# Patient Record
Sex: MALE | Race: WHITE | Employment: OTHER | URBAN - METROPOLITAN AREA
[De-identification: names, ages, dates, MRNs, and addresses within clinical notes are randomized per-mention and may not be internally consistent; named-entity substitution may affect disease eponyms.]

---

## 2017-09-28 ENCOUNTER — CASE MANAGEMENT (OUTPATIENT)
Dept: EMERGENCY DEPT | Age: 72
End: 2017-09-28

## 2017-09-28 PROBLEM — R07.81 RIB PAIN: Status: ACTIVE | Noted: 2017-09-28

## 2017-09-28 PROBLEM — M25.562 BILATERAL KNEE PAIN: Status: ACTIVE | Noted: 2017-09-28

## 2017-09-28 PROBLEM — M25.561 BILATERAL KNEE PAIN: Status: ACTIVE | Noted: 2017-09-28

## 2017-09-28 PROBLEM — F10.10 ALCOHOL ABUSE: Status: ACTIVE | Noted: 2017-09-28

## 2017-09-28 PROBLEM — J96.01 ACUTE RESPIRATORY FAILURE WITH HYPOXIA (HCC): Status: ACTIVE | Noted: 2017-09-28

## 2017-09-28 PROBLEM — M54.9 BACK PAIN: Status: ACTIVE | Noted: 2017-09-28

## 2017-11-07 ENCOUNTER — OFFICE VISIT (OUTPATIENT)
Dept: INTERNAL MEDICINE CLINIC | Age: 72
End: 2017-11-07

## 2017-11-07 VITALS — HEIGHT: 73 IN | WEIGHT: 298 LBS | BODY MASS INDEX: 39.49 KG/M2

## 2017-11-07 DIAGNOSIS — E78.2 MIXED HYPERLIPIDEMIA: ICD-10-CM

## 2017-11-07 DIAGNOSIS — Z11.59 ENCOUNTER FOR HEPATITIS C SCREENING TEST FOR LOW RISK PATIENT: ICD-10-CM

## 2017-11-07 DIAGNOSIS — E03.9 ACQUIRED HYPOTHYROIDISM: ICD-10-CM

## 2017-11-07 DIAGNOSIS — R60.0 PEDAL EDEMA: ICD-10-CM

## 2017-11-07 DIAGNOSIS — I10 HYPERTENSION, ESSENTIAL: Primary | ICD-10-CM

## 2017-11-07 DIAGNOSIS — K70.31 ASCITES DUE TO ALCOHOLIC CIRRHOSIS (HCC): ICD-10-CM

## 2017-11-07 DIAGNOSIS — F10.10 ALCOHOL ABUSE: ICD-10-CM

## 2017-11-07 PROBLEM — R07.81 RIB PAIN: Status: RESOLVED | Noted: 2017-09-28 | Resolved: 2017-11-07

## 2017-11-07 PROBLEM — M54.9 BACK PAIN: Status: RESOLVED | Noted: 2017-09-28 | Resolved: 2017-11-07

## 2017-11-07 LAB
A/G RATIO: 1.2 (ref 1.1–2.2)
ALBUMIN SERPL-MCNC: 3.9 G/DL (ref 3.4–5)
ALP BLD-CCNC: 117 U/L (ref 40–129)
ALT SERPL-CCNC: 21 U/L (ref 10–40)
ANION GAP SERPL CALCULATED.3IONS-SCNC: 17 MMOL/L (ref 3–16)
AST SERPL-CCNC: 30 U/L (ref 15–37)
BILIRUB SERPL-MCNC: 0.7 MG/DL (ref 0–1)
BUN BLDV-MCNC: 11 MG/DL (ref 7–20)
CALCIUM SERPL-MCNC: 9.9 MG/DL (ref 8.3–10.6)
CHLORIDE BLD-SCNC: 95 MMOL/L (ref 99–110)
CO2: 31 MMOL/L (ref 21–32)
CREAT SERPL-MCNC: 0.6 MG/DL (ref 0.8–1.3)
GFR AFRICAN AMERICAN: >60
GFR NON-AFRICAN AMERICAN: >60
GLOBULIN: 3.2 G/DL
GLUCOSE BLD-MCNC: 98 MG/DL (ref 70–99)
POTASSIUM SERPL-SCNC: 4 MMOL/L (ref 3.5–5.1)
SODIUM BLD-SCNC: 143 MMOL/L (ref 136–145)
TOTAL PROTEIN: 7.1 G/DL (ref 6.4–8.2)
TSH REFLEX: 6.76 UIU/ML (ref 0.27–4.2)

## 2017-11-07 PROCEDURE — 99214 OFFICE O/P EST MOD 30 MIN: CPT | Performed by: INTERNAL MEDICINE

## 2017-11-07 RX ORDER — FUROSEMIDE 40 MG/1
40 TABLET ORAL 2 TIMES DAILY
Qty: 60 TABLET | Refills: 3 | Status: SHIPPED | OUTPATIENT
Start: 2017-11-07 | End: 2017-11-27 | Stop reason: SDUPTHER

## 2017-11-07 ASSESSMENT — ENCOUNTER SYMPTOMS
COLOR CHANGE: 0
RHINORRHEA: 0
CHEST TIGHTNESS: 0
SHORTNESS OF BREATH: 1

## 2017-11-07 NOTE — PROGRESS NOTES
Negative for ear discharge, hearing loss, postnasal drip and rhinorrhea. Respiratory: Positive for shortness of breath. Negative for chest tightness. Cardiovascular: Positive for leg swelling. Negative for chest pain and palpitations. Genitourinary: Negative for frequency and hematuria. Musculoskeletal: Negative for neck stiffness. Skin: Positive for rash and wound. Negative for color change. Neurological: Negative for weakness. Hematological: Negative for adenopathy. Objective:   Physical Exam      There were no vitals filed for this visit. General:  Awake, alert and oriented. Appears to be not in any distress  Mucous Membranes:  Pink , anicteric  Neck: No JVD, no carotid bruit, no thyromegaly  Chest:  Clear to auscultation bilaterally, no added sounds  Cardiovascular:  RRR S1S2 heard, no murmurs or gallops  Abdomen:  Soft, undistended, non tender, no organomegaly, BS present  Extremities: 2 + pedal edema jono with chronic skin changes and erythematous skin   With excessive dry skin  Distal pulses well felt  Neurological : grossly normal      Assessment:      1. Hypertension, essential     2. Ascites due to alcoholic cirrhosis (HCC)  US Abdomen Complete    COMPREHENSIVE METABOLIC PANEL    HEPATITIS C ANTIBODY   3. Mixed hyperlipidemia     4. Alcohol abuse     5. Pedal edema     6. Encounter for hepatitis C screening test for low risk patient     7. Acquired hypothyroidism  TSH with Reflex           Plan:              HTN  - moderately controlled. On multiple meds. On norvasc, losartan, metoprolol   See below for changes        Alochol abuse - discussed cessation- pt not interested    Suspect pedal edema and ascites with Alcoholic cirrhosis - dc norvasc. Increase lasix to 40 mg bid , obtain liver us and add aldactone if needed    Chronic back pain - off methadone , now on cymbalta and neurontin    Chronic anxiety - on xanax bid, plan to wean off slowly .  D.w pt     Hyperlipidemia - on statins. Need lipids    Copd with chronic resp failure- pt not using oxygen.  Continue proventil , add symbicort  Diurese    Vit d def - on supplements    Hypothyroid on synthroid, check TSH    F/w 2 months

## 2017-11-08 LAB
HEPATITIS C ANTIBODY INTERPRETATION: NORMAL
T4 FREE: 1.3 NG/DL (ref 0.9–1.8)

## 2017-11-09 ENCOUNTER — HOSPITAL ENCOUNTER (OUTPATIENT)
Dept: ULTRASOUND IMAGING | Age: 72
Discharge: HOME | End: 2017-11-09
Attending: INTERNAL MEDICINE | Admitting: INTERNAL MEDICINE

## 2017-11-09 DIAGNOSIS — K70.31 ASCITES DUE TO ALCOHOLIC CIRRHOSIS (HCC): ICD-10-CM

## 2017-11-09 DIAGNOSIS — K70.31 ALCOHOLIC CIRRHOSIS OF LIVER WITH ASCITES (HCC): ICD-10-CM

## 2017-11-13 RX ORDER — LEVOTHYROXINE SODIUM 0.07 MG/1
TABLET ORAL
Qty: 30 TABLET | Refills: 2 | Status: SHIPPED | OUTPATIENT
Start: 2017-11-13 | End: 2018-01-11 | Stop reason: SDUPTHER

## 2017-11-13 RX ORDER — DULOXETIN HYDROCHLORIDE 60 MG/1
CAPSULE, DELAYED RELEASE ORAL
Qty: 30 CAPSULE | Refills: 2 | Status: SHIPPED | OUTPATIENT
Start: 2017-11-13 | End: 2018-01-11 | Stop reason: SDUPTHER

## 2017-11-14 ENCOUNTER — TELEPHONE (OUTPATIENT)
Dept: INTERNAL MEDICINE CLINIC | Age: 72
End: 2017-11-14

## 2017-11-14 NOTE — TELEPHONE ENCOUNTER
----- Message from Florence Adams MD sent at 11/14/2017  3:03 PM EST -----  Contact: [phone]  Call him    ----- Message -----  From: Nomi Milena  Sent: 11/13/2017   4:32 PM  To: Florence Adams MD    Patient wants you to speak with him personally about his US results. He was unaware that he had cirrhosis of the liver.

## 2017-11-20 RX ORDER — METOPROLOL SUCCINATE 25 MG/1
TABLET, EXTENDED RELEASE ORAL
Qty: 30 TABLET | Refills: 1 | Status: SHIPPED | OUTPATIENT
Start: 2017-11-20 | End: 2018-01-11 | Stop reason: SDUPTHER

## 2017-11-27 RX ORDER — FUROSEMIDE 40 MG/1
TABLET ORAL
Qty: 30 TABLET | Refills: 3 | Status: SHIPPED | OUTPATIENT
Start: 2017-11-27 | End: 2018-01-11 | Stop reason: SDUPTHER

## 2017-12-05 RX ORDER — LOSARTAN POTASSIUM 100 MG/1
TABLET ORAL
Qty: 30 TABLET | Refills: 2 | Status: SHIPPED | OUTPATIENT
Start: 2017-12-05 | End: 2018-01-11 | Stop reason: SDUPTHER

## 2018-01-11 ENCOUNTER — OFFICE VISIT (OUTPATIENT)
Dept: INTERNAL MEDICINE CLINIC | Age: 73
End: 2018-01-11

## 2018-01-11 VITALS
WEIGHT: 288 LBS | HEIGHT: 73 IN | HEART RATE: 70 BPM | BODY MASS INDEX: 38.17 KG/M2 | RESPIRATION RATE: 18 BRPM | DIASTOLIC BLOOD PRESSURE: 78 MMHG | SYSTOLIC BLOOD PRESSURE: 110 MMHG

## 2018-01-11 DIAGNOSIS — E78.2 MIXED HYPERLIPIDEMIA: ICD-10-CM

## 2018-01-11 DIAGNOSIS — R79.89 ABNORMAL TSH: ICD-10-CM

## 2018-01-11 DIAGNOSIS — I10 HYPERTENSION, ESSENTIAL: Primary | ICD-10-CM

## 2018-01-11 DIAGNOSIS — F10.10 ALCOHOL ABUSE: ICD-10-CM

## 2018-01-11 DIAGNOSIS — E55.9 VITAMIN D DEFICIENCY: ICD-10-CM

## 2018-01-11 LAB
A/G RATIO: 1.4 (ref 1.1–2.2)
ALBUMIN SERPL-MCNC: 3.8 G/DL (ref 3.4–5)
ALP BLD-CCNC: 98 U/L (ref 40–129)
ALT SERPL-CCNC: 21 U/L (ref 10–40)
ANION GAP SERPL CALCULATED.3IONS-SCNC: 12 MMOL/L (ref 3–16)
AST SERPL-CCNC: 26 U/L (ref 15–37)
BILIRUB SERPL-MCNC: 0.6 MG/DL (ref 0–1)
BUN BLDV-MCNC: 11 MG/DL (ref 7–20)
CALCIUM SERPL-MCNC: 10.3 MG/DL (ref 8.3–10.6)
CHLORIDE BLD-SCNC: 99 MMOL/L (ref 99–110)
CHOLESTEROL, TOTAL: 176 MG/DL (ref 0–199)
CO2: 32 MMOL/L (ref 21–32)
CREAT SERPL-MCNC: 0.7 MG/DL (ref 0.8–1.3)
GFR AFRICAN AMERICAN: >60
GFR NON-AFRICAN AMERICAN: >60
GLOBULIN: 2.7 G/DL
GLUCOSE BLD-MCNC: 102 MG/DL (ref 70–99)
HDLC SERPL-MCNC: 105 MG/DL (ref 40–60)
LDL CHOLESTEROL CALCULATED: 59 MG/DL
POTASSIUM SERPL-SCNC: 4.6 MMOL/L (ref 3.5–5.1)
SODIUM BLD-SCNC: 143 MMOL/L (ref 136–145)
TOTAL PROTEIN: 6.5 G/DL (ref 6.4–8.2)
TRIGL SERPL-MCNC: 62 MG/DL (ref 0–150)
TSH REFLEX: 5.41 UIU/ML (ref 0.27–4.2)
VLDLC SERPL CALC-MCNC: 12 MG/DL

## 2018-01-11 PROCEDURE — 99213 OFFICE O/P EST LOW 20 MIN: CPT | Performed by: INTERNAL MEDICINE

## 2018-01-11 RX ORDER — METOPROLOL SUCCINATE 25 MG/1
25 TABLET, EXTENDED RELEASE ORAL DAILY
Qty: 30 TABLET | Refills: 1 | Status: SHIPPED | OUTPATIENT
Start: 2018-01-11 | End: 2018-03-11 | Stop reason: SDUPTHER

## 2018-01-11 RX ORDER — DULOXETIN HYDROCHLORIDE 60 MG/1
60 CAPSULE, DELAYED RELEASE ORAL DAILY
Qty: 30 CAPSULE | Refills: 2 | Status: SHIPPED | OUTPATIENT
Start: 2018-01-11 | End: 2018-03-05 | Stop reason: SDUPTHER

## 2018-01-11 RX ORDER — LOSARTAN POTASSIUM 100 MG/1
100 TABLET ORAL DAILY
Qty: 30 TABLET | Refills: 2 | Status: SHIPPED | OUTPATIENT
Start: 2018-01-11 | End: 2018-04-13 | Stop reason: SDUPTHER

## 2018-01-11 RX ORDER — ATORVASTATIN CALCIUM 20 MG/1
20 TABLET, FILM COATED ORAL DAILY
Qty: 90 TABLET | Refills: 0 | Status: SHIPPED | OUTPATIENT
Start: 2018-01-11 | End: 2018-04-11 | Stop reason: SDUPTHER

## 2018-01-11 RX ORDER — OMEPRAZOLE 20 MG/1
20 CAPSULE, DELAYED RELEASE ORAL DAILY
Qty: 30 CAPSULE | Refills: 0 | Status: SHIPPED | OUTPATIENT
Start: 2018-01-11 | End: 2018-04-13 | Stop reason: SDUPTHER

## 2018-01-11 RX ORDER — FUROSEMIDE 40 MG/1
40 TABLET ORAL DAILY
Qty: 30 TABLET | Refills: 3 | Status: SHIPPED | OUTPATIENT
Start: 2018-01-11 | End: 2018-04-13 | Stop reason: SDUPTHER

## 2018-01-11 RX ORDER — LEVOTHYROXINE SODIUM 0.07 MG/1
75 TABLET ORAL DAILY
Qty: 30 TABLET | Refills: 2 | Status: SHIPPED | OUTPATIENT
Start: 2018-01-11 | End: 2018-01-15 | Stop reason: ALTCHOICE

## 2018-01-11 ASSESSMENT — ENCOUNTER SYMPTOMS
COLOR CHANGE: 0
RHINORRHEA: 0
CHEST TIGHTNESS: 0
SHORTNESS OF BREATH: 1

## 2018-01-11 ASSESSMENT — PATIENT HEALTH QUESTIONNAIRE - PHQ9
2. FEELING DOWN, DEPRESSED OR HOPELESS: 1
SUM OF ALL RESPONSES TO PHQ9 QUESTIONS 1 & 2: 1
SUM OF ALL RESPONSES TO PHQ QUESTIONS 1-9: 1
1. LITTLE INTEREST OR PLEASURE IN DOING THINGS: 0

## 2018-01-11 NOTE — PROGRESS NOTES
Visit Information    Have you changed or started any medications since your last visit including any over-the-counter medicines, vitamins, or herbal medicines? no   Are you having any side effects from any of your medications? -  no  Have you stopped taking any of your medications? Is so, why? -  no    Have you seen any other physician or provider since your last visit? No  Have you had any other diagnostic tests since your last visit? No  Have you been seen in the emergency room and/or had an admission to a hospital since we last saw you? No  Have you had your routine dental cleaning in the past 6 months? no    Have you activated your [employer] account? If not, what are your barriers?  Yes     Patient Care Team:  Jame Torrez MD as PCP - General (Internal Medicine)    Medical History Review  Past Medical, Family, and Social History reviewed and does not contribute to the patient presenting condition    Health Maintenance   Topic Date Due    AAA screen  1945    Lipid screen  06/28/1986    Colon cancer screen colonoscopy  06/28/1996    Zostavax vaccine  06/28/2006    Pneumococcal low/med risk (1 of 2 - PCV13) 06/28/2011    Flu vaccine (1) 09/01/2017    Potassium monitoring  11/07/2018    Creatinine monitoring  11/07/2018    DTaP/Tdap/Td vaccine (2 - Td) 03/06/2023    Hepatitis C screen  Completed

## 2018-01-11 NOTE — PROGRESS NOTES
Subjective:      Patient ID: Ean Rubio is a 71 y.o. male. HPI     71 y.o. male  with known h.o alcohol abuse, copd , HTN, hypothyroid,  anxiety here for regular f.w    Since last visit, pt has been taking lasix and has lost considerable weight but sob with exertion did not improve much     Reports he has been still drinking heavily, 8-10 beers per day and whiskey at night       Chronic pain syndrome- previously on methadone and has been off for few years    Copd with chronic resp failure- reports he is not using oxygen and he sent away oxygen recently. Not a current smoker     HTN -  on metoprolol. norvasc and losartan , lasix, no dizziness, edema resolved        Current Outpatient Prescriptions   Medication Sig Dispense Refill    losartan (COZAAR) 100 MG tablet TAKE 1 TABLET BY MOUTH EVERY DAY 30 tablet 2    CVS VITAMIN D3 1000 units CAPS TAKE ONE CAPSULE BY MOUTH DAILY 30 capsule 3    furosemide (LASIX) 40 MG tablet TAKE 1 TABLET BY MOUTH EVERY DAY 30 tablet 3    metoprolol succinate (TOPROL XL) 25 MG extended release tablet TAKE 1 TABLET BY MOUTH EVERY DAY 30 tablet 1    DULoxetine (CYMBALTA) 60 MG extended release capsule TAKE ONE CAPSULE BY MOUTH EVERY DAY 30 capsule 2    levothyroxine (SYNTHROID) 75 MCG tablet TAKE 1 TABLET BY MOUTH EVERY DAY 30 tablet 2    albuterol sulfate HFA (PROVENTIL HFA) 108 (90 BASE) MCG/ACT inhaler Inhale 2 puffs into the lungs every 6 hours as needed for Wheezing 1 Inhaler 3    potassium chloride SA (K-DUR;KLOR-CON M) 20 MEQ tablet Take 1 tablet by mouth 2 times daily 60 tablet 3    atorvastatin (LIPITOR) 20 MG tablet Take 1 tablet by mouth daily 90 tablet 0     No current facility-administered medications for this visit. Review of Systems   Constitutional: Negative for activity change, fatigue and unexpected weight change. HENT: Negative for ear discharge, hearing loss, postnasal drip and rhinorrhea. Respiratory: Positive for shortness of breath.  Negative

## 2018-01-12 LAB — T4 FREE: 1.1 NG/DL (ref 0.9–1.8)

## 2018-01-15 ENCOUNTER — TELEPHONE (OUTPATIENT)
Dept: INTERNAL MEDICINE CLINIC | Age: 73
End: 2018-01-15

## 2018-01-15 RX ORDER — LEVOTHYROXINE SODIUM 0.1 MG/1
100 TABLET ORAL DAILY
Qty: 30 TABLET | Refills: 0 | Status: SHIPPED | OUTPATIENT
Start: 2018-01-15 | End: 2018-02-10 | Stop reason: SDUPTHER

## 2018-01-16 RX ORDER — GABAPENTIN 300 MG/1
CAPSULE ORAL
Qty: 90 CAPSULE | Refills: 1 | Status: SHIPPED | OUTPATIENT
Start: 2018-01-16 | End: 2018-04-13 | Stop reason: SDUPTHER

## 2018-01-16 RX ORDER — POTASSIUM CHLORIDE 1500 MG/1
TABLET, EXTENDED RELEASE ORAL
Qty: 60 TABLET | Refills: 3 | Status: SHIPPED | OUTPATIENT
Start: 2018-01-16 | End: 2018-04-13 | Stop reason: SDUPTHER

## 2018-01-22 ENCOUNTER — TELEPHONE (OUTPATIENT)
Dept: INTERNAL MEDICINE CLINIC | Age: 73
End: 2018-01-22

## 2018-02-12 RX ORDER — LEVOTHYROXINE SODIUM 0.1 MG/1
100 TABLET ORAL DAILY
Qty: 30 TABLET | Refills: 2 | Status: SHIPPED | OUTPATIENT
Start: 2018-02-12 | End: 2018-04-13 | Stop reason: SDUPTHER

## 2018-03-05 RX ORDER — DULOXETIN HYDROCHLORIDE 60 MG/1
CAPSULE, DELAYED RELEASE ORAL
Qty: 30 CAPSULE | Refills: 1 | Status: SHIPPED | OUTPATIENT
Start: 2018-03-05 | End: 2018-04-13 | Stop reason: SDUPTHER

## 2018-03-12 RX ORDER — METOPROLOL SUCCINATE 25 MG/1
25 TABLET, EXTENDED RELEASE ORAL DAILY
Qty: 30 TABLET | Refills: 1 | Status: SHIPPED | OUTPATIENT
Start: 2018-03-12 | End: 2018-04-13 | Stop reason: SDUPTHER

## 2018-04-12 RX ORDER — ATORVASTATIN CALCIUM 20 MG/1
20 TABLET, FILM COATED ORAL DAILY
Qty: 30 TABLET | Refills: 0 | Status: SHIPPED | OUTPATIENT
Start: 2018-04-12 | End: 2018-04-13 | Stop reason: SDUPTHER

## 2018-04-13 ENCOUNTER — OFFICE VISIT (OUTPATIENT)
Dept: INTERNAL MEDICINE CLINIC | Age: 73
End: 2018-04-13

## 2018-04-13 VITALS
DIASTOLIC BLOOD PRESSURE: 78 MMHG | HEART RATE: 70 BPM | BODY MASS INDEX: 36.58 KG/M2 | SYSTOLIC BLOOD PRESSURE: 135 MMHG | RESPIRATION RATE: 18 BRPM | OXYGEN SATURATION: 93 % | WEIGHT: 276 LBS | HEIGHT: 73 IN

## 2018-04-13 DIAGNOSIS — E03.9 ACQUIRED HYPOTHYROIDISM: ICD-10-CM

## 2018-04-13 DIAGNOSIS — E78.2 MIXED HYPERLIPIDEMIA: Primary | ICD-10-CM

## 2018-04-13 DIAGNOSIS — I10 HYPERTENSION, ESSENTIAL: ICD-10-CM

## 2018-04-13 DIAGNOSIS — R06.09 DYSPNEA ON EXERTION: ICD-10-CM

## 2018-04-13 DIAGNOSIS — F41.9 ANXIETY: ICD-10-CM

## 2018-04-13 DIAGNOSIS — F10.10 ALCOHOL ABUSE: ICD-10-CM

## 2018-04-13 PROBLEM — J96.01 ACUTE RESPIRATORY FAILURE WITH HYPOXIA (HCC): Status: RESOLVED | Noted: 2017-09-28 | Resolved: 2018-04-13

## 2018-04-13 LAB
A/G RATIO: 1.6 (ref 1.1–2.2)
ALBUMIN SERPL-MCNC: 4.2 G/DL (ref 3.4–5)
ALP BLD-CCNC: 92 U/L (ref 40–129)
ALT SERPL-CCNC: 15 U/L (ref 10–40)
ANION GAP SERPL CALCULATED.3IONS-SCNC: 15 MMOL/L (ref 3–16)
AST SERPL-CCNC: 20 U/L (ref 15–37)
BASOPHILS ABSOLUTE: 0.1 K/UL (ref 0–0.2)
BASOPHILS RELATIVE PERCENT: 0.6 %
BILIRUB SERPL-MCNC: 0.8 MG/DL (ref 0–1)
BUN BLDV-MCNC: 12 MG/DL (ref 7–20)
CALCIUM SERPL-MCNC: 9.4 MG/DL (ref 8.3–10.6)
CHLORIDE BLD-SCNC: 96 MMOL/L (ref 99–110)
CO2: 31 MMOL/L (ref 21–32)
CREAT SERPL-MCNC: 0.7 MG/DL (ref 0.8–1.3)
EOSINOPHILS ABSOLUTE: 0.3 K/UL (ref 0–0.6)
EOSINOPHILS RELATIVE PERCENT: 3.6 %
GFR AFRICAN AMERICAN: >60
GFR NON-AFRICAN AMERICAN: >60
GLOBULIN: 2.7 G/DL
GLUCOSE BLD-MCNC: 111 MG/DL (ref 70–99)
HCT VFR BLD CALC: 44.4 % (ref 40.5–52.5)
HEMOGLOBIN: 15 G/DL (ref 13.5–17.5)
LYMPHOCYTES ABSOLUTE: 1.2 K/UL (ref 1–5.1)
LYMPHOCYTES RELATIVE PERCENT: 12.4 %
MCH RBC QN AUTO: 35.3 PG (ref 26–34)
MCHC RBC AUTO-ENTMCNC: 33.8 G/DL (ref 31–36)
MCV RBC AUTO: 104.2 FL (ref 80–100)
MONOCYTES ABSOLUTE: 0.8 K/UL (ref 0–1.3)
MONOCYTES RELATIVE PERCENT: 8.3 %
NEUTROPHILS ABSOLUTE: 7 K/UL (ref 1.7–7.7)
NEUTROPHILS RELATIVE PERCENT: 75.1 %
PDW BLD-RTO: 15.8 % (ref 12.4–15.4)
PLATELET # BLD: 455 K/UL (ref 135–450)
PMV BLD AUTO: 8.2 FL (ref 5–10.5)
POTASSIUM SERPL-SCNC: 4.6 MMOL/L (ref 3.5–5.1)
RBC # BLD: 4.26 M/UL (ref 4.2–5.9)
SODIUM BLD-SCNC: 142 MMOL/L (ref 136–145)
TOTAL PROTEIN: 6.9 G/DL (ref 6.4–8.2)
TSH REFLEX: 4.17 UIU/ML (ref 0.27–4.2)
WBC # BLD: 9.4 K/UL (ref 4–11)

## 2018-04-13 PROCEDURE — 99213 OFFICE O/P EST LOW 20 MIN: CPT | Performed by: INTERNAL MEDICINE

## 2018-04-13 RX ORDER — OMEPRAZOLE 20 MG/1
20 CAPSULE, DELAYED RELEASE ORAL DAILY
Qty: 30 CAPSULE | Refills: 0 | Status: SHIPPED | OUTPATIENT
Start: 2018-04-13 | End: 2018-05-13 | Stop reason: SDUPTHER

## 2018-04-13 RX ORDER — LEVOTHYROXINE SODIUM 0.1 MG/1
100 TABLET ORAL DAILY
Qty: 30 TABLET | Refills: 2 | Status: ON HOLD | OUTPATIENT
Start: 2018-04-13 | End: 2019-02-19 | Stop reason: HOSPADM

## 2018-04-13 RX ORDER — ATORVASTATIN CALCIUM 20 MG/1
20 TABLET, FILM COATED ORAL DAILY
Qty: 30 TABLET | Refills: 5 | Status: SHIPPED | OUTPATIENT
Start: 2018-04-13 | End: 2019-04-08 | Stop reason: ALTCHOICE

## 2018-04-13 RX ORDER — METOPROLOL SUCCINATE 25 MG/1
25 TABLET, EXTENDED RELEASE ORAL DAILY
Qty: 30 TABLET | Refills: 5 | Status: SHIPPED | OUTPATIENT
Start: 2018-04-13 | End: 2018-05-08 | Stop reason: SDUPTHER

## 2018-04-13 RX ORDER — POTASSIUM CHLORIDE 20 MEQ/1
20 TABLET, EXTENDED RELEASE ORAL DAILY
Qty: 30 TABLET | Refills: 3 | Status: SHIPPED | OUTPATIENT
Start: 2018-04-13 | End: 2018-05-08 | Stop reason: SDUPTHER

## 2018-04-13 RX ORDER — LOSARTAN POTASSIUM 100 MG/1
100 TABLET ORAL DAILY
Qty: 30 TABLET | Refills: 3 | Status: SHIPPED | OUTPATIENT
Start: 2018-04-13 | End: 2018-05-08 | Stop reason: SDUPTHER

## 2018-04-13 RX ORDER — GABAPENTIN 300 MG/1
300 CAPSULE ORAL 3 TIMES DAILY
Qty: 90 CAPSULE | Refills: 1 | Status: SHIPPED | OUTPATIENT
Start: 2018-04-13 | End: 2018-09-17 | Stop reason: SDUPTHER

## 2018-04-13 RX ORDER — DULOXETIN HYDROCHLORIDE 60 MG/1
60 CAPSULE, DELAYED RELEASE ORAL DAILY
Qty: 30 CAPSULE | Refills: 3 | Status: SHIPPED | OUTPATIENT
Start: 2018-04-13 | End: 2018-05-08 | Stop reason: SDUPTHER

## 2018-04-13 RX ORDER — FUROSEMIDE 40 MG/1
40 TABLET ORAL DAILY
Qty: 30 TABLET | Refills: 3 | Status: SHIPPED | OUTPATIENT
Start: 2018-04-13 | End: 2018-04-30 | Stop reason: SDUPTHER

## 2018-04-13 RX ORDER — ALBUTEROL SULFATE 90 UG/1
2 AEROSOL, METERED RESPIRATORY (INHALATION) EVERY 6 HOURS PRN
Qty: 1 INHALER | Refills: 3 | Status: SHIPPED | OUTPATIENT
Start: 2018-04-13

## 2018-04-13 ASSESSMENT — ENCOUNTER SYMPTOMS
SHORTNESS OF BREATH: 1
CHEST TIGHTNESS: 0
RHINORRHEA: 0
COLOR CHANGE: 0

## 2018-04-23 RX ORDER — LOSARTAN POTASSIUM 100 MG/1
100 TABLET ORAL DAILY
Qty: 30 TABLET | Refills: 2 | Status: SHIPPED | OUTPATIENT
Start: 2018-04-23 | End: 2018-05-08 | Stop reason: SDUPTHER

## 2018-04-30 RX ORDER — FUROSEMIDE 40 MG/1
TABLET ORAL
Qty: 30 TABLET | Refills: 2 | Status: SHIPPED | OUTPATIENT
Start: 2018-04-30 | End: 2018-05-08 | Stop reason: SDUPTHER

## 2018-05-08 RX ORDER — POTASSIUM CHLORIDE 20 MEQ/1
20 TABLET, EXTENDED RELEASE ORAL DAILY
Qty: 90 TABLET | Refills: 0 | Status: SHIPPED | OUTPATIENT
Start: 2018-05-08 | End: 2018-05-10 | Stop reason: SDUPTHER

## 2018-05-08 RX ORDER — FUROSEMIDE 40 MG/1
TABLET ORAL
Qty: 90 TABLET | Refills: 0 | Status: SHIPPED | OUTPATIENT
Start: 2018-05-08 | End: 2018-10-25

## 2018-05-08 RX ORDER — METOPROLOL SUCCINATE 25 MG/1
25 TABLET, EXTENDED RELEASE ORAL DAILY
Qty: 30 TABLET | Refills: 1 | Status: SHIPPED | OUTPATIENT
Start: 2018-05-08 | End: 2018-05-08 | Stop reason: SDUPTHER

## 2018-05-08 RX ORDER — DULOXETIN HYDROCHLORIDE 60 MG/1
60 CAPSULE, DELAYED RELEASE ORAL DAILY
Qty: 90 CAPSULE | Refills: 0 | Status: SHIPPED | OUTPATIENT
Start: 2018-05-08 | End: 2019-04-15 | Stop reason: ALTCHOICE

## 2018-05-08 RX ORDER — LOSARTAN POTASSIUM 100 MG/1
100 TABLET ORAL DAILY
Qty: 90 TABLET | Refills: 0 | Status: SHIPPED | OUTPATIENT
Start: 2018-05-08 | End: 2019-03-09 | Stop reason: SDUPTHER

## 2018-05-08 RX ORDER — METOPROLOL SUCCINATE 25 MG/1
25 TABLET, EXTENDED RELEASE ORAL DAILY
Qty: 90 TABLET | Refills: 0 | Status: ON HOLD | OUTPATIENT
Start: 2018-05-08 | End: 2019-03-23 | Stop reason: SDUPTHER

## 2018-05-10 RX ORDER — POTASSIUM CHLORIDE 20 MEQ/1
20 TABLET, EXTENDED RELEASE ORAL DAILY
Qty: 90 TABLET | Refills: 0 | Status: SHIPPED | OUTPATIENT
Start: 2018-05-10 | End: 2018-09-07 | Stop reason: SDUPTHER

## 2018-05-14 RX ORDER — OMEPRAZOLE 20 MG/1
20 CAPSULE, DELAYED RELEASE ORAL DAILY
Qty: 30 CAPSULE | Refills: 1 | Status: SHIPPED | OUTPATIENT
Start: 2018-05-14 | End: 2018-08-12 | Stop reason: SDUPTHER

## 2018-06-14 ENCOUNTER — OFFICE VISIT (OUTPATIENT)
Dept: INTERNAL MEDICINE CLINIC | Age: 73
End: 2018-06-14

## 2018-06-14 VITALS — HEART RATE: 70 BPM | DIASTOLIC BLOOD PRESSURE: 70 MMHG | RESPIRATION RATE: 18 BRPM | SYSTOLIC BLOOD PRESSURE: 135 MMHG
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
## 2018-06-14 DIAGNOSIS — J94.2 HEMOTHORAX, RIGHT: ICD-10-CM

## 2018-06-14 DIAGNOSIS — M43.24 FUSION OF SPINE OF THORACIC REGION: ICD-10-CM

## 2018-06-14 DIAGNOSIS — R78.81 STAPHYLOCOCCUS AUREUS BACTEREMIA: ICD-10-CM

## 2018-06-14 DIAGNOSIS — Z09 HOSPITAL DISCHARGE FOLLOW-UP: ICD-10-CM

## 2018-06-14 DIAGNOSIS — B95.61 STAPHYLOCOCCUS AUREUS BACTEREMIA: ICD-10-CM

## 2018-06-14 DIAGNOSIS — S22.43XS MULTIPLE FRACTURES OF RIBS, BILATERAL, SEQUELA: ICD-10-CM

## 2018-06-14 PROCEDURE — 99214 OFFICE O/P EST MOD 30 MIN: CPT | Performed by: INTERNAL MEDICINE

## 2018-06-14 ASSESSMENT — ENCOUNTER SYMPTOMS
SHORTNESS OF BREATH: 1
CHEST TIGHTNESS: 0
RHINORRHEA: 0
COLOR CHANGE: 0

## 2018-06-14 NOTE — PROGRESS NOTES
Subjective:      Patient ID: Deja Maki is a 71 y.o. male. HPI       71 y.o. male  with known h.o alcohol abuse, copd , HTN, hypothyroid,  anxiety here for regular f.w    Since last visit, pt fell at home on steps 5/18- with injury to back bone   Sustained multiple jono rib fractures, right hemothorax , MSSA bacteremia    Required T6- T11 fusion  at . Now completed rehab and in back brace  Now has picc line for IV abx , f/w  Infectious diseases     Reports he quit alcohol since discharge and now slowly recovering . Able to ambulate. Pain well controlled. No urinary or fecal incontinence or lower ext weakness         Chronic pain syndrome- previously on methadone and has been off for few years  Now on cymbalta         HTN -  on metoprolol.  norvasc and losartan , lasix, no dizziness, edema resolved    GERD on PPI    Copd , stable now on home oxygen post surgery      Current Outpatient Prescriptions   Medication Sig Dispense Refill    omeprazole (PRILOSEC) 20 MG delayed release capsule TAKE 1 CAPSULE BY MOUTH DAILY 30 capsule 1    aspirin 81 MG tablet Take 81 mg by mouth daily      potassium chloride (KLOR-CON M20) 20 MEQ extended release tablet Take 1 tablet by mouth daily 90 tablet 0    DULoxetine (CYMBALTA) 60 MG extended release capsule Take 1 capsule by mouth daily 90 capsule 0    metoprolol succinate (TOPROL XL) 25 MG extended release tablet Take 1 tablet by mouth daily 90 tablet 0    furosemide (LASIX) 40 MG tablet TAKE 1 TABLET BY MOUTH EVERY DAY 90 tablet 0    losartan (COZAAR) 100 MG tablet Take 1 tablet by mouth daily 90 tablet 0    vitamin D (CVS VITAMIN D3) 1000 units CAPS Take 1 capsule by mouth daily 90 capsule 0    albuterol sulfate HFA (PROVENTIL HFA) 108 (90 Base) MCG/ACT inhaler Inhale 2 puffs into the lungs every 6 hours as needed for Wheezing 1 Inhaler 3    atorvastatin (LIPITOR) 20 MG tablet Take 1 tablet by mouth daily 30 tablet 5    levothyroxine (SYNTHROID) 100 MCG tablet vertebral fracture  - from fall, sp T6- 11 fusion   - pain controlled  - in back brace, fw  trauma clinic      HTN  - controlled. On multiple meds. Off norvasc,  Continue lasix, losartan, metoprolol   Improved pedal edema, has lost 25 lbs since last year       Alochol abuse - remains sober since fall 5/18    Pedal edema - on lasix 40 mg daily    Chronic back pain - off methadone , now on cymbalta and neurontin     Chronic anxiety- was on xanax bid, weaned off last year 2017    Hyperlipidemia - on statins.  Well controlled       Vit d def - on supplements    Hypothyroid on synthroid, check TSH    F/w 3 months

## 2018-06-21 RX ORDER — BUDESONIDE AND FORMOTEROL FUMARATE DIHYDRATE 80; 4.5 UG/1; UG/1
1 AEROSOL RESPIRATORY (INHALATION) 2 TIMES DAILY
Qty: 3 INHALER | Refills: 0 | Status: SHIPPED | OUTPATIENT
Start: 2018-06-21 | End: 2018-10-01 | Stop reason: SDUPTHER

## 2018-07-23 ASSESSMENT — ENCOUNTER SYMPTOMS: BACK PAIN: 1

## 2018-08-09 ENCOUNTER — TELEPHONE (OUTPATIENT)
Dept: INTERNAL MEDICINE CLINIC | Age: 73
End: 2018-08-09

## 2018-08-13 RX ORDER — OMEPRAZOLE 20 MG/1
CAPSULE, DELAYED RELEASE ORAL
Qty: 30 CAPSULE | Refills: 1 | Status: SHIPPED | OUTPATIENT
Start: 2018-08-13 | End: 2018-10-06 | Stop reason: SDUPTHER

## 2018-08-29 ENCOUNTER — OFFICE VISIT (OUTPATIENT)
Dept: INTERNAL MEDICINE CLINIC | Age: 73
End: 2018-08-29

## 2018-08-29 VITALS
SYSTOLIC BLOOD PRESSURE: 125 MMHG | RESPIRATION RATE: 18 BRPM | WEIGHT: 266 LBS | BODY MASS INDEX: 35.25 KG/M2 | HEART RATE: 85 BPM | DIASTOLIC BLOOD PRESSURE: 70 MMHG | HEIGHT: 73 IN

## 2018-08-29 DIAGNOSIS — I10 HYPERTENSION, ESSENTIAL: Primary | ICD-10-CM

## 2018-08-29 DIAGNOSIS — F10.10 ALCOHOL ABUSE: ICD-10-CM

## 2018-08-29 DIAGNOSIS — J43.9 PULMONARY EMPHYSEMA, UNSPECIFIED EMPHYSEMA TYPE (HCC): ICD-10-CM

## 2018-08-29 DIAGNOSIS — I10 HYPERTENSION, ESSENTIAL: ICD-10-CM

## 2018-08-29 DIAGNOSIS — E78.2 MIXED HYPERLIPIDEMIA: ICD-10-CM

## 2018-08-29 DIAGNOSIS — F41.9 ANXIETY: ICD-10-CM

## 2018-08-29 LAB
A/G RATIO: 1.4 (ref 1.1–2.2)
ALBUMIN SERPL-MCNC: 4.1 G/DL (ref 3.4–5)
ALP BLD-CCNC: 123 U/L (ref 40–129)
ALT SERPL-CCNC: 19 U/L (ref 10–40)
ANION GAP SERPL CALCULATED.3IONS-SCNC: 16 MMOL/L (ref 3–16)
AST SERPL-CCNC: 30 U/L (ref 15–37)
BASOPHILS ABSOLUTE: 0.1 K/UL (ref 0–0.2)
BASOPHILS RELATIVE PERCENT: 0.5 %
BILIRUB SERPL-MCNC: 0.3 MG/DL (ref 0–1)
BUN BLDV-MCNC: 8 MG/DL (ref 7–20)
CALCIUM SERPL-MCNC: 9.6 MG/DL (ref 8.3–10.6)
CHLORIDE BLD-SCNC: 94 MMOL/L (ref 99–110)
CO2: 30 MMOL/L (ref 21–32)
CREAT SERPL-MCNC: 0.8 MG/DL (ref 0.8–1.3)
EOSINOPHILS ABSOLUTE: 0.2 K/UL (ref 0–0.6)
EOSINOPHILS RELATIVE PERCENT: 2.5 %
GFR AFRICAN AMERICAN: >60
GFR NON-AFRICAN AMERICAN: >60
GLOBULIN: 2.9 G/DL
GLUCOSE BLD-MCNC: 92 MG/DL (ref 70–99)
HCT VFR BLD CALC: 37.5 % (ref 40.5–52.5)
HEMOGLOBIN: 12.6 G/DL (ref 13.5–17.5)
LYMPHOCYTES ABSOLUTE: 1.2 K/UL (ref 1–5.1)
LYMPHOCYTES RELATIVE PERCENT: 13 %
MCH RBC QN AUTO: 31.6 PG (ref 26–34)
MCHC RBC AUTO-ENTMCNC: 33.5 G/DL (ref 31–36)
MCV RBC AUTO: 94.2 FL (ref 80–100)
MONOCYTES ABSOLUTE: 0.7 K/UL (ref 0–1.3)
MONOCYTES RELATIVE PERCENT: 7.5 %
NEUTROPHILS ABSOLUTE: 7.3 K/UL (ref 1.7–7.7)
NEUTROPHILS RELATIVE PERCENT: 76.5 %
PDW BLD-RTO: 16.1 % (ref 12.4–15.4)
PLATELET # BLD: 470 K/UL (ref 135–450)
PMV BLD AUTO: 8.3 FL (ref 5–10.5)
POTASSIUM SERPL-SCNC: 4.2 MMOL/L (ref 3.5–5.1)
RBC # BLD: 3.98 M/UL (ref 4.2–5.9)
SODIUM BLD-SCNC: 140 MMOL/L (ref 136–145)
TOTAL PROTEIN: 7 G/DL (ref 6.4–8.2)
TSH REFLEX: 2.93 UIU/ML (ref 0.27–4.2)
WBC # BLD: 9.6 K/UL (ref 4–11)

## 2018-08-29 PROCEDURE — 99214 OFFICE O/P EST MOD 30 MIN: CPT | Performed by: INTERNAL MEDICINE

## 2018-08-29 ASSESSMENT — ENCOUNTER SYMPTOMS
BACK PAIN: 1
RHINORRHEA: 0
CHEST TIGHTNESS: 0
SHORTNESS OF BREATH: 1
COLOR CHANGE: 0

## 2018-08-29 NOTE — PROGRESS NOTES
Inhaler 3    atorvastatin (LIPITOR) 20 MG tablet Take 1 tablet by mouth daily 30 tablet 5    levothyroxine (SYNTHROID) 100 MCG tablet Take 1 tablet by mouth Daily 30 tablet 2    gabapentin (NEURONTIN) 300 MG capsule Take 1 capsule by mouth 3 times daily for 90 days. . 90 capsule 1     No current facility-administered medications for this visit. Review of Systems   Constitutional: Negative for activity change, fatigue and unexpected weight change. HENT: Negative for ear discharge, hearing loss, postnasal drip and rhinorrhea. Respiratory: Positive for shortness of breath. Negative for chest tightness. Cardiovascular: Negative for chest pain, palpitations and leg swelling. Genitourinary: Negative for frequency and hematuria. Musculoskeletal: Positive for back pain. Negative for neck stiffness. Skin: Negative for color change and wound. Skin tag on left shoulder   Neurological: Negative for dizziness, weakness and numbness. Hematological: Negative for adenopathy. Psychiatric/Behavioral: Negative for dysphoric mood and sleep disturbance. The patient is not nervous/anxious. Objective:   Physical Exam    Vitals:    08/29/18 1057   BP: 125/70   Pulse: 85   Resp: 18         General: elderly male,  Awake, alert and oriented. Appears to be not in any distress  Mucous Membranes:  Pink , anicteric  Neck: No JVD, no carotid bruit, no thyromegaly  Chest:  Bilateral air entry , clear with no wheeze  Cardiovascular:  RRR S1S2 heard, no murmurs   Abdomen:  Soft, undistended, non tender, no organomegaly, BS present  Extremities: resolved pedal edema jono with chronic skin changes and erythematous skin    Distal pulses well felt    Wt Readings from Last 3 Encounters:   08/29/18 266 lb (120.7 kg)   05/13/18 276 lb (125.2 kg)   04/13/18 276 lb (125.2 kg)         Assessment:       Diagnosis Orders   1. Hypertension, essential     2. Anxiety     3. Mixed hyperlipidemia     4.  Pulmonary emphysema, unspecified emphysema type ([facility] [address]             Plan:        Copd  - weaned off oxygen  - remains symptomatic with exertional dyspnea. Does not want oxygen  - quit smoking >10 yrs  - stop symbicort and trial of breo today    Fall with mulitiple rib fractures and T8 vertebral fracture  - from fall, sp T6- 11 fusion 5/18 at [facility]  - no issues  - pt continues to drink       HTN  - controlled. On multiple meds. Off norvasc,  Continue lasix, losartan, metoprolol   Improved pedal edema, has lost 30 lbs since last year       Alochol abuse - no interest in quittting. Continues to drink heavily    Pedal edema - on lasix 40 mg daily- resolved    Chronic back pain - off methadone , now on cymbalta and neurontin     Chronic anxiety- was on xanax bid, weaned off last year 2017    Hyperlipidemia - on statins.  Well controlled       Vit d def - on supplements    Hypothyroid on synthroid, check TSH    F/w 3-4months

## 2018-09-10 RX ORDER — POTASSIUM CHLORIDE 1500 MG/1
TABLET, EXTENDED RELEASE ORAL
Qty: 90 TABLET | Refills: 0 | Status: ON HOLD | OUTPATIENT
Start: 2018-09-10 | End: 2019-08-25 | Stop reason: HOSPADM

## 2018-09-17 RX ORDER — GABAPENTIN 300 MG/1
300 CAPSULE ORAL 3 TIMES DAILY
Qty: 90 CAPSULE | Refills: 1 | Status: ON HOLD | OUTPATIENT
Start: 2018-09-17 | End: 2019-02-19 | Stop reason: HOSPADM

## 2018-10-01 RX ORDER — DILTIAZEM HYDROCHLORIDE 60 MG/1
TABLET, FILM COATED ORAL
Qty: 30.6 INHALER | Refills: 2 | Status: SHIPPED | OUTPATIENT
Start: 2018-10-01

## 2018-10-08 RX ORDER — OMEPRAZOLE 20 MG/1
CAPSULE, DELAYED RELEASE ORAL
Qty: 30 CAPSULE | Refills: 2 | Status: SHIPPED | OUTPATIENT
Start: 2018-10-08 | End: 2018-12-10 | Stop reason: SDUPTHER

## 2018-10-25 RX ORDER — FUROSEMIDE 40 MG/1
TABLET ORAL
Qty: 30 TABLET | Refills: 2 | Status: ON HOLD | OUTPATIENT
Start: 2018-10-25 | End: 2019-03-29 | Stop reason: SDUPTHER

## 2018-12-03 ENCOUNTER — OFFICE VISIT (OUTPATIENT)
Dept: INTERNAL MEDICINE CLINIC | Age: 73
End: 2018-12-03

## 2018-12-03 ENCOUNTER — TELEPHONE (OUTPATIENT)
Dept: INTERNAL MEDICINE CLINIC | Age: 73
End: 2018-12-03

## 2018-12-03 VITALS
HEIGHT: 73 IN | WEIGHT: 272 LBS | DIASTOLIC BLOOD PRESSURE: 75 MMHG | RESPIRATION RATE: 18 BRPM | BODY MASS INDEX: 36.05 KG/M2 | SYSTOLIC BLOOD PRESSURE: 125 MMHG | HEART RATE: 70 BPM

## 2018-12-03 DIAGNOSIS — F41.9 ANXIETY: ICD-10-CM

## 2018-12-03 DIAGNOSIS — F10.10 ALCOHOL ABUSE: ICD-10-CM

## 2018-12-03 DIAGNOSIS — E03.9 ACQUIRED HYPOTHYROIDISM: ICD-10-CM

## 2018-12-03 DIAGNOSIS — I10 HYPERTENSION, ESSENTIAL: Primary | ICD-10-CM

## 2018-12-03 DIAGNOSIS — R06.09 DYSPNEA ON EXERTION: ICD-10-CM

## 2018-12-03 DIAGNOSIS — E78.2 MIXED HYPERLIPIDEMIA: ICD-10-CM

## 2018-12-03 DIAGNOSIS — G62.1 ALCOHOLIC PERIPHERAL NEUROPATHY (HCC): ICD-10-CM

## 2018-12-03 DIAGNOSIS — J43.2 CENTRILOBULAR EMPHYSEMA (HCC): ICD-10-CM

## 2018-12-03 DIAGNOSIS — I10 HYPERTENSION, ESSENTIAL: ICD-10-CM

## 2018-12-03 PROCEDURE — G0008 ADMIN INFLUENZA VIRUS VAC: HCPCS | Performed by: INTERNAL MEDICINE

## 2018-12-03 PROCEDURE — G0009 ADMIN PNEUMOCOCCAL VACCINE: HCPCS | Performed by: INTERNAL MEDICINE

## 2018-12-03 PROCEDURE — 90732 PPSV23 VACC 2 YRS+ SUBQ/IM: CPT | Performed by: INTERNAL MEDICINE

## 2018-12-03 PROCEDURE — 90662 IIV NO PRSV INCREASED AG IM: CPT | Performed by: INTERNAL MEDICINE

## 2018-12-03 PROCEDURE — 99214 OFFICE O/P EST MOD 30 MIN: CPT | Performed by: INTERNAL MEDICINE

## 2018-12-03 RX ORDER — ALBUTEROL SULFATE 0.63 MG/3ML
1 SOLUTION RESPIRATORY (INHALATION) EVERY 6 HOURS PRN
Qty: 270 ML | Refills: 3 | Status: ON HOLD | OUTPATIENT
Start: 2018-12-03 | End: 2019-02-19 | Stop reason: HOSPADM

## 2018-12-03 RX ORDER — MULTIVIT-MIN/IRON FUM/FOLIC AC 7.5 MG-4
1 TABLET ORAL DAILY
Qty: 30 TABLET | Refills: 3 | Status: SHIPPED | OUTPATIENT
Start: 2018-12-03 | End: 2019-03-19 | Stop reason: SDUPTHER

## 2018-12-03 ASSESSMENT — ENCOUNTER SYMPTOMS
BACK PAIN: 1
RHINORRHEA: 0
SHORTNESS OF BREATH: 1
CHEST TIGHTNESS: 0
COLOR CHANGE: 0

## 2018-12-03 NOTE — PROGRESS NOTES
skin changes and erythematous skin   Diabetic foot exam normal. Monofilament testing and vibration testing  demonstrates decreased sensation to front half of foot   Distal pulses well felt    Wt Readings from Last 3 Encounters:   12/03/18 272 lb (123.4 kg)   08/29/18 266 lb (120.7 kg)   05/13/18 276 lb (125.2 kg)         Assessment:       Diagnosis Orders   1. Hypertension, essential  COMPREHENSIVE METABOLIC PANEL    CBC WITH AUTO DIFFERENTIAL   2. Mixed hyperlipidemia     3. Anxiety     4. Alcohol abuse     5. Centrilobular emphysema ([address]  Full PFT Study With Bronchodilator   6. Dyspnea on exertion  Full PFT Study With Bronchodilator   7. Alcoholic peripheral neuropathy (HCC)  HEMOGLOBIN A1C    VITAMIN B12 & FOLATE   8. Acquired hypothyroidism  TSH with Reflex           Plan:        Copd  - off oxygen since 5./18- received during hospital   - remains symptomatic with exertional dyspnea. Does not want oxygen  - quit smoking >10 yrs  - add nebulizer with accunebs  - PFT  - continue breo       Fall with mulitiple rib fractures and T8 vertebral fracture  - from fall, sp T6- 11 fusion 5/18 at [facility]  - no issues  - pt continues to drink heavily    Peripheral neuropathy - likely alcohol induced  - add B12 supplements       HTN  - controlled. On multiple meds. Off norvasc,  Continue lasix, losartan, metoprolol   Improved pedal edema,      Alochol abuse - no interest in quittting. Continues to drink heavily    Pedal edema - on lasix 40 mg daily- resolved    Chronic back pain - off methadone , now on cymbalta and neurontin     Chronic anxiety- was on xanax bid, weaned off last year 2017    Hyperlipidemia - on statins.  Well controlled       Vit d def - on supplements    Hypothyroid on synthroid, check TSH    F/w 3-4months

## 2018-12-04 ENCOUNTER — TELEPHONE (OUTPATIENT)
Dept: INTERNAL MEDICINE CLINIC | Age: 73
End: 2018-12-04

## 2018-12-04 DIAGNOSIS — D64.9 ANEMIA, UNSPECIFIED TYPE: Primary | ICD-10-CM

## 2018-12-04 DIAGNOSIS — D64.9 ANEMIA, UNSPECIFIED TYPE: ICD-10-CM

## 2018-12-04 LAB
A/G RATIO: 1.3 (ref 1.1–2.2)
ALBUMIN SERPL-MCNC: 3.8 G/DL (ref 3.4–5)
ALP BLD-CCNC: 131 U/L (ref 40–129)
ALT SERPL-CCNC: 12 U/L (ref 10–40)
ANION GAP SERPL CALCULATED.3IONS-SCNC: 17 MMOL/L (ref 3–16)
AST SERPL-CCNC: 15 U/L (ref 15–37)
BASOPHILS ABSOLUTE: 0.1 K/UL (ref 0–0.2)
BASOPHILS RELATIVE PERCENT: 0.7 %
BILIRUB SERPL-MCNC: 0.6 MG/DL (ref 0–1)
BUN BLDV-MCNC: 17 MG/DL (ref 7–20)
CALCIUM SERPL-MCNC: 9.7 MG/DL (ref 8.3–10.6)
CHLORIDE BLD-SCNC: 93 MMOL/L (ref 99–110)
CO2: 31 MMOL/L (ref 21–32)
CREAT SERPL-MCNC: 1.1 MG/DL (ref 0.8–1.3)
EOSINOPHILS ABSOLUTE: 0.5 K/UL (ref 0–0.6)
EOSINOPHILS RELATIVE PERCENT: 3.9 %
ESTIMATED AVERAGE GLUCOSE: 125.5 MG/DL
FERRITIN: 65.1 NG/ML (ref 30–400)
FOLATE: 3.27 NG/ML (ref 4.78–24.2)
GFR AFRICAN AMERICAN: >60
GFR NON-AFRICAN AMERICAN: >60
GLOBULIN: 3 G/DL
GLUCOSE BLD-MCNC: 101 MG/DL (ref 70–99)
HBA1C MFR BLD: 6 %
HCT VFR BLD CALC: 32.6 % (ref 40.5–52.5)
HEMOGLOBIN: 9.9 G/DL (ref 13.5–17.5)
IRON SATURATION: 9 % (ref 20–50)
IRON: 29 UG/DL (ref 59–158)
LYMPHOCYTES ABSOLUTE: 1.1 K/UL (ref 1–5.1)
LYMPHOCYTES RELATIVE PERCENT: 8.7 %
MCH RBC QN AUTO: 25.4 PG (ref 26–34)
MCHC RBC AUTO-ENTMCNC: 30.5 G/DL (ref 31–36)
MCV RBC AUTO: 83 FL (ref 80–100)
MONOCYTES ABSOLUTE: 0.8 K/UL (ref 0–1.3)
MONOCYTES RELATIVE PERCENT: 6 %
NEUTROPHILS ABSOLUTE: 10.1 K/UL (ref 1.7–7.7)
NEUTROPHILS RELATIVE PERCENT: 80.7 %
PDW BLD-RTO: 18.6 % (ref 12.4–15.4)
PLATELET # BLD: 666 K/UL (ref 135–450)
PMV BLD AUTO: 7.9 FL (ref 5–10.5)
POTASSIUM SERPL-SCNC: 4 MMOL/L (ref 3.5–5.1)
RBC # BLD: 3.93 M/UL (ref 4.2–5.9)
SODIUM BLD-SCNC: 141 MMOL/L (ref 136–145)
TOTAL IRON BINDING CAPACITY: 308 UG/DL (ref 260–445)
TOTAL PROTEIN: 6.8 G/DL (ref 6.4–8.2)
TSH REFLEX: 2.69 UIU/ML (ref 0.27–4.2)
VITAMIN B-12: 367 PG/ML (ref 211–911)
WBC # BLD: 12.6 K/UL (ref 4–11)

## 2018-12-04 RX ORDER — FERROUS SULFATE 325(65) MG
325 TABLET ORAL
Qty: 90 TABLET | Refills: 1 | Status: SHIPPED | OUTPATIENT
Start: 2018-12-04 | End: 2019-06-06 | Stop reason: SDUPTHER

## 2018-12-04 RX ORDER — FOLIC ACID 1 MG/1
1 TABLET ORAL DAILY
Qty: 90 TABLET | Refills: 1 | Status: SHIPPED | OUTPATIENT
Start: 2018-12-04 | End: 2019-06-06 | Stop reason: SDUPTHER

## 2018-12-06 ENCOUNTER — TELEPHONE (OUTPATIENT)
Dept: INTERNAL MEDICINE CLINIC | Age: 73
End: 2018-12-06

## 2018-12-06 NOTE — TELEPHONE ENCOUNTER
Pt informed script for nebulizer will be sent to [facility] in [place]. Pt given their number. Script faxed.

## 2018-12-10 RX ORDER — OMEPRAZOLE 20 MG/1
CAPSULE, DELAYED RELEASE ORAL
Qty: 90 CAPSULE | Refills: 0 | Status: ON HOLD | OUTPATIENT
Start: 2018-12-10 | End: 2019-03-27 | Stop reason: SDUPTHER

## 2019-01-08 RX ORDER — LEVOTHYROXINE SODIUM 0.1 MG/1
TABLET ORAL
Qty: 90 TABLET | Refills: 0 | Status: SHIPPED | OUTPATIENT
Start: 2019-01-08 | End: 2019-04-04 | Stop reason: SDUPTHER

## 2019-02-14 ENCOUNTER — APPOINTMENT (OUTPATIENT)
Dept: GENERAL RADIOLOGY | Age: 74
DRG: 871 | End: 2019-02-14
Payer: MEDICARE

## 2019-02-14 ENCOUNTER — APPOINTMENT (OUTPATIENT)
Dept: CT IMAGING | Age: 74
DRG: 871 | End: 2019-02-14
Payer: MEDICARE

## 2019-02-14 ENCOUNTER — HOSPITAL ENCOUNTER (INPATIENT)
Age: 74
LOS: 5 days | Discharge: HOME | DRG: 871 | End: 2019-02-19
Attending: EMERGENCY MEDICINE | Admitting: INTERNAL MEDICINE
Payer: MEDICARE

## 2019-02-14 DIAGNOSIS — J44.1 COPD EXACERBATION (HCC): ICD-10-CM

## 2019-02-14 DIAGNOSIS — R00.0 TACHYCARDIA: ICD-10-CM

## 2019-02-14 DIAGNOSIS — R09.02 HYPOXIA: Primary | ICD-10-CM

## 2019-02-14 DIAGNOSIS — I50.9 ACUTE ON CHRONIC CONGESTIVE HEART FAILURE, UNSPECIFIED HEART FAILURE TYPE (HCC): ICD-10-CM

## 2019-02-14 DIAGNOSIS — I26.99 OTHER ACUTE PULMONARY EMBOLISM WITHOUT ACUTE COR PULMONALE (HCC): ICD-10-CM

## 2019-02-14 DIAGNOSIS — D72.829 LEUKOCYTOSIS, UNSPECIFIED TYPE: ICD-10-CM

## 2019-02-14 PROBLEM — R59.0 MEDIASTINAL ADENOPATHY: Status: ACTIVE | Noted: 2019-02-14

## 2019-02-14 PROBLEM — I27.20 PULMONARY HTN (HCC): Status: ACTIVE | Noted: 2019-02-14

## 2019-02-14 PROBLEM — Z87.891 FORMER SMOKER: Status: ACTIVE | Noted: 2019-02-14

## 2019-02-14 PROBLEM — Z78.9 ALCOHOL USE: Status: ACTIVE | Noted: 2019-02-14

## 2019-02-14 PROBLEM — J90 PLEURAL EFFUSION: Status: ACTIVE | Noted: 2019-02-14

## 2019-02-14 PROBLEM — R91.8 PULMONARY INFILTRATES: Status: ACTIVE | Noted: 2019-02-14

## 2019-02-14 PROBLEM — E87.20 LACTIC ACID ACIDOSIS: Status: ACTIVE | Noted: 2019-02-14

## 2019-02-14 PROBLEM — J98.11 ATELECTASIS: Status: ACTIVE | Noted: 2019-02-14

## 2019-02-14 PROBLEM — I50.31 ACUTE DIASTOLIC HEART FAILURE (HCC): Status: ACTIVE | Noted: 2019-02-14

## 2019-02-14 PROBLEM — J96.21 ACUTE ON CHRONIC RESPIRATORY FAILURE WITH HYPOXIA (HCC): Status: ACTIVE | Noted: 2019-02-14

## 2019-02-14 LAB
A/G RATIO: 0.8 (ref 1.1–2.2)
ALBUMIN SERPL-MCNC: 2.7 G/DL (ref 3.4–5)
ALP BLD-CCNC: 196 U/L (ref 40–129)
ALT SERPL-CCNC: 40 U/L (ref 10–40)
ANION GAP SERPL CALCULATED.3IONS-SCNC: 14 MMOL/L (ref 3–16)
ANISOCYTOSIS: ABNORMAL
AST SERPL-CCNC: 75 U/L (ref 15–37)
BANDED NEUTROPHILS RELATIVE PERCENT: 2 % (ref 0–7)
BASE EXCESS VENOUS: 4.1 MMOL/L (ref -3–3)
BASOPHILS ABSOLUTE: 0 K/UL (ref 0–0.2)
BASOPHILS RELATIVE PERCENT: 0 %
BILIRUB SERPL-MCNC: 1.1 MG/DL (ref 0–1)
BUN BLDV-MCNC: 13 MG/DL (ref 7–20)
C DIFFICILE TOXIN, EIA: NORMAL
CALCIUM SERPL-MCNC: 8.1 MG/DL (ref 8.3–10.6)
CARBOXYHEMOGLOBIN: 1.6 % (ref 0–1.5)
CHLORIDE BLD-SCNC: 99 MMOL/L (ref 99–110)
CO2: 27 MMOL/L (ref 21–32)
CREAT SERPL-MCNC: 0.9 MG/DL (ref 0.8–1.3)
EKG ATRIAL RATE: 123 BPM
EKG DIAGNOSIS: NORMAL
EKG P-R INTERVAL: 176 MS
EKG Q-T INTERVAL: 322 MS
EKG QRS DURATION: 84 MS
EKG QTC CALCULATION (BAZETT): 460 MS
EKG R AXIS: 47 DEGREES
EKG T AXIS: 47 DEGREES
EKG VENTRICULAR RATE: 123 BPM
EOSINOPHILS ABSOLUTE: 0 K/UL (ref 0–0.6)
EOSINOPHILS RELATIVE PERCENT: 0 %
GFR AFRICAN AMERICAN: >60
GFR NON-AFRICAN AMERICAN: >60
GLOBULIN: 3.4 G/DL
GLUCOSE BLD-MCNC: 159 MG/DL (ref 70–99)
HCO3 VENOUS: 28.5 MMOL/L (ref 23–29)
HCT VFR BLD CALC: 36.5 % (ref 40.5–52.5)
HEMOGLOBIN: 11.9 G/DL (ref 13.5–17.5)
LACTIC ACID, SEPSIS: 2.7 MMOL/L (ref 0.4–1.9)
LACTIC ACID: 2.4 MMOL/L (ref 0.4–2)
LACTIC ACID: 3.7 MMOL/L (ref 0.4–2)
LACTIC ACID: 3.8 MMOL/L (ref 0.4–2)
LYMPHOCYTES ABSOLUTE: 0.2 K/UL (ref 1–5.1)
LYMPHOCYTES RELATIVE PERCENT: 1 %
MCH RBC QN AUTO: 31.1 PG (ref 26–34)
MCHC RBC AUTO-ENTMCNC: 32.7 G/DL (ref 31–36)
MCV RBC AUTO: 94.9 FL (ref 80–100)
METHEMOGLOBIN VENOUS: 0.6 %
MONOCYTES ABSOLUTE: 2 K/UL (ref 0–1.3)
MONOCYTES RELATIVE PERCENT: 11 %
NEUTROPHILS ABSOLUTE: 16 K/UL (ref 1.7–7.7)
NEUTROPHILS RELATIVE PERCENT: 86 %
O2 CONTENT, VEN: 13 VOL %
O2 SAT, VEN: 71 %
O2 THERAPY: ABNORMAL
PCO2, VEN: 41.8 MMHG (ref 40–50)
PDW BLD-RTO: 24.6 % (ref 12.4–15.4)
PH VENOUS: 7.45 (ref 7.35–7.45)
PLATELET # BLD: 410 K/UL (ref 135–450)
PLATELET SLIDE REVIEW: ADEQUATE
PMV BLD AUTO: 8.7 FL (ref 5–10.5)
PO2, VEN: 40 MMHG (ref 25–40)
POIKILOCYTES: ABNORMAL
POTASSIUM SERPL-SCNC: 3.9 MMOL/L (ref 3.5–5.1)
PRO-BNP: 1639 PG/ML (ref 0–124)
RAPID INFLUENZA  B AGN: NEGATIVE
RAPID INFLUENZA A AGN: NEGATIVE
RBC # BLD: 3.85 M/UL (ref 4.2–5.9)
RSV RAPID ANTIGEN: NEGATIVE
SLIDE REVIEW: ABNORMAL
SODIUM BLD-SCNC: 140 MMOL/L (ref 136–145)
STOMATOCYTES: ABNORMAL
TARGET CELLS: ABNORMAL
TCO2 CALC VENOUS: 30 MMOL/L
TOTAL PROTEIN: 6.1 G/DL (ref 6.4–8.2)
TROPONIN: <0.01 NG/ML
WBC # BLD: 18.2 K/UL (ref 4–11)

## 2019-02-14 PROCEDURE — 6360000002 HC RX W HCPCS: Performed by: INTERNAL MEDICINE

## 2019-02-14 PROCEDURE — 93005 ELECTROCARDIOGRAM TRACING: CPT | Performed by: EMERGENCY MEDICINE

## 2019-02-14 PROCEDURE — 82803 BLOOD GASES ANY COMBINATION: CPT

## 2019-02-14 PROCEDURE — 99291 CRITICAL CARE FIRST HOUR: CPT | Performed by: INTERNAL MEDICINE

## 2019-02-14 PROCEDURE — 94644 CONT INHLJ TX 1ST HOUR: CPT

## 2019-02-14 PROCEDURE — 93010 ELECTROCARDIOGRAM REPORT: CPT | Performed by: INTERNAL MEDICINE

## 2019-02-14 PROCEDURE — 2060000000 HC ICU INTERMEDIATE R&B

## 2019-02-14 PROCEDURE — 96365 THER/PROPH/DIAG IV INF INIT: CPT

## 2019-02-14 PROCEDURE — 2580000003 HC RX 258: Performed by: NURSE PRACTITIONER

## 2019-02-14 PROCEDURE — 87804 INFLUENZA ASSAY W/OPTIC: CPT

## 2019-02-14 PROCEDURE — 6370000000 HC RX 637 (ALT 250 FOR IP): Performed by: NURSE PRACTITIONER

## 2019-02-14 PROCEDURE — 2700000000 HC OXYGEN THERAPY PER DAY

## 2019-02-14 PROCEDURE — 2580000003 HC RX 258: Performed by: INTERNAL MEDICINE

## 2019-02-14 PROCEDURE — 99285 EMERGENCY DEPT VISIT HI MDM: CPT

## 2019-02-14 PROCEDURE — 6370000000 HC RX 637 (ALT 250 FOR IP): Performed by: INTERNAL MEDICINE

## 2019-02-14 PROCEDURE — 6360000002 HC RX W HCPCS: Performed by: NURSE PRACTITIONER

## 2019-02-14 PROCEDURE — 94664 DEMO&/EVAL PT USE INHALER: CPT

## 2019-02-14 PROCEDURE — 83880 ASSAY OF NATRIURETIC PEPTIDE: CPT

## 2019-02-14 PROCEDURE — 84484 ASSAY OF TROPONIN QUANT: CPT

## 2019-02-14 PROCEDURE — 71260 CT THORAX DX C+: CPT

## 2019-02-14 PROCEDURE — 85025 COMPLETE CBC W/AUTO DIFF WBC: CPT

## 2019-02-14 PROCEDURE — 71046 X-RAY EXAM CHEST 2 VIEWS: CPT

## 2019-02-14 PROCEDURE — 83605 ASSAY OF LACTIC ACID: CPT

## 2019-02-14 PROCEDURE — 87807 RSV ASSAY W/OPTIC: CPT

## 2019-02-14 PROCEDURE — 96375 TX/PRO/DX INJ NEW DRUG ADDON: CPT

## 2019-02-14 PROCEDURE — 87040 BLOOD CULTURE FOR BACTERIA: CPT

## 2019-02-14 PROCEDURE — 36415 COLL VENOUS BLD VENIPUNCTURE: CPT

## 2019-02-14 PROCEDURE — 87324 CLOSTRIDIUM AG IA: CPT

## 2019-02-14 PROCEDURE — 87076 CULTURE ANAEROBE IDENT EACH: CPT

## 2019-02-14 PROCEDURE — 94761 N-INVAS EAR/PLS OXIMETRY MLT: CPT

## 2019-02-14 PROCEDURE — 6360000004 HC RX CONTRAST MEDICATION: Performed by: EMERGENCY MEDICINE

## 2019-02-14 PROCEDURE — 87449 NOS EACH ORGANISM AG IA: CPT

## 2019-02-14 PROCEDURE — 94640 AIRWAY INHALATION TREATMENT: CPT

## 2019-02-14 PROCEDURE — 80053 COMPREHEN METABOLIC PANEL: CPT

## 2019-02-14 RX ORDER — IPRATROPIUM BROMIDE AND ALBUTEROL SULFATE 2.5; .5 MG/3ML; MG/3ML
1 SOLUTION RESPIRATORY (INHALATION) ONCE
Status: COMPLETED | OUTPATIENT
Start: 2019-02-14 | End: 2019-02-14

## 2019-02-14 RX ORDER — IPRATROPIUM BROMIDE AND ALBUTEROL SULFATE 2.5; .5 MG/3ML; MG/3ML
1 SOLUTION RESPIRATORY (INHALATION)
Status: DISCONTINUED | OUTPATIENT
Start: 2019-02-14 | End: 2019-02-19 | Stop reason: HOSPADM

## 2019-02-14 RX ORDER — POTASSIUM CHLORIDE 20 MEQ/1
20 TABLET, EXTENDED RELEASE ORAL
Status: DISCONTINUED | OUTPATIENT
Start: 2019-02-15 | End: 2019-02-19 | Stop reason: HOSPADM

## 2019-02-14 RX ORDER — LOSARTAN POTASSIUM 100 MG/1
100 TABLET ORAL DAILY
Status: DISCONTINUED | OUTPATIENT
Start: 2019-02-14 | End: 2019-02-19 | Stop reason: HOSPADM

## 2019-02-14 RX ORDER — LEVOTHYROXINE SODIUM 0.1 MG/1
100 TABLET ORAL DAILY
Status: DISCONTINUED | OUTPATIENT
Start: 2019-02-15 | End: 2019-02-19 | Stop reason: HOSPADM

## 2019-02-14 RX ORDER — ACETAMINOPHEN 325 MG/1
650 TABLET ORAL ONCE
Status: DISCONTINUED | OUTPATIENT
Start: 2019-02-14 | End: 2019-02-19 | Stop reason: HOSPADM

## 2019-02-14 RX ORDER — SODIUM CHLORIDE 0.9 % (FLUSH) 0.9 %
10 SYRINGE (ML) INJECTION PRN
Status: DISCONTINUED | OUTPATIENT
Start: 2019-02-14 | End: 2019-02-19 | Stop reason: HOSPADM

## 2019-02-14 RX ORDER — FUROSEMIDE 10 MG/ML
40 INJECTION INTRAMUSCULAR; INTRAVENOUS ONCE
Status: COMPLETED | OUTPATIENT
Start: 2019-02-14 | End: 2019-02-14

## 2019-02-14 RX ORDER — FERROUS SULFATE 325(65) MG
325 TABLET ORAL
Status: DISCONTINUED | OUTPATIENT
Start: 2019-02-15 | End: 2019-02-19 | Stop reason: HOSPADM

## 2019-02-14 RX ORDER — ATORVASTATIN CALCIUM 10 MG/1
20 TABLET, FILM COATED ORAL DAILY
Status: DISCONTINUED | OUTPATIENT
Start: 2019-02-14 | End: 2019-02-19 | Stop reason: HOSPADM

## 2019-02-14 RX ORDER — METOPROLOL SUCCINATE 25 MG/1
25 TABLET, EXTENDED RELEASE ORAL DAILY
Status: DISCONTINUED | OUTPATIENT
Start: 2019-02-14 | End: 2019-02-19 | Stop reason: HOSPADM

## 2019-02-14 RX ORDER — 0.9 % SODIUM CHLORIDE 0.9 %
500 INTRAVENOUS SOLUTION INTRAVENOUS ONCE
Status: COMPLETED | OUTPATIENT
Start: 2019-02-14 | End: 2019-02-14

## 2019-02-14 RX ORDER — METHYLPREDNISOLONE SODIUM SUCCINATE 40 MG/ML
40 INJECTION, POWDER, LYOPHILIZED, FOR SOLUTION INTRAMUSCULAR; INTRAVENOUS EVERY 12 HOURS
Status: DISCONTINUED | OUTPATIENT
Start: 2019-02-14 | End: 2019-02-17

## 2019-02-14 RX ORDER — ASPIRIN 81 MG/1
81 TABLET, CHEWABLE ORAL DAILY
Status: DISCONTINUED | OUTPATIENT
Start: 2019-02-14 | End: 2019-02-19 | Stop reason: HOSPADM

## 2019-02-14 RX ORDER — METHYLPREDNISOLONE SODIUM SUCCINATE 125 MG/2ML
125 INJECTION, POWDER, LYOPHILIZED, FOR SOLUTION INTRAMUSCULAR; INTRAVENOUS ONCE
Status: COMPLETED | OUTPATIENT
Start: 2019-02-14 | End: 2019-02-14

## 2019-02-14 RX ORDER — DULOXETIN HYDROCHLORIDE 60 MG/1
60 CAPSULE, DELAYED RELEASE ORAL DAILY
Status: DISCONTINUED | OUTPATIENT
Start: 2019-02-14 | End: 2019-02-19 | Stop reason: HOSPADM

## 2019-02-14 RX ORDER — FOLIC ACID 1 MG/1
1 TABLET ORAL DAILY
Status: DISCONTINUED | OUTPATIENT
Start: 2019-02-14 | End: 2019-02-19 | Stop reason: HOSPADM

## 2019-02-14 RX ORDER — SODIUM CHLORIDE 9 MG/ML
INJECTION, SOLUTION INTRAVENOUS CONTINUOUS
Status: DISCONTINUED | OUTPATIENT
Start: 2019-02-14 | End: 2019-02-15

## 2019-02-14 RX ORDER — PANTOPRAZOLE SODIUM 40 MG/1
40 TABLET, DELAYED RELEASE ORAL
Status: DISCONTINUED | OUTPATIENT
Start: 2019-02-15 | End: 2019-02-19 | Stop reason: HOSPADM

## 2019-02-14 RX ORDER — SODIUM CHLORIDE 0.9 % (FLUSH) 0.9 %
10 SYRINGE (ML) INJECTION EVERY 12 HOURS SCHEDULED
Status: DISCONTINUED | OUTPATIENT
Start: 2019-02-14 | End: 2019-02-19 | Stop reason: HOSPADM

## 2019-02-14 RX ADMIN — ATORVASTATIN CALCIUM 20 MG: 10 TABLET, FILM COATED ORAL at 17:17

## 2019-02-14 RX ADMIN — IPRATROPIUM BROMIDE AND ALBUTEROL SULFATE 1 AMPULE: .5; 3 SOLUTION RESPIRATORY (INHALATION) at 20:48

## 2019-02-14 RX ADMIN — SODIUM CHLORIDE 500 ML: 9 INJECTION, SOLUTION INTRAVENOUS at 17:20

## 2019-02-14 RX ADMIN — METHYLPREDNISOLONE SODIUM SUCCINATE 40 MG: 40 INJECTION, POWDER, FOR SOLUTION INTRAMUSCULAR; INTRAVENOUS at 21:29

## 2019-02-14 RX ADMIN — LOSARTAN POTASSIUM 100 MG: 100 TABLET, FILM COATED ORAL at 17:18

## 2019-02-14 RX ADMIN — ASPIRIN 81 MG 81 MG: 81 TABLET ORAL at 17:18

## 2019-02-14 RX ADMIN — CEFTRIAXONE SODIUM 1 G: 1 INJECTION, POWDER, FOR SOLUTION INTRAMUSCULAR; INTRAVENOUS at 16:06

## 2019-02-14 RX ADMIN — ENOXAPARIN SODIUM 120 MG: 150 INJECTION SUBCUTANEOUS at 17:17

## 2019-02-14 RX ADMIN — FUROSEMIDE 40 MG: 10 INJECTION, SOLUTION INTRAMUSCULAR; INTRAVENOUS at 14:47

## 2019-02-14 RX ADMIN — DULOXETINE HYDROCHLORIDE 60 MG: 60 CAPSULE, DELAYED RELEASE ORAL at 17:18

## 2019-02-14 RX ADMIN — METHYLPREDNISOLONE SODIUM SUCCINATE 125 MG: 125 INJECTION, POWDER, FOR SOLUTION INTRAMUSCULAR; INTRAVENOUS at 13:09

## 2019-02-14 RX ADMIN — SODIUM CHLORIDE, PRESERVATIVE FREE 10 ML: 5 INJECTION INTRAVENOUS at 21:29

## 2019-02-14 RX ADMIN — METOPROLOL SUCCINATE 25 MG: 25 TABLET, EXTENDED RELEASE ORAL at 17:18

## 2019-02-14 RX ADMIN — IPRATROPIUM BROMIDE AND ALBUTEROL SULFATE 1 AMPULE: .5; 3 SOLUTION RESPIRATORY (INHALATION) at 12:18

## 2019-02-14 RX ADMIN — SODIUM CHLORIDE: 9 INJECTION, SOLUTION INTRAVENOUS at 17:21

## 2019-02-14 RX ADMIN — IOPAMIDOL 75 ML: 755 INJECTION, SOLUTION INTRAVENOUS at 15:42

## 2019-02-14 RX ADMIN — FOLIC ACID 1 MG: 1 TABLET ORAL at 17:18

## 2019-02-14 RX ADMIN — AZITHROMYCIN MONOHYDRATE 500 MG: 500 INJECTION, POWDER, LYOPHILIZED, FOR SOLUTION INTRAVENOUS at 14:38

## 2019-02-14 RX ADMIN — VITAMIN D, TAB 1000IU (100/BT) 1000 UNITS: 25 TAB at 17:18

## 2019-02-14 ASSESSMENT — PAIN SCALES - GENERAL: PAINLEVEL_OUTOF10: 0

## 2019-02-15 LAB
ANION GAP SERPL CALCULATED.3IONS-SCNC: 11 MMOL/L (ref 3–16)
BASOPHILS ABSOLUTE: 0.1 K/UL (ref 0–0.2)
BASOPHILS RELATIVE PERCENT: 0.6 %
BUN BLDV-MCNC: 16 MG/DL (ref 7–20)
CALCIUM SERPL-MCNC: 8 MG/DL (ref 8.3–10.6)
CHLORIDE BLD-SCNC: 101 MMOL/L (ref 99–110)
CHOLESTEROL, TOTAL: 121 MG/DL (ref 0–199)
CO2: 27 MMOL/L (ref 21–32)
CREAT SERPL-MCNC: 0.9 MG/DL (ref 0.8–1.3)
EOSINOPHILS ABSOLUTE: 0 K/UL (ref 0–0.6)
EOSINOPHILS RELATIVE PERCENT: 0 %
GFR AFRICAN AMERICAN: >60
GFR NON-AFRICAN AMERICAN: >60
GLUCOSE BLD-MCNC: 158 MG/DL (ref 70–99)
HCT VFR BLD CALC: 32.6 % (ref 40.5–52.5)
HDLC SERPL-MCNC: 57 MG/DL (ref 40–60)
HEMATOLOGY PATH CONSULT: NORMAL
HEMOGLOBIN: 10.8 G/DL (ref 13.5–17.5)
LACTIC ACID: 2 MMOL/L (ref 0.4–2)
LDL CHOLESTEROL CALCULATED: 51 MG/DL
LV EF: 58 %
LVEF MODALITY: NORMAL
LYMPHOCYTES ABSOLUTE: 0.8 K/UL (ref 1–5.1)
LYMPHOCYTES RELATIVE PERCENT: 5.1 %
MAGNESIUM: 1.9 MG/DL (ref 1.8–2.4)
MCH RBC QN AUTO: 31.5 PG (ref 26–34)
MCHC RBC AUTO-ENTMCNC: 33 G/DL (ref 31–36)
MCV RBC AUTO: 95.4 FL (ref 80–100)
MONOCYTES ABSOLUTE: 0.7 K/UL (ref 0–1.3)
MONOCYTES RELATIVE PERCENT: 4.8 %
NEUTROPHILS ABSOLUTE: 13.6 K/UL (ref 1.7–7.7)
NEUTROPHILS RELATIVE PERCENT: 89.5 %
PDW BLD-RTO: 24.6 % (ref 12.4–15.4)
PLATELET # BLD: 362 K/UL (ref 135–450)
PMV BLD AUTO: 8.3 FL (ref 5–10.5)
POTASSIUM REFLEX MAGNESIUM: 3.8 MMOL/L (ref 3.5–5.1)
POTASSIUM SERPL-SCNC: 3.8 MMOL/L (ref 3.5–5.1)
RBC # BLD: 3.42 M/UL (ref 4.2–5.9)
SODIUM BLD-SCNC: 139 MMOL/L (ref 136–145)
TRIGL SERPL-MCNC: 66 MG/DL (ref 0–150)
VLDLC SERPL CALC-MCNC: 13 MG/DL
WBC # BLD: 15.2 K/UL (ref 4–11)

## 2019-02-15 PROCEDURE — 85025 COMPLETE CBC W/AUTO DIFF WBC: CPT

## 2019-02-15 PROCEDURE — 6360000004 HC RX CONTRAST MEDICATION: Performed by: INTERNAL MEDICINE

## 2019-02-15 PROCEDURE — 2060000000 HC ICU INTERMEDIATE R&B

## 2019-02-15 PROCEDURE — 83605 ASSAY OF LACTIC ACID: CPT

## 2019-02-15 PROCEDURE — 36415 COLL VENOUS BLD VENIPUNCTURE: CPT

## 2019-02-15 PROCEDURE — 80061 LIPID PANEL: CPT

## 2019-02-15 PROCEDURE — 94640 AIRWAY INHALATION TREATMENT: CPT

## 2019-02-15 PROCEDURE — 6360000002 HC RX W HCPCS: Performed by: INTERNAL MEDICINE

## 2019-02-15 PROCEDURE — 2700000000 HC OXYGEN THERAPY PER DAY

## 2019-02-15 PROCEDURE — 94668 MNPJ CHEST WALL SBSQ: CPT

## 2019-02-15 PROCEDURE — 94761 N-INVAS EAR/PLS OXIMETRY MLT: CPT

## 2019-02-15 PROCEDURE — 2580000003 HC RX 258: Performed by: INTERNAL MEDICINE

## 2019-02-15 PROCEDURE — 99233 SBSQ HOSP IP/OBS HIGH 50: CPT | Performed by: INTERNAL MEDICINE

## 2019-02-15 PROCEDURE — 80048 BASIC METABOLIC PNL TOTAL CA: CPT

## 2019-02-15 PROCEDURE — 94660 CPAP INITIATION&MGMT: CPT

## 2019-02-15 PROCEDURE — 93306 TTE W/DOPPLER COMPLETE: CPT

## 2019-02-15 PROCEDURE — 6370000000 HC RX 637 (ALT 250 FOR IP): Performed by: INTERNAL MEDICINE

## 2019-02-15 PROCEDURE — C8929 TTE W OR WO FOL WCON,DOPPLER: HCPCS

## 2019-02-15 PROCEDURE — 94667 MNPJ CHEST WALL 1ST: CPT

## 2019-02-15 PROCEDURE — 83735 ASSAY OF MAGNESIUM: CPT

## 2019-02-15 RX ADMIN — PANTOPRAZOLE SODIUM 40 MG: 40 TABLET, DELAYED RELEASE ORAL at 08:56

## 2019-02-15 RX ADMIN — CEFTRIAXONE SODIUM 1 G: 1 INJECTION, POWDER, FOR SOLUTION INTRAMUSCULAR; INTRAVENOUS at 08:57

## 2019-02-15 RX ADMIN — IPRATROPIUM BROMIDE AND ALBUTEROL SULFATE 1 AMPULE: .5; 3 SOLUTION RESPIRATORY (INHALATION) at 15:56

## 2019-02-15 RX ADMIN — FOLIC ACID 1 MG: 1 TABLET ORAL at 08:56

## 2019-02-15 RX ADMIN — PERFLUTREN 2.42 MG: 6.52 INJECTION, SUSPENSION INTRAVENOUS at 13:36

## 2019-02-15 RX ADMIN — IPRATROPIUM BROMIDE AND ALBUTEROL SULFATE 1 AMPULE: .5; 3 SOLUTION RESPIRATORY (INHALATION) at 08:40

## 2019-02-15 RX ADMIN — LOSARTAN POTASSIUM 100 MG: 100 TABLET, FILM COATED ORAL at 08:56

## 2019-02-15 RX ADMIN — VITAMIN D, TAB 1000IU (100/BT) 1000 UNITS: 25 TAB at 08:56

## 2019-02-15 RX ADMIN — ENOXAPARIN SODIUM 120 MG: 150 INJECTION SUBCUTANEOUS at 05:21

## 2019-02-15 RX ADMIN — IPRATROPIUM BROMIDE AND ALBUTEROL SULFATE 1 AMPULE: .5; 3 SOLUTION RESPIRATORY (INHALATION) at 20:41

## 2019-02-15 RX ADMIN — SODIUM CHLORIDE, PRESERVATIVE FREE 10 ML: 5 INJECTION INTRAVENOUS at 09:02

## 2019-02-15 RX ADMIN — DULOXETINE HYDROCHLORIDE 60 MG: 60 CAPSULE, DELAYED RELEASE ORAL at 08:56

## 2019-02-15 RX ADMIN — METHYLPREDNISOLONE SODIUM SUCCINATE 40 MG: 40 INJECTION, POWDER, FOR SOLUTION INTRAMUSCULAR; INTRAVENOUS at 08:56

## 2019-02-15 RX ADMIN — SODIUM CHLORIDE, PRESERVATIVE FREE 10 ML: 5 INJECTION INTRAVENOUS at 20:09

## 2019-02-15 RX ADMIN — SODIUM CHLORIDE: 9 INJECTION, SOLUTION INTRAVENOUS at 05:25

## 2019-02-15 RX ADMIN — ENOXAPARIN SODIUM 120 MG: 150 INJECTION SUBCUTANEOUS at 16:22

## 2019-02-15 RX ADMIN — METOPROLOL SUCCINATE 25 MG: 25 TABLET, EXTENDED RELEASE ORAL at 08:56

## 2019-02-15 RX ADMIN — AZITHROMYCIN MONOHYDRATE 250 MG: 500 INJECTION, POWDER, LYOPHILIZED, FOR SOLUTION INTRAVENOUS at 09:36

## 2019-02-15 RX ADMIN — METHYLPREDNISOLONE SODIUM SUCCINATE 40 MG: 40 INJECTION, POWDER, FOR SOLUTION INTRAMUSCULAR; INTRAVENOUS at 20:09

## 2019-02-15 RX ADMIN — POTASSIUM CHLORIDE 20 MEQ: 20 TABLET, EXTENDED RELEASE ORAL at 08:56

## 2019-02-15 RX ADMIN — ASPIRIN 81 MG 81 MG: 81 TABLET ORAL at 08:56

## 2019-02-15 RX ADMIN — FERROUS SULFATE TAB 325 MG (65 MG ELEMENTAL FE) 325 MG: 325 (65 FE) TAB at 08:56

## 2019-02-15 RX ADMIN — ATORVASTATIN CALCIUM 20 MG: 10 TABLET, FILM COATED ORAL at 08:56

## 2019-02-15 RX ADMIN — LEVOTHYROXINE SODIUM 100 MCG: 0.1 TABLET ORAL at 08:56

## 2019-02-15 RX ADMIN — IPRATROPIUM BROMIDE AND ALBUTEROL SULFATE 1 AMPULE: .5; 3 SOLUTION RESPIRATORY (INHALATION) at 12:11

## 2019-02-15 ASSESSMENT — PAIN SCALES - GENERAL: PAINLEVEL_OUTOF10: 0

## 2019-02-16 ENCOUNTER — APPOINTMENT (OUTPATIENT)
Dept: GENERAL RADIOLOGY | Age: 74
DRG: 871 | End: 2019-02-16
Payer: MEDICARE

## 2019-02-16 LAB
ANION GAP SERPL CALCULATED.3IONS-SCNC: 10 MMOL/L (ref 3–16)
BASOPHILS ABSOLUTE: 0.1 K/UL (ref 0–0.2)
BASOPHILS RELATIVE PERCENT: 0.7 %
BUN BLDV-MCNC: 19 MG/DL (ref 7–20)
CALCIUM SERPL-MCNC: 7.9 MG/DL (ref 8.3–10.6)
CHLORIDE BLD-SCNC: 102 MMOL/L (ref 99–110)
CO2: 29 MMOL/L (ref 21–32)
CREAT SERPL-MCNC: 0.9 MG/DL (ref 0.8–1.3)
EOSINOPHILS ABSOLUTE: 0 K/UL (ref 0–0.6)
EOSINOPHILS RELATIVE PERCENT: 0 %
GFR AFRICAN AMERICAN: >60
GFR NON-AFRICAN AMERICAN: >60
GLUCOSE BLD-MCNC: 125 MG/DL (ref 70–99)
HCT VFR BLD CALC: 33.5 % (ref 40.5–52.5)
HEMOGLOBIN: 11.1 G/DL (ref 13.5–17.5)
LYMPHOCYTES ABSOLUTE: 0.7 K/UL (ref 1–5.1)
LYMPHOCYTES RELATIVE PERCENT: 4.3 %
MAGNESIUM: 2 MG/DL (ref 1.8–2.4)
MCH RBC QN AUTO: 31.7 PG (ref 26–34)
MCHC RBC AUTO-ENTMCNC: 33.1 G/DL (ref 31–36)
MCV RBC AUTO: 95.9 FL (ref 80–100)
MONOCYTES ABSOLUTE: 0.7 K/UL (ref 0–1.3)
MONOCYTES RELATIVE PERCENT: 4.6 %
NEUTROPHILS ABSOLUTE: 13.6 K/UL (ref 1.7–7.7)
NEUTROPHILS RELATIVE PERCENT: 90.4 %
PDW BLD-RTO: 24.8 % (ref 12.4–15.4)
PLATELET # BLD: 424 K/UL (ref 135–450)
PMV BLD AUTO: 8.4 FL (ref 5–10.5)
POTASSIUM REFLEX MAGNESIUM: 4.1 MMOL/L (ref 3.5–5.1)
POTASSIUM SERPL-SCNC: 4.1 MMOL/L (ref 3.5–5.1)
PRO-BNP: 2086 PG/ML (ref 0–124)
RBC # BLD: 3.49 M/UL (ref 4.2–5.9)
SODIUM BLD-SCNC: 141 MMOL/L (ref 136–145)
WBC # BLD: 15.1 K/UL (ref 4–11)

## 2019-02-16 PROCEDURE — 83880 ASSAY OF NATRIURETIC PEPTIDE: CPT

## 2019-02-16 PROCEDURE — 94668 MNPJ CHEST WALL SBSQ: CPT

## 2019-02-16 PROCEDURE — 80048 BASIC METABOLIC PNL TOTAL CA: CPT

## 2019-02-16 PROCEDURE — 2580000003 HC RX 258: Performed by: INTERNAL MEDICINE

## 2019-02-16 PROCEDURE — 83735 ASSAY OF MAGNESIUM: CPT

## 2019-02-16 PROCEDURE — 99233 SBSQ HOSP IP/OBS HIGH 50: CPT | Performed by: INTERNAL MEDICINE

## 2019-02-16 PROCEDURE — 94761 N-INVAS EAR/PLS OXIMETRY MLT: CPT

## 2019-02-16 PROCEDURE — 2060000000 HC ICU INTERMEDIATE R&B

## 2019-02-16 PROCEDURE — 85025 COMPLETE CBC W/AUTO DIFF WBC: CPT

## 2019-02-16 PROCEDURE — 6360000002 HC RX W HCPCS: Performed by: INTERNAL MEDICINE

## 2019-02-16 PROCEDURE — 71046 X-RAY EXAM CHEST 2 VIEWS: CPT

## 2019-02-16 PROCEDURE — 94664 DEMO&/EVAL PT USE INHALER: CPT

## 2019-02-16 PROCEDURE — 94660 CPAP INITIATION&MGMT: CPT

## 2019-02-16 PROCEDURE — 2700000000 HC OXYGEN THERAPY PER DAY

## 2019-02-16 PROCEDURE — 6370000000 HC RX 637 (ALT 250 FOR IP): Performed by: INTERNAL MEDICINE

## 2019-02-16 PROCEDURE — 36415 COLL VENOUS BLD VENIPUNCTURE: CPT

## 2019-02-16 PROCEDURE — 94640 AIRWAY INHALATION TREATMENT: CPT

## 2019-02-16 RX ORDER — SODIUM CHLORIDE 9 MG/ML
INJECTION, SOLUTION INTRAVENOUS
Status: DISPENSED
Start: 2019-02-16 | End: 2019-02-16

## 2019-02-16 RX ADMIN — METHYLPREDNISOLONE SODIUM SUCCINATE 40 MG: 40 INJECTION, POWDER, FOR SOLUTION INTRAMUSCULAR; INTRAVENOUS at 09:26

## 2019-02-16 RX ADMIN — ATORVASTATIN CALCIUM 20 MG: 10 TABLET, FILM COATED ORAL at 09:26

## 2019-02-16 RX ADMIN — METHYLPREDNISOLONE SODIUM SUCCINATE 40 MG: 40 INJECTION, POWDER, FOR SOLUTION INTRAMUSCULAR; INTRAVENOUS at 20:14

## 2019-02-16 RX ADMIN — FOLIC ACID 1 MG: 1 TABLET ORAL at 09:27

## 2019-02-16 RX ADMIN — VITAMIN D, TAB 1000IU (100/BT) 1000 UNITS: 25 TAB at 09:27

## 2019-02-16 RX ADMIN — DULOXETINE HYDROCHLORIDE 60 MG: 60 CAPSULE, DELAYED RELEASE ORAL at 09:27

## 2019-02-16 RX ADMIN — METOPROLOL SUCCINATE 25 MG: 25 TABLET, EXTENDED RELEASE ORAL at 09:27

## 2019-02-16 RX ADMIN — IPRATROPIUM BROMIDE AND ALBUTEROL SULFATE 1 AMPULE: .5; 3 SOLUTION RESPIRATORY (INHALATION) at 12:35

## 2019-02-16 RX ADMIN — ASPIRIN 81 MG 81 MG: 81 TABLET ORAL at 09:27

## 2019-02-16 RX ADMIN — SODIUM CHLORIDE, PRESERVATIVE FREE 10 ML: 5 INJECTION INTRAVENOUS at 20:16

## 2019-02-16 RX ADMIN — ENOXAPARIN SODIUM 120 MG: 150 INJECTION SUBCUTANEOUS at 05:36

## 2019-02-16 RX ADMIN — FERROUS SULFATE TAB 325 MG (65 MG ELEMENTAL FE) 325 MG: 325 (65 FE) TAB at 09:27

## 2019-02-16 RX ADMIN — POTASSIUM CHLORIDE 20 MEQ: 20 TABLET, EXTENDED RELEASE ORAL at 09:27

## 2019-02-16 RX ADMIN — IPRATROPIUM BROMIDE AND ALBUTEROL SULFATE 1 AMPULE: .5; 3 SOLUTION RESPIRATORY (INHALATION) at 08:33

## 2019-02-16 RX ADMIN — LOSARTAN POTASSIUM 100 MG: 100 TABLET, FILM COATED ORAL at 09:27

## 2019-02-16 RX ADMIN — CEFTRIAXONE SODIUM 1 G: 1 INJECTION, POWDER, FOR SOLUTION INTRAMUSCULAR; INTRAVENOUS at 09:28

## 2019-02-16 RX ADMIN — LEVOTHYROXINE SODIUM 100 MCG: 0.1 TABLET ORAL at 09:27

## 2019-02-16 RX ADMIN — AZITHROMYCIN MONOHYDRATE 250 MG: 500 INJECTION, POWDER, LYOPHILIZED, FOR SOLUTION INTRAVENOUS at 10:56

## 2019-02-16 RX ADMIN — SODIUM CHLORIDE, PRESERVATIVE FREE 10 ML: 5 INJECTION INTRAVENOUS at 09:38

## 2019-02-16 RX ADMIN — ENOXAPARIN SODIUM 120 MG: 150 INJECTION SUBCUTANEOUS at 17:50

## 2019-02-16 RX ADMIN — IPRATROPIUM BROMIDE AND ALBUTEROL SULFATE 1 AMPULE: .5; 3 SOLUTION RESPIRATORY (INHALATION) at 20:44

## 2019-02-16 RX ADMIN — IPRATROPIUM BROMIDE AND ALBUTEROL SULFATE 1 AMPULE: .5; 3 SOLUTION RESPIRATORY (INHALATION) at 15:52

## 2019-02-16 RX ADMIN — PANTOPRAZOLE SODIUM 40 MG: 40 TABLET, DELAYED RELEASE ORAL at 09:27

## 2019-02-17 LAB
ANION GAP SERPL CALCULATED.3IONS-SCNC: 11 MMOL/L (ref 3–16)
BASOPHILS ABSOLUTE: 0.1 K/UL (ref 0–0.2)
BASOPHILS RELATIVE PERCENT: 0.6 %
BUN BLDV-MCNC: 17 MG/DL (ref 7–20)
CALCIUM SERPL-MCNC: 8 MG/DL (ref 8.3–10.6)
CHLORIDE BLD-SCNC: 104 MMOL/L (ref 99–110)
CO2: 28 MMOL/L (ref 21–32)
CREAT SERPL-MCNC: 0.9 MG/DL (ref 0.8–1.3)
EOSINOPHILS ABSOLUTE: 0 K/UL (ref 0–0.6)
EOSINOPHILS RELATIVE PERCENT: 0 %
GFR AFRICAN AMERICAN: >60
GFR NON-AFRICAN AMERICAN: >60
GLUCOSE BLD-MCNC: 109 MG/DL (ref 70–99)
HCT VFR BLD CALC: 33.9 % (ref 40.5–52.5)
HEMOGLOBIN: 11.3 G/DL (ref 13.5–17.5)
LYMPHOCYTES ABSOLUTE: 0.5 K/UL (ref 1–5.1)
LYMPHOCYTES RELATIVE PERCENT: 4.5 %
MAGNESIUM: 2.3 MG/DL (ref 1.8–2.4)
MCH RBC QN AUTO: 32.2 PG (ref 26–34)
MCHC RBC AUTO-ENTMCNC: 33.4 G/DL (ref 31–36)
MCV RBC AUTO: 96.6 FL (ref 80–100)
MONOCYTES ABSOLUTE: 0.7 K/UL (ref 0–1.3)
MONOCYTES RELATIVE PERCENT: 6.6 %
NEUTROPHILS ABSOLUTE: 9.2 K/UL (ref 1.7–7.7)
NEUTROPHILS RELATIVE PERCENT: 88.3 %
PDW BLD-RTO: 24.5 % (ref 12.4–15.4)
PLATELET # BLD: 438 K/UL (ref 135–450)
PMV BLD AUTO: 8.5 FL (ref 5–10.5)
POTASSIUM REFLEX MAGNESIUM: 4.4 MMOL/L (ref 3.5–5.1)
POTASSIUM SERPL-SCNC: 4.4 MMOL/L (ref 3.5–5.1)
RBC # BLD: 3.51 M/UL (ref 4.2–5.9)
SODIUM BLD-SCNC: 143 MMOL/L (ref 136–145)
WBC # BLD: 10.4 K/UL (ref 4–11)

## 2019-02-17 PROCEDURE — 2580000003 HC RX 258: Performed by: INTERNAL MEDICINE

## 2019-02-17 PROCEDURE — 94640 AIRWAY INHALATION TREATMENT: CPT

## 2019-02-17 PROCEDURE — 80048 BASIC METABOLIC PNL TOTAL CA: CPT

## 2019-02-17 PROCEDURE — 2700000000 HC OXYGEN THERAPY PER DAY

## 2019-02-17 PROCEDURE — 6370000000 HC RX 637 (ALT 250 FOR IP): Performed by: INTERNAL MEDICINE

## 2019-02-17 PROCEDURE — 94761 N-INVAS EAR/PLS OXIMETRY MLT: CPT

## 2019-02-17 PROCEDURE — 2060000000 HC ICU INTERMEDIATE R&B

## 2019-02-17 PROCEDURE — 94664 DEMO&/EVAL PT USE INHALER: CPT

## 2019-02-17 PROCEDURE — 99232 SBSQ HOSP IP/OBS MODERATE 35: CPT | Performed by: INTERNAL MEDICINE

## 2019-02-17 PROCEDURE — 83735 ASSAY OF MAGNESIUM: CPT

## 2019-02-17 PROCEDURE — 6370000000 HC RX 637 (ALT 250 FOR IP): Performed by: FAMILY MEDICINE

## 2019-02-17 PROCEDURE — 36415 COLL VENOUS BLD VENIPUNCTURE: CPT

## 2019-02-17 PROCEDURE — 94668 MNPJ CHEST WALL SBSQ: CPT

## 2019-02-17 PROCEDURE — 85025 COMPLETE CBC W/AUTO DIFF WBC: CPT

## 2019-02-17 PROCEDURE — 6360000002 HC RX W HCPCS: Performed by: INTERNAL MEDICINE

## 2019-02-17 RX ORDER — FUROSEMIDE 40 MG/1
40 TABLET ORAL DAILY
Status: DISCONTINUED | OUTPATIENT
Start: 2019-02-17 | End: 2019-02-19 | Stop reason: HOSPADM

## 2019-02-17 RX ORDER — PREDNISONE 20 MG/1
40 TABLET ORAL DAILY
Status: DISCONTINUED | OUTPATIENT
Start: 2019-02-17 | End: 2019-02-19 | Stop reason: HOSPADM

## 2019-02-17 RX ADMIN — LEVOTHYROXINE SODIUM 100 MCG: 0.1 TABLET ORAL at 08:42

## 2019-02-17 RX ADMIN — METOPROLOL SUCCINATE 25 MG: 25 TABLET, EXTENDED RELEASE ORAL at 08:42

## 2019-02-17 RX ADMIN — SODIUM CHLORIDE, PRESERVATIVE FREE 10 ML: 5 INJECTION INTRAVENOUS at 08:42

## 2019-02-17 RX ADMIN — IPRATROPIUM BROMIDE AND ALBUTEROL SULFATE 1 AMPULE: .5; 3 SOLUTION RESPIRATORY (INHALATION) at 16:14

## 2019-02-17 RX ADMIN — FOLIC ACID 1 MG: 1 TABLET ORAL at 08:42

## 2019-02-17 RX ADMIN — PANTOPRAZOLE SODIUM 40 MG: 40 TABLET, DELAYED RELEASE ORAL at 08:42

## 2019-02-17 RX ADMIN — ATORVASTATIN CALCIUM 20 MG: 10 TABLET, FILM COATED ORAL at 08:42

## 2019-02-17 RX ADMIN — VITAMIN D, TAB 1000IU (100/BT) 1000 UNITS: 25 TAB at 08:41

## 2019-02-17 RX ADMIN — PREDNISONE 40 MG: 20 TABLET ORAL at 08:42

## 2019-02-17 RX ADMIN — IPRATROPIUM BROMIDE AND ALBUTEROL SULFATE 1 AMPULE: .5; 3 SOLUTION RESPIRATORY (INHALATION) at 19:59

## 2019-02-17 RX ADMIN — IPRATROPIUM BROMIDE AND ALBUTEROL SULFATE 1 AMPULE: .5; 3 SOLUTION RESPIRATORY (INHALATION) at 11:50

## 2019-02-17 RX ADMIN — POTASSIUM CHLORIDE 20 MEQ: 20 TABLET, EXTENDED RELEASE ORAL at 08:41

## 2019-02-17 RX ADMIN — ENOXAPARIN SODIUM 120 MG: 150 INJECTION SUBCUTANEOUS at 16:36

## 2019-02-17 RX ADMIN — CEFTRIAXONE SODIUM 1 G: 1 INJECTION, POWDER, FOR SOLUTION INTRAMUSCULAR; INTRAVENOUS at 08:42

## 2019-02-17 RX ADMIN — ENOXAPARIN SODIUM 120 MG: 150 INJECTION SUBCUTANEOUS at 04:24

## 2019-02-17 RX ADMIN — LOSARTAN POTASSIUM 100 MG: 100 TABLET, FILM COATED ORAL at 08:41

## 2019-02-17 RX ADMIN — DULOXETINE HYDROCHLORIDE 60 MG: 60 CAPSULE, DELAYED RELEASE ORAL at 08:42

## 2019-02-17 RX ADMIN — IPRATROPIUM BROMIDE AND ALBUTEROL SULFATE 1 AMPULE: .5; 3 SOLUTION RESPIRATORY (INHALATION) at 08:20

## 2019-02-17 RX ADMIN — FUROSEMIDE 40 MG: 40 TABLET ORAL at 16:36

## 2019-02-17 RX ADMIN — ASPIRIN 81 MG 81 MG: 81 TABLET ORAL at 08:42

## 2019-02-17 RX ADMIN — FERROUS SULFATE TAB 325 MG (65 MG ELEMENTAL FE) 325 MG: 325 (65 FE) TAB at 08:41

## 2019-02-18 LAB
ANION GAP SERPL CALCULATED.3IONS-SCNC: 13 MMOL/L (ref 3–16)
BASOPHILS ABSOLUTE: 0 K/UL (ref 0–0.2)
BASOPHILS RELATIVE PERCENT: 0.4 %
BLOOD CULTURE, ROUTINE: ABNORMAL
BUN BLDV-MCNC: 17 MG/DL (ref 7–20)
CALCIUM SERPL-MCNC: 8.5 MG/DL (ref 8.3–10.6)
CHLORIDE BLD-SCNC: 103 MMOL/L (ref 99–110)
CO2: 27 MMOL/L (ref 21–32)
CREAT SERPL-MCNC: 1 MG/DL (ref 0.8–1.3)
EOSINOPHILS ABSOLUTE: 0 K/UL (ref 0–0.6)
EOSINOPHILS RELATIVE PERCENT: 0.1 %
GFR AFRICAN AMERICAN: >60
GFR NON-AFRICAN AMERICAN: >60
GLUCOSE BLD-MCNC: 78 MG/DL (ref 70–99)
HCT VFR BLD CALC: 34.9 % (ref 40.5–52.5)
HEMOGLOBIN: 11.7 G/DL (ref 13.5–17.5)
LYMPHOCYTES ABSOLUTE: 1.2 K/UL (ref 1–5.1)
LYMPHOCYTES RELATIVE PERCENT: 11.3 %
MAGNESIUM: 2.1 MG/DL (ref 1.8–2.4)
MCH RBC QN AUTO: 32.2 PG (ref 26–34)
MCHC RBC AUTO-ENTMCNC: 33.5 G/DL (ref 31–36)
MCV RBC AUTO: 96.2 FL (ref 80–100)
MONOCYTES ABSOLUTE: 1 K/UL (ref 0–1.3)
MONOCYTES RELATIVE PERCENT: 9.4 %
NEUTROPHILS ABSOLUTE: 8.2 K/UL (ref 1.7–7.7)
NEUTROPHILS RELATIVE PERCENT: 78.8 %
ORGANISM: ABNORMAL
ORGANISM: ABNORMAL
PDW BLD-RTO: 24.3 % (ref 12.4–15.4)
PLATELET # BLD: 450 K/UL (ref 135–450)
PMV BLD AUTO: 8.4 FL (ref 5–10.5)
POTASSIUM REFLEX MAGNESIUM: 4 MMOL/L (ref 3.5–5.1)
POTASSIUM SERPL-SCNC: 4 MMOL/L (ref 3.5–5.1)
PRO-BNP: 6711 PG/ML (ref 0–124)
RBC # BLD: 3.63 M/UL (ref 4.2–5.9)
SODIUM BLD-SCNC: 143 MMOL/L (ref 136–145)
WBC # BLD: 10.4 K/UL (ref 4–11)

## 2019-02-18 PROCEDURE — 97162 PT EVAL MOD COMPLEX 30 MIN: CPT

## 2019-02-18 PROCEDURE — 94668 MNPJ CHEST WALL SBSQ: CPT

## 2019-02-18 PROCEDURE — 83735 ASSAY OF MAGNESIUM: CPT

## 2019-02-18 PROCEDURE — 6370000000 HC RX 637 (ALT 250 FOR IP): Performed by: INTERNAL MEDICINE

## 2019-02-18 PROCEDURE — 80048 BASIC METABOLIC PNL TOTAL CA: CPT

## 2019-02-18 PROCEDURE — 2700000000 HC OXYGEN THERAPY PER DAY

## 2019-02-18 PROCEDURE — 85025 COMPLETE CBC W/AUTO DIFF WBC: CPT

## 2019-02-18 PROCEDURE — 99232 SBSQ HOSP IP/OBS MODERATE 35: CPT | Performed by: INTERNAL MEDICINE

## 2019-02-18 PROCEDURE — 83880 ASSAY OF NATRIURETIC PEPTIDE: CPT

## 2019-02-18 PROCEDURE — 97530 THERAPEUTIC ACTIVITIES: CPT

## 2019-02-18 PROCEDURE — 6370000000 HC RX 637 (ALT 250 FOR IP): Performed by: FAMILY MEDICINE

## 2019-02-18 PROCEDURE — 94761 N-INVAS EAR/PLS OXIMETRY MLT: CPT

## 2019-02-18 PROCEDURE — 2580000003 HC RX 258: Performed by: INTERNAL MEDICINE

## 2019-02-18 PROCEDURE — 6360000002 HC RX W HCPCS: Performed by: INTERNAL MEDICINE

## 2019-02-18 PROCEDURE — 2580000003 HC RX 258

## 2019-02-18 PROCEDURE — 2060000000 HC ICU INTERMEDIATE R&B

## 2019-02-18 PROCEDURE — 97116 GAIT TRAINING THERAPY: CPT

## 2019-02-18 PROCEDURE — 97165 OT EVAL LOW COMPLEX 30 MIN: CPT

## 2019-02-18 PROCEDURE — 36415 COLL VENOUS BLD VENIPUNCTURE: CPT

## 2019-02-18 PROCEDURE — 94640 AIRWAY INHALATION TREATMENT: CPT

## 2019-02-18 RX ORDER — SODIUM CHLORIDE 9 MG/ML
INJECTION, SOLUTION INTRAVENOUS
Status: COMPLETED
Start: 2019-02-18 | End: 2019-02-18

## 2019-02-18 RX ADMIN — LEVOTHYROXINE SODIUM 100 MCG: 0.1 TABLET ORAL at 09:51

## 2019-02-18 RX ADMIN — ENOXAPARIN SODIUM 120 MG: 150 INJECTION SUBCUTANEOUS at 16:38

## 2019-02-18 RX ADMIN — ENOXAPARIN SODIUM 120 MG: 150 INJECTION SUBCUTANEOUS at 04:35

## 2019-02-18 RX ADMIN — ASPIRIN 81 MG 81 MG: 81 TABLET ORAL at 09:51

## 2019-02-18 RX ADMIN — ATORVASTATIN CALCIUM 20 MG: 10 TABLET, FILM COATED ORAL at 12:09

## 2019-02-18 RX ADMIN — FUROSEMIDE 40 MG: 40 TABLET ORAL at 09:50

## 2019-02-18 RX ADMIN — VITAMIN D, TAB 1000IU (100/BT) 1000 UNITS: 25 TAB at 09:50

## 2019-02-18 RX ADMIN — SODIUM CHLORIDE 250 ML: 9 INJECTION, SOLUTION INTRAVENOUS at 09:54

## 2019-02-18 RX ADMIN — APIXABAN 10 MG: 5 TABLET, FILM COATED ORAL at 20:52

## 2019-02-18 RX ADMIN — CEFTRIAXONE SODIUM 1 G: 1 INJECTION, POWDER, FOR SOLUTION INTRAMUSCULAR; INTRAVENOUS at 09:53

## 2019-02-18 RX ADMIN — POTASSIUM CHLORIDE 20 MEQ: 20 TABLET, EXTENDED RELEASE ORAL at 09:50

## 2019-02-18 RX ADMIN — IPRATROPIUM BROMIDE AND ALBUTEROL SULFATE 1 AMPULE: .5; 3 SOLUTION RESPIRATORY (INHALATION) at 20:24

## 2019-02-18 RX ADMIN — PANTOPRAZOLE SODIUM 40 MG: 40 TABLET, DELAYED RELEASE ORAL at 09:50

## 2019-02-18 RX ADMIN — DULOXETINE HYDROCHLORIDE 60 MG: 60 CAPSULE, DELAYED RELEASE ORAL at 09:51

## 2019-02-18 RX ADMIN — PREDNISONE 40 MG: 20 TABLET ORAL at 09:51

## 2019-02-18 RX ADMIN — IPRATROPIUM BROMIDE AND ALBUTEROL SULFATE 1 AMPULE: .5; 3 SOLUTION RESPIRATORY (INHALATION) at 04:41

## 2019-02-18 RX ADMIN — IPRATROPIUM BROMIDE AND ALBUTEROL SULFATE 1 AMPULE: .5; 3 SOLUTION RESPIRATORY (INHALATION) at 12:53

## 2019-02-18 RX ADMIN — SODIUM CHLORIDE, PRESERVATIVE FREE 10 ML: 5 INJECTION INTRAVENOUS at 09:53

## 2019-02-18 RX ADMIN — FERROUS SULFATE TAB 325 MG (65 MG ELEMENTAL FE) 325 MG: 325 (65 FE) TAB at 09:50

## 2019-02-18 RX ADMIN — IPRATROPIUM BROMIDE AND ALBUTEROL SULFATE 1 AMPULE: .5; 3 SOLUTION RESPIRATORY (INHALATION) at 16:42

## 2019-02-18 RX ADMIN — LOSARTAN POTASSIUM 100 MG: 100 TABLET, FILM COATED ORAL at 09:51

## 2019-02-18 RX ADMIN — IPRATROPIUM BROMIDE AND ALBUTEROL SULFATE 1 AMPULE: .5; 3 SOLUTION RESPIRATORY (INHALATION) at 09:04

## 2019-02-18 RX ADMIN — METOPROLOL SUCCINATE 25 MG: 25 TABLET, EXTENDED RELEASE ORAL at 09:50

## 2019-02-18 RX ADMIN — SODIUM CHLORIDE, PRESERVATIVE FREE 10 ML: 5 INJECTION INTRAVENOUS at 00:35

## 2019-02-18 RX ADMIN — FOLIC ACID 1 MG: 1 TABLET ORAL at 09:51

## 2019-02-18 RX ADMIN — SODIUM CHLORIDE, PRESERVATIVE FREE 10 ML: 5 INJECTION INTRAVENOUS at 20:52

## 2019-02-19 ENCOUNTER — TELEPHONE (OUTPATIENT)
Dept: PULMONOLOGY | Age: 74
End: 2019-02-19

## 2019-02-19 VITALS
HEIGHT: 74 IN | TEMPERATURE: 98.2 F | WEIGHT: 260.7 LBS | RESPIRATION RATE: 20 BRPM | OXYGEN SATURATION: 94 % | DIASTOLIC BLOOD PRESSURE: 85 MMHG | BODY MASS INDEX: 33.46 KG/M2 | SYSTOLIC BLOOD PRESSURE: 151 MMHG | HEART RATE: 84 BPM

## 2019-02-19 LAB
ANION GAP SERPL CALCULATED.3IONS-SCNC: 10 MMOL/L (ref 3–16)
BASOPHILS ABSOLUTE: 0 K/UL (ref 0–0.2)
BASOPHILS RELATIVE PERCENT: 0.4 %
BUN BLDV-MCNC: 16 MG/DL (ref 7–20)
CALCIUM SERPL-MCNC: 8.4 MG/DL (ref 8.3–10.6)
CHLORIDE BLD-SCNC: 101 MMOL/L (ref 99–110)
CO2: 29 MMOL/L (ref 21–32)
CREAT SERPL-MCNC: 0.8 MG/DL (ref 0.8–1.3)
CULTURE, BLOOD 2: NORMAL
EOSINOPHILS ABSOLUTE: 0 K/UL (ref 0–0.6)
EOSINOPHILS RELATIVE PERCENT: 0.2 %
GFR AFRICAN AMERICAN: >60
GFR NON-AFRICAN AMERICAN: >60
GLUCOSE BLD-MCNC: 96 MG/DL (ref 70–99)
HCT VFR BLD CALC: 34.9 % (ref 40.5–52.5)
HEMOGLOBIN: 11.3 G/DL (ref 13.5–17.5)
LYMPHOCYTES ABSOLUTE: 1.1 K/UL (ref 1–5.1)
LYMPHOCYTES RELATIVE PERCENT: 9.6 %
MAGNESIUM: 2 MG/DL (ref 1.8–2.4)
MCH RBC QN AUTO: 31 PG (ref 26–34)
MCHC RBC AUTO-ENTMCNC: 32.5 G/DL (ref 31–36)
MCV RBC AUTO: 95.2 FL (ref 80–100)
MONOCYTES ABSOLUTE: 0.9 K/UL (ref 0–1.3)
MONOCYTES RELATIVE PERCENT: 7.7 %
NEUTROPHILS ABSOLUTE: 9.2 K/UL (ref 1.7–7.7)
NEUTROPHILS RELATIVE PERCENT: 82.1 %
PDW BLD-RTO: 23.7 % (ref 12.4–15.4)
PLATELET # BLD: 459 K/UL (ref 135–450)
PMV BLD AUTO: 8.4 FL (ref 5–10.5)
POTASSIUM REFLEX MAGNESIUM: 5 MMOL/L (ref 3.5–5.1)
RBC # BLD: 3.66 M/UL (ref 4.2–5.9)
SODIUM BLD-SCNC: 140 MMOL/L (ref 136–145)
WBC # BLD: 11.2 K/UL (ref 4–11)

## 2019-02-19 PROCEDURE — 99232 SBSQ HOSP IP/OBS MODERATE 35: CPT | Performed by: INTERNAL MEDICINE

## 2019-02-19 PROCEDURE — 85025 COMPLETE CBC W/AUTO DIFF WBC: CPT

## 2019-02-19 PROCEDURE — 6360000002 HC RX W HCPCS: Performed by: INTERNAL MEDICINE

## 2019-02-19 PROCEDURE — 80048 BASIC METABOLIC PNL TOTAL CA: CPT

## 2019-02-19 PROCEDURE — 6370000000 HC RX 637 (ALT 250 FOR IP): Performed by: INTERNAL MEDICINE

## 2019-02-19 PROCEDURE — 83735 ASSAY OF MAGNESIUM: CPT

## 2019-02-19 PROCEDURE — 36415 COLL VENOUS BLD VENIPUNCTURE: CPT

## 2019-02-19 PROCEDURE — 2700000000 HC OXYGEN THERAPY PER DAY

## 2019-02-19 PROCEDURE — 2580000003 HC RX 258: Performed by: INTERNAL MEDICINE

## 2019-02-19 PROCEDURE — 94761 N-INVAS EAR/PLS OXIMETRY MLT: CPT

## 2019-02-19 PROCEDURE — 6370000000 HC RX 637 (ALT 250 FOR IP): Performed by: FAMILY MEDICINE

## 2019-02-19 PROCEDURE — 94640 AIRWAY INHALATION TREATMENT: CPT

## 2019-02-19 PROCEDURE — 97116 GAIT TRAINING THERAPY: CPT

## 2019-02-19 PROCEDURE — 94668 MNPJ CHEST WALL SBSQ: CPT

## 2019-02-19 PROCEDURE — 97110 THERAPEUTIC EXERCISES: CPT

## 2019-02-19 RX ORDER — IPRATROPIUM BROMIDE AND ALBUTEROL SULFATE 2.5; .5 MG/3ML; MG/3ML
3 SOLUTION RESPIRATORY (INHALATION) EVERY 4 HOURS PRN
Qty: 360 ML | Refills: 1 | Status: SHIPPED | OUTPATIENT
Start: 2019-02-19 | End: 2019-04-04 | Stop reason: SDUPTHER

## 2019-02-19 RX ADMIN — APIXABAN 10 MG: 5 TABLET, FILM COATED ORAL at 09:26

## 2019-02-19 RX ADMIN — DULOXETINE HYDROCHLORIDE 60 MG: 60 CAPSULE, DELAYED RELEASE ORAL at 09:26

## 2019-02-19 RX ADMIN — IPRATROPIUM BROMIDE AND ALBUTEROL SULFATE 1 AMPULE: .5; 3 SOLUTION RESPIRATORY (INHALATION) at 08:04

## 2019-02-19 RX ADMIN — ATORVASTATIN CALCIUM 20 MG: 10 TABLET, FILM COATED ORAL at 09:26

## 2019-02-19 RX ADMIN — ASPIRIN 81 MG 81 MG: 81 TABLET ORAL at 09:26

## 2019-02-19 RX ADMIN — IPRATROPIUM BROMIDE AND ALBUTEROL SULFATE 1 AMPULE: .5; 3 SOLUTION RESPIRATORY (INHALATION) at 16:32

## 2019-02-19 RX ADMIN — PREDNISONE 40 MG: 20 TABLET ORAL at 09:26

## 2019-02-19 RX ADMIN — FERROUS SULFATE TAB 325 MG (65 MG ELEMENTAL FE) 325 MG: 325 (65 FE) TAB at 09:26

## 2019-02-19 RX ADMIN — POTASSIUM CHLORIDE 20 MEQ: 20 TABLET, EXTENDED RELEASE ORAL at 09:26

## 2019-02-19 RX ADMIN — CEFTRIAXONE SODIUM 1 G: 1 INJECTION, POWDER, FOR SOLUTION INTRAMUSCULAR; INTRAVENOUS at 09:24

## 2019-02-19 RX ADMIN — LEVOTHYROXINE SODIUM 100 MCG: 0.1 TABLET ORAL at 09:26

## 2019-02-19 RX ADMIN — VITAMIN D, TAB 1000IU (100/BT) 1000 UNITS: 25 TAB at 09:25

## 2019-02-19 RX ADMIN — FOLIC ACID 1 MG: 1 TABLET ORAL at 09:26

## 2019-02-19 RX ADMIN — METOPROLOL SUCCINATE 25 MG: 25 TABLET, EXTENDED RELEASE ORAL at 09:25

## 2019-02-19 RX ADMIN — FUROSEMIDE 40 MG: 40 TABLET ORAL at 09:26

## 2019-02-19 RX ADMIN — LOSARTAN POTASSIUM 100 MG: 100 TABLET, FILM COATED ORAL at 09:26

## 2019-02-19 RX ADMIN — PANTOPRAZOLE SODIUM 40 MG: 40 TABLET, DELAYED RELEASE ORAL at 09:25

## 2019-02-19 RX ADMIN — IPRATROPIUM BROMIDE AND ALBUTEROL SULFATE 1 AMPULE: .5; 3 SOLUTION RESPIRATORY (INHALATION) at 12:19

## 2019-02-19 RX ADMIN — SODIUM CHLORIDE, PRESERVATIVE FREE 10 ML: 5 INJECTION INTRAVENOUS at 09:29

## 2019-02-19 ASSESSMENT — PAIN SCALES - GENERAL
PAINLEVEL_OUTOF10: 0
PAINLEVEL_OUTOF10: 0

## 2019-02-20 ENCOUNTER — TELEPHONE (OUTPATIENT)
Dept: PHARMACY | Facility: CLINIC | Age: 74
End: 2019-02-20

## 2019-02-20 ENCOUNTER — CARE COORDINATION (OUTPATIENT)
Dept: CASE MANAGEMENT | Age: 74
End: 2019-02-20

## 2019-02-20 DIAGNOSIS — I26.99 OTHER ACUTE PULMONARY EMBOLISM WITHOUT ACUTE COR PULMONALE (HCC): Primary | ICD-10-CM

## 2019-02-20 PROCEDURE — 1111F DSCHRG MED/CURRENT MED MERGE: CPT

## 2019-02-25 ENCOUNTER — CARE COORDINATION (OUTPATIENT)
Dept: CASE MANAGEMENT | Age: 74
End: 2019-02-25

## 2019-02-28 ENCOUNTER — CARE COORDINATION (OUTPATIENT)
Dept: CASE MANAGEMENT | Age: 74
End: 2019-02-28

## 2019-03-04 ENCOUNTER — CARE COORDINATION (OUTPATIENT)
Dept: CASE MANAGEMENT | Age: 74
End: 2019-03-04

## 2019-03-11 RX ORDER — LOSARTAN POTASSIUM 100 MG/1
TABLET ORAL
Qty: 90 TABLET | Refills: 0 | Status: ON HOLD | OUTPATIENT
Start: 2019-03-11 | End: 2019-04-11 | Stop reason: HOSPADM

## 2019-03-20 RX ORDER — GLUCOSAMINE SULFATE DIPOT CHLR 500 MG
CAPSULE ORAL
Qty: 30 TABLET | Refills: 3 | Status: ON HOLD | OUTPATIENT
Start: 2019-03-20 | End: 2019-04-11 | Stop reason: HOSPADM

## 2019-03-23 ENCOUNTER — HOSPITAL ENCOUNTER (INPATIENT)
Age: 74
LOS: 5 days | Discharge: HOME | DRG: 291 | End: 2019-03-29
Attending: EMERGENCY MEDICINE | Admitting: INTERNAL MEDICINE
Payer: MEDICARE

## 2019-03-23 DIAGNOSIS — R77.8 ELEVATED TROPONIN: ICD-10-CM

## 2019-03-23 DIAGNOSIS — R60.9 PERIPHERAL EDEMA: ICD-10-CM

## 2019-03-23 DIAGNOSIS — R06.00 DYSPNEA, UNSPECIFIED TYPE: Primary | ICD-10-CM

## 2019-03-23 PROCEDURE — 99285 EMERGENCY DEPT VISIT HI MDM: CPT

## 2019-03-24 ENCOUNTER — APPOINTMENT (OUTPATIENT)
Dept: GENERAL RADIOLOGY | Age: 74
DRG: 291 | End: 2019-03-24
Payer: MEDICARE

## 2019-03-24 ENCOUNTER — APPOINTMENT (OUTPATIENT)
Dept: ULTRASOUND IMAGING | Age: 74
DRG: 291 | End: 2019-03-24
Payer: MEDICARE

## 2019-03-24 PROBLEM — E66.9 OBESITY: Status: ACTIVE | Noted: 2019-03-24

## 2019-03-24 PROBLEM — N17.9 ARF (ACUTE RENAL FAILURE) (HCC): Status: ACTIVE | Noted: 2019-03-24

## 2019-03-24 PROBLEM — D64.9 ANEMIA: Status: ACTIVE | Noted: 2019-03-24

## 2019-03-24 PROBLEM — I50.9 CHF (CONGESTIVE HEART FAILURE) (HCC): Status: ACTIVE | Noted: 2019-03-24

## 2019-03-24 PROBLEM — I50.43 CHF (CONGESTIVE HEART FAILURE), NYHA CLASS I, ACUTE ON CHRONIC, COMBINED (HCC): Status: ACTIVE | Noted: 2019-03-24

## 2019-03-24 LAB
A/G RATIO: 0.9 (ref 1.1–2.2)
ALBUMIN SERPL-MCNC: 2.7 G/DL (ref 3.4–5)
ALP BLD-CCNC: 178 U/L (ref 40–129)
ALT SERPL-CCNC: 46 U/L (ref 10–40)
ANION GAP SERPL CALCULATED.3IONS-SCNC: 13 MMOL/L (ref 3–16)
ANION GAP SERPL CALCULATED.3IONS-SCNC: 20 MMOL/L (ref 3–16)
ANISOCYTOSIS: ABNORMAL
AST SERPL-CCNC: 68 U/L (ref 15–37)
BANDED NEUTROPHILS RELATIVE PERCENT: 7 % (ref 0–7)
BASOPHILS ABSOLUTE: 0 K/UL (ref 0–0.2)
BASOPHILS RELATIVE PERCENT: 0 %
BILIRUB SERPL-MCNC: 0.4 MG/DL (ref 0–1)
BUN BLDV-MCNC: 16 MG/DL (ref 7–20)
BUN BLDV-MCNC: 18 MG/DL (ref 7–20)
CALCIUM SERPL-MCNC: 8.2 MG/DL (ref 8.3–10.6)
CALCIUM SERPL-MCNC: 8.4 MG/DL (ref 8.3–10.6)
CHLORIDE BLD-SCNC: 96 MMOL/L (ref 99–110)
CHLORIDE BLD-SCNC: 96 MMOL/L (ref 99–110)
CO2: 21 MMOL/L (ref 21–32)
CO2: 29 MMOL/L (ref 21–32)
CREAT SERPL-MCNC: 1.4 MG/DL (ref 0.8–1.3)
CREAT SERPL-MCNC: 1.4 MG/DL (ref 0.8–1.3)
EKG ATRIAL RATE: 110 BPM
EKG DIAGNOSIS: NORMAL
EKG P AXIS: 65 DEGREES
EKG P-R INTERVAL: 166 MS
EKG Q-T INTERVAL: 350 MS
EKG QRS DURATION: 100 MS
EKG QTC CALCULATION (BAZETT): 473 MS
EKG R AXIS: 23 DEGREES
EKG T AXIS: 34 DEGREES
EKG VENTRICULAR RATE: 110 BPM
EOSINOPHILS ABSOLUTE: 0.1 K/UL (ref 0–0.6)
EOSINOPHILS RELATIVE PERCENT: 1 %
GFR AFRICAN AMERICAN: >60
GFR AFRICAN AMERICAN: >60
GFR NON-AFRICAN AMERICAN: 50
GFR NON-AFRICAN AMERICAN: 50
GLOBULIN: 3 G/DL
GLUCOSE BLD-MCNC: 107 MG/DL (ref 70–99)
GLUCOSE BLD-MCNC: 163 MG/DL (ref 70–99)
HCT VFR BLD CALC: 29.4 % (ref 40.5–52.5)
HEMOGLOBIN: 9.9 G/DL (ref 13.5–17.5)
LACTIC ACID, SEPSIS: 4.8 MMOL/L (ref 0.4–1.9)
LACTIC ACID, SEPSIS: 6.9 MMOL/L (ref 0.4–1.9)
LACTIC ACID: 3.2 MMOL/L (ref 0.4–2)
LYMPHOCYTES ABSOLUTE: 1.7 K/UL (ref 1–5.1)
LYMPHOCYTES RELATIVE PERCENT: 19 %
MACROCYTES: ABNORMAL
MCH RBC QN AUTO: 34.3 PG (ref 26–34)
MCHC RBC AUTO-ENTMCNC: 33.6 G/DL (ref 31–36)
MCV RBC AUTO: 102.3 FL (ref 80–100)
MONOCYTES ABSOLUTE: 1.1 K/UL (ref 0–1.3)
MONOCYTES RELATIVE PERCENT: 12 %
NEUTROPHILS ABSOLUTE: 6.1 K/UL (ref 1.7–7.7)
NEUTROPHILS RELATIVE PERCENT: 61 %
PDW BLD-RTO: 18.2 % (ref 12.4–15.4)
PLATELET # BLD: 346 K/UL (ref 135–450)
PLATELET SLIDE REVIEW: ADEQUATE
PMV BLD AUTO: 7.3 FL (ref 5–10.5)
POTASSIUM SERPL-SCNC: 4.2 MMOL/L (ref 3.5–5.1)
POTASSIUM SERPL-SCNC: 4.9 MMOL/L (ref 3.5–5.1)
PRO-BNP: 365 PG/ML (ref 0–124)
RBC # BLD: 2.87 M/UL (ref 4.2–5.9)
SODIUM BLD-SCNC: 137 MMOL/L (ref 136–145)
SODIUM BLD-SCNC: 138 MMOL/L (ref 136–145)
TOTAL PROTEIN: 5.7 G/DL (ref 6.4–8.2)
TROPONIN: 0.02 NG/ML
TROPONIN: 0.03 NG/ML
TROPONIN: 0.03 NG/ML
TROPONIN: 0.04 NG/ML
WBC # BLD: 8.9 K/UL (ref 4–11)

## 2019-03-24 PROCEDURE — 83880 ASSAY OF NATRIURETIC PEPTIDE: CPT

## 2019-03-24 PROCEDURE — 76770 US EXAM ABDO BACK WALL COMP: CPT

## 2019-03-24 PROCEDURE — 93010 ELECTROCARDIOGRAM REPORT: CPT | Performed by: INTERNAL MEDICINE

## 2019-03-24 PROCEDURE — 2580000003 HC RX 258: Performed by: INTERNAL MEDICINE

## 2019-03-24 PROCEDURE — 80053 COMPREHEN METABOLIC PANEL: CPT

## 2019-03-24 PROCEDURE — 94640 AIRWAY INHALATION TREATMENT: CPT

## 2019-03-24 PROCEDURE — 99223 1ST HOSP IP/OBS HIGH 75: CPT | Performed by: INTERNAL MEDICINE

## 2019-03-24 PROCEDURE — 87801 DETECT AGNT MULT DNA AMPLI: CPT

## 2019-03-24 PROCEDURE — 36415 COLL VENOUS BLD VENIPUNCTURE: CPT

## 2019-03-24 PROCEDURE — 6360000002 HC RX W HCPCS: Performed by: INTERNAL MEDICINE

## 2019-03-24 PROCEDURE — 85025 COMPLETE CBC W/AUTO DIFF WBC: CPT

## 2019-03-24 PROCEDURE — 6370000000 HC RX 637 (ALT 250 FOR IP): Performed by: EMERGENCY MEDICINE

## 2019-03-24 PROCEDURE — 84484 ASSAY OF TROPONIN QUANT: CPT

## 2019-03-24 PROCEDURE — 93005 ELECTROCARDIOGRAM TRACING: CPT | Performed by: EMERGENCY MEDICINE

## 2019-03-24 PROCEDURE — 94150 VITAL CAPACITY TEST: CPT

## 2019-03-24 PROCEDURE — 87040 BLOOD CULTURE FOR BACTERIA: CPT

## 2019-03-24 PROCEDURE — 2700000000 HC OXYGEN THERAPY PER DAY

## 2019-03-24 PROCEDURE — 94761 N-INVAS EAR/PLS OXIMETRY MLT: CPT

## 2019-03-24 PROCEDURE — 6370000000 HC RX 637 (ALT 250 FOR IP): Performed by: INTERNAL MEDICINE

## 2019-03-24 PROCEDURE — 71046 X-RAY EXAM CHEST 2 VIEWS: CPT

## 2019-03-24 PROCEDURE — 83605 ASSAY OF LACTIC ACID: CPT

## 2019-03-24 PROCEDURE — 1200000000 HC SEMI PRIVATE

## 2019-03-24 RX ORDER — M-VIT,TX,IRON,MINS/CALC/FOLIC 27MG-0.4MG
1 TABLET ORAL DAILY
Status: DISCONTINUED | OUTPATIENT
Start: 2019-03-24 | End: 2019-03-29 | Stop reason: HOSPADM

## 2019-03-24 RX ORDER — SODIUM CHLORIDE 0.9 % (FLUSH) 0.9 %
10 SYRINGE (ML) INJECTION PRN
Status: DISCONTINUED | OUTPATIENT
Start: 2019-03-24 | End: 2019-03-29 | Stop reason: HOSPADM

## 2019-03-24 RX ORDER — DULOXETIN HYDROCHLORIDE 60 MG/1
60 CAPSULE, DELAYED RELEASE ORAL DAILY
Status: DISCONTINUED | OUTPATIENT
Start: 2019-03-24 | End: 2019-03-29 | Stop reason: HOSPADM

## 2019-03-24 RX ORDER — LORAZEPAM 2 MG/ML
4 INJECTION INTRAMUSCULAR
Status: DISCONTINUED | OUTPATIENT
Start: 2019-03-24 | End: 2019-03-29 | Stop reason: HOSPADM

## 2019-03-24 RX ORDER — LORAZEPAM 1 MG/1
1 TABLET ORAL
Status: DISCONTINUED | OUTPATIENT
Start: 2019-03-24 | End: 2019-03-29 | Stop reason: HOSPADM

## 2019-03-24 RX ORDER — METOPROLOL SUCCINATE 50 MG/1
50 TABLET, EXTENDED RELEASE ORAL DAILY
Status: DISCONTINUED | OUTPATIENT
Start: 2019-03-25 | End: 2019-03-29 | Stop reason: HOSPADM

## 2019-03-24 RX ORDER — SODIUM CHLORIDE 0.9 % (FLUSH) 0.9 %
10 SYRINGE (ML) INJECTION EVERY 12 HOURS SCHEDULED
Status: DISCONTINUED | OUTPATIENT
Start: 2019-03-24 | End: 2019-03-29 | Stop reason: HOSPADM

## 2019-03-24 RX ORDER — LORAZEPAM 1 MG/1
3 TABLET ORAL
Status: DISCONTINUED | OUTPATIENT
Start: 2019-03-24 | End: 2019-03-29 | Stop reason: HOSPADM

## 2019-03-24 RX ORDER — HYDRALAZINE HYDROCHLORIDE 20 MG/ML
10 INJECTION INTRAMUSCULAR; INTRAVENOUS EVERY 4 HOURS PRN
Status: DISCONTINUED | OUTPATIENT
Start: 2019-03-24 | End: 2019-03-29 | Stop reason: HOSPADM

## 2019-03-24 RX ORDER — IPRATROPIUM BROMIDE AND ALBUTEROL SULFATE 2.5; .5 MG/3ML; MG/3ML
1 SOLUTION RESPIRATORY (INHALATION) ONCE
Status: COMPLETED | OUTPATIENT
Start: 2019-03-24 | End: 2019-03-24

## 2019-03-24 RX ORDER — FUROSEMIDE 10 MG/ML
40 INJECTION INTRAMUSCULAR; INTRAVENOUS 3 TIMES DAILY
Status: DISCONTINUED | OUTPATIENT
Start: 2019-03-24 | End: 2019-03-24

## 2019-03-24 RX ORDER — SODIUM CHLORIDE 9 MG/ML
INJECTION, SOLUTION INTRAVENOUS CONTINUOUS
Status: DISPENSED | OUTPATIENT
Start: 2019-03-24 | End: 2019-03-25

## 2019-03-24 RX ORDER — LORAZEPAM 2 MG/ML
1 INJECTION INTRAMUSCULAR
Status: DISCONTINUED | OUTPATIENT
Start: 2019-03-24 | End: 2019-03-29 | Stop reason: HOSPADM

## 2019-03-24 RX ORDER — ONDANSETRON 2 MG/ML
4 INJECTION INTRAMUSCULAR; INTRAVENOUS EVERY 6 HOURS PRN
Status: DISCONTINUED | OUTPATIENT
Start: 2019-03-24 | End: 2019-03-29 | Stop reason: HOSPADM

## 2019-03-24 RX ORDER — LORAZEPAM 1 MG/1
4 TABLET ORAL
Status: DISCONTINUED | OUTPATIENT
Start: 2019-03-24 | End: 2019-03-29 | Stop reason: HOSPADM

## 2019-03-24 RX ORDER — ACETAMINOPHEN 325 MG/1
650 TABLET ORAL EVERY 4 HOURS PRN
Status: DISCONTINUED | OUTPATIENT
Start: 2019-03-24 | End: 2019-03-29 | Stop reason: HOSPADM

## 2019-03-24 RX ORDER — LORAZEPAM 1 MG/1
2 TABLET ORAL
Status: DISCONTINUED | OUTPATIENT
Start: 2019-03-24 | End: 2019-03-29 | Stop reason: HOSPADM

## 2019-03-24 RX ORDER — THIAMINE MONONITRATE (VIT B1) 100 MG
100 TABLET ORAL DAILY
Status: DISCONTINUED | OUTPATIENT
Start: 2019-03-24 | End: 2019-03-29 | Stop reason: HOSPADM

## 2019-03-24 RX ORDER — FOLIC ACID 1 MG/1
1 TABLET ORAL DAILY
Status: DISCONTINUED | OUTPATIENT
Start: 2019-03-24 | End: 2019-03-29 | Stop reason: HOSPADM

## 2019-03-24 RX ORDER — LOSARTAN POTASSIUM 100 MG/1
100 TABLET ORAL DAILY
Status: DISCONTINUED | OUTPATIENT
Start: 2019-03-24 | End: 2019-03-24

## 2019-03-24 RX ORDER — METOPROLOL SUCCINATE 25 MG/1
25 TABLET, EXTENDED RELEASE ORAL DAILY
Status: DISCONTINUED | OUTPATIENT
Start: 2019-03-24 | End: 2019-03-24

## 2019-03-24 RX ORDER — FUROSEMIDE 10 MG/ML
40 INJECTION INTRAMUSCULAR; INTRAVENOUS 2 TIMES DAILY
Status: DISCONTINUED | OUTPATIENT
Start: 2019-03-24 | End: 2019-03-24

## 2019-03-24 RX ORDER — POTASSIUM CHLORIDE 20 MEQ/1
20 TABLET, EXTENDED RELEASE ORAL
Status: DISCONTINUED | OUTPATIENT
Start: 2019-03-24 | End: 2019-03-24

## 2019-03-24 RX ORDER — PANTOPRAZOLE SODIUM 20 MG/1
40 TABLET, DELAYED RELEASE ORAL
Status: DISCONTINUED | OUTPATIENT
Start: 2019-03-24 | End: 2019-03-29 | Stop reason: HOSPADM

## 2019-03-24 RX ORDER — ASPIRIN 81 MG/1
81 TABLET ORAL DAILY
Status: DISCONTINUED | OUTPATIENT
Start: 2019-03-24 | End: 2019-03-29

## 2019-03-24 RX ORDER — IPRATROPIUM BROMIDE AND ALBUTEROL SULFATE 2.5; .5 MG/3ML; MG/3ML
3 SOLUTION RESPIRATORY (INHALATION) EVERY 4 HOURS
Status: DISCONTINUED | OUTPATIENT
Start: 2019-03-24 | End: 2019-03-29 | Stop reason: HOSPADM

## 2019-03-24 RX ORDER — LORAZEPAM 2 MG/ML
3 INJECTION INTRAMUSCULAR
Status: DISCONTINUED | OUTPATIENT
Start: 2019-03-24 | End: 2019-03-29 | Stop reason: HOSPADM

## 2019-03-24 RX ORDER — ATORVASTATIN CALCIUM 40 MG/1
20 TABLET, FILM COATED ORAL DAILY
Status: DISCONTINUED | OUTPATIENT
Start: 2019-03-24 | End: 2019-03-29 | Stop reason: HOSPADM

## 2019-03-24 RX ORDER — LEVOTHYROXINE SODIUM 0.1 MG/1
100 TABLET ORAL DAILY
Status: DISCONTINUED | OUTPATIENT
Start: 2019-03-24 | End: 2019-03-29 | Stop reason: HOSPADM

## 2019-03-24 RX ORDER — FERROUS SULFATE 325(65) MG
325 TABLET ORAL
Status: DISCONTINUED | OUTPATIENT
Start: 2019-03-24 | End: 2019-03-29 | Stop reason: HOSPADM

## 2019-03-24 RX ORDER — LORAZEPAM 2 MG/ML
2 INJECTION INTRAMUSCULAR
Status: DISCONTINUED | OUTPATIENT
Start: 2019-03-24 | End: 2019-03-29 | Stop reason: HOSPADM

## 2019-03-24 RX ORDER — FUROSEMIDE 10 MG/ML
40 INJECTION INTRAMUSCULAR; INTRAVENOUS ONCE
Status: COMPLETED | OUTPATIENT
Start: 2019-03-24 | End: 2019-03-24

## 2019-03-24 RX ORDER — IPRATROPIUM BROMIDE AND ALBUTEROL SULFATE 2.5; .5 MG/3ML; MG/3ML
3 SOLUTION RESPIRATORY (INHALATION) EVERY 4 HOURS PRN
Status: DISCONTINUED | OUTPATIENT
Start: 2019-03-24 | End: 2019-03-24

## 2019-03-24 RX ADMIN — POTASSIUM CHLORIDE 20 MEQ: 20 TABLET, EXTENDED RELEASE ORAL at 08:13

## 2019-03-24 RX ADMIN — LORAZEPAM 3 MG: 1 TABLET ORAL at 12:18

## 2019-03-24 RX ADMIN — Medication 10 ML: at 08:13

## 2019-03-24 RX ADMIN — Medication 100 MG: at 21:44

## 2019-03-24 RX ADMIN — FOLIC ACID 1 MG: 1 TABLET ORAL at 08:13

## 2019-03-24 RX ADMIN — SODIUM CHLORIDE: 9 INJECTION, SOLUTION INTRAVENOUS at 14:35

## 2019-03-24 RX ADMIN — IPRATROPIUM BROMIDE AND ALBUTEROL SULFATE 3 ML: .5; 3 SOLUTION RESPIRATORY (INHALATION) at 11:55

## 2019-03-24 RX ADMIN — FUROSEMIDE 40 MG: 10 INJECTION, SOLUTION INTRAMUSCULAR; INTRAVENOUS at 08:13

## 2019-03-24 RX ADMIN — METOPROLOL SUCCINATE 25 MG: 25 TABLET, EXTENDED RELEASE ORAL at 08:13

## 2019-03-24 RX ADMIN — LOSARTAN POTASSIUM 100 MG: 100 TABLET, FILM COATED ORAL at 08:13

## 2019-03-24 RX ADMIN — ACETAMINOPHEN 650 MG: 325 TABLET ORAL at 03:55

## 2019-03-24 RX ADMIN — ATORVASTATIN CALCIUM 20 MG: 40 TABLET, FILM COATED ORAL at 08:13

## 2019-03-24 RX ADMIN — DULOXETINE HYDROCHLORIDE 60 MG: 60 CAPSULE, DELAYED RELEASE ORAL at 08:14

## 2019-03-24 RX ADMIN — IPRATROPIUM BROMIDE AND ALBUTEROL SULFATE 3 ML: .5; 3 SOLUTION RESPIRATORY (INHALATION) at 08:00

## 2019-03-24 RX ADMIN — FERROUS SULFATE TAB 325 MG (65 MG ELEMENTAL FE) 325 MG: 325 (65 FE) TAB at 08:13

## 2019-03-24 RX ADMIN — ONDANSETRON 4 MG: 2 INJECTION INTRAMUSCULAR; INTRAVENOUS at 12:18

## 2019-03-24 RX ADMIN — Medication 2 PUFF: at 20:31

## 2019-03-24 RX ADMIN — PANTOPRAZOLE SODIUM 40 MG: 20 TABLET, DELAYED RELEASE ORAL at 06:48

## 2019-03-24 RX ADMIN — APIXABAN 5 MG: 5 TABLET, FILM COATED ORAL at 08:13

## 2019-03-24 RX ADMIN — IPRATROPIUM BROMIDE AND ALBUTEROL SULFATE 3 ML: .5; 3 SOLUTION RESPIRATORY (INHALATION) at 04:12

## 2019-03-24 RX ADMIN — FUROSEMIDE 40 MG: 10 INJECTION, SOLUTION INTRAMUSCULAR; INTRAVENOUS at 03:12

## 2019-03-24 RX ADMIN — Medication 2 PUFF: at 08:00

## 2019-03-24 RX ADMIN — Medication 1 TABLET: at 21:45

## 2019-03-24 RX ADMIN — ASPIRIN 81 MG: 81 TABLET, COATED ORAL at 08:13

## 2019-03-24 RX ADMIN — IPRATROPIUM BROMIDE AND ALBUTEROL SULFATE 1 AMPULE: .5; 3 SOLUTION RESPIRATORY (INHALATION) at 01:35

## 2019-03-24 RX ADMIN — LEVOTHYROXINE SODIUM 100 MCG: 100 TABLET ORAL at 06:48

## 2019-03-24 RX ADMIN — IPRATROPIUM BROMIDE AND ALBUTEROL SULFATE 3 ML: .5; 3 SOLUTION RESPIRATORY (INHALATION) at 23:44

## 2019-03-24 RX ADMIN — APIXABAN 5 MG: 5 TABLET, FILM COATED ORAL at 21:44

## 2019-03-24 RX ADMIN — IPRATROPIUM BROMIDE AND ALBUTEROL SULFATE 3 ML: .5; 3 SOLUTION RESPIRATORY (INHALATION) at 20:31

## 2019-03-24 ASSESSMENT — PAIN DESCRIPTION - PAIN TYPE
TYPE: CHRONIC PAIN

## 2019-03-24 ASSESSMENT — PAIN SCALES - GENERAL
PAINLEVEL_OUTOF10: 9
PAINLEVEL_OUTOF10: 4
PAINLEVEL_OUTOF10: 5
PAINLEVEL_OUTOF10: 0
PAINLEVEL_OUTOF10: 4
PAINLEVEL_OUTOF10: 9
PAINLEVEL_OUTOF10: 9
PAINLEVEL_OUTOF10: 0
PAINLEVEL_OUTOF10: 9
PAINLEVEL_OUTOF10: 9

## 2019-03-24 ASSESSMENT — PAIN DESCRIPTION - LOCATION
LOCATION: BACK

## 2019-03-24 ASSESSMENT — PAIN DESCRIPTION - PROGRESSION
CLINICAL_PROGRESSION: NOT CHANGED

## 2019-03-24 NOTE — PROGRESS NOTES
Patient admitted to room 211 from ED. Patient oriented to room, call light, bed rails, phone, lights and bathroom. Patient instructed about the schedule of the day including: vital sign frequency, lab draws, possible tests, frequency of MD and staff rounds, including RN/MD rounding together at bedside, daily weights, and I &O's. Patient instructed about prescribed diet, how to use 8MENU, and television. Bed alarm in place, patient aware of placement and reason. Telemetry box  in place, patient aware of placement and reason. Bed locked, in lowest position, side rails up 2/4, call light within reach. Will continue to monitor.

## 2019-03-24 NOTE — PLAN OF CARE
Diabetes education provided today:    Different diabetic medications. Managing high and low sugar readings.

## 2019-03-24 NOTE — CONSULTS
Cardiovascular Consultation     Attending Physician: Esmer Mckeon MD    PATIENT: Elvis Tomlinson  : 1945  MRN: [id number]    Reason for Consultation:   Chief Complaint   Patient presents with    Shortness of Breath     states has been swelling since he was released from the hospital last month, states started SOB 2 days ago and now is harder to walk, patient with wet nonproductive cough       History of present illness:   Mr. Elvis Tomlinson is a 68 y.o. male patient who was discharged from here 19 following admission for acute on chronic hypoxic respiratory failure secondary to multiple bilateral PE as well as bilateral pneumonia and acute on chronic diastolic HF. He admits to not leaving his home since that day and his daughter managing all errands to include groceries and medication pickup. Tamiko So is a very poor historian and has little insight into his conditions. He says that he eats one small meal a day and drinks liquor daily (gave up beer and does not smoke). He claims he does not miss any medications but \"just started noticing leg swelling and more short of breath\". Medical History:      Diagnosis Date    Alcohol abuse     Alcohol abuse     ARF (acute renal failure) ([facility] [address] 3/24/2019    CHF (congestive heart failure) ([facility] [address] 3/24/2019    Hypercholesteremia     Hypertension     Mediastinal adenopathy 2019    Pulmonary emphysema ([facility] [address] 2018       Surgical History:      Procedure Laterality Date    SKIN BIOPSY      UPPER GASTROINTESTINAL ENDOSCOPY  1/10/2011       Social History:  Social History     Socioeconomic History    Marital status:       Spouse name: Not on file    Number of children: Not on file    Years of education: Not on file    Highest education level: Not on file   Occupational History    Not on file   Social Needs    Financial resource strain: Not on file    Food insecurity:     Worry: Not on file     Inability: Not on file    Transportation needs:     Medical: Not on file     Non-medical: Not on file   Tobacco Use    Smoking status: Former Smoker     Packs/day: 3.00     Years: 50.00     Pack years: 150.00     Last attempt to quit: 3/10/2002     Years since quittin.0    Smokeless tobacco: Never Used   Substance and Sexual Activity    Alcohol use: Yes     Alcohol/week: 50.4 oz     Types: 84 Cans of beer per week     Comment: 12 beers a day and 1/2 liter of liquor    Drug use: No    Sexual activity: Not Currently   Lifestyle    Physical activity:     Days per week: Not on file     Minutes per session: Not on file    Stress: Not on file   Relationships    Social connections:     Talks on phone: Not on file     Gets together: Not on file     Attends Anabaptism service: Not on file     Active member of club or organization: Not on file     Attends meetings of clubs or organizations: Not on file     Relationship status: Not on file    Intimate partner violence:     Fear of current or ex partner: Not on file     Emotionally abused: Not on file     Physically abused: Not on file     Forced sexual activity: Not on file   Other Topics Concern    Not on file   Social History Narrative    Not on file        Family History:  No evidence for sudden cardiac death or premature CAD.       Problem Relation Age of Onset    Other Other         no early lung disease       Medications:    apixaban (ELIQUIS) tablet 5 mg BID   aspirin EC tablet 81 mg Daily   atorvastatin (LIPITOR) tablet 20 mg Daily   DULoxetine (CYMBALTA) extended release capsule 60 mg Daily   ferrous sulfate tablet 325 mg Daily with breakfast   folic acid (FOLVITE) tablet 1 mg Daily   levothyroxine (SYNTHROID) tablet 100 mcg Daily   metoprolol succinate (TOPROL XL) extended release tablet 25 mg Daily   pantoprazole (PROTONIX) tablet 40 mg QAM AC   mometasone-formoterol (DULERA) 200-5 MCG/ACT inhaler 2 puff BID   hydrALAZINE (APRESOLINE) injection 10 mg Q4H PRN sodium chloride flush 0.9 % injection 10 mL 2 times per day   sodium chloride flush 0.9 % injection 10 mL PRN   magnesium hydroxide (MILK OF MAGNESIA) 400 MG/5ML suspension 30 mL Daily PRN   ondansetron (ZOFRAN) injection 4 mg Q6H PRN   acetaminophen (TYLENOL) tablet 650 mg Q4H PRN   ipratropium-albuterol (DUONEB) nebulizer solution 3 mL Q4H   sodium chloride flush 0.9 % injection 10 mL 2 times per day   sodium chloride flush 0.9 % injection 10 mL PRN   LORazepam (ATIVAN) tablet 1 mg Q1H PRN   Or    LORazepam (ATIVAN) injection 1 mg Q1H PRN   Or    LORazepam (ATIVAN) tablet 2 mg Q1H PRN   Or    LORazepam (ATIVAN) injection 2 mg Q1H PRN   Or    LORazepam (ATIVAN) tablet 3 mg Q1H PRN   Or    LORazepam (ATIVAN) injection 3 mg Q1H PRN   Or    LORazepam (ATIVAN) tablet 4 mg Q1H PRN   Or    LORazepam (ATIVAN) injection 4 mg Q1H PRN   vitamin B-1 (THIAMINE) tablet 100 mg Daily   therapeutic multivitamin-minerals 1 tablet Daily   furosemide (LASIX) injection 40 mg BID       Allergies:  Patient has no known allergies.      Review of Systems:   [x]Full ROS obtained and negative except as mentioned in HPI    Physical Examination:    /61   Pulse 118   Temp 98.1 °F (36.7 °C) (Oral)   Resp 18   Ht 6' 2\" (1.88 m)   Wt 292 lb 8.8 oz (132.7 kg)   SpO2 92%   BMI 37.56 kg/m²   Wt Readings from Last 3 Encounters:   03/24/19 292 lb 8.8 oz (132.7 kg)   02/19/19 260 lb 11.2 oz (118.3 kg)   12/03/18 272 lb (123.4 kg)       GENERAL: Well developed, well nourished, no acute distress  NEUROLOGICAL: Alert and oriented x3  PSYCH: Normal mood and affect   SKIN: Warm and dry, without lesions  HEENT: Normocephalic, atraumatic, Sclera non-icteric, mucous membranes moist  NECK: supple, JVP normal, thyroid not enlarged   CAROTID: Normal upstroke, no bruits  CARDIAC: Normal PMI, regular rate and rhythm, normal S1S2, no murmur, rub  RESPIRATORY: Normal respiratory effort, clear to auscultation bilaterally  EXTREMITIES: four extremity erythema with 1+ pitting edema to mid calf bilaterally   MUSCULOSKELETAL: No joint swelling or tenderness, no chest wall tenderness  GASTROINTESTINAL:  soft, non-tender, no bruit    Labs:  Lab Review   Lab Results   Component Value Date     03/24/2019    K 4.2 03/24/2019    K 5.0 02/19/2019    CL 96 03/24/2019    CO2 21 03/24/2019    BUN 16 03/24/2019    CREATININE 1.4 03/24/2019    GLUCOSE 107 03/24/2019    CALCIUM 8.4 03/24/2019     Lab Results   Component Value Date    TROPONINI 0.03 03/24/2019     Lab Results   Component Value Date    WBC 8.9 03/24/2019    HGB 9.9 03/24/2019    HCT 29.4 03/24/2019    .3 03/24/2019     03/24/2019     Lab Results   Component Value Date    CHOL 121 02/15/2019    TRIG 66 02/15/2019    HDL 57 02/15/2019         Imaging:  I have reviewed the below testing personally:    EKG:    3/24/19  Sinus tachycardia   Incomplete right bundle branch block  Otherwise normal ECG  When compared with ECG of 14-FEB-2019 11:54,  Fusion complexes are no longer Present  ST no longer depressed in Anterior leads  T wave inversion no longer evident in Anterior leads     ECHO:   2/15/19  Summary   Technically difficult study due to body surface area and poor acoustic   window.   Normal left ventricular systolic function with ejection fraction of 55-60%.   No regional wall motion abnormalites are seen.   Mild concentric left ventricular hypertrophy.   Grade I diastolic dysfunction with normal filling pressure.   Compared to previous study from 9- no changes noted. 3/24/19 CXR  Pleural effusions with bibasilar atelectasis or pneumonia. Troponins 0.04, 0.03    Impression/Recommendations    Mr. Brooke Locke is a 68 y.o. male patient    Acute hypoxic respiratory insufficiency   Acute on Chronic Diastolic (LVEF 48-74% 8/0762) CHF  Elevated troponins, flat, inconsistent with ACS   MCKENNA  Recent hx.  PEs, on DOAC  Accelerated Hypertension  Hyperlipidemia   Obesity  ETOH  Macrocytic anemia  Medical noncompliance         Without clinical concern for coronary ischemia at this time. Asymptomatic this morning and ECG has actually improved. Tele unremarkable. He is a poor historian with poor insight into above conditions. Continue cardioprotective regimen as well as antihypertensives and IV diuresis. HR and BP control improving. Repeat limited TTE to evaluate RV function. Thank you for allowing me to participate in the care of your patient. Please do not hesitate to call. Eri Mccarty D.O., [facility] [place]  Interventional Cardiology     o: [phone]  [address] [place] [address]., [address], [address] [facility], [place]        NOTE:  This report was transcribed using voice recognition software. Every effort was made to ensure accuracy; however, inadvertent computerized transcription errors may be present.

## 2019-03-24 NOTE — PROGRESS NOTES
Hospitalist Progress Note      PCP: Elisabeth Rodgers MD    Date of Admission: 3/23/2019    Chief Complaint: [facility] Course:      Subjective: notes ongoing sob, LE swelling       Medications:  Reviewed    Infusion Medications   Scheduled Medications    apixaban  5 mg Oral BID    aspirin  81 mg Oral Daily    atorvastatin  20 mg Oral Daily    DULoxetine  60 mg Oral Daily    ferrous sulfate  325 mg Oral Daily with breakfast    folic acid  1 mg Oral Daily    furosemide  40 mg Intravenous TID    potassium chloride  20 mEq Oral TID WC    levothyroxine  100 mcg Oral Daily    losartan  100 mg Oral Daily    metoprolol succinate  25 mg Oral Daily    pantoprazole  40 mg Oral QAM AC    mometasone-formoterol  2 puff Inhalation BID    sodium chloride flush  10 mL Intravenous 2 times per day    ipratropium-albuterol  3 mL Inhalation Q4H     PRN Meds: hydrALAZINE, sodium chloride flush, magnesium hydroxide, ondansetron, acetaminophen      Intake/Output Summary (Last 24 hours) at 3/24/2019 0846  Last data filed at 3/24/2019 0842  Gross per 24 hour   Intake 490 ml   Output 0 ml   Net 490 ml       Physical Exam Performed:    BP (!) 125/55   Pulse 116   Temp 98.8 °F (37.1 °C)   Resp 18   Ht 6' 2\" (1.88 m)   Wt 292 lb 8.8 oz (132.7 kg)   SpO2 94%   BMI 37.56 kg/m²       General appearance:  No apparent distress, appears stated age and cooperative. HEENT:  Normal cephalic, atraumatic without obvious deformity. Pupils equal, round, and reactive to light. Extra ocular muscles intact. Conjunctivae/corneas clear. Neck: Supple, with full range of motion. No jugular venous distention. Trachea midline. Respiratory:  Normal respiratory effort. Clear to auscultation, bilaterally without Rales/Wheezes/Rhonchi. Cardiovascular:  Regular rate and rhythm with normal S1/S2 without murmurs, rubs or gallops. Abdomen: Soft, non-tender, non-distended with normal bowel sounds.   Musculoskeletal:  No clubbing, and documented as above. Hydralazine PRN ordered.      HyperLipidemia - controlled on home Statin. Continue, w/ f/u and med adjustment w/ PCP     Lactic acidosis- unclear, no infection, ?hypoperfusion from heart failure  trialed ivfs    Anemia - macrocytic, w/out evidence of active bleeding/hemolysis. Asymptomatic w/out indication for transfusion. Will continue to follow serial labs. Reviewed and documented as above.     ARF - w/out elevated BUN/Cr ratio. Will diurese and follow serial labs. Reviewed and documented as above.    -nephro consulted, suspect element of cardiorenal syndrome    Alcohol Abuse - active and ongoing. Follow clinically for signs/sxs of active w/drawal   -ordered CIWA      Obesity -  With Body mass index is 34.67 kg/m². Complicating assessment and treatment. Placing patient at risk for multiple co-morbidities as well as early death and contributing to the patient's presentation.  Counseled on weight loss.        DVT Prophylaxis: lovenox  Diet: DIET GENERAL;  Code Status: Full Code    PT/OT Eval Status: not ordered    Dispo - cards/nephro consulted, stop kcl/arb, stop lasix, trial ivfs, check serial lactates    Karla Lopez MD

## 2019-03-24 NOTE — H&P
Hospital Medicine History & Physical      PCP: Armen Mann MD    Date of Admission: 3/23/2019    Date of Service: Pt seen/examined on 24 March and Admitted to Inpatient with expected LOS greater than two midnights due to medical therapy. Chief Complaint:  SOB      History Of Present Illness:      68 y.o. male w/ active Etoh Hx as well as a hx of CHF who presented to [facility] with a 2 day hx of worsening SOB/TOBIAS w/ associated whole body edema, worse to the lower extremities. He denies any hx suggestive of infection. The patient denies any fever/chills or other signs/sxs of systemic illness or identifiable aggravating/alleviating factors. Past Medical History:          Diagnosis Date    Alcohol abuse     Alcohol abuse     [facility]F [facility]acute renal failure) ([facility] 3/24/2019    CHF (congestive heart failure) ([facility] 3/24/2019    Hypercholesteremia     Hypertension     Mediastinal adenopathy 2/14/2019    Pulmonary emphysema ([facility] [address] 8/29/2018       Past Surgical History:          Procedure Laterality Date    SKIN BIOPSY      UPPER GASTROINTESTINAL ENDOSCOPY  1/10/2011       Medications Prior to Admission:      Prior to Admission medications    Medication Sig Start Date End Date Taking?  Authorizing Provider   Multiple Vitamins-Minerals ([employer] SPECTRAVITE SENIOR) TABS TAKE 1 TABLET BY MOUTH EVERY DAY 3/20/19  Yes Armen Mann MD   losartan (COZAAR) 100 MG tablet TAKE 1 TABLET BY MOUTH EVERY DAY 3/11/19  Yes Armen Mann MD   ipratropium-albuterol (DUONEB) 0.5-2.5 (3) MG/3ML SOLN nebulizer solution Inhale 3 mLs into the lungs every 4 hours as needed for Shortness of Breath 2/19/19  Yes Abilio Irby MD   apixaban (ELIQUIS) 5 MG TABS tablet Take 1 tablet by mouth 2 times daily 2/25/19  Yes Abilio Irby MD   levothyroxine (SYNTHROID) 100 MCG tablet TAKE 1 TABLET BY MOUTH EVERY DAY 1/8/19  Yes Armen Mann MD   omeprazole (PRILOSEC) 20 MG delayed release capsule TAKE 1 CAPSULE BY MOUTH EVERY DAY 12/10/18  Yes Denice Urban MD   folic acid (FOLVITE) 1 MG tablet Take 1 tablet by mouth daily 12/4/18  Yes Denice Urban MD   ferrous sulfate 325 (65 Fe) MG tablet Take 1 tablet by mouth daily (with breakfast) 12/4/18  Yes Denice Urban MD   Respiratory Therapy Supplies (NEBULIZER/ADULT MASK) KIT Tid daily 12/3/18  Yes Denice Urban MD   furosemide (LASIX) 40 MG tablet TAKE 1 TABLET BY MOUTH EVERY DAY 10/25/18  Yes Denice Urban MD   SYMBICORT 80-4.5 MCG/ACT AERO INHALE 1 PUFF INTO LUNGS BY MOUTH TWICE A DAY 10/1/18  Yes Denice Urban MD   KLOR-CON M20 20 MEQ extended release tablet TAKE 1 TABLET BY MOUTH EVERY DAY 9/10/18  Yes Denice Urban MD   aspirin 81 MG tablet Take 81 mg by mouth daily   Yes Kamilla Sharif MD   DULoxetine (CYMBALTA) 60 MG extended release capsule Take 1 capsule by mouth daily 5/8/18  Yes Denice Urban MD   metoprolol succinate (TOPROL XL) 25 MG extended release tablet Take 1 tablet by mouth daily 5/8/18  Yes Denice Urban MD   vitamin D ([employer]S VITAMIN D3) 1000 units CAPS Take 1 capsule by mouth daily 5/8/18  Yes Denice Urban MD   albuterol sulfate HFA (PROVENTIL HFA) 108 (90 Base) MCG/ACT inhaler Inhale 2 puffs into the lungs every 6 hours as needed for Wheezing 4/13/18  Yes Denice Urban MD   atorvastatin (LIPITOR) 20 MG tablet Take 1 tablet by mouth daily 4/13/18  Yes Denice Urban MD       Allergies:  Patient has no known allergies. Social History:      The patient currently lives independently    TOBACCO:   reports that he quit smoking about 17 years ago. He has a 150.00 pack-year smoking history. He has never used smokeless tobacco.  ETOH:   reports that he drinks about 50.4 oz of alcohol per week. Family History:      Reviewed in detail and negative for DM, CAD, Cancer, CVA.  Positive as follows:        Problem Relation Age of Onset    Other Other no early lung disease       REVIEW OF SYSTEMS:   Pertinent positives as noted in the HPI. All other systems reviewed and negative. PHYSICAL EXAM:    BP (!) 172/98   Pulse 75   Temp 97.9 °F (36.6 °C) (Oral)   Resp 12   Ht 6' 2\" (1.88 m)   Wt 270 lb (122.5 kg)   SpO2 97%   BMI 34.67 kg/m²     General appearance:  No apparent distress, appears stated age and cooperative. HEENT:  Normal cephalic, atraumatic without obvious deformity. Pupils equal, round, and reactive to light. Extra ocular muscles intact. Conjunctivae/corneas clear. Neck: Supple, with full range of motion. No jugular venous distention. Trachea midline. Respiratory:  Normal respiratory effort. Clear to auscultation, bilaterally without Rales/Wheezes/Rhonchi. Cardiovascular:  Regular rate and rhythm with normal S1/S2 without murmurs, rubs or gallops. Abdomen: Soft, non-tender, non-distended with normal bowel sounds. Musculoskeletal:  No clubbing, cyanosis w/ 2+ LE edema bilaterally. Full range of motion without deformity. Skin: Skin color, texture, turgor normal.  No rashes or lesions. Neurologic:  Neurovascularly intact without any focal sensory/motor deficits. Cranial nerves: II-XII intact, grossly non-focal.  Psychiatric:  Alert and oriented, thought content appropriate, normal insight  Capillary Refill: Brisk,< 3 seconds   Peripheral Pulses: +2 palpable, equal bilaterally       CXR:  I have reviewed the CXR with the following interpretation: Vascular congestion by my read  EKG:  I have reviewed the EKG with the following interpretation: no acute ischemic changes. Labs:     Recent Labs     03/24/19 0016   WBC 8.9   HGB 9.9*   HCT 29.4*        Recent Labs     03/24/19 0016      K 4.2   CL 96*   CO2 21   BUN 16   CREATININE 1.4*   CALCIUM 8.4     Recent Labs     03/24/19 0016   AST 68*   ALT 46*   BILITOT 0.4   ALKPHOS 178*     No results for input(s): INR in the last 72 hours.   Recent Labs     03/24/19 0016 involved in this patient's care. If you have any questions or concerns please feel free to contact me at [phone].

## 2019-03-24 NOTE — ED PROVIDER NOTES
Triage Chief Complaint:   Shortness of Breath (states has been swelling since he was released from the hospital last month, states started SOB 2 days ago and now is harder to walk, patient with wet nonproductive cough)      EDDIE:  Stanley Haji is a 68 y.o. male that presents with shortness of breath. The patient is an extremely poor historian. He does not know his ongoing medical problems or medications. He states that he was placed on a new medication which she feels caused him to develop peripheral edema and he stopped taking it. He does not note what medication that was. The patient has anasarca and may have stopped taking his Lasix. He denies chest pain or fever and has not had a cough. Is not vomiting and did not have abdominal pain. ROS:  Unable to obtain an accurate review of system because of the patient's mental status    Past Medical History:   Diagnosis Date    Alcohol abuse     Alcohol abuse     Hypercholesteremia     Hypertension     Mediastinal adenopathy 2019    Pulmonary emphysema ([address] 2018     Past Surgical History:   Procedure Laterality Date    SKIN BIOPSY      UPPER GASTROINTESTINAL ENDOSCOPY  1/10/2011     Family History   Problem Relation Age of Onset    Other Other         no early lung disease     Social History     Socioeconomic History    Marital status:       Spouse name: Not on file    Number of children: Not on file    Years of education: Not on file    Highest education level: Not on file   Occupational History    Not on file   Social Needs    Financial resource strain: Not on file    Food insecurity:     Worry: Not on file     Inability: Not on file    Transportation needs:     Medical: Not on file     Non-medical: Not on file   Tobacco Use    Smoking status: Former Smoker     Packs/day: 3.00     Years: 50.00     Pack years: 150.00     Last attempt to quit: 3/10/2002     Years since quittin.0    Smokeless tobacco: Never Used Substance and Sexual Activity    Alcohol use: Yes     Alcohol/week: 50.4 oz     Types: 84 Cans of beer per week     Comment: 12 beers a day and 1/2 liter of liquor    Drug use: No    Sexual activity: Not Currently   Lifestyle    Physical activity:     Days per week: Not on file     Minutes per session: Not on file    Stress: Not on file   Relationships    Social connections:     Talks on phone: Not on file     Gets together: Not on file     Attends Jew service: Not on file     Active member of club or organization: Not on file     Attends meetings of clubs or organizations: Not on file     Relationship status: Not on file    Intimate partner violence:     Fear of current or ex partner: Not on file     Emotionally abused: Not on file     Physically abused: Not on file     Forced sexual activity: Not on file   Other Topics Concern    Not on file   Social History Narrative    Not on file     No current facility-administered medications for this encounter.       Current Outpatient Medications   Medication Sig Dispense Refill    Multiple Vitamins-Minerals ([employer]S SPECTRAVITE SENIOR) TABS TAKE 1 TABLET BY MOUTH EVERY DAY 30 tablet 3    losartan (COZAAR) 100 MG tablet TAKE 1 TABLET BY MOUTH EVERY DAY 90 tablet 0    ipratropium-albuterol (DUONEB) 0.5-2.5 (3) MG/3ML SOLN nebulizer solution Inhale 3 mLs into the lungs every 4 hours as needed for Shortness of Breath 360 mL 1    apixaban (ELIQUIS) 5 MG TABS tablet Take 1 tablet by mouth 2 times daily 60 tablet 1    levothyroxine (SYNTHROID) 100 MCG tablet TAKE 1 TABLET BY MOUTH EVERY DAY 90 tablet 0    omeprazole (PRILOSEC) 20 MG delayed release capsule TAKE 1 CAPSULE BY MOUTH EVERY DAY 90 capsule 0    folic acid (FOLVITE) 1 MG tablet Take 1 tablet by mouth daily 90 tablet 1    ferrous sulfate 325 (65 Fe) MG tablet Take 1 tablet by mouth daily (with breakfast) 90 tablet 1    Respiratory Therapy Supplies (NEBULIZER/ADULT MASK) KIT Tid daily 1 kit 0    furosemide (LASIX) 40 MG tablet TAKE 1 TABLET BY MOUTH EVERY DAY 30 tablet 2    SYMBICORT 80-4.5 MCG/ACT AERO INHALE 1 PUFF INTO LUNGS BY MOUTH TWICE A DAY 30.6 Inhaler 2    KLOR-CON M20 20 MEQ extended release tablet TAKE 1 TABLET BY MOUTH EVERY DAY 90 tablet 0    aspirin 81 MG tablet Take 81 mg by mouth daily      DULoxetine (CYMBALTA) 60 MG extended release capsule Take 1 capsule by mouth daily 90 capsule 0    metoprolol succinate (TOPROL XL) 25 MG extended release tablet Take 1 tablet by mouth daily 90 tablet 0    vitamin D ([employer]S VITAMIN D3) 1000 units CAPS Take 1 capsule by mouth daily 90 capsule 0    albuterol sulfate HFA (PROVENTIL HFA) 108 (90 Base) MCG/ACT inhaler Inhale 2 puffs into the lungs every 6 hours as needed for Wheezing 1 Inhaler 3    atorvastatin (LIPITOR) 20 MG tablet Take 1 tablet by mouth daily 30 tablet 5     No Known Allergies  Nursing Notes Reviewed    Physical Exam:  ED Triage Vitals [03/23/19 3866]   Enc Vitals Group      BP (!) 191/117      Pulse 110      Resp 18      Temp 97.9 °F (36.6 °C)      Temp Source Oral      SpO2       Weight 270 lb (122.5 kg)      Height 6' 2\" (1.88 m)      Head Circumference       Peak Flow       Pain Score       Pain Loc       Pain Edu? Excl. in [address]? GENERAL APPEARANCE: An on healthy-appearing overweight elderly male  In moderate distress  HEAD: Normocephalic, atraumatic  EYES: Sclera anicteric.no conjunctival injection, PERRL EOM's  Intact  ENT: Mucous membranes moist, no nasal discharge, pharynx clear,   NECK: Supple, no meningismus, no adenopathy, no JVD  HEART: RRR without rubs murmurs or gallops  LUNGS:  And expiratory wheezing with fair overall air movement no retraction or accessory muscle use  ABDOMEN: Soft, non-tender to palpation, no guarding or rebound. , no mass or distention and no hepatosplenomegaly.   No peritoneal signs, focal findings, or evidence of an acute abdomen at time of exam   : Bladder not distended,  no CVA Alkaline Phosphatase 178 (H) 40 - 129 U/L    ALT 46 (H) 10 - 40 U/L    AST 68 (H) 15 - 37 U/L    Globulin 3.0 g/dL   Troponin   Result Value Ref Range    Troponin 0.04 (H) <0.01 ng/mL   Brain Natriuretic Peptide   Result Value Ref Range    Pro- (H) 0 - 124 pg/mL        Radiographs (if obtained):  ? Radiologist's Report Reviewed:  XR CHEST STANDARD (2 VW)   Final Result   Pleural effusions with bibasilar atelectasis or pneumonia. ? Discussed with Radiologist:     ? The following radiograph was interpreted by myself in the absence of a radiologist:     EKG (if obtained): (All EKG's are interpreted by myself in the absence of a cardiologist)  EKG demonstrates a sinus tachycardia at 110 but was otherwise normal with normal axis intervals and ST-T wave segments and no evidence of ischemia infarction or other arrhythmia and this is much like his previous EKGs    MDM:   Patient with respiratory distress presents to the ER for evaluation. He does appear to have anasarca but his BNP is not significantly elevated at 365. He did have significant desaturations with any exertion even urinating at the bedside. His troponin was elevated at 0.04 and it is not normally elevated. Radiology reads his chest x-ray as pleural effusion with basilar atelectasis versus pneumonia. Patient is not febrile. I'll arrange to get him admitted for further evaluation. Final Impression:  1. Dyspnea, unspecified type    2. Peripheral edema    3. Elevated troponin        Critical Care:       Disposition referral (if applicable):  No follow-up provider specified. Disposition medications (if applicable):  New Prescriptions    No medications on file       Comment: Please note this report has been produced using speech recognition software and may contain errors related to that system including errors in grammar, punctuation, and spelling, as well as words and phrases that may be inappropriate.  If there are any questions or concerns please feel free to contact the dictating provider for clarification.       (Please note that portions of this note may have been completed with a voice recognition program. Efforts were made to edit the dictations but occasionally words are mis-transcribed.)    MD Refugio Ball., MD  03/24/19 1854

## 2019-03-24 NOTE — CONSULTS
Renal consult dictated  Pt previously evaluated by Dr. Rosalynd Fleischer in 12/2015 for hyponatremia    At the time creatinine was 0.7 with sodium 123  Now admitted with SOB, CHF. Creatinine 1.4, sodium 138. More recently during Feb admission creatinine 0.8. Work up initiated.

## 2019-03-24 NOTE — PROGRESS NOTES
Alert, Oriented, and Cooperative = 0    Level of Activity: Walking with assistance = 1    Respiratory Pattern: Dyspnea with exertion;Irregular pattern;or RR less than 6 = 2    Breath Sounds: Absent bilaterally and/or with wheezes = 3    Sputum   ,  ,    Cough: Strong, productive = 1    Vital Signs   BP (!) 134/54   Pulse 119   Temp 97.8 °F (36.6 °C) (Oral)   Resp 18   Ht 6' 2\" (1.88 m)   Wt 292 lb 8.8 oz (132.7 kg)   SpO2 94%   BMI 37.56 kg/m²   SPO2 (COPD values may differ): 88-89% on room air or greater than 92% on FiO2 28- 35% = 2    Peak Flow (asthma only): not applicable = 0    RSI: 54-01 = Q6H or QID and Q4HPRN for dyspnea        Plan       Goals: medication delivery, mobilize retained secretions, volume expansion and improve oxygenation    Patient/caregiver was educated on the proper method of use for Respiratory Care Devices:  Yes      Level of patient/caregiver understanding able to:   ? Verbalize understanding   ? Demonstrate understanding       ? Teach back        ? Needs reinforcement       ? No available caregiver               ? Other:     Response to education:  Good     Is patient being placed on Home Treatment Regimen? Yes     Does the patient have everything they need prior to discharge? NA     Comments: Patient admits with shortness of breath. Plan of Care: DAKOTA Jean Q4, MDI [place] BID    Electronically signed by Danielle Smith RCP on 3/24/2019 at 4:38 AM    Respiratory Protocol Guidelines     1. Assessment and treatment by Respiratory Therapy will be initiated for medication and therapeutic interventions upon initiation of aerosolized medication. 2. Physician will be contacted for respiratory rate (RR) greater than 35 breaths per minute. Therapy will be held for heart rate (HR) greater than 140 beats per minute, pending direction from physician. 3. Bronchodilators will be administered via Metered Dose Inhaler (MDI) with spacer when the following criteria are met:  a.  Alert and examination or by history for at least 24 hours, contact physician for possible discontinuation.

## 2019-03-24 NOTE — PROGRESS NOTES
4 Eyes Skin Assessment     The patient is being assess for  Admission    I agree that 2 RN's have performed a thorough Head to Toe Skin Assessment on the patient. ALL assessment sites listed below have been assessed. Areas assessed by both nurses: Jaz/Martha  [x]   Head, Face, and Ears   [x]   Shoulders, Back, and Chest  [x]   Arms, Elbows, and Hands   [x]   Coccyx, Sacrum, and Ischum  [x]   Legs, Feet, and Heels        Does the Patient have Skin Breakdown?   No         Bridger Prevention initiated:  No   Wound Care Orders initiated:  No      [facility] nurse consulted for Pressure Injury (Stage 3,4, Unstageable, DTI, NWPT, and Complex wounds):  No      Nurse 1 eSignature: Electronically signed by Laura Laurent RN on 3/24/19 at 5:39 AM    **SHARE this note so that the co-signing nurse is able to place an eSignature**    Nurse 2 eSignature: Electronically signed by Tigist Villalobos RN on 3/25/19 at 2:05 AM

## 2019-03-24 NOTE — PLAN OF CARE
Patient's EF (Ejection Fraction) is greater than 40%    Patient has a past medical history of Alcohol abuse, Alcohol abuse, ARF (acute renal failure) ([facility] CHF (congestive heart failure) ([facility] [facility] Hypercholesteremia, Hypertension, Mediastinal adenopathy, and Pulmonary emphysema ([facility] [address] Comorbidities reviewed and education provided. Patient and family's stated goal of care: reduce shortness of breath, increase activity tolerance, better understand heart failure and disease management, be more comfortable and reduce lower extremity edema prior to discharge    Patient's current functional capacity:  Marked limitation of physical activity. Comfortable at rest. Less than ordinary activity causes fatigue, palpitation, or dyspnea. Pt resting in bed at this time on 4 L O2. Pt denies shortness of breath. Pt with pitting lower extremity edema.  Patient's weights and intake/output reviewed:    Patient Vitals for the past 96 hrs (Last 3 readings):   Weight   03/24/19 0244 292 lb 8.8 oz (132.7 kg)   03/23/19 2346 270 lb (122.5 kg)       Intake/Output Summary (Last 24 hours) at 3/24/2019 1709  Last data filed at 3/24/2019 1438  Gross per 24 hour   Intake 610 ml   Output 0 ml   Net 610 ml         >>For CHF and Comorbidity documentation on Education Time and Topics, please see Education Tab

## 2019-03-24 NOTE — PLAN OF CARE
Patient's EF (Ejection Fraction) is greater than 40%    Patient has a past medical history of Alcohol abuse, Alcohol abuse, ARF (acute renal failure) ([facility] [address] CHF (congestive heart failure) ([facility] [address] Hypercholesteremia, Hypertension, Mediastinal adenopathy, and Pulmonary emphysema ([facility] [address] Comorbidities reviewed and education provided. Patient and family's stated goal of care: reduce shortness of breath, increase activity tolerance, better understand heart failure and disease management, be more comfortable and reduce lower extremity edema prior to discharge    Patient's current functional capacity:  Marked limitation of physical activity. Comfortable at rest. Less than ordinary activity causes fatigue, palpitation, or dyspnea. Pt resting in bed at this time on 4 L O2. Pt denies shortness of breath. Pt with pitting lower extremity edema.  Patient's weights and intake/output reviewed:    Patient Vitals for the past 96 hrs (Last 3 readings):   Weight   03/24/19 0244 292 lb 8.8 oz (132.7 kg)   03/23/19 2346 270 lb (122.5 kg)     No intake or output data in the 24 hours ending 03/24/19 0550      >>For CHF and Comorbidity documentation on Education Time and Topics, please see Education Tab

## 2019-03-25 LAB
ALBUMIN SERPL-MCNC: 2.5 G/DL (ref 3.4–5)
ALP BLD-CCNC: 165 U/L (ref 40–129)
ALT SERPL-CCNC: 49 U/L (ref 10–40)
ANION GAP SERPL CALCULATED.3IONS-SCNC: 10 MMOL/L (ref 3–16)
AST SERPL-CCNC: 50 U/L (ref 15–37)
BACTERIA: ABNORMAL /HPF
BILIRUB SERPL-MCNC: 0.6 MG/DL (ref 0–1)
BILIRUBIN DIRECT: 0.3 MG/DL (ref 0–0.3)
BILIRUBIN URINE: ABNORMAL
BILIRUBIN, INDIRECT: 0.3 MG/DL (ref 0–1)
BLOOD, URINE: ABNORMAL
BUN BLDV-MCNC: 17 MG/DL (ref 7–20)
CALCIUM SERPL-MCNC: 8.6 MG/DL (ref 8.3–10.6)
CASTS: ABNORMAL /LPF
CHLORIDE BLD-SCNC: 100 MMOL/L (ref 99–110)
CLARITY: CLEAR
CO2: 30 MMOL/L (ref 21–32)
COLOR: ABNORMAL
CREAT SERPL-MCNC: 1.2 MG/DL (ref 0.8–1.3)
CREATININE URINE: 233.5 MG/DL (ref 39–259)
EPITHELIAL CELLS, UA: ABNORMAL /HPF
FOLATE: >20 NG/ML (ref 4.78–24.2)
GFR AFRICAN AMERICAN: >60
GFR NON-AFRICAN AMERICAN: 59
GLUCOSE BLD-MCNC: 140 MG/DL (ref 70–99)
GLUCOSE URINE: NEGATIVE MG/DL
HCT VFR BLD CALC: 26.2 % (ref 40.5–52.5)
HEMOGLOBIN: 9 G/DL (ref 13.5–17.5)
IRON SATURATION: 63 % (ref 20–50)
IRON: 54 UG/DL (ref 59–158)
KETONES, URINE: ABNORMAL MG/DL
LACTIC ACID: 1.4 MMOL/L (ref 0.4–2)
LACTIC ACID: 1.8 MMOL/L (ref 0.4–2)
LACTIC ACID: 3.6 MMOL/L (ref 0.4–2)
LEUKOCYTE ESTERASE, URINE: ABNORMAL
MAGNESIUM: 1.7 MG/DL (ref 1.8–2.4)
MCH RBC QN AUTO: 35 PG (ref 26–34)
MCHC RBC AUTO-ENTMCNC: 34.4 G/DL (ref 31–36)
MCV RBC AUTO: 101.7 FL (ref 80–100)
MICROSCOPIC EXAMINATION: YES
NITRITE, URINE: NEGATIVE
PDW BLD-RTO: 18 % (ref 12.4–15.4)
PH UA: 5.5 (ref 5–8)
PHOSPHORUS: 2.6 MG/DL (ref 2.5–4.9)
PLATELET # BLD: 301 K/UL (ref 135–450)
PMV BLD AUTO: 7.4 FL (ref 5–10.5)
POTASSIUM SERPL-SCNC: 4.3 MMOL/L (ref 3.5–5.1)
PROTEIN PROTEIN: 35 MG/DL
PROTEIN UA: ABNORMAL MG/DL
PROTEIN/CREAT RATIO: 0.1 MG/DL
RBC # BLD: 2.57 M/UL (ref 4.2–5.9)
RBC UA: >100 /HPF (ref 0–2)
RENAL EPITHELIAL, UA: ABNORMAL /HPF
REPORT: NORMAL
SODIUM BLD-SCNC: 140 MMOL/L (ref 136–145)
SODIUM URINE: <20 MMOL/L
SPECIFIC GRAVITY UA: 1.02 (ref 1–1.03)
TOTAL IRON BINDING CAPACITY: 86 UG/DL (ref 260–445)
TOTAL PROTEIN: 5.6 G/DL (ref 6.4–8.2)
URINE TYPE: ABNORMAL
UROBILINOGEN, URINE: 0.2 E.U./DL
WBC # BLD: 6.7 K/UL (ref 4–11)
WBC UA: ABNORMAL /HPF (ref 0–5)

## 2019-03-25 PROCEDURE — 84165 PROTEIN E-PHORESIS SERUM: CPT

## 2019-03-25 PROCEDURE — 51798 US URINE CAPACITY MEASURE: CPT

## 2019-03-25 PROCEDURE — 80076 HEPATIC FUNCTION PANEL: CPT

## 2019-03-25 PROCEDURE — 2580000003 HC RX 258: Performed by: INTERNAL MEDICINE

## 2019-03-25 PROCEDURE — 83540 ASSAY OF IRON: CPT

## 2019-03-25 PROCEDURE — 6370000000 HC RX 637 (ALT 250 FOR IP): Performed by: INTERNAL MEDICINE

## 2019-03-25 PROCEDURE — 99233 SBSQ HOSP IP/OBS HIGH 50: CPT | Performed by: NURSE PRACTITIONER

## 2019-03-25 PROCEDURE — 84100 ASSAY OF PHOSPHORUS: CPT

## 2019-03-25 PROCEDURE — 82570 ASSAY OF URINE CREATININE: CPT

## 2019-03-25 PROCEDURE — 94640 AIRWAY INHALATION TREATMENT: CPT

## 2019-03-25 PROCEDURE — 81001 URINALYSIS AUTO W/SCOPE: CPT

## 2019-03-25 PROCEDURE — 36415 COLL VENOUS BLD VENIPUNCTURE: CPT

## 2019-03-25 PROCEDURE — 82746 ASSAY OF FOLIC ACID SERUM: CPT

## 2019-03-25 PROCEDURE — 83605 ASSAY OF LACTIC ACID: CPT

## 2019-03-25 PROCEDURE — 84252 ASSAY OF VITAMIN B-2: CPT

## 2019-03-25 PROCEDURE — 84300 ASSAY OF URINE SODIUM: CPT

## 2019-03-25 PROCEDURE — 84155 ASSAY OF PROTEIN SERUM: CPT

## 2019-03-25 PROCEDURE — 1200000000 HC SEMI PRIVATE

## 2019-03-25 PROCEDURE — 83550 IRON BINDING TEST: CPT

## 2019-03-25 PROCEDURE — 85027 COMPLETE CBC AUTOMATED: CPT

## 2019-03-25 PROCEDURE — 83735 ASSAY OF MAGNESIUM: CPT

## 2019-03-25 PROCEDURE — 6360000002 HC RX W HCPCS: Performed by: NURSE PRACTITIONER

## 2019-03-25 PROCEDURE — 94761 N-INVAS EAR/PLS OXIMETRY MLT: CPT

## 2019-03-25 PROCEDURE — 93308 TTE F-UP OR LMTD: CPT

## 2019-03-25 PROCEDURE — 6370000000 HC RX 637 (ALT 250 FOR IP): Performed by: UROLOGY

## 2019-03-25 PROCEDURE — 86334 IMMUNOFIX E-PHORESIS SERUM: CPT

## 2019-03-25 PROCEDURE — 80048 BASIC METABOLIC PNL TOTAL CA: CPT

## 2019-03-25 PROCEDURE — 84156 ASSAY OF PROTEIN URINE: CPT

## 2019-03-25 PROCEDURE — 2700000000 HC OXYGEN THERAPY PER DAY

## 2019-03-25 RX ORDER — TAMSULOSIN HYDROCHLORIDE 0.4 MG/1
0.4 CAPSULE ORAL DAILY
Status: DISCONTINUED | OUTPATIENT
Start: 2019-03-25 | End: 2019-03-29 | Stop reason: HOSPADM

## 2019-03-25 RX ORDER — FUROSEMIDE 10 MG/ML
40 INJECTION INTRAMUSCULAR; INTRAVENOUS 2 TIMES DAILY
Status: DISCONTINUED | OUTPATIENT
Start: 2019-03-25 | End: 2019-03-27

## 2019-03-25 RX ADMIN — FERROUS SULFATE TAB 325 MG (65 MG ELEMENTAL FE) 325 MG: 325 (65 FE) TAB at 09:48

## 2019-03-25 RX ADMIN — FOLIC ACID 1 MG: 1 TABLET ORAL at 09:47

## 2019-03-25 RX ADMIN — TAMSULOSIN HYDROCHLORIDE 0.4 MG: 0.4 CAPSULE ORAL at 19:01

## 2019-03-25 RX ADMIN — FUROSEMIDE 40 MG: 10 INJECTION, SOLUTION INTRAMUSCULAR; INTRAVENOUS at 19:00

## 2019-03-25 RX ADMIN — Medication 100 MG: at 09:48

## 2019-03-25 RX ADMIN — Medication 10 ML: at 09:48

## 2019-03-25 RX ADMIN — DULOXETINE HYDROCHLORIDE 60 MG: 60 CAPSULE, DELAYED RELEASE ORAL at 09:48

## 2019-03-25 RX ADMIN — Medication 2 PUFF: at 19:53

## 2019-03-25 RX ADMIN — IPRATROPIUM BROMIDE AND ALBUTEROL SULFATE 3 ML: .5; 3 SOLUTION RESPIRATORY (INHALATION) at 23:50

## 2019-03-25 RX ADMIN — Medication 10 ML: at 22:54

## 2019-03-25 RX ADMIN — Medication 10 ML: at 10:49

## 2019-03-25 RX ADMIN — LEVOTHYROXINE SODIUM 100 MCG: 100 TABLET ORAL at 05:02

## 2019-03-25 RX ADMIN — Medication 2 PUFF: at 08:02

## 2019-03-25 RX ADMIN — ASPIRIN 81 MG: 81 TABLET, COATED ORAL at 09:48

## 2019-03-25 RX ADMIN — IPRATROPIUM BROMIDE AND ALBUTEROL SULFATE 3 ML: .5; 3 SOLUTION RESPIRATORY (INHALATION) at 03:43

## 2019-03-25 RX ADMIN — METOPROLOL SUCCINATE 50 MG: 50 TABLET, EXTENDED RELEASE ORAL at 09:48

## 2019-03-25 RX ADMIN — IPRATROPIUM BROMIDE AND ALBUTEROL SULFATE 3 ML: .5; 3 SOLUTION RESPIRATORY (INHALATION) at 08:02

## 2019-03-25 RX ADMIN — Medication 10 ML: at 22:53

## 2019-03-25 RX ADMIN — ATORVASTATIN CALCIUM 20 MG: 40 TABLET, FILM COATED ORAL at 09:48

## 2019-03-25 RX ADMIN — IPRATROPIUM BROMIDE AND ALBUTEROL SULFATE 3 ML: .5; 3 SOLUTION RESPIRATORY (INHALATION) at 19:53

## 2019-03-25 RX ADMIN — IPRATROPIUM BROMIDE AND ALBUTEROL SULFATE 3 ML: .5; 3 SOLUTION RESPIRATORY (INHALATION) at 15:31

## 2019-03-25 RX ADMIN — APIXABAN 5 MG: 5 TABLET, FILM COATED ORAL at 22:53

## 2019-03-25 RX ADMIN — Medication 1 TABLET: at 09:48

## 2019-03-25 RX ADMIN — PANTOPRAZOLE SODIUM 40 MG: 20 TABLET, DELAYED RELEASE ORAL at 05:02

## 2019-03-25 RX ADMIN — APIXABAN 5 MG: 5 TABLET, FILM COATED ORAL at 09:48

## 2019-03-25 ASSESSMENT — PAIN SCALES - GENERAL
PAINLEVEL_OUTOF10: 0

## 2019-03-25 NOTE — DISCHARGE INSTR - COC
Continuity of Care Form    Patient Name: Herb Vasquez   :  1945  MRN:  [id number]    Admit date:  3/23/2019  Discharge date:  2019    Code Status Order: Full Code   Advance Directives:   Advance Care Flowsheet Documentation     Date/Time Healthcare Directive Type of Healthcare Directive Copy in [address] Agent's Name Healthcare Agent's Phone Number    [phone]  No, patient does not have an advance directive for healthcare treatment -- -- -- -- --          Admitting Physician:  Ruthie Adame MD  PCP: Anju Campbell MD    Discharging Nurse: Marley Lees 7010 Unit/Room#: 0211/0211-01  Discharging Unit Phone Number: [phone]    Emergency Contact:   Extended Emergency Contact Information  Primary Emergency Contact: [id number] [place] [address] Phone: [phone]  Relation: Other  Secondary Emergency Contact: [address] [place] [address] Phone: [phone]  Relation: Other    Past Surgical History:  Past Surgical History:   Procedure Laterality Date    SKIN BIOPSY      UPPER GASTROINTESTINAL ENDOSCOPY  1/10/2011       Immunization History:   Immunization History   Administered Date(s) Administered    Influenza Vaccine, unspecified formulation 2015    Influenza, High Dose (Fluzone 65 yrs and older) 2018    Pneumococcal Polysaccharide ([id number]) 2018    Tdap (Boostrix, Adacel) 2013       Active Problems:  Patient Active Problem List   Diagnosis Code    DISH (diffuse idiopathic skeletal hyperostosis) M48.10    Hyperlipidemia E78.5    Anxiety F41.9    Hypertension, essential I10    Closed nondisplaced fracture of pubis ([address] S32.509A    Bilateral knee pain M25.561, M25.562    Alcohol abuse F10.10    Centrilobular emphysema ([address] J43.2    Acute on chronic respiratory failure with hypoxia ([address] J96.21    Other pulmonary embolism without acute cor pulmonale (HCC) I26.99    Pulmonary infiltrates R91.8    Atelectasis J98.11    Pleural effusion J90    Mediastinal adenopathy R59.0    Former smoker Z87.891    Lactic acid acidosis E87.2    Leukocytosis D72.829    Pulmonary HTN ([facility]) I27.20    Alcohol use Z78.9    Acute diastolic heart failure ([facility]) I50.31    Obesity E66.9    ARF (acute renal failure) ([facility]) N17.9    Anemia D64.9    CHF (congestive heart failure) ([facility]) I50.9    CHF (congestive heart failure), NYHA class I, acute on chronic, combined ([facility]) I50.43       Isolation/Infection:   Isolation          No Isolation            Nurse Assessment:  Last Vital Signs: BP (!) 159/77   Pulse 102   Temp 98 °F (36.7 °C) (Oral)   Resp 18   Ht 6' 2\" (1.88 m)   Wt 292 lb 8.8 oz (132.7 kg)   SpO2 95%   BMI 37.56 kg/m²     Last documented pain score (0-10 scale): Pain Level: 0  Last Weight:   Wt Readings from Last 1 Encounters:   03/25/19 292 lb 8.8 oz (132.7 kg)     Mental Status:  oriented, alert, coherent, logical, thought processes intact and able to concentrate and follow conversation    IV Access:  - None    Nursing Mobility/ADLs:  Walking   Assisted  Transfer  Assisted  Bathing  Independent  Dressing  Independent  Toileting  Assisted  Feeding  Independent  Med Admin  Assisted  Med Delivery   none and whole    Wound Care Documentation and Therapy:        Elimination:  Continence:   · Bowel: Yes  · Bladder: Yes  Urinary Catheter: None   Colostomy/Ileostomy/Ileal Conduit: No       Date of Last BM: 03/29/2019    Intake/Output Summary (Last 24 hours) at 3/25/2019 1500  Last data filed at 3/25/2019 1020  Gross per 24 hour   Intake 665 ml   Output 120 ml   Net 545 ml     I/O last 3 completed shifts: In: 80 [P.O.:800; I.V.:10]  Out: 0     Safety Concerns: At Risk for Falls    Impairments/Disabilities:      None    Nutrition Therapy:  Current Nutrition Therapy:   - Oral Diet:  Cardiac    Routes of Feeding: Oral  Liquids:  Thin Liquids  Daily Fluid Restriction: no  Last Modified Barium Swallow with Video (Video Swallowing Test): not done    Treatments at the Time of Hospital Discharge:   Respiratory Treatments:   Oxygen Therapy:  is on oxygen at 3 L/min per nasal cannula. Ventilator:    - No ventilator support    Rehab Therapies: Physical Therapy and Occupational Therapy  Weight Bearing Status/Restrictions: No weight bearing restirctions  Other Medical Equipment (for information only, NOT a DME order):  wheelchair  Other Treatments:     Patient's personal belongings (please select all that are sent with patient):  Dentures upper and lower    RN SIGNATURE:  Electronically signed by Laury Antoine RN on 3/29/19 at 4:57 PM    CASE MANAGEMENT/SOCIAL WORK SECTION    Inpatient Status Date: 3/24/19    Readmission Risk Assessment Score:  Readmission Risk              Risk of Unplanned Readmission:        21           Discharging to Facility/ Agency   · Name: [address] [place] [address]   · Address:  · Phone:  · Fax:    Dialysis Facility (if applicable)   · Name:  · Address:  · Dialysis Schedule:  · Phone:  · Fax:    / signature: Electronically signed by Bowen Brandt RN on 3/29/19 at 4:02 PM    PHYSICIAN SECTION    Prognosis: Guarded    Condition at Discharge: Stable    Rehab Potential (if transferring to Rehab): Guarded    Recommended Follow-up, Labs or Other Treatments After Discharge:    F/u with cardio   Fluid restriction 2 l / day   F/u nephro/ PCP               Physician Certification: I certify the above information and transfer of Adelita Rubalcava  is necessary for the continuing treatment of the diagnosis listed and that he requires [address] for less 30 days.      Update Admission H&P: No change in H&P    PHYSICIAN SIGNATURE:  Electronically signed by Endy Sauceda MD on 3/29/19 at 3:06 PM

## 2019-03-25 NOTE — PLAN OF CARE
Patient's EF (Ejection Fraction) is greater than 40%    Patient has a past medical history of Alcohol abuse, Alcohol abuse, ARF (acute renal failure) ([facility] CHF (congestive heart failure) ([facility] Hypercholesteremia, Hypertension, Mediastinal adenopathy, and Pulmonary emphysema ([facility] Comorbidities reviewed and education provided. Patient and family's stated goal of care: reduce shortness of breath, increase activity tolerance, better understand heart failure and disease management, be more comfortable and reduce lower extremity edema prior to discharge    Patient's current functional capacity:  Slight limitation of physical activity. Comfortable at rest. Ordinary physical activity results in fatigue, palpitation, dyspnea. Pt resting in bed at this time on 4 L O2. Pt denies shortness of breath. Pt with pitting lower extremity edema.  Patient's weights and intake/output reviewed:    Patient Vitals for the past 96 hrs (Last 3 readings):   Weight   03/25/19 0619 292 lb 8.8 oz (132.7 kg)   03/24/19 0244 292 lb 8.8 oz (132.7 kg)   03/23/19 2346 270 lb (122.5 kg)       Intake/Output Summary (Last 24 hours) at 3/25/2019 0730  Last data filed at 3/25/2019 0002  Gross per 24 hour   Intake 810 ml   Output 0 ml   Net 810 ml         >>For CHF and Comorbidity documentation on Education Time and Topics, please see Education Tab

## 2019-03-25 NOTE — PROGRESS NOTES
Verbally informed Maite Deleon NP of patient's Lactic draw at 0000 resulting at 3.6 now that fluids are complete. Per Maite Deleon NP \"Continue to montior patient and lactic level at this time. Nothing more to add currently. \"     Will continue to monitor.

## 2019-03-25 NOTE — PROGRESS NOTES
flush  10 mL Intravenous 2 times per day    thiamine  100 mg Oral Daily    therapeutic multivitamin-minerals  1 tablet Oral Daily    metoprolol succinate  50 mg Oral Daily         Lab Data:  CBC:   Recent Labs     03/24/19  0016 03/25/19  0632   WBC 8.9 6.7   HGB 9.9* 9.0*    301     BMP:    Recent Labs     03/24/19  0016 03/24/19  1747 03/25/19  0633    138 140   K 4.2 4.9 4.3   CO2 21 29 30   BUN 16 18 17   CREATININE 1.4* 1.4* 1.2     INR:  No results for input(s): INR in the last 72 hours. BNP:    Recent Labs     03/24/19  0016   PROBNP 365*     No results found for: LVEF, LVEFMODE    ECHO:   2/15/19  Summary   Technically difficult study due to body surface area and poor acoustic   window.   Normal left ventricular systolic function with ejection fraction of 55-60%.   No regional wall motion abnormalites are seen.   Mild concentric left ventricular hypertrophy.   Grade I diastolic dysfunction with normal filling pressure.   Compared to previous study from 9- no changes noted.      Limited for RV function.   The right ventricle is normal in size and function.   Right ventricular function appears same as echo on 2-.   Systolic pulmonic artery pressure (SPAP) is normal estimated at 27 mmHg   (Right atrial pressure of 3 mmHg).   The right atrium is mildly dilated.       Principal Problem:    CHF (congestive heart failure) ([facility])  Active Problems:    Hyperlipidemia    Hypertension, essential    Alcohol use    Obesity    ARF (acute renal failure) ([facility])    Anemia    CHF (congestive heart failure), NYHA class I, acute on chronic, combined ([facility]  Resolved Problems:    * No resolved hospital problems. *      Assessment/Plan:  Generalized edema, multifactorial cause including hypoalbuminemia, CHF, compliance  Acute on Chronic Diastolic (LVEF 01-13% 7/1117) CHF   - admission weight 292lbs;  Pro-  Elevated troponins, flat, inconsistent with ACS   Acute on chronic hypoxemic respiratory failure  MCKENNA  Recent hx.  PEs, on DOAC  Hypertension  Hyperlipidemia   Obesity  ETOH  Macrocytic anemia  Medical noncompliance     PLan:  Needs diuresis; restart lasix 40mg IV BID  Echo shows normal right heart function and size  Continue daily weights/strict I/o's, Daily BMP  Continue toprol  Off of losartan due to MCKENNA; consider restarting once renal function improved  Add fluid restriction 2L a day    Chel aGspar CNP, 3/25/2019, 1:04 PM

## 2019-03-25 NOTE — PROGRESS NOTES
symmetrical, JVD- not visible  Respiratory: Respiratory effort-  labored, wheeze- no, crackles - no  Cardiovascular:  Ausculation- No M/R/G, Edema 1+  Abdomen: visible mass- no, distention- no, scar- no, tenderness- no                            hepatosplenomegaly-  no  Musculoskeletal:  clubbing no,cyanosis- no , digital ischemia- no                           muscle strength- grossly normal , tone - grossly normal  Skin: rashes- no , ulcers- no, induration- no, tightening - no  Psychiatric:  Judgement and insight- normal           AAO X 3      Meds:      apixaban  5 mg Oral BID    aspirin  81 mg Oral Daily    atorvastatin  20 mg Oral Daily    DULoxetine  60 mg Oral Daily    ferrous sulfate  325 mg Oral Daily with breakfast    folic acid  1 mg Oral Daily    levothyroxine  100 mcg Oral Daily    pantoprazole  40 mg Oral QAM AC    mometasone-formoterol  2 puff Inhalation BID    sodium chloride flush  10 mL Intravenous 2 times per day    ipratropium-albuterol  3 mL Inhalation Q4H    sodium chloride flush  10 mL Intravenous 2 times per day    thiamine  100 mg Oral Daily    therapeutic multivitamin-minerals  1 tablet Oral Daily    metoprolol succinate  50 mg Oral Daily       Labs:     Recent Labs     03/24/19  0016 03/25/19  0632   WBC 8.9 6.7   HGB 9.9* 9.0*   HCT 29.4* 26.2*    301          Recent Labs     03/24/19  0016 03/24/19  1747 03/25/19  0633    138 140   K 4.2 4.9 4.3   CL 96* 96* 100   CO2 21 29 30   BUN 16 18 17   CREATININE 1.4* 1.4* 1.2   GLUCOSE 107* 163* 140*   MG  --   --  1.70*   PHOS  --   --  2.6

## 2019-03-25 NOTE — CARE COORDINATION
CASE MANAGEMENT INITIAL ASSESSMENT      Reviewed chart and met with patient today, re: Possible discharge needs. Explained Case Management role/services. yes  Family present:   Primary contact information: Pt's daughter Avelina Leos [phone]    Admit date/status: 3/24/19 IP  Diagnosis: CHF    Insurance: [address] [employer]  Precert required for SNF - Y        3 night stay required -  N    Living arrangements, Adls, care needs, prior to admission: lives in a two story house with daughter and grandchildren. Transportation: family    Durable Medical Equipment at home: Walker_X_Cane__RTS__ BSC__Shower Chair__  02_X _ HHN__ CPAP__  BiPap__  Hospital Bed__ W/C___ Other__________    Services in the home and/or outpatient, prior to admission: [facility], home O2 3L continuous. Dialysis Facility (if applicable) N/A  · Name:  · Address:  · Dialysis Schedule:  · Phone:  · Fax:    PT/OT recs: none seen    Hospital Exemption Notification (HEN): needed for snf, not initiated. Barriers to discharge: none    Plan/comments: pt is independent in ADL's and family drives. Pt is NOT in agreement for home health care nor is he in agreement for snf. Pt does have home oxygen and daughter will bring in portable tank when pt is d/c. CM following.     ECOC on chart for MD signature

## 2019-03-25 NOTE — PLAN OF CARE
Problem: OXYGENATION/RESPIRATORY FUNCTION  Goal: Patient will maintain patent airway  3/25/2019 1835 by Mirza Mcmanus RN  Outcome: Ongoing  Note:   Patient's EF (Ejection Fraction) is greater than 40%    Patient has a past medical history of Alcohol abuse, Alcohol abuse, ARF (acute renal failure) ([facility] CHF (congestive heart failure) ([facility] Hypercholesteremia, Hypertension, Mediastinal adenopathy, and Pulmonary emphysema ([facility] [address] [facility] Comorbidities reviewed and education provided. Patient and family's stated goal of care: reduce shortness of breath, increase activity tolerance, better understand heart failure and disease management, be more comfortable and reduce lower extremity edema prior to discharge    Patient's current functional capacity:  Slight limitation of physical activity. Comfortable at rest. Ordinary physical activity results in fatigue, palpitation, dyspnea. Pt resting in bed at this time on 5 L O2. Pt with complaints of shortness of breath. Pt with pitting lower extremity edema.  Patient's weights and intake/output reviewed:    Patient Vitals for the past 96 hrs (Last 3 readings):   Weight   03/25/19 0619 292 lb 8.8 oz (132.7 kg)   03/24/19 0244 292 lb 8.8 oz (132.7 kg)   03/23/19 2346 270 lb (122.5 kg)       Intake/Output Summary (Last 24 hours) at 3/25/2019 1835  Last data filed at 3/25/2019 1730  Gross per 24 hour   Intake 1140 ml   Output 270 ml   Net 870 ml         >>For CHF and Comorbidity documentation on Education Time and Topics, please see Education Tab     3/25/2019 4530 by Antionette Dyson RN  Outcome: Ongoing

## 2019-03-25 NOTE — PROGRESS NOTES
Hospitalist Progress Note      PCP: Shilo Gonzalez MD    Date of Admission: 3/23/2019    Chief Complaint: [facility] Course:      Subjective:  sob, LE swelling - better       Medications:  Reviewed    Infusion Medications   Scheduled Medications    apixaban  5 mg Oral BID    aspirin  81 mg Oral Daily    atorvastatin  20 mg Oral Daily    DULoxetine  60 mg Oral Daily    ferrous sulfate  325 mg Oral Daily with breakfast    folic acid  1 mg Oral Daily    levothyroxine  100 mcg Oral Daily    pantoprazole  40 mg Oral QAM AC    mometasone-formoterol  2 puff Inhalation BID    sodium chloride flush  10 mL Intravenous 2 times per day    ipratropium-albuterol  3 mL Inhalation Q4H    sodium chloride flush  10 mL Intravenous 2 times per day    thiamine  100 mg Oral Daily    therapeutic multivitamin-minerals  1 tablet Oral Daily    metoprolol succinate  50 mg Oral Daily     PRN Meds: hydrALAZINE, sodium chloride flush, magnesium hydroxide, ondansetron, acetaminophen, sodium chloride flush, LORazepam **OR** LORazepam **OR** LORazepam **OR** LORazepam **OR** LORazepam **OR** LORazepam **OR** LORazepam **OR** LORazepam      Intake/Output Summary (Last 24 hours) at 3/25/2019 0751  Last data filed at 3/25/2019 9300  Gross per 24 hour   Intake 810 ml   Output 0 ml   Net 810 ml       Physical Exam Performed:    BP (!) 153/76   Pulse 104   Temp 98.1 °F (36.7 °C) (Oral)   Resp 16   Ht 6' 2\" (1.88 m)   Wt 292 lb 8.8 oz (132.7 kg)   SpO2 95%   BMI 37.56 kg/m²       General appearance:  No apparent distress, appears stated age and cooperative. On oxygen  HEENT:  Normal cephalic, atraumatic without obvious deformity. Pupils equal, round, and reactive to light. Extra ocular muscles intact. Conjunctivae/corneas clear. Neck: Supple, with full range of motion. No jugular venous distention. Trachea midline. Respiratory:  Normal respiratory effort.  Clear to auscultation, bilaterally without

## 2019-03-25 NOTE — CONSULTS
[address] [facility]                 [place] [address]                                  CONSULTATION    PATIENT NAME: Cheryle Scala                      :        1945  MED REC NO:   [id number]                          ROOM:       0211  ACCOUNT NO:   [de-identified]                           ADMIT DATE: 2019  PROVIDER:     Burt Perry MD    CONSULT DATE:  2019    RENAL CONSULT    INDICATION FOR CONSULT:  [facility] has been asked to  consult at the request of Dr. Aracelis Urbano to evaluate the patient's  acute renal insufficiency. HISTORY OF PRESENT ILLNESS:  The patient is a 80-year-old male who  presented to the emergency room today with increasing shortness of  breath. The patient had recently been admitted to the hospital in  February and was diagnosed with pulmonary emboli. Since discharge, the  patient had not left home and had been inactive. He has noted  increasing edema especially in the lower extremities, some orthopnea  without chest pain. Labs were obtained as part of the evaluation and he  was noted to have elevated creatinine of 1.4. This has increased  compared to creatinine of 0.8 back in February. The patient was  previously seen by my colleagues in 2015 for hyponatremia. The  patient has a history of congestive heart failure, liver disease,  alcohol abuse, mediastinal adenopathy, and COPD. The patient is a poor  historian. Most of the history is obtained from reviewing the medical  records. Currently, he is resting comfortably. He says since  discharge, his right upper arm had been swollen, erythematous; he  developed it while in the hospital.  He said he was prescribed an  antibiotic, but he cannot state the name of the antibiotic. He does not  currently smoke cigarettes, but he did in the past.  He continues to  drink some alcohol.     PAST MEDICAL HISTORY:  As mentioned, he has alcohol abuse, cirrhosis of  the liver with evidence of increased echogenicity on previous  ultrasound, congestive heart failure, hyperlipidemia, hypertension,  COPD, pulmonary emboli, obesity, hypothyroidism. ALLERGIES:  He has no known drug allergies. SOCIAL HISTORY:  The patient is retired. He was living at home with the  daughter helping him. FAMILY MEDICAL HISTORY:  Negative for kidney disease. The patient  cannot recall any specific illnesses in family members. MEDICATIONS:  Medicines pre-admit listed as the following: The patient  actually is not able to provide the list.  He states his daughter sets  them up, but according to the last discharge, he was taking  multivitamin, Cozaar 100 mg daily, DuoNebs as needed, Eliquis 5 mg twice  daily, Synthroid 100 mcg daily, Prilosec 20 mg daily, Folvite 1 mg  daily, iron sulfate one daily, Lasix 40 mg daily, Symbicort twice daily,  potassium chloride 20 mEq daily, aspirin 81 mg daily, Cymbalta 60 mg  daily, metoprolol 25 mg daily, vitamin D3 1000 units daily, Lipitor 20  mg daily. REVIEW OF SYSTEMS:  The patient denies fever, chills, or change in  weight. He denies seizure, stroke, change in vision, change in hearing,  difficulty swallowing, neck mass, cough, or sputum production. He  denies hemoptysis. He was short of breath prior to admission. He  currently appears comfortable. He denies chest pain, palpitations,  pleuritic chest pain. He denies increased abdominal girth, nausea,  vomiting, constipation, diarrhea, bright red blood per rectum, melena,  known ascites. He denies difficulty voiding, gross hematuria, dysuria,  flank pain, passage of kidney stones, use of NSAIDs, change in color of  urine, urinary tract infections, frothy urine. He denies discrete joint  swelling. He does complain of generalized edema involving the legs. He  denies foot sores, diabetes, neuropathy. A 12-system review of systems  completed and otherwise negative.     PHYSICAL EXAMINATION:  GENERAL APPEARANCE:  An elderly male who looks older than stated age of  68 years. VITAL SIGNS:  Weight 132.7 kg. Blood pressure on arrival to the  emergency room was 191/117, currently 137/70, temperature 97.8, pulse  112, respiratory rate 18. HEENT:  Head atraumatic. External appearance of ears normal.  Eyes,  clear conjunctivae, nonicteric. Pupils reactive to light. Nasal mucosa  normal.  Nares patent. Oral cavity, the patient has missing teeth. No  pharyngeal exudate. No lip ulcers. NECK:  Stout appearance. Trachea midline. No JVD. LUNGS:  Diminished breath sounds with scattered wheezes. Respiratory  effort currently normal.  HEART:  Tachycardia. Regular rate and rhythm. No gallop or rub. ABDOMEN:  Extremely obese, limited quality exam.  Soft. Bowel sounds  present. No rebound or rigidity. No renal bruits heard. EXTREMITIES:  Significant chronic inflammatory changes involving the  lower extremities _____ erythema, hyperpigmentation, scaly skin of the  lower extremities. On the right upper arm, there is thickening of the  skin with erythema up to the mid portion of the upper arm on the right,  minor amount on the left. The right hand is swollen. The patient  states it has improved since discharge from the hospital.  +2 pretibial  edema present, lower extremity/legs, pitting. On the upper extremities,  there is nonpitting edema on the right arm. NEURO:  The patient is alert and oriented. Gross motor and sensory  function normal.  The patient was not ambulated due to _____ shortness  of breath and general debility. No discrete joint swelling of the  joints of the hands or knees. PSYCH:  Mood, speech, affect normal.    LABORATORY DATA:  Today, sodium 138, potassium 4.9, BUN 18, creatinine  1.4, estimated GFR 50, glucose 163, calcium 8.2. Troponin 0.02. Lactic  acid elevated at 4.8 repeat, earlier 6.9. Albumin 2.7, alkaline  phosphatase 178, ALT 46, AST 68.   Creatinine on date of discharge in  February was 0.8 as of 02/19. Calcium has been normal to low, 8.2  today. Phosphorus on 03/13/2016 was 5.1. ProBNP on 02/18 was 6711,  today 365. Troponin highest was 0.04 on initial labs. TSH on 12/03 was  2.69. White blood count 8900, hemoglobin 9.9, platelets 808,255. Hepatitis C antibody obtained in 11/2017 was negative. DIAGNOSTIC DATA:  CT scan of the chest obtained on 02/14/2019  demonstrated small bilateral pleural effusions, mild enlarged  mediastinal and right hilar nodes, multiple pulmonary emboli. Chest  x-ray today, 03/24:  Bilateral pleural effusions. Abdominal ultrasound,  11/09/2017, showed cholelithiasis, right renal cyst, increased  echogenicity of the liver. ASSESSMENT AND PLAN:  1. Acute kidney injury. The patient's renal function has deteriorated  since recent admission in February. The patient has multiple potential  etiologies including hypertensive urgency with uncontrolled  hypertension, cardiorenal syndrome, liver-related renal failure, and  primary renal disease. The patient denies use of NSAIDs. He is a very  poor historian, does not know what medications he was actually taking,  although the medication list from recent discharge is available for  review. To evaluate the renal dysfunction, the following studies will  be obtained:  Renal ultrasound, urine protein-creatinine ratio,  urinalysis, urine sodium, urine/serum protein electrophoresis. Depending on results, additional studies will be obtained as needed. 2.  History of liver disease, alcohol abuse. The patient has a very low  albumin, may be related to liver disease. Exclude renal causes such as  nephrotic syndrome or malignancy such as myeloma. Obtain serum protein  electrophoresis, free light chains. In light of recent anemia,  hemoglobin 9.9 compared to 11.3 on 02/19 and MCV has increased at 102. The patient is on anticoagulant.   He is at risk for nutritional  deficiencies as well as anemia of liver disease, myelodysplasia with  increased MCV. Obtain vitamin B12 and folate. Monitor for GI blood  loss. Iron studies, serum protein electrophoresis. 3.  Monitor for secondary hyperparathyroidism. Obtain 25-vitamin D,  phosphorus level once renal function stabilized, PTH.  4.  Obesity. 5.  History of hyponatremia. Current sodium normal.  6.  Pulmonary emboli. The patient is on Eliquis. Currently, monitor  renal functions and dose would need to be adjusted if renal function  worsens. 7.  The patient warned to avoid NSAIDs and proton pump inhibitors if  possible. 8.  COPD. Management per Primary. 9.  Hypertension, inadequately controlled. The patient had been taking  Cozaar pre-admit and metoprolol 25 mg daily. Losartan currently on  hold; depending on results of studies, may be able to resume. On behalf of [facility], I would like to thank Dr. Jeanne Bradford for asking us to participate in the patient's care.         Edison Dash MD    D: 03/24/2019 18:43:36       T: 03/25/2019 0:41:48     [email or web address]  Job#: [id number]     Doc#: [id number]    CC:

## 2019-03-26 LAB
ANION GAP SERPL CALCULATED.3IONS-SCNC: 10 MMOL/L (ref 3–16)
BUN BLDV-MCNC: 14 MG/DL (ref 7–20)
CALCIUM SERPL-MCNC: 8.6 MG/DL (ref 8.3–10.6)
CHLORIDE BLD-SCNC: 99 MMOL/L (ref 99–110)
CO2: 32 MMOL/L (ref 21–32)
CREAT SERPL-MCNC: 0.9 MG/DL (ref 0.8–1.3)
GFR AFRICAN AMERICAN: >60
GFR NON-AFRICAN AMERICAN: >60
GLUCOSE BLD-MCNC: 132 MG/DL (ref 70–99)
POTASSIUM REFLEX MAGNESIUM: 4 MMOL/L (ref 3.5–5.1)
SODIUM BLD-SCNC: 141 MMOL/L (ref 136–145)

## 2019-03-26 PROCEDURE — 2580000003 HC RX 258: Performed by: INTERNAL MEDICINE

## 2019-03-26 PROCEDURE — 94640 AIRWAY INHALATION TREATMENT: CPT

## 2019-03-26 PROCEDURE — 6370000000 HC RX 637 (ALT 250 FOR IP): Performed by: INTERNAL MEDICINE

## 2019-03-26 PROCEDURE — 6360000002 HC RX W HCPCS: Performed by: NURSE PRACTITIONER

## 2019-03-26 PROCEDURE — 2700000000 HC OXYGEN THERAPY PER DAY

## 2019-03-26 PROCEDURE — 6370000000 HC RX 637 (ALT 250 FOR IP): Performed by: UROLOGY

## 2019-03-26 PROCEDURE — 36415 COLL VENOUS BLD VENIPUNCTURE: CPT

## 2019-03-26 PROCEDURE — 80048 BASIC METABOLIC PNL TOTAL CA: CPT

## 2019-03-26 PROCEDURE — 99233 SBSQ HOSP IP/OBS HIGH 50: CPT | Performed by: NURSE PRACTITIONER

## 2019-03-26 PROCEDURE — 1200000000 HC SEMI PRIVATE

## 2019-03-26 PROCEDURE — 94761 N-INVAS EAR/PLS OXIMETRY MLT: CPT

## 2019-03-26 RX ADMIN — FUROSEMIDE 40 MG: 10 INJECTION, SOLUTION INTRAMUSCULAR; INTRAVENOUS at 10:57

## 2019-03-26 RX ADMIN — FOLIC ACID 1 MG: 1 TABLET ORAL at 10:57

## 2019-03-26 RX ADMIN — Medication 10 ML: at 22:03

## 2019-03-26 RX ADMIN — LEVOTHYROXINE SODIUM 100 MCG: 100 TABLET ORAL at 06:57

## 2019-03-26 RX ADMIN — ATORVASTATIN CALCIUM 20 MG: 40 TABLET, FILM COATED ORAL at 10:57

## 2019-03-26 RX ADMIN — IPRATROPIUM BROMIDE AND ALBUTEROL SULFATE 3 ML: .5; 3 SOLUTION RESPIRATORY (INHALATION) at 16:24

## 2019-03-26 RX ADMIN — Medication 1 TABLET: at 10:57

## 2019-03-26 RX ADMIN — FERROUS SULFATE TAB 325 MG (65 MG ELEMENTAL FE) 325 MG: 325 (65 FE) TAB at 10:57

## 2019-03-26 RX ADMIN — Medication 10 ML: at 22:06

## 2019-03-26 RX ADMIN — FUROSEMIDE 40 MG: 10 INJECTION, SOLUTION INTRAMUSCULAR; INTRAVENOUS at 17:35

## 2019-03-26 RX ADMIN — DULOXETINE HYDROCHLORIDE 60 MG: 60 CAPSULE, DELAYED RELEASE ORAL at 10:56

## 2019-03-26 RX ADMIN — IPRATROPIUM BROMIDE AND ALBUTEROL SULFATE 3 ML: .5; 3 SOLUTION RESPIRATORY (INHALATION) at 08:05

## 2019-03-26 RX ADMIN — APIXABAN 5 MG: 5 TABLET, FILM COATED ORAL at 22:02

## 2019-03-26 RX ADMIN — IPRATROPIUM BROMIDE AND ALBUTEROL SULFATE 3 ML: .5; 3 SOLUTION RESPIRATORY (INHALATION) at 11:48

## 2019-03-26 RX ADMIN — Medication 10 ML: at 10:57

## 2019-03-26 RX ADMIN — TAMSULOSIN HYDROCHLORIDE 0.4 MG: 0.4 CAPSULE ORAL at 10:57

## 2019-03-26 RX ADMIN — IPRATROPIUM BROMIDE AND ALBUTEROL SULFATE 3 ML: .5; 3 SOLUTION RESPIRATORY (INHALATION) at 19:30

## 2019-03-26 RX ADMIN — ASPIRIN 81 MG: 81 TABLET, COATED ORAL at 10:57

## 2019-03-26 RX ADMIN — Medication 2 PUFF: at 08:05

## 2019-03-26 RX ADMIN — Medication 100 MG: at 10:56

## 2019-03-26 RX ADMIN — APIXABAN 5 MG: 5 TABLET, FILM COATED ORAL at 10:56

## 2019-03-26 RX ADMIN — METOPROLOL SUCCINATE 50 MG: 50 TABLET, EXTENDED RELEASE ORAL at 10:57

## 2019-03-26 RX ADMIN — IPRATROPIUM BROMIDE AND ALBUTEROL SULFATE 3 ML: .5; 3 SOLUTION RESPIRATORY (INHALATION) at 03:33

## 2019-03-26 RX ADMIN — Medication 2 PUFF: at 19:30

## 2019-03-26 RX ADMIN — PANTOPRAZOLE SODIUM 40 MG: 20 TABLET, DELAYED RELEASE ORAL at 06:57

## 2019-03-26 ASSESSMENT — PAIN SCALES - GENERAL
PAINLEVEL_OUTOF10: 0

## 2019-03-26 NOTE — PROGRESS NOTES
times per day    ipratropium-albuterol  3 mL Inhalation Q4H    sodium chloride flush  10 mL Intravenous 2 times per day    thiamine  100 mg Oral Daily    therapeutic multivitamin-minerals  1 tablet Oral Daily    metoprolol succinate  50 mg Oral Daily         Lab Data:  CBC:   Recent Labs     03/24/19  0016 03/25/19  0632   WBC 8.9 6.7   HGB 9.9* 9.0*    301     BMP:    Recent Labs     03/24/19  0016 03/24/19  1747 03/25/19  0633    138 140   K 4.2 4.9 4.3   CO2 21 29 30   BUN 16 18 17   CREATININE 1.4* 1.4* 1.2     INR:  No results for input(s): INR in the last 72 hours.   BNP:    Recent Labs     03/24/19  0016   PROBNP 365*     No results found for: LVEF, LVEFMODE    ECHO:   2/15/19  Summary   Technically difficult study due to body surface area and poor acoustic   window.   Normal left ventricular systolic function with ejection fraction of 55-60%.   No regional wall motion abnormalites are seen.   Mild concentric left ventricular hypertrophy.   Grade I diastolic dysfunction with normal filling pressure.   Compared to previous study from 9- no changes noted.     3/25/19   Limited for RV function.   The right ventricle is normal in size and function.   Right ventricular function appears same as echo on 6-.   Systolic pulmonic artery pressure (SPAP) is normal estimated at 27 mmHg   (Right atrial pressure of 3 mmHg).   The right atrium is mildly dilated.     weights  03/26/19 0626  288 lb 4.8 oz (130.8 kg)  Actual;Standing scale      03/25/19 0619  292 lb 8.8 oz (132.7 kg)  Actual;Standing scale     03/24/19 0244  292 lb 8.8 oz (132.7 kg)  Actual;Standing scale     03/23/19 2346  270 lb (122.5 kg)  Stated         Principal Problem:    CHF (congestive heart failure) ([facility])  Active Problems:    Hyperlipidemia    Hypertension, essential    Alcohol use    Obesity    ARF (acute renal failure) ([facility])    Anemia    CHF (congestive heart failure), NYHA class I, acute on chronic, combined [facility])  Resolved Problems:    * No resolved hospital problems. *      Assessment/Plan:  Generalized edema, multifactorial cause including hypoalbuminemia, CHF, compliance  Acute on Chronic Diastolic (LVEF 30-93% 3/1274) CHF   - admission weight 292lbs; Pro-  Elevated troponins, flat, inconsistent with ACS   Acute on chronic hypoxemic respiratory failure  MCKENNA  Recent hx.  PEs, on DOAC  Hypertension  Hyperlipidemia   Obesity  ETOH abuse  Macrocytic anemia  Medical noncompliance     PLan:  Continue lasix 40mg IV BID  Continue daily weights/strict I/o's, Daily BMP pending from today   Continue toprol  Off of losartan due to MCKENNA; consider restarting once renal function improved  Continue fluid restriction 2L a day     Jennifer Singer CNP, 3/26/2019, 11:00 AM

## 2019-03-26 NOTE — PROGRESS NOTES
Pt is alert and oriented x 4 with no history of falls. Assessment completed as charted. Bed is in lowest position with 2/4 bed rails raised. Bed alarm turned on, wheels locked, and call light within reach. Patient wearing non-skid socks and verbalizes understanding to call out for assistance. No further requests at this time. Will continue to monitor patient.     BP (!) 161/86   Pulse 122   Temp 97.7 °F (36.5 °C) (Axillary)   Resp 26   Ht 6' 2\" (1.88 m)   Wt 292 lb 8.8 oz (132.7 kg)   SpO2 93%   BMI 37.56 kg/m²      Electronically signed by Alisa Sylvester RN on 3/26/2019 at 6:13 AM

## 2019-03-26 NOTE — CONSULTS
Patient's EF (Ejection Fraction) is 55%  Discharge Plans: home  Family Present: no  Advanced Directives: patient does not have advance directives and declines assistance completing them at this time    Patient has a past medical history of Alcohol abuse, Alcohol abuse, ARF (acute renal failure) ([facility] [address] CHF (congestive heart failure) ([facility] [address] Hypercholesteremia, Hypertension, Mediastinal adenopathy, and Pulmonary emphysema ([facility] [address] Comorbidities reviewed and education provided. Patient and family's stated goal of care: Pt is not able to discuss his goals prior to discharge. Long term goal: attempted to explore pt thoughts on alcohol cessation however pt admits he does not plan on stopping at this time. Lifestyle goal discussed: AA when he is ready for support to quit alcohol    Patient's current functional capacity: Marked limitation of physical activity. Comfortable at rest. Less than ordinary activity causes fatigue, palpitation, or dyspnea. Pt resting in bed at this time on 4 L O2. Pt with complaints of shortness of breath. Pt with pitting lower extremity edema. Patient's weights and intake/output reviewed:    Patient Vitals for the past 96 hrs (Last 3 readings):   Weight   03/26/19 0626 288 lb 4.8 oz (130.8 kg)   03/25/19 0619 292 lb 8.8 oz (132.7 kg)   03/24/19 0244 292 lb 8.8 oz (132.7 kg)       Intake/Output Summary (Last 24 hours) at 3/26/2019 1518  Last data filed at 3/26/2019 1322  Gross per 24 hour   Intake 385 ml   Output 1550 ml   Net -1165 ml       Patient provided with verbal and written education on CHF signs/symptoms, discharge medications, daily weights, low sodium diet, activity and follow-up. Heart Failure self-management education/written zone tool reviewed and encouraged to call at early signs of worsening CHF or when in yellow zone. Pt had admits he thought he heard the doctors explain his Sharmaine Duarte is doing just fine\" and he did not know he had heart failure.  Reviewed signs and symptoms of heart failure and chronicity of this disease. Pt appears still fluid overloaded and dyspneic this afternoon. Long and claudine discussion on the need for him to stop drinking alcohol or his heart will likely continually get worse. Pt appears distressed and tearful with this discussion but admits he is not ready to quit drinking because he \"likes to drink with his buddies\". Pt describes drinking up to 15 beers daily and 1/2 liter of whiskey daily. Briefly reviewed need for low sodium diet also but most emphasis was placed on his large amount of fluid intake daily that is all alcoholic and the damage this is doing to his heart. Pt does admit he was \"scared when he could not breathe\". Notified patient of scheduled hospital follow-up appointment with Jolaine Hatchet, MD for 4/1 at 1:10 PM. Pt had already scheduled 4/4 9:20 AM appointment also with his PCP and encouraged to keep this as he will likely need much outpatient support. Info on AA and supports in the community will be given to pt from discharge planner if pt decides to engage and request help. Instructed patient to call the doctor post discharge if they experience shortness of breath, chest pain, swelling, cough, or weight gain of 3 pounds in a day / 5 pounds in a week. Also, notified patient to call the doctor with dizziness, increased fatigue, decreased or difficulty urinating. Pt education needs reinforcement. No additional questions at this time. Education Time: 20 Minutes    Recommendations:  1. Encourage follow-up appointment compliance. Next appointment: 4/1  2. Educate further on fluid restriction 48 oz - 64 oz during inpatient stay so they can understand how to measure intake at home. 3. Review sodium restrictions. Encourage patient to not add table salt to their foods and avoid foods that are high in sodium. 4. Continue to educate on S/S. Stress the importance of calling the MD with the earliest signs of an acute exacerbation. 5. Emphasize daily weights - instruct patient to call the MD if they gain 2-3 lb in a day or 5 lb in a week. 6. Provided patient with CHF Resource Line for questions and concerns. 7. Strongly encouraged alcohol cessation to prevent further heart failure readmissions  8.  Compliance to medications also emphasized,      3/26/2019 3:18 PM

## 2019-03-26 NOTE — PROGRESS NOTES
Hospitalist Progress Note      PCP: Jose Savage MD    Date of Admission: 3/23/2019    Chief Complaint: [facility] Course:      Subjective:  sob, LE swelling - better       Medications:  Reviewed    Infusion Medications   Scheduled Medications    furosemide  40 mg Intravenous BID    tamsulosin  0.4 mg Oral Daily    apixaban  5 mg Oral BID    aspirin  81 mg Oral Daily    atorvastatin  20 mg Oral Daily    DULoxetine  60 mg Oral Daily    ferrous sulfate  325 mg Oral Daily with breakfast    folic acid  1 mg Oral Daily    levothyroxine  100 mcg Oral Daily    pantoprazole  40 mg Oral QAM AC    mometasone-formoterol  2 puff Inhalation BID    sodium chloride flush  10 mL Intravenous 2 times per day    ipratropium-albuterol  3 mL Inhalation Q4H    sodium chloride flush  10 mL Intravenous 2 times per day    thiamine  100 mg Oral Daily    therapeutic multivitamin-minerals  1 tablet Oral Daily    metoprolol succinate  50 mg Oral Daily     PRN Meds: hydrALAZINE, sodium chloride flush, magnesium hydroxide, ondansetron, acetaminophen, sodium chloride flush, LORazepam **OR** LORazepam **OR** LORazepam **OR** LORazepam **OR** LORazepam **OR** LORazepam **OR** LORazepam **OR** LORazepam      Intake/Output Summary (Last 24 hours) at 3/26/2019 0736  Last data filed at 3/26/2019 0634  Gross per 24 hour   Intake 900 ml   Output 1120 ml   Net -220 ml       Physical Exam Performed:    BP (!) 143/84   Pulse 102   Temp 98 °F (36.7 °C) (Oral)   Resp 22   Ht 6' 2\" (1.88 m)   Wt 288 lb 4.8 oz (130.8 kg)   SpO2 95%   BMI 37.02 kg/m²       General appearance:  No apparent distress, appears stated age and cooperative. On oxygen  HEENT:  Normal cephalic, atraumatic without obvious deformity. Pupils equal, round, and reactive to light. Extra ocular muscles intact. Conjunctivae/corneas clear. Neck: Supple, with full range of motion. No jugular venous distention. Trachea midline.   Respiratory:  Normal respiratory effort. Clear to auscultation, bilaterally without Rales/Wheezes/Rhonchi. Cardiovascular:  Regular rate and rhythm with normal S1/S2 without murmurs, rubs or gallops. Abdomen: Soft, non-tender, non-distended with normal bowel sounds. Musculoskeletal:  No clubbing, cyanosis w/ 2+ LE edema bilaterally. Full range of motion without deformity. UE edema rt >left- improving    Skin: Skin color, texture, turgor normal.  No rashes or lesions. Neurologic:  Neurovascularly intact without any focal sensory/motor deficits. Cranial nerves: II-XII intact, grossly non-focal.  Psychiatric:  Alert and oriented, thought content appropriate, normal insight  Capillary Refill: Brisk,< 3 seconds   Peripheral Pulses: +2 palpable, equal bilaterally         Labs:   Recent Labs     03/24/19  0016 03/25/19  0632   WBC 8.9 6.7   HGB 9.9* 9.0*   HCT 29.4* 26.2*    301     Recent Labs     03/24/19  0016 03/24/19  1747 03/25/19  0633    138 140   K 4.2 4.9 4.3   CL 96* 96* 100   CO2 21 29 30   BUN 16 18 17   CREATININE 1.4* 1.4* 1.2   CALCIUM 8.4 8.2* 8.6   PHOS  --   --  2.6     Recent Labs     03/24/19  0016 03/25/19  0633   AST 68* 50*   ALT 46* 49*   BILIDIR  --  0.3   BILITOT 0.4 0.6   ALKPHOS 178* 165*     No results for input(s): INR in the last 72 hours. Recent Labs     03/24/19  0632 03/24/19  1047 03/24/19  1747   TROPONINI 0.03* 0.03* 0.02*       Urinalysis:      Lab Results   Component Value Date    NITRU Negative 03/25/2019    WBCUA see below 03/25/2019    BACTERIA 3+ 03/25/2019    RBCUA >100 03/25/2019    BLOODU LARGE 03/25/2019    SPECGRAV 1.025 03/25/2019    GLUCOSEU Negative 03/25/2019       Radiology:  US RENAL COMPLETE   Final Result   No evidence of hydronephrosis. A right urinary bladder diverticulum is suspected. .         XR CHEST STANDARD (2 VW)   Final Result   Pleural effusions with bibasilar atelectasis or pneumonia.                  Assessment/Plan:    Active Hospital Problems 2-3 days, pending the course    Zeke Prieto MD

## 2019-03-26 NOTE — PLAN OF CARE
Problem: OXYGENATION/RESPIRATORY FUNCTION  Goal: Patient will maintain patent airway  Note:   Patient's EF (Ejection Fraction) is greater than 40%    Patient has a past medical history of Alcohol abuse, Alcohol abuse, ARF (acute renal failure) ([facility] [address] CHF (congestive heart failure) ([facility] [address] Hypercholesteremia, Hypertension, Mediastinal adenopathy, and Pulmonary emphysema ([facility] [address] Comorbidities reviewed and education provided. Patient and family's stated goal of care: reduce shortness of breath, increase activity tolerance, better understand heart failure and disease management, be more comfortable and reduce lower extremity edema prior to discharge    Patient's current functional capacity:  Slight limitation of physical activity. Comfortable at rest. Ordinary physical activity results in fatigue, palpitation, dyspnea. Pt resting in bed at this time on 4 L O2. Pt denies shortness of breath. Pt with pitting lower extremity edema.  Patient's weights and intake/output reviewed:    Patient Vitals for the past 96 hrs (Last 3 readings):   Weight   03/26/19 0626 288 lb 4.8 oz (130.8 kg)   03/25/19 0619 292 lb 8.8 oz (132.7 kg)   03/24/19 0244 292 lb 8.8 oz (132.7 kg)       Intake/Output Summary (Last 24 hours) at 3/26/2019 1847  Last data filed at 3/26/2019 1800  Gross per 24 hour   Intake 905 ml   Output 1650 ml   Net -745 ml         >>For CHF and Comorbidity documentation on Education Time and Topics, please see Education Tab

## 2019-03-26 NOTE — PROGRESS NOTES
Urology Consult Note      Reason for Consult: Cardoza placement    History:   Pt is a 67 yo WM with a h/o ETOH abuse admitted for SOB. He has a penile prosthesis in place but does not know by who or when but it was done here in town. Staff did have some difficulty placing cardoza but 16F coude was placed. He denies having retention prior to the catheter being placed. Meds: see med rec  Family History, Social History, Review of Systems:  Reviewed and agreed to as per chart    Exam:    Vitals:  BP (!) 143/84   Pulse 102   Temp 98 °F (36.7 °C) (Oral)   Resp 22   Ht 6' 2\" (1.88 m)   Wt 288 lb 4.8 oz (130.8 kg)   SpO2 97%   BMI 37.02 kg/m²   Temp  Av.8 °F (36.6 °C)  Min: 97.6 °F (36.4 °C)  Max: 98 °F (36.7 °C)    Intake/Output Summary (Last 24 hours) at 3/26/2019 1023  Last data filed at 3/26/2019 0999  Gross per 24 hour   Intake 675 ml   Output 1000 ml   Net -325 ml       Physical:    Well developed, well nourished in no acute distress   Mood indicates no abnormalities. Pt doesnt appear depressed   Orientated to time and place   Neck is supple, trachea is midline   Respiratory effort is normal   Cardiovascular show no extremity swelling   Abdomen no masses or hernias are palpated, there is no tenderness. Liver and Spleen appear normal.   Skin show no abnormal lesions   Lymph nodes are not palpated in the inguinal, neck, or axillary area.      Male :   Semi-rigid penile prosthesis in place    Urethral meatus is normal in size and location   16F coude catheter in place with clear yellow urine   Scrotum appears normal and both testicles appear normal in size and location      Labs:  WBC:    Lab Results   Component Value Date    WBC 6.7 2019     Hemoglobin/Hematocrit:    Lab Results   Component Value Date    HGB 9.0 2019    HCT 26.2 2019     BMP:    Lab Results   Component Value Date     2019    K 4.3 2019    K 5.0 2019     2019    CO2 30 03/25/2019    BUN 17 03/25/2019    LABALBU 2.5 03/25/2019    CREATININE 1.2 03/25/2019    CALCIUM 8.6 03/25/2019    GFRAA >60 03/25/2019    GFRAA >60 03/07/2013    LABGLOM 59 03/25/2019     PT/INR:    Lab Results   Component Value Date    PROTIME 11.6 09/27/2017    INR 1.03 09/27/2017     PTT:  No results found for: APTT[APTT    Urinalysis:     Imaging:      Impression/Plan:    Cardoza in place  Remove cardoza as early as possible     Carley Hess PA-C

## 2019-03-26 NOTE — PLAN OF CARE
Problem: Serum Glucose Level - Abnormal:  Goal: Ability to maintain appropriate glucose levels will improve  Description  Ability to maintain appropriate glucose levels will improve  Outcome: Ongoing  Note:   Pts blood sugars will remain within normal limits. Pt will call out to staff appropriately before meals to ensure appropriate measures to staff.

## 2019-03-26 NOTE — CARE COORDINATION
CM updated by Austin Madsen CHF that she did speak with pt at bedside in regards to [address] and community resources. This writer will also meet with pt and give community resource folder. Will follow.

## 2019-03-26 NOTE — PROGRESS NOTES
Discussed alcohol intake during admission with cardiology, cleared the pt to drink with meals while following fluid restriction. Will continue to monitor. Electronically signed by Beck Trinidad RN on 3/26/2019 at 10:12 AM       Pt states \"I will not quit drinking when I go home, and I don't want any ativan or information on rehabs\".  Electronically signed by Beck Trinidad RN on 3/26/2019 at 10:13 AM

## 2019-03-26 NOTE — PROGRESS NOTES
Received a call from microbiology reporting that pt had tested positive for gram positive cocci, the sample would be cultured for identification.     Electronically signed by Taylor Zepeda RN on 3/25/2019 at 10:29 PM

## 2019-03-26 NOTE — PROGRESS NOTES
[facility]    [facility]            [phone]        Interval Plan:     Cr improving  Agree with diuretics  Still edematous significant                Assessment :     Acute Kidney Injury  Cr peaked to 1.4- down to 1.2  Renal USG: R- 11.7 cm, L - 13.23 cm, normal echogenicity,  UA: large blood, RBC> 100, has cardoza, Pr/Cr ratio- negligible at 0.1    Echo- 2/19- EF 55-60%, Grade I DD, mild LVH,   3/19- RV function is normal in size and function    Generalized Edema  edema  Diuresis managed by cardiology  On iv lasix    Hypertension   BP: (143-161)/(84-86)  Pulse:  [102-122]    BP high   But on iv lasix  Tachycardic- also possible withdrawl    COPD  Anemia- unlikely to be CKD, m/m per Dr Tatiana Yanez  R- arm cellulitis- on abx   PE- 2/2019  Liver disease, alcohol abuse      Please call with questions at-  24 hrs Answering service [phone]   7 am-5 pm at Perfect serve, or cell phone  Dr Chet Davies        CC/ Reason for consult:     MCKENNA    HPI:     Patient is a 68 y.o. male  presented to  the hospital on 3/23/2019 with shortness of breath,  Low leg edema. Also right arm edema and erythema.     He is found to have MCKENNA, fluid overload and being   Treated with lasix  He is also known to have alcohol abuse        Interval History:     Cr coming down  Still on oxygen  On iv lasix    Seen with - no family  ROS -   SOB- present, but better than on admission  Edema- none  Nausea/vomiting- none  Poor appetite-No  Confusion- no  Urinary complaints- no  Any other complaints- no  All other ROS are reviewed and are Negative           Vitals:     Vitals:    03/26/19 0717   BP: (!) 143/84   Pulse: 102   Resp: 22   Temp: 98 °F (36.7 °C)   SpO2: 95%         I & O:       Intake/Output Summary (Last 24 hours) at 3/26/2019 0736  Last data filed at 3/26/2019 5843  Gross per 24 hour   Intake 900 ml   Output 1120 ml   Net -220 ml         Physical Examination:     General appearance: Anxious- no, distressed- no, in good spirits- no  Lethargic, obese  HEENT: Lips- normal, teeth- ok , oral mucosa- moist  Neck : Mass- no, appears symmetrical, JVD- not visible  Respiratory: Respiratory effort-  labored, wheeze- no, crackles - no  Cardiovascular:  Ausculation- No M/R/G, Edema 1+  Abdomen: visible mass- no, distention- no, scar- no, tenderness- no                            hepatosplenomegaly-  no  Musculoskeletal:  clubbing no,cyanosis- no , digital ischemia- no                           muscle strength- grossly normal , tone - grossly normal  Skin: rashes- no , ulcers- no, induration- no, tightening - no  Psychiatric:  Judgement and insight- normal           AAO X 3      Meds:      furosemide  40 mg Intravenous BID    tamsulosin  0.4 mg Oral Daily    apixaban  5 mg Oral BID    aspirin  81 mg Oral Daily    atorvastatin  20 mg Oral Daily    DULoxetine  60 mg Oral Daily    ferrous sulfate  325 mg Oral Daily with breakfast    folic acid  1 mg Oral Daily    levothyroxine  100 mcg Oral Daily    pantoprazole  40 mg Oral QAM AC    mometasone-formoterol  2 puff Inhalation BID    sodium chloride flush  10 mL Intravenous 2 times per day    ipratropium-albuterol  3 mL Inhalation Q4H    sodium chloride flush  10 mL Intravenous 2 times per day    thiamine  100 mg Oral Daily    therapeutic multivitamin-minerals  1 tablet Oral Daily    metoprolol succinate  50 mg Oral Daily       Labs:     Recent Labs     03/24/19  0016 03/25/19  0632   WBC 8.9 6.7   HGB 9.9* 9.0*   HCT 29.4* 26.2*    301          Recent Labs     03/24/19  0016 03/24/19  1747 03/25/19  0633    138 140   K 4.2 4.9 4.3   CL 96* 96* 100   CO2 21 29 30   BUN 16 18 17   CREATININE 1.4* 1.4* 1.2   GLUCOSE 107* 163* 140*   MG  --   --  1.70*   PHOS  --   --  2.6

## 2019-03-26 NOTE — PLAN OF CARE
Patient's EF (Ejection Fraction) is greater than 40%    Patient has a past medical history of Alcohol abuse, Alcohol abuse, ARF (acute renal failure) ([facility] CHF (congestive heart failure) ([facility] [address] Hypercholesteremia, Hypertension, Mediastinal adenopathy, and Pulmonary emphysema ([facility] [address] Comorbidities reviewed and education provided. Patient and family's stated goal of care: reduce shortness of breath, increase activity tolerance, better understand heart failure and disease management, be more comfortable and reduce lower extremity edema prior to discharge    Patient's current functional capacity:  Slight limitation of physical activity. Comfortable at rest. Ordinary physical activity results in fatigue, palpitation, dyspnea. Pt resting in bed at this time on 5 L O2. Pt with complaints of shortness of breath. Pt with nonpitting lower extremity edema.  Patient's weights and intake/output reviewed:    Patient Vitals for the past 96 hrs (Last 3 readings):   Weight   03/25/19 0619 292 lb 8.8 oz (132.7 kg)   03/24/19 0244 292 lb 8.8 oz (132.7 kg)   03/23/19 2346 270 lb (122.5 kg)       Intake/Output Summary (Last 24 hours) at 3/26/2019 1001  Last data filed at 3/25/2019 2227  Gross per 24 hour   Intake 900 ml   Output 770 ml   Net 130 ml         >>For CHF and Comorbidity documentation on Education Time and Topics, please see Education Tab

## 2019-03-27 LAB
ALBUMIN SERPL-MCNC: 2.7 G/DL (ref 3.1–4.9)
ALPHA-1-GLOBULIN: 0.3 G/DL (ref 0.2–0.4)
ALPHA-2-GLOBULIN: 0.9 G/DL (ref 0.4–1.1)
ANION GAP SERPL CALCULATED.3IONS-SCNC: 9 MMOL/L (ref 3–16)
BETA GLOBULIN: 1 G/DL (ref 0.9–1.6)
BUN BLDV-MCNC: 14 MG/DL (ref 7–20)
CALCIUM SERPL-MCNC: 8.9 MG/DL (ref 8.3–10.6)
CHLORIDE BLD-SCNC: 100 MMOL/L (ref 99–110)
CO2: 33 MMOL/L (ref 21–32)
CREAT SERPL-MCNC: 0.9 MG/DL (ref 0.8–1.3)
CULTURE, BLOOD 2: ABNORMAL
GAMMA GLOBULIN: 0.8 G/DL (ref 0.6–1.8)
GFR AFRICAN AMERICAN: >60
GFR NON-AFRICAN AMERICAN: >60
GLUCOSE BLD-MCNC: 111 MG/DL (ref 70–99)
ORGANISM: ABNORMAL
ORGANISM: ABNORMAL
POTASSIUM REFLEX MAGNESIUM: 4.1 MMOL/L (ref 3.5–5.1)
SODIUM BLD-SCNC: 142 MMOL/L (ref 136–145)

## 2019-03-27 PROCEDURE — 94640 AIRWAY INHALATION TREATMENT: CPT

## 2019-03-27 PROCEDURE — 2580000003 HC RX 258: Performed by: INTERNAL MEDICINE

## 2019-03-27 PROCEDURE — 94761 N-INVAS EAR/PLS OXIMETRY MLT: CPT

## 2019-03-27 PROCEDURE — 6370000000 HC RX 637 (ALT 250 FOR IP): Performed by: INTERNAL MEDICINE

## 2019-03-27 PROCEDURE — 99233 SBSQ HOSP IP/OBS HIGH 50: CPT | Performed by: NURSE PRACTITIONER

## 2019-03-27 PROCEDURE — 2700000000 HC OXYGEN THERAPY PER DAY

## 2019-03-27 PROCEDURE — 6370000000 HC RX 637 (ALT 250 FOR IP): Performed by: UROLOGY

## 2019-03-27 PROCEDURE — 80048 BASIC METABOLIC PNL TOTAL CA: CPT

## 2019-03-27 PROCEDURE — 1200000000 HC SEMI PRIVATE

## 2019-03-27 PROCEDURE — 36415 COLL VENOUS BLD VENIPUNCTURE: CPT

## 2019-03-27 PROCEDURE — 6360000002 HC RX W HCPCS: Performed by: NURSE PRACTITIONER

## 2019-03-27 RX ORDER — OMEPRAZOLE 20 MG/1
CAPSULE, DELAYED RELEASE ORAL
Qty: 90 CAPSULE | Refills: 0 | Status: SHIPPED | OUTPATIENT
Start: 2019-03-27 | End: 2019-06-23 | Stop reason: SDUPTHER

## 2019-03-27 RX ORDER — FUROSEMIDE 10 MG/ML
40 INJECTION INTRAMUSCULAR; INTRAVENOUS 3 TIMES DAILY
Status: DISCONTINUED | OUTPATIENT
Start: 2019-03-27 | End: 2019-03-28

## 2019-03-27 RX ADMIN — ASPIRIN 81 MG: 81 TABLET, COATED ORAL at 09:01

## 2019-03-27 RX ADMIN — IPRATROPIUM BROMIDE AND ALBUTEROL SULFATE 3 ML: .5; 3 SOLUTION RESPIRATORY (INHALATION) at 20:18

## 2019-03-27 RX ADMIN — Medication 10 ML: at 21:43

## 2019-03-27 RX ADMIN — DULOXETINE HYDROCHLORIDE 60 MG: 60 CAPSULE, DELAYED RELEASE ORAL at 09:00

## 2019-03-27 RX ADMIN — FUROSEMIDE 40 MG: 10 INJECTION, SOLUTION INTRAMUSCULAR; INTRAVENOUS at 21:43

## 2019-03-27 RX ADMIN — IPRATROPIUM BROMIDE AND ALBUTEROL SULFATE 3 ML: .5; 3 SOLUTION RESPIRATORY (INHALATION) at 07:45

## 2019-03-27 RX ADMIN — IPRATROPIUM BROMIDE AND ALBUTEROL SULFATE 3 ML: .5; 3 SOLUTION RESPIRATORY (INHALATION) at 23:23

## 2019-03-27 RX ADMIN — METOPROLOL SUCCINATE 50 MG: 50 TABLET, EXTENDED RELEASE ORAL at 08:58

## 2019-03-27 RX ADMIN — IPRATROPIUM BROMIDE AND ALBUTEROL SULFATE 3 ML: .5; 3 SOLUTION RESPIRATORY (INHALATION) at 03:34

## 2019-03-27 RX ADMIN — FOLIC ACID 1 MG: 1 TABLET ORAL at 08:59

## 2019-03-27 RX ADMIN — APIXABAN 5 MG: 5 TABLET, FILM COATED ORAL at 21:43

## 2019-03-27 RX ADMIN — PANTOPRAZOLE SODIUM 40 MG: 20 TABLET, DELAYED RELEASE ORAL at 06:11

## 2019-03-27 RX ADMIN — FUROSEMIDE 40 MG: 10 INJECTION, SOLUTION INTRAMUSCULAR; INTRAVENOUS at 08:58

## 2019-03-27 RX ADMIN — Medication 2 PUFF: at 20:18

## 2019-03-27 RX ADMIN — Medication 2 PUFF: at 07:45

## 2019-03-27 RX ADMIN — Medication 10 ML: at 08:58

## 2019-03-27 RX ADMIN — Medication 10 ML: at 09:00

## 2019-03-27 RX ADMIN — LEVOTHYROXINE SODIUM 100 MCG: 100 TABLET ORAL at 06:11

## 2019-03-27 RX ADMIN — IPRATROPIUM BROMIDE AND ALBUTEROL SULFATE 3 ML: .5; 3 SOLUTION RESPIRATORY (INHALATION) at 00:11

## 2019-03-27 RX ADMIN — APIXABAN 5 MG: 5 TABLET, FILM COATED ORAL at 09:01

## 2019-03-27 RX ADMIN — IPRATROPIUM BROMIDE AND ALBUTEROL SULFATE 3 ML: .5; 3 SOLUTION RESPIRATORY (INHALATION) at 15:43

## 2019-03-27 RX ADMIN — Medication 1 TABLET: at 09:00

## 2019-03-27 RX ADMIN — FUROSEMIDE 40 MG: 10 INJECTION, SOLUTION INTRAMUSCULAR; INTRAVENOUS at 14:16

## 2019-03-27 RX ADMIN — TAMSULOSIN HYDROCHLORIDE 0.4 MG: 0.4 CAPSULE ORAL at 08:58

## 2019-03-27 RX ADMIN — FERROUS SULFATE TAB 325 MG (65 MG ELEMENTAL FE) 325 MG: 325 (65 FE) TAB at 09:00

## 2019-03-27 RX ADMIN — Medication 10 ML: at 21:42

## 2019-03-27 RX ADMIN — ATORVASTATIN CALCIUM 20 MG: 40 TABLET, FILM COATED ORAL at 08:59

## 2019-03-27 RX ADMIN — IPRATROPIUM BROMIDE AND ALBUTEROL SULFATE 3 ML: .5; 3 SOLUTION RESPIRATORY (INHALATION) at 12:07

## 2019-03-27 RX ADMIN — Medication 100 MG: at 09:01

## 2019-03-27 ASSESSMENT — PAIN SCALES - GENERAL
PAINLEVEL_OUTOF10: 0
PAINLEVEL_OUTOF10: 0
PAINLEVEL_OUTOF10: 7
PAINLEVEL_OUTOF10: 0
PAINLEVEL_OUTOF10: 0

## 2019-03-27 ASSESSMENT — PAIN DESCRIPTION - ONSET: ONSET: UNABLE TO ASSESS

## 2019-03-27 ASSESSMENT — PAIN DESCRIPTION - PAIN TYPE: TYPE: ACUTE PAIN

## 2019-03-27 ASSESSMENT — PAIN DESCRIPTION - DESCRIPTORS: DESCRIPTORS: ACHING;SHARP

## 2019-03-27 ASSESSMENT — PAIN DESCRIPTION - FREQUENCY: FREQUENCY: CONTINUOUS

## 2019-03-27 ASSESSMENT — PAIN DESCRIPTION - ORIENTATION: ORIENTATION: RIGHT;UPPER;LEFT

## 2019-03-27 ASSESSMENT — PAIN DESCRIPTION - LOCATION: LOCATION: BACK

## 2019-03-27 NOTE — CARE COORDINATION
[address] Transitions Interview     3/27/2019    Patient: Samira Zuniga Patient : 1945   MRN: [id number]  Reason for Admission: CHF   RARS: Readmission Risk Score: 25         Spoke with: Samira Zuniga        Readmission Risk  Patient Active Problem List   Diagnosis    DISH (diffuse idiopathic skeletal hyperostosis)    Hyperlipidemia    Anxiety    Hypertension, essential    Closed nondisplaced fracture of pubis ([facility] [address]    Bilateral knee pain    Alcohol abuse    Centrilobular emphysema (HCC)    Acute on chronic respiratory failure with hypoxia (HCC)    Other pulmonary embolism without acute cor pulmonale (HCC)    Pulmonary infiltrates    Atelectasis    Pleural effusion    Mediastinal adenopathy    Former smoker    Lactic acid acidosis    Leukocytosis    Pulmonary HTN (HCC)    Alcohol use    Acute diastolic heart failure (HCC)    Obesity    ARF (acute renal failure) (HCC)    Anemia    CHF (congestive heart failure) (HCC)    CHF (congestive heart failure), NYHA class I, acute on chronic, combined [facility])       Inpatient Assessment  Care Transitions Summary    Care Transitions Inpatient Review  Medication Review  Do you have all of your prescriptions and are they filled?:   (Comment: [facility] reviewed with him)   Are you able to afford your medications?:  Yes  How often do you have difficulty taking your medications?:  I always take them as prescribed. Housing Review  Who do you live with?:  Grandchild, Child  Are you an active caregiver in your home?:  No  Social Support  Do you have a ?:  No  Do you have a [address] [place] [address]?:  No  Durable Medical Equipment  Patient DME:  Shower chair, Walker, Other  Other Patient DME:  Grab bars in bathroom   Patient Home Equipment:  Nebulizer, Oxygen  Functional Review  Ability to seek help/take action for Emergent/Urgent situations i.e. fire, crime, inclement weather or health crisis. :  Independent  Ability handle personal hygiene needs (bathing/dressing/grooming): Independent  Ability to manage medications:  Needs Assistance  Ability to prepare food:  Needs Assistance  Ability to maintain home (clean home, laundry):  Needs Assistance  Ability to drive and/or has transportation:  Independent  Ability to do shopping:  Dependent  Is patient able to live independently?:  Yes  Hearing and Vision  Visual Impairment:  Reading glasses  Hearing Impairment:  Hard of hearing  Care Transitions Interventions       Met with patient to discuss care transition. Role of CTC and care transition program explained to patient. Spoke with patient who reports he resides in a private residence with his daughter Marsha Bellamy and her four children ages 6, 5, 6, 2. Patient stated he is independent with most ADL's and Marsha Bellamy helps with shopping, cooking, and cleaning. Patient denied the need for any home care services post discharge,but stated would be open to services if recommended. If meets criteria, patient agreeable to participate in care transition program post discharge. Encouraged patient to ask to speak with  on the unit should any needs arise. Notified ERIC Barron RN of the above. Follow Up  Future Appointments   Date Time Provider Basil Lubin   4/1/2019  1:00 PM Jose Savage MD [facility] None   4/4/2019  9:20 AM Haskel Na Larey Hodgkins, MD [facility] None       Health Maintenance  There are no preventive care reminders to display for this patient.     Abundio Kelly RN, BSN, [address] Transitions Coordinator    [phone]

## 2019-03-27 NOTE — PROGRESS NOTES
[facility]    [facility]            [phone]        Interval Plan:     Cr stable  Increased diuretics  Still edematous significant              Assessment :     Acute Kidney Injury  Cr peaked to 1.4- down to 0.9  Renal USG: R- 11.7 cm, L - 13.23 cm, normal echogenicity,  UA: large blood, RBC> 100, has cardoza, Pr/Cr ratio- negligible at 0.1    Echo- 2/19- EF 55-60%, Grade I DD, mild LVH,   3/19- RV function is normal in size and function    Generalized Edema  edema  Diuresis managed by cardiology  On iv lasix- now Tid     Hypertension   BP: (147-150)/(74-85)  Pulse:  [94-96]    BP high   But on iv lasix  Tachycardic- also possible withdrawal- getting better    COPD  Anemia- unlikely to be CKD, m/m per Dr Millie Pack  R- arm cellulitis- on abx   PE- 2/2019  Liver disease, alcohol abuse      Please call with questions at-  24 hrs Answering service [phone]   7 am-5 pm at Perfect serve, or cell phone  Dr Cristal Novak        CC/ Reason for consult:     MCKENNA    HPI:     Patient is a 68 y.o. male  presented to  the hospital on 3/23/2019 with shortness of breath,  Low leg edema. Also right arm edema and erythema.     He is found to have MCKENNA, fluid overload and being   Treated with lasix  He is also known to have alcohol abuse        Interval History:     Cr normal  Making urine      Seen with - no family  ROS -   SOB- present, but better than on admission  Edema- none  Nausea/vomiting- none  Poor appetite-No  Confusion- no  Urinary complaints- no  Any other complaints- no  All other ROS are reviewed and are Negative           Vitals:     Vitals:    03/27/19 1218   BP: (!) 147/74   Pulse: 94   Resp: 18   Temp: 97.8 °F (36.6 °C)   SpO2: 94%         I & O:       Intake/Output Summary (Last 24 hours) at 3/27/2019 1406  Last data filed at 3/27/2019 1196  Gross per 24 hour   Intake 625 ml   Output 950 ml   Net -325 ml         Physical Examination:     General appearance: Anxious- no, distressed- no, in good spirits- no  Lethargic, obese  HEENT: Lips- normal, teeth- ok , oral mucosa- moist  Neck : Mass- no, appears symmetrical, JVD- not visible  Respiratory: Respiratory effort-  labored, wheeze- no, crackles - no  Cardiovascular:  Ausculation- No M/R/G, Edema 1+  Abdomen: visible mass- no, distention- no, scar- no, tenderness- no                            hepatosplenomegaly-  no  Musculoskeletal:  clubbing no,cyanosis- no , digital ischemia- no                           muscle strength- grossly normal , tone - grossly normal  Skin: rashes- no , ulcers- no, induration- no, tightening - no  Psychiatric:  Judgement and insight- normal           AAO X 3      Meds:      furosemide  40 mg Intravenous TID    tamsulosin  0.4 mg Oral Daily    apixaban  5 mg Oral BID    aspirin  81 mg Oral Daily    atorvastatin  20 mg Oral Daily    DULoxetine  60 mg Oral Daily    ferrous sulfate  325 mg Oral Daily with breakfast    folic acid  1 mg Oral Daily    levothyroxine  100 mcg Oral Daily    pantoprazole  40 mg Oral QAM AC    mometasone-formoterol  2 puff Inhalation BID    sodium chloride flush  10 mL Intravenous 2 times per day    ipratropium-albuterol  3 mL Inhalation Q4H    sodium chloride flush  10 mL Intravenous 2 times per day    thiamine  100 mg Oral Daily    therapeutic multivitamin-minerals  1 tablet Oral Daily    metoprolol succinate  50 mg Oral Daily       Labs:     Recent Labs     03/25/19  0632   WBC 6.7   HGB 9.0*   HCT 26.2*             Recent Labs     03/25/19  0633 03/26/19  1050 03/27/19  0733    141 142   K 4.3 4.0 4.1    99 100   CO2 30 32 33*   BUN 17 14 14   CREATININE 1.2 0.9 0.9   GLUCOSE 140* 132* 111*   MG 1.70*  --   --    PHOS 2.6  --   --

## 2019-03-27 NOTE — PROGRESS NOTES
[address]   Daily Progress Note    Admit Date:  3/23/2019  HPI:    Chief Complaint   Patient presents with    Shortness of Breath     states has been swelling since he was released from the hospital last month, states started SOB 2 days ago and now is harder to walk, patient with wet nonproductive cough        Interval history: Herb Vasquez is being followed for shortness of breath. REcently discharged from here 2/19/19 following admission for acute on chronic hypoxic respiratory failure secondary to multiple bilateral PE as well as bilateral pneumonia and acute on chronic diastolic HF. Subjective:  Mr. Jenise Werner breathing seems to be improving. Edema slowly improving. Weight is improving    Objective:   BP (!) 150/85   Pulse 96   Temp 97.5 °F (36.4 °C) (Oral)   Resp 18   Ht 6' 2\" (1.88 m)   Wt 284 lb 8 oz (129 kg)   SpO2 93%   BMI 36.53 kg/m²       Intake/Output Summary (Last 24 hours) at 3/27/2019 0946  Last data filed at 3/27/2019 7352  Gross per 24 hour   Intake 785 ml   Output 1750 ml   Net -965 ml       NYHA: IV  Tele: SR/ST   Physical Exam:  General:  Awake, alert, NAD  Skin:  Warm and dry  Neck:  JVD difficult to assess due to body habitus  Chest:  Dim to auscultation, less wheezes/rhonchi/ few rales  Cardiovascular:  RRR S1S2, no m/r/g  Abdomen:  Soft, nontender, +bowel sounds  Extremities:  +BUE R>L edema, ++ BLE edema.   Weeping right arm -improving     Medications:    furosemide  40 mg Intravenous BID    tamsulosin  0.4 mg Oral Daily    apixaban  5 mg Oral BID    aspirin  81 mg Oral Daily    atorvastatin  20 mg Oral Daily    DULoxetine  60 mg Oral Daily    ferrous sulfate  325 mg Oral Daily with breakfast    folic acid  1 mg Oral Daily    levothyroxine  100 mcg Oral Daily    pantoprazole  40 mg Oral QAM AC    mometasone-formoterol  2 puff Inhalation BID    sodium chloride flush  10 mL Intravenous 2 times per day    ipratropium-albuterol  3 mL Inhalation Q4H    sodium chloride flush  10 mL Intravenous 2 times per day    thiamine  100 mg Oral Daily    therapeutic multivitamin-minerals  1 tablet Oral Daily    metoprolol succinate  50 mg Oral Daily         Lab Data:  CBC:   Recent Labs     03/25/19  0632   WBC 6.7   HGB 9.0*        BMP:    Recent Labs     03/25/19  0633 03/26/19  1050 03/27/19  0733    141 142   K 4.3 4.0 4.1   CO2 30 32 33*   BUN 17 14 14   CREATININE 1.2 0.9 0.9     INR:  No results for input(s): INR in the last 72 hours. BNP:    No results for input(s): PROBNP in the last 72 hours. No results found for: LVEF, LVEFMODE    ECHO:   2/15/19  Summary   Technically difficult study due to body surface area and poor acoustic   window.   Normal left ventricular systolic function with ejection fraction of 55-60%.   No regional wall motion abnormalites are seen.   Mild concentric left ventricular hypertrophy.   Grade I diastolic dysfunction with normal filling pressure.   Compared to previous study from 9- no changes noted.     3/25/19   Limited for RV function.   The right ventricle is normal in size and function.   Right ventricular function appears same as echo on 2-.   Systolic pulmonic artery pressure (SPAP) is normal estimated at 27 mmHg   (Right atrial pressure of 3 mmHg).   The right atrium is mildly dilated.     weights  03/26/19 0626  288 lb 4.8 oz (130.8 kg)  Actual;Standing scale      03/25/19 0619  292 lb 8.8 oz (132.7 kg)  Actual;Standing scale     03/24/19 0244  292 lb 8.8 oz (132.7 kg)  Actual;Standing scale     03/23/19 2346  270 lb (122.5 kg)  Stated         Principal Problem:    CHF (congestive heart failure) ([facility])  Active Problems:    Hyperlipidemia    Hypertension, essential    Alcohol use    Obesity    ARF (acute renal failure) ([facility])    Anemia    CHF (congestive heart failure), NYHA class I, acute on chronic, [facility] ([facility]  Resolved Problems:    * No resolved hospital problems. *      Assessment/Plan:  Generalized edema, multifactorial cause including hypoalbuminemia, CHF, compliance  Acute on Chronic Diastolic (LVEF 73-82% 6/7507) CHF   - admission weight 292lbs; Pro-  Elevated troponins, flat, inconsistent with ACS   Acute on chronic hypoxemic respiratory failure  MCKENNA- improving   Recent hx.  PEs, on DOAC  Hypertension  Hyperlipidemia   Obesity  ETOH abuse  Macrocytic anemia  Medical noncompliance     PLan:  Increase lasix 40mg IV TID today plan to change to PO tomorrow  Continue daily weights/strict I/o's, Daily BMP   Continue toprol  Off of losartan due to MCKENNA; would consider restarting tomorrow  Continue fluid restriction 2L a day     Tierney Arceo CNP, 3/27/2019, 1:29 PM

## 2019-03-27 NOTE — PROGRESS NOTES
midline. Respiratory:  Normal respiratory effort. Clear to auscultation, bilaterally with Rales/ occ Wheezes/Rhonchi. Cardiovascular:  Regular rate and rhythm with normal S1/S2 without murmurs, rubs or gallops. Abdomen: Soft, non-tender, non-distended with normal bowel sounds. Has Bermudez  Musculoskeletal:  No clubbing, cyanosis w/ 2+ LE edema bilaterally. Full range of motion without deformity. UE edema rt >left- improving    Skin: Skin color, texture, turgor normal.  No rashes or lesions. Neurologic:  Neurovascularly intact without any focal sensory/motor deficits. Cranial nerves: II-XII intact, grossly non-focal.  Psychiatric:  Alert and oriented, thought content appropriate, normal insight  Capillary Refill: Brisk,< 3 seconds   Peripheral Pulses: +2 palpable, equal bilaterally         Labs:   Recent Labs     03/25/19  0632   WBC 6.7   HGB 9.0*   HCT 26.2*        Recent Labs     03/24/19  1747 03/25/19  0633 03/26/19  1050    140 141   K 4.9 4.3 4.0   CL 96* 100 99   CO2 29 30 32   BUN 18 17 14   CREATININE 1.4* 1.2 0.9   CALCIUM 8.2* 8.6 8.6   PHOS  --  2.6  --      Recent Labs     03/25/19  0633   AST 50*   ALT 49*   BILIDIR 0.3   BILITOT 0.6   ALKPHOS 165*     No results for input(s): INR in the last 72 hours. Recent Labs     03/24/19  1047 03/24/19  1747   TROPONINI 0.03* 0.02*       Urinalysis:      Lab Results   Component Value Date    NITRU Negative 03/25/2019    WBCUA see below 03/25/2019    BACTERIA 3+ 03/25/2019    RBCUA >100 03/25/2019    BLOODU LARGE 03/25/2019    SPECGRAV 1.025 03/25/2019    GLUCOSEU Negative 03/25/2019       Radiology:  US RENAL COMPLETE   Final Result   No evidence of hydronephrosis. A right urinary bladder diverticulum is suspected. .         XR CHEST STANDARD (2 VW)   Final Result   Pleural effusions with bibasilar atelectasis or pneumonia.                  Assessment/Plan:    Active Hospital Problems    Diagnosis Date Noted    Obesity [E66.9] 03/24/2019    ARF (acute renal failure) ([facility] [N17.9] 03/24/2019    Anemia [D64.9] 03/24/2019    CHF (congestive heart failure) ([facility] [I50.9] 03/24/2019    CHF (congestive heart failure), NYHA class I, acute on chronic, combined ([facility] [I50.43] 03/24/2019    Alcohol use [Z78.9] 02/14/2019    Hyperlipidemia [E78.5] 03/18/2015    Hypertension, essential [I10] 03/18/2015       SOB -  suspect possible acute failure. Home PO Lasix changed to IV  Follow I/O. Will continue to follow serial labs. Reviewed and documented as above.   -cards consulted given troponin bump(no cad hx)  -stopped lasix 3/24 given AG and lactic acidosis,  A-a gap is resolved- started back on 3/25     HTN - w/out known CAD and no evidence of active signs/sxs of ischemia/failure. Currently uncontrolled on home meds w/ vitals reviewed and documented as above. Hydralazine PRN ordered.      HyperLipidemia - controlled on home Statin. Continue, w/ f/u and med adjustment w/ PCP     Lactic acidosis- unclear, no infection, ?hypoperfusion from heart failure  trialed ivfs- resolved     Anemia - macrocytic, w/out evidence of active bleeding/hemolysis. Asymptomatic w/out indication for transfusion. Will continue to follow serial labs. Reviewed and documented as above.     ARF - w/out elevated BUN/Cr ratio. Will diurese and follow serial labs. Reviewed and documented as above.    -nephro consulted, suspect element of cardiorenal syndrome    Alcohol Abuse - .   patient doesn't have any intention to stop alcohol-Beer with meal if ok with cardio    Obesity -  With Body mass index is 34.67 kg/m². Complicating assessment and treatment. Placing patient at risk for multiple co-morbidities as well as early death and contributing to the patient's presentation. Counseled on weight loss.      PE- eliquis     Elevated liver function test- possibly secondary to congestion monitor       DVT Prophylaxis: eliquis   Diet: DIET cardiac   Code Status: Full Code    PT/OT Eval Status: not ordered    Dispo - 2-3 days, pending the course    Vivian Henry MD

## 2019-03-27 NOTE — PROGRESS NOTES
Pt is alert and oriented x 4 with no history of falls. Assessment completed as charted. Bed is in lowest position with 2/4 bed rails raised. Bed wheels locked, and call light within reach. Patient wearing non-skid socks and verbalizes understanding to call out for assistance. No further requests at this time. Will continue to monitor patient.     BP (!) 159/87   Pulse 102   Temp 98.2 °F (36.8 °C) (Oral)   Resp 18   Ht 6' 2\" (1.88 m)   Wt 288 lb 4.8 oz (130.8 kg)   SpO2 93%   BMI 37.02 kg/m²      Electronically signed by Carl Burnette RN on 3/27/2019 at 5:52 AM

## 2019-03-27 NOTE — PROGRESS NOTES
Patient is awake, alert and oriented x4. Assessment is complete, see flow sheet. Bed in lowest position, wheels locked, call light is within reach. Patient denies any further needs at the moment. Will continue to monitor.     Vitals:    03/27/19 0808   BP: (!) 150/85   Pulse: 96   Resp: 18   Temp: 97.5 °F (36.4 °C)   SpO2: 93%

## 2019-03-27 NOTE — PLAN OF CARE
Problem: Falls - Risk of:  Goal: Will remain free from falls  Description  Will remain free from falls  Outcome: Ongoing  Note:   Pt is medium fall risk. Pt is a selfer. Call light within reach. Bed in low position. Will continue to monitor. Goal: Absence of physical injury  Description  Absence of physical injury  Outcome: Ongoing  Note:   Pt is medium fall risk. Pt is a selfer. Call light within reach. Bed in low position. Will continue to monitor.

## 2019-03-27 NOTE — PLAN OF CARE
Patient's EF (Ejection Fraction) is greater than 40%    Patient has a past medical history of Alcohol abuse, Alcohol abuse, ARF (acute renal failure) ([facility] CHF (congestive heart failure) ([facility] Hypercholesteremia, Hypertension, Mediastinal adenopathy, and Pulmonary emphysema ([facility] Comorbidities reviewed and education provided. Patient and family's stated goal of care: reduce shortness of breath, increase activity tolerance, better understand heart failure and disease management, be more comfortable and reduce lower extremity edema prior to discharge    Patient's current functional capacity:  Slight limitation of physical activity. Comfortable at rest. Ordinary physical activity results in fatigue, palpitation, dyspnea. Pt resting in bed at this time on room air. Pt denies shortness of breath. Pt with pitting lower extremity edema.  Patient's weights and intake/output reviewed:    Patient Vitals for the past 96 hrs (Last 3 readings):   Weight   03/27/19 0646 284 lb 8 oz (129 kg)   03/26/19 0626 288 lb 4.8 oz (130.8 kg)   03/25/19 0619 292 lb 8.8 oz (132.7 kg)       Intake/Output Summary (Last 24 hours) at 3/27/2019 1319  Last data filed at 3/27/2019 9220  Gross per 24 hour   Intake 625 ml   Output 1550 ml   Net -925 ml         >>For CHF and Comorbidity documentation on Education Time and Topics, please see Education Tab

## 2019-03-27 NOTE — PLAN OF CARE
Patient's EF (Ejection Fraction) is greater than 40%    Patient has a past medical history of Alcohol abuse, Alcohol abuse, ARF (acute renal failure) ([facility] CHF (congestive heart failure) ([facility] Hypercholesteremia, Hypertension, Mediastinal adenopathy, and Pulmonary emphysema ([facility] Comorbidities reviewed and education provided. Patient and family's stated goal of care: reduce shortness of breath, increase activity tolerance, better understand heart failure and disease management, be more comfortable and reduce lower extremity edema prior to discharge    Patient's current functional capacity:  Marked limitation of physical activity. Comfortable at rest. Less than ordinary activity causes fatigue, palpitation, or dyspnea. Pt resting in bed at this time on 3 L O2. Pt with complaints of shortness of breath. Pt with pitting lower extremity edema.  Patient's weights and intake/output reviewed:    Patient Vitals for the past 96 hrs (Last 3 readings):   Weight   03/26/19 0626 288 lb 4.8 oz (130.8 kg)   03/25/19 0619 292 lb 8.8 oz (132.7 kg)   03/24/19 0244 292 lb 8.8 oz (132.7 kg)       Intake/Output Summary (Last 24 hours) at 3/27/2019 0514  Last data filed at 3/27/2019 0031  Gross per 24 hour   Intake 905 ml   Output 2000 ml   Net -1095 ml         >>For CHF and Comorbidity documentation on Education Time and Topics, please see Education Tab

## 2019-03-28 ENCOUNTER — APPOINTMENT (OUTPATIENT)
Dept: GENERAL RADIOLOGY | Age: 74
DRG: 291 | End: 2019-03-28
Payer: MEDICARE

## 2019-03-28 LAB
ANION GAP SERPL CALCULATED.3IONS-SCNC: 9 MMOL/L (ref 3–16)
BUN BLDV-MCNC: 14 MG/DL (ref 7–20)
CALCIUM SERPL-MCNC: 8.9 MG/DL (ref 8.3–10.6)
CHLORIDE BLD-SCNC: 97 MMOL/L (ref 99–110)
CO2: 36 MMOL/L (ref 21–32)
CREAT SERPL-MCNC: 1 MG/DL (ref 0.8–1.3)
GFR AFRICAN AMERICAN: >60
GFR NON-AFRICAN AMERICAN: >60
GLUCOSE BLD-MCNC: 111 MG/DL (ref 70–99)
POTASSIUM REFLEX MAGNESIUM: 4.6 MMOL/L (ref 3.5–5.1)
SODIUM BLD-SCNC: 142 MMOL/L (ref 136–145)
VITAMIN B2: 25 NMOL/L (ref 5–50)

## 2019-03-28 PROCEDURE — 6370000000 HC RX 637 (ALT 250 FOR IP): Performed by: INTERNAL MEDICINE

## 2019-03-28 PROCEDURE — 71046 X-RAY EXAM CHEST 2 VIEWS: CPT

## 2019-03-28 PROCEDURE — 2580000003 HC RX 258: Performed by: INTERNAL MEDICINE

## 2019-03-28 PROCEDURE — 97161 PT EVAL LOW COMPLEX 20 MIN: CPT

## 2019-03-28 PROCEDURE — 99233 SBSQ HOSP IP/OBS HIGH 50: CPT | Performed by: NURSE PRACTITIONER

## 2019-03-28 PROCEDURE — 80048 BASIC METABOLIC PNL TOTAL CA: CPT

## 2019-03-28 PROCEDURE — 6370000000 HC RX 637 (ALT 250 FOR IP): Performed by: UROLOGY

## 2019-03-28 PROCEDURE — 97110 THERAPEUTIC EXERCISES: CPT

## 2019-03-28 PROCEDURE — 94640 AIRWAY INHALATION TREATMENT: CPT

## 2019-03-28 PROCEDURE — 97166 OT EVAL MOD COMPLEX 45 MIN: CPT

## 2019-03-28 PROCEDURE — 1200000000 HC SEMI PRIVATE

## 2019-03-28 PROCEDURE — 36415 COLL VENOUS BLD VENIPUNCTURE: CPT

## 2019-03-28 PROCEDURE — 2700000000 HC OXYGEN THERAPY PER DAY

## 2019-03-28 PROCEDURE — 6370000000 HC RX 637 (ALT 250 FOR IP): Performed by: NURSE PRACTITIONER

## 2019-03-28 PROCEDURE — 97530 THERAPEUTIC ACTIVITIES: CPT

## 2019-03-28 PROCEDURE — 94761 N-INVAS EAR/PLS OXIMETRY MLT: CPT

## 2019-03-28 RX ORDER — LOSARTAN POTASSIUM 100 MG/1
100 TABLET ORAL DAILY
Status: DISCONTINUED | OUTPATIENT
Start: 2019-03-28 | End: 2019-03-29 | Stop reason: HOSPADM

## 2019-03-28 RX ORDER — FUROSEMIDE 40 MG/1
40 TABLET ORAL 2 TIMES DAILY
Status: DISCONTINUED | OUTPATIENT
Start: 2019-03-28 | End: 2019-03-28

## 2019-03-28 RX ORDER — FUROSEMIDE 40 MG/1
40 TABLET ORAL 3 TIMES DAILY
Status: DISCONTINUED | OUTPATIENT
Start: 2019-03-28 | End: 2019-03-29

## 2019-03-28 RX ORDER — METOLAZONE 2.5 MG/1
5 TABLET ORAL ONCE
Status: COMPLETED | OUTPATIENT
Start: 2019-03-28 | End: 2019-03-28

## 2019-03-28 RX ADMIN — METOPROLOL SUCCINATE 50 MG: 50 TABLET, EXTENDED RELEASE ORAL at 10:56

## 2019-03-28 RX ADMIN — Medication 2 PUFF: at 19:53

## 2019-03-28 RX ADMIN — LOSARTAN POTASSIUM 100 MG: 100 TABLET, FILM COATED ORAL at 10:59

## 2019-03-28 RX ADMIN — Medication 2 PUFF: at 08:03

## 2019-03-28 RX ADMIN — ASPIRIN 81 MG: 81 TABLET, COATED ORAL at 11:00

## 2019-03-28 RX ADMIN — IPRATROPIUM BROMIDE AND ALBUTEROL SULFATE 3 ML: .5; 3 SOLUTION RESPIRATORY (INHALATION) at 19:53

## 2019-03-28 RX ADMIN — PANTOPRAZOLE SODIUM 40 MG: 20 TABLET, DELAYED RELEASE ORAL at 06:25

## 2019-03-28 RX ADMIN — FOLIC ACID 1 MG: 1 TABLET ORAL at 10:59

## 2019-03-28 RX ADMIN — IPRATROPIUM BROMIDE AND ALBUTEROL SULFATE 3 ML: .5; 3 SOLUTION RESPIRATORY (INHALATION) at 08:03

## 2019-03-28 RX ADMIN — TAMSULOSIN HYDROCHLORIDE 0.4 MG: 0.4 CAPSULE ORAL at 11:00

## 2019-03-28 RX ADMIN — Medication 1 TABLET: at 11:00

## 2019-03-28 RX ADMIN — ATORVASTATIN CALCIUM 20 MG: 40 TABLET, FILM COATED ORAL at 10:59

## 2019-03-28 RX ADMIN — IPRATROPIUM BROMIDE AND ALBUTEROL SULFATE 3 ML: .5; 3 SOLUTION RESPIRATORY (INHALATION) at 16:06

## 2019-03-28 RX ADMIN — APIXABAN 5 MG: 5 TABLET, FILM COATED ORAL at 11:00

## 2019-03-28 RX ADMIN — IPRATROPIUM BROMIDE AND ALBUTEROL SULFATE 3 ML: .5; 3 SOLUTION RESPIRATORY (INHALATION) at 11:47

## 2019-03-28 RX ADMIN — METOLAZONE 5 MG: 2.5 TABLET ORAL at 11:00

## 2019-03-28 RX ADMIN — Medication 100 MG: at 10:59

## 2019-03-28 RX ADMIN — FUROSEMIDE 40 MG: 40 TABLET ORAL at 20:47

## 2019-03-28 RX ADMIN — LEVOTHYROXINE SODIUM 100 MCG: 100 TABLET ORAL at 06:25

## 2019-03-28 RX ADMIN — IPRATROPIUM BROMIDE AND ALBUTEROL SULFATE 3 ML: .5; 3 SOLUTION RESPIRATORY (INHALATION) at 03:28

## 2019-03-28 RX ADMIN — APIXABAN 5 MG: 5 TABLET, FILM COATED ORAL at 20:47

## 2019-03-28 RX ADMIN — Medication 10 ML: at 20:47

## 2019-03-28 RX ADMIN — FERROUS SULFATE TAB 325 MG (65 MG ELEMENTAL FE) 325 MG: 325 (65 FE) TAB at 11:00

## 2019-03-28 RX ADMIN — Medication 10 ML: at 11:00

## 2019-03-28 RX ADMIN — FUROSEMIDE 40 MG: 40 TABLET ORAL at 17:29

## 2019-03-28 RX ADMIN — DULOXETINE HYDROCHLORIDE 60 MG: 60 CAPSULE, DELAYED RELEASE ORAL at 10:59

## 2019-03-28 ASSESSMENT — PAIN DESCRIPTION - DESCRIPTORS: DESCRIPTORS: ACHING;NAGGING

## 2019-03-28 ASSESSMENT — PAIN SCALES - GENERAL
PAINLEVEL_OUTOF10: 0
PAINLEVEL_OUTOF10: 9
PAINLEVEL_OUTOF10: 0

## 2019-03-28 ASSESSMENT — PAIN DESCRIPTION - PAIN TYPE: TYPE: CHRONIC PAIN

## 2019-03-28 ASSESSMENT — PAIN DESCRIPTION - FREQUENCY: FREQUENCY: CONTINUOUS

## 2019-03-28 ASSESSMENT — PAIN DESCRIPTION - ORIENTATION: ORIENTATION: RIGHT;LEFT;UPPER

## 2019-03-28 ASSESSMENT — PAIN DESCRIPTION - LOCATION: LOCATION: BACK

## 2019-03-28 ASSESSMENT — PAIN DESCRIPTION - ONSET: ONSET: UNABLE TO TELL

## 2019-03-28 NOTE — PLAN OF CARE
Patient's EF (Ejection Fraction) is greater than 40%    Patient has a past medical history of Alcohol abuse, Alcohol abuse, ARF (acute renal failure) ([facility] CHF (congestive heart failure) ([facility] Hypercholesteremia, Hypertension, Mediastinal adenopathy, and Pulmonary emphysema ([facility] Comorbidities reviewed and education provided. Patient and family's stated goal of care: reduce shortness of breath, increase activity tolerance, better understand heart failure and disease management, be more comfortable and reduce lower extremity edema prior to discharge    Patient's current functional capacity:  Marked limitation of physical activity. Comfortable at rest. Less than ordinary activity causes fatigue, palpitation, or dyspnea. Pt resting in bed at this time on 3 L O2. Pt denies shortness of breath. Pt with pitting lower extremity edema.  Patient's weights and intake/output reviewed:    Patient Vitals for the past 96 hrs (Last 3 readings):   Weight   03/28/19 0649 275 lb 9.6 oz (125 kg)   03/27/19 0646 284 lb 8 oz (129 kg)   03/26/19 0626 288 lb 4.8 oz (130.8 kg)       Intake/Output Summary (Last 24 hours) at 3/28/2019 1957  Last data filed at 3/28/2019 1837  Gross per 24 hour   Intake 840 ml   Output 570 ml   Net 270 ml         >>For CHF and Comorbidity documentation on Education Time and Topics, please see Education Tab

## 2019-03-28 NOTE — PROGRESS NOTES
[facility]   Daily Progress Note    Admit Date:  3/23/2019  HPI:    Chief Complaint   Patient presents with    Shortness of Breath     states has been swelling since he was released from the hospital last month, states started SOB 2 days ago and now is harder to walk, patient with wet nonproductive cough        Interval history: Jose Pickering is being followed for shortness of breath. REcently discharged from here 2/19/19 following admission for acute on chronic hypoxic respiratory failure secondary to multiple bilateral PE as well as bilateral pneumonia and acute on chronic diastolic HF. Subjective:  Mr. Raulito Barclay wheezing, weight  Is down. Breathing is the same. No chest pain. Edema is slow to improve but arms not weeping any more.      Objective:   BP (!) 164/85   Pulse 92   Temp 98.2 °F (36.8 °C)   Resp 16   Ht 6' 2\" (1.88 m)   Wt 275 lb 9.6 oz (125 kg)   SpO2 91%   BMI 35.38 kg/m²       Intake/Output Summary (Last 24 hours) at 3/28/2019 0844  Last data filed at 3/28/2019 0352  Gross per 24 hour   Intake 0 ml   Output 1570 ml   Net -1570 ml       NYHA: IV  Tele: SR   Physical Exam:  General:  Awake, alert, NAD  Skin:  Warm and dry  Neck:  JVD difficult to assess due to body habitus  Chest:  Dim to auscultation, + wheezes/rhonchi/ no rales  Cardiovascular:  RRR S1S2, no m/r/g  Abdomen:  Soft, nontender, +bowel sounds  Extremities:  +BUE R>L edema, + BLE edema     Medications:    furosemide  40 mg Intravenous TID    tamsulosin  0.4 mg Oral Daily    apixaban  5 mg Oral BID    aspirin  81 mg Oral Daily    atorvastatin  20 mg Oral Daily    DULoxetine  60 mg Oral Daily    ferrous sulfate  325 mg Oral Daily with breakfast    folic acid  1 mg Oral Daily    levothyroxine  100 mcg Oral Daily    pantoprazole  40 mg Oral QAM AC    mometasone-formoterol  2 puff Inhalation BID    sodium chloride flush  10 mL Intravenous 2 times per day    ipratropium-albuterol  3 mL Inhalation Q4H    sodium chloride flush  10 mL Intravenous 2 times per day    thiamine  100 mg Oral Daily    therapeutic multivitamin-minerals  1 tablet Oral Daily    metoprolol succinate  50 mg Oral Daily         Lab Data:  CBC:   No results for input(s): WBC, HGB, PLT in the last 72 hours. BMP:    Recent Labs     03/26/19  1050 03/27/19  0733 03/28/19  0737    142 142   K 4.0 4.1 4.6   CO2 32 33* 36*   BUN 14 14 14   CREATININE 0.9 0.9 1.0     INR:  No results for input(s): INR in the last 72 hours. BNP:    No results for input(s): PROBNP in the last 72 hours.   No results found for: LVEF, LVEFMODE    ECHO:   2/15/19  Summary   Technically difficult study due to body surface area and poor acoustic   window.   Normal left ventricular systolic function with ejection fraction of 55-60%.   No regional wall motion abnormalites are seen.   Mild concentric left ventricular hypertrophy.   Grade I diastolic dysfunction with normal filling pressure.   Compared to previous study from 9- no changes noted.     3/25/19   Limited for RV function.   The right ventricle is normal in size and function.   Right ventricular function appears same as echo on 0-.   Systolic pulmonic artery pressure (SPAP) is normal estimated at 27 mmHg   (Right atrial pressure of 3 mmHg).   The right atrium is mildly dilated.     weights   Date/Time  Weight  Weight Method     03/28/19 0649  275 lb 9.6 oz (125 kg)  Standing scale     03/27/19 0646  284 lb 8 oz (129 kg)  Standing scale     03/26/19 0626  288 lb 4.8 oz (130.8 kg)  Actual;Standing scale     03/25/19 0619  292 lb 8.8 oz (132.7 kg)  Actual;Standing scale     03/24/19 0244  292 lb 8.8 oz (132.7 kg)  Actual;Standing scale     03/23/19 2346  270 lb (122.5 kg)  Stated       Principal Problem:    CHF (congestive heart failure) ([facility])  Active Problems:    Hyperlipidemia    Hypertension, essential    Alcohol use    Obesity    ARF (acute renal failure) ([facility])    Anemia    CHF (congestive heart failure), NYHA class I, acute on chronic, combined ([facility]  Resolved Problems:    * No resolved hospital problems. *      Assessment/Plan:  Generalized edema, multifactorial cause including hypoalbuminemia, CHF, compliance  Acute on Chronic Diastolic (LVEF 42-33% 8/3083) CHF   - admission weight 292lbs-->275lbs  Elevated troponins, flat, inconsistent with ACS   Acute on chronic hypoxemic respiratory failure  MCKENNA- improving   Recent hx. PEs, on DOAC  Hypertension  Hyperlipidemia   Obesity  ETOH abuse  Macrocytic anemia  Medical noncompliance     PLan:  lasix 40mg IV TID change to PO 40mg BID   Continue daily weights/strict I/o's, Daily BMP   Continue toprol  Restart losartan home dose 100mg daily   Continue fluid restriction 2L a day     Patient declines follow up with our group, says he probably won't come. I encouraged him to follow up with his PCP as planned with repeat Labs with restarting his ARB. He verbalized understanding.      Ralph Cabrera, CNP, 3/28/2019, 9:34 AM

## 2019-03-28 NOTE — PROGRESS NOTES
Secure message sent to Dr. Malia Maria:  Carrington Larios is back. Pts feet/lower legs are ok. Bermudez is out. Cardio said they are signed off.

## 2019-03-28 NOTE — PROGRESS NOTES
midline. Respiratory:  Normal respiratory effort. Clear to auscultation, bilaterally with Rales/ occ Wheezes/Rhonchi. Cardiovascular:  Regular rate and rhythm with normal S1/S2 without murmurs, rubs or gallops. Abdomen: Soft, non-tender, non-distended with normal bowel sounds. Has Bermudez  Musculoskeletal:  No clubbing, cyanosis w/ 2+ LE edema bilaterally. Full range of motion without deformity. UE edema rt >left- improving    Skin: Skin color, texture, turgor normal.  No rashes or lesions. Neurologic:  Neurovascularly intact without any focal sensory/motor deficits. Cranial nerves: II-XII intact, grossly non-focal.  Psychiatric:  Alert and oriented, thought content appropriate, normal insight  Capillary Refill: Brisk,< 3 seconds   Peripheral Pulses: +2 palpable, equal bilaterally         Labs:   No results for input(s): WBC, HGB, HCT, PLT in the last 72 hours. Recent Labs     03/26/19  1050 03/27/19  0733    142   K 4.0 4.1   CL 99 100   CO2 32 33*   BUN 14 14   CREATININE 0.9 0.9   CALCIUM 8.6 8.9     No results for input(s): AST, ALT, BILIDIR, BILITOT, ALKPHOS in the last 72 hours. No results for input(s): INR in the last 72 hours. No results for input(s): Manuel Dusky in the last 72 hours. Urinalysis:      Lab Results   Component Value Date    NITRU Negative 03/25/2019    WBCUA see below 03/25/2019    BACTERIA 3+ 03/25/2019    RBCUA >100 03/25/2019    BLOODU LARGE 03/25/2019    SPECGRAV 1.025 03/25/2019    GLUCOSEU Negative 03/25/2019       Radiology:  US RENAL COMPLETE   Final Result   No evidence of hydronephrosis. A right urinary bladder diverticulum is suspected. .         XR CHEST STANDARD (2 VW)   Final Result   Pleural effusions with bibasilar atelectasis or pneumonia.                  Assessment/Plan:    Active Hospital Problems    Diagnosis Date Noted    Obesity [E66.9] 03/24/2019    ARF (acute renal failure) ([facility] [N17.9] 03/24/2019    Anemia [D64.9] 03/24/2019    CHF (congestive heart failure) ([facility] [I50.9] 03/24/2019    CHF (congestive heart failure), NYHA class I, acute on chronic, combined ([facility] [I50.43] 03/24/2019    Alcohol use [Z78.9] 02/14/2019    Hyperlipidemia [E78.5] 03/18/2015    Hypertension, essential [I10] 03/18/2015       Acute on Chronic Diastolic (LVEF 41-24% 5/1993) CHF-Lasix 40 twice a day was increased to tid  and received 1 dose of metolazone, monitor BMP, continue with the beta blocker      HTN -  , blood pressure is not controlled very well- losartan 100 daily started cont  Hydralazine prn ,     HyperLipidemia - controlled on home Statin. Continue, w/ f/u and med adjustment w/ PCP     Lactic acidosis- unclear, no infection, ?hypoperfusion from heart failure  trialed ivfs- resolved     Anemia - macrocytic, w/out evidence of active bleeding/hemolysis. Asymptomatic w/out indication for transfusion. Will continue to follow serial labs. Reviewed and documented as above.     ARF - w/out elevated BUN/Cr ratio. Will diurese and follow serial labs. Reviewed and documented as above.    -nephro consulted, and following suspect element of cardiorenal syndrome    Alcohol Abuse - .   patient doesn't have any intention to stop alcohol-Beer with meal if ok with cardio    Obesity -  With Body mass index is 34.67 kg/m². Complicating assessment and treatment. Placing patient at risk for multiple co-morbidities as well as early death and contributing to the patient's presentation. Counseled on weight loss.      PE- eliquis     Elevated liver function test- possibly secondary to congestion monitor    COPD-?   -repeat chest x-ray,  congestion, shortness of breath and no rhonchi smooth with CHF rather than COPD- continue the inhalers and nebulizers consider steroids if necessary     Urinary retention-given the history of penile implant urology consult called and input appreciated,  Bermudez Catheter is placed, advised to discontinue as soon as possible, given the management of

## 2019-03-28 NOTE — PROGRESS NOTES
[facility]    [facility]            [phone]        Interval Plan:     Cr stable  On po diuretics  Labs stable  Still has sob and L- pleural effusion  Add a dose of metolazone  Goal of urine output > 4 L /day              Assessment :     Acute Kidney Injury  Cr peaked to 1.4- down to 0.9  Renal USG: R- 11.7 cm, L - 13.23 cm, normal echogenicity,  UA: large blood, RBC> 100, has cardoza, Pr/Cr ratio- negligible at 0.1    Echo- 2/19- EF 55-60%, Grade I DD, mild LVH,   3/19- RV function is normal in size and function    Generalized Edema  edema  Diuresis managed by cardiology  On iv lasix- now Tid , now po  Add metolazone    Hypertension   BP: (158-164)/(84-85)  Pulse:  [92-96]    BP high   On iv lasix, now on po  Will add metolazone 1 dose on 3/28/19    COPD  Anemia- unlikely to be CKD, m/m per Dr Ean Argueta  R- arm cellulitis- on abx   PE- 2/2019  Liver disease, alcohol abuse      Please call with questions at-  24 hrs Answering service [phone]   7 am-5 pm at [employer], or cell phone  Dr Sara Childs        CC/ Reason for consult:     MCKENNA    HPI:     Patient is a 68 y.o. male  presented to  the hospital on 3/23/2019 with shortness of breath,  Low leg edema. Also right arm edema and erythema.     He is found to have MCKENNA, fluid overload and being   Treated with lasix  He is also known to have alcohol abuse        Interval History:     Cr normal  Making urine      Seen with - no family  ROS -   SOB- present, but better than on admission  Edema- none  Nausea/vomiting- none  Poor appetite-No  Confusion- no  Urinary complaints- no  Any other complaints- no  All other ROS are reviewed and are Negative           Vitals:     Vitals:    03/28/19 0959   BP:    Pulse: 94   Resp:    Temp:    SpO2:          I & O:       Intake/Output Summary (Last 24 hours) at 3/28/2019 1019  Last data filed at 3/28/2019 0649  Gross per 24 hour   Intake 0 ml   Output 1570 ml   Net -1570 ml         Physical Examination:     General appearance: Anxious- no, distressed- no, in good spirits- no  Lethargic, obese  HEENT: Lips- normal, teeth- ok , oral mucosa- moist  Neck : Mass- no, appears symmetrical, JVD- not visible  Respiratory: Respiratory effort-  labored, wheeze- no, crackles - no  Cardiovascular: Ausculation- No M/R/G, Edema 1+  Abdomen: visible mass- no, distention- no, scar- no, tenderness- no                            hepatosplenomegaly-  no  Musculoskeletal:  clubbing no,cyanosis- no , digital ischemia- no                           muscle strength- grossly normal , tone - grossly normal  Skin: rashes- no , ulcers- no, induration- no, tightening - no  Psychiatric:  Judgement and insight- normal           AAO X 3      Meds:      furosemide  40 mg Oral BID    losartan  100 mg Oral Daily    tamsulosin  0.4 mg Oral Daily    apixaban  5 mg Oral BID    aspirin  81 mg Oral Daily    atorvastatin  20 mg Oral Daily    DULoxetine  60 mg Oral Daily    ferrous sulfate  325 mg Oral Daily with breakfast    folic acid  1 mg Oral Daily    levothyroxine  100 mcg Oral Daily    pantoprazole  40 mg Oral QAM AC    mometasone-formoterol  2 puff Inhalation BID    sodium chloride flush  10 mL Intravenous 2 times per day    ipratropium-albuterol  3 mL Inhalation Q4H    sodium chloride flush  10 mL Intravenous 2 times per day    thiamine  100 mg Oral Daily    therapeutic multivitamin-minerals  1 tablet Oral Daily    metoprolol succinate  50 mg Oral Daily       Labs:     No results for input(s): WBC, HGB, HCT, PLT in the last 72 hours.        Recent Labs     03/26/19  1050 03/27/19  0733 03/28/19  0737    142 142   K 4.0 4.1 4.6   CL 99 100 97*   CO2 32 33* 36*   BUN 14 14 14   CREATININE 0.9 0.9 1.0   GLUCOSE 132* 111* 111*

## 2019-03-28 NOTE — PROGRESS NOTES
Occupational Therapy  Facility/Department: [facility] A2 CARD TELEMETRY  Daily Treatment Note  NAME: Constanza Nj  : 1945  MRN: [id number]    Date of Service: 3/28/2019    Discharge Recommendations:  Continue to assess pending progress  OT Equipment Recommendations  Other: cont to assess    Assessment   Performance deficits / Impairments: Decreased functional mobility ; Decreased strength;Decreased endurance;Decreased ADL status; Decreased safe awareness;Decreased balance;Decreased high-level IADLs  Assessment: Patient presents to hospital s/p SOB and BLE edema. Unsure of PLOF due to poor historian but per chart review it appears pt was using a RW for mobility and needing assistance with ADLs. Currently pt is funcitoning below baseline however eval and tx were quite limited by patient participation and patient refusing all EOB and OOB activities. Therefore DC rec is to cont to assess pending progress and participation with therapy. Prognosis: Fair  Decision Making: Low Complexity  Patient Education: role of OT, OT POC, DC recs  REQUIRES OT FOLLOW UP: Yes  Activity Tolerance  Activity Tolerance: Patient limited by fatigue;Patient limited by pain  Activity Tolerance: Pt with limited agreement to complete with max encouragement  Safety Devices  Safety Devices in place: Yes  Type of devices: Nurse notified; Patient at risk for falls;Call light within reach; Bed alarm in place; Left in bed; All fall risk precautions in place  Restraints  Initially in place: No         Patient Diagnosis(es): The primary encounter diagnosis was Dyspnea, unspecified type. Diagnoses of Peripheral edema and Elevated troponin were also pertinent to this visit. has a past medical history of Alcohol abuse, Alcohol abuse, ARF (acute renal failure) ([address] CHF (congestive heart failure) ([address] Hypercholesteremia, Hypertension, Mediastinal adenopathy, and Pulmonary emphysema ([address]    has a past surgical history that includes Upper gastrointestinal endoscopy (1/10/2011) and skin biopsy. Restrictions  Restrictions/Precautions  Restrictions/Precautions: Weight Bearing, Up as Tolerated  Position Activity Restriction  Other position/activity restrictions: 4L O2, tele  Subjective   General  Chart Reviewed: Yes  Patient assessed for rehabilitation services?: Yes  Family / Caregiver Present: No  Referring Practitioner: Shala Murrell MD  Diagnosis: SOB, BLE edema; PMH: alcohol abuse  Subjective  Subjective: Patient in bed, minimally agreeable to OT eval and tx. Max encouragement and only agreeable to bed level ex after evaluation completed. Vital Signs  Patient Currently in Pain: No   Orientation  Orientation  Overall Orientation Status: Within Functional Limits  Objective    ADL  Feeding: Setup; Beverage management  Grooming: Setup        Balance  Sitting Balance: Unable to assess(comment)(pt refused)  Standing Balance: Unable to assess(comment)(pt refused)  Standing Balance  Sit to stand: Unable to assess  Stand to sit: Unable to assess  Functional Mobility  Functional - Mobility Device: Other  Functional Mobility Comments: pt refused any OOB or EOB activities  Bed mobility  Supine to Sit: Unable to assess  Sit to Supine: Unable to assess  Scooting: Unable to assess  Comment: Pt refused any OOB or EOB activities despite max encouragement  Transfers  Stand Step Transfers: Unable to assess  Sit to stand: Unable to assess  Stand to sit: Unable to assess  Transfer Comments: Pt refused all attempts at EOB or OOB activities                       Cognition  Overall Cognitive Status: Exceptions  Arousal/Alertness: Appropriate responses to stimuli  Following Commands:  Follows one step commands with increased time  Attention Span: Difficulty dividing attention  Memory: Appears intact  Cognition Comment: Limited agreement to therapy needing max encouragement to participate in minimal BUE ex                    Type of ROM/Therapeutic Exercise  Type of ROM/Therapeutic Exercise: AROM  Comment: semi supine, max encouragement to complete  Exercises  Shoulder Flexion: x10  Elbow Flexion: x10  Supination: x10  Pronation: x10  Wrist Flexion: x10  Wrist Extension: x10  Grasp/Release: x10  LUE AROM (degrees)  LUE AROM : WFL  Left Hand AROM (degrees)  Left Hand AROM: WFL  RUE AROM (degrees)  RUE AROM : WFL  Right Hand AROM (degrees)  Right Hand AROM: WFL                 Plan   Plan  Times per week: 2-3x a week  Times per day: Daily  Current Treatment Recommendations: Strengthening, Patient/Caregiver Education & Training, Equipment Evaluation, Education, & procurement, Endurance Training, Balance Training, Safety Education & Training, Functional Mobility Training, Self-Care / ADL  G-Code  OT G-codes  Functional Assessment Tool Used: AMPAC  Score: 14    AM-PAC Score        AM-PAC Inpatient Daily Activity Raw Score: 14  AM-PAC Inpatient ADL T-Scale Score : 33.39  ADL Inpatient CMS 0-100% Score: 59.67  ADL Inpatient CMS G-Code Modifier : CK    Goals  Short term goals  Time Frame for Short term goals: 1 week unless otherwise specified  Short term goal 1: Pt will tolerate x5 minutes seated EOB to complete 2 simple ADL tasks by 3/31  Short term goal 2: Pt will complete functional mobility to/from bathroom and complete toileting Lupe with LRAD  Short term goal 3: Pt will complete LB/UB dressing with LRAD Lupe by DC  Patient Goals   Patient goals : \"I have no goals\"       Therapy Time   Individual Concurrent Group Co-treatment   Time In 1356         Time Out 1416         Minutes 20         Timed Code Treatment Minutes: 10 Minutes(10 min eval)       Tayler Draper OTR/L

## 2019-03-28 NOTE — PROGRESS NOTES
Urology Progress Note      Subjective:   Pt has no c/o  Bermudez just out an hour ago    Vitals:  BP (!) 144/85   Pulse 92   Temp 98.2 °F (36.8 °C) (Oral)   Resp 16   Ht 6' 2\" (1.88 m)   Wt 275 lb 9.6 oz (125 kg)   SpO2 93%   BMI 35.38 kg/m²   Temp  Av.3 °F (36.8 °C)  Min: 98.2 °F (36.8 °C)  Max: 98.8 °F (37.1 °C)    Intake/Output Summary (Last 24 hours) at 3/28/2019 1236  Last data filed at 3/28/2019 1102  Gross per 24 hour   Intake 360 ml   Output 1120 ml   Net -760 ml       Exam:     Labs:  WBC:    Lab Results   Component Value Date    WBC 6.7 2019     Hemoglobin/Hematocrit:    Lab Results   Component Value Date    HGB 9.0 2019    HCT 26.2 2019     BMP:    Lab Results   Component Value Date     2019    K 4.6 2019    CL 97 2019    CO2 36 2019    BUN 14 2019    LABALBU 2.5 2019    LABALBU 2.7 2019    CREATININE 1.0 2019    CALCIUM 8.9 2019    GFRAA >60 2019    GFRAA >60 2013    LABGLOM >60 2019     PT/INR:    Lab Results   Component Value Date    PROTIME 11.6 2017    INR 1.03 2017     PTT:  No results found for: APTT[APTT    Urinalysis:     Urine Culture:      Blood Culture:      Antibiotic Therapy:      Imaging:       Impression/Plan:    Bermudez out, pt has not urinated yet    Serjio Anderson PA-C

## 2019-03-28 NOTE — PROGRESS NOTES
Pt is alert and oriented x 4 with no history of falls. Assessment completed as charted. Bed is in lowest position with 2/4 bed rails raised. Bed alarm turned on, wheels locked, and call light within reach. Patient wearing non-skid socks and verbalizes understanding to call out for assistance. No further requests at this time. Will continue to monitor patient.     BP (!) 158/84   Pulse 96   Temp 98.4 °F (36.9 °C) (Oral)   Resp 16   Ht 6' 2\" (1.88 m)   Wt 275 lb 9.6 oz (125 kg)   SpO2 93%   BMI 35.38 kg/m²      Electronically signed by Ling Jean RN on 3/28/2019 at 6:58 AM

## 2019-03-28 NOTE — PROGRESS NOTES
Physical Therapy    Facility/Department: [facility] A2 CARD TELEMETRY  Initial Assessment    NAME: Sharri Kelly  : 1945  MRN: [id number]    Date of Service: 3/28/2019    Discharge Recommendations:  Continue to assess pending progress   PT Equipment Recommendations  Equipment Needed: No    Assessment   Body structures, Functions, Activity limitations: Decreased functional mobility ; Decreased endurance;Decreased safe awareness  Assessment: Pt is 69 yo male who presents with diagnosis of CHF. Pt states indep with mobility at baseline. Pt declining OOB mobility this date despite max encouragement and education. Pt agreeable to bed exercises only. Pt would benefit from continued skilled therapy to address deficits. Will continue to assess for d/c recs pending OOB assessment. Treatment Diagnosis: decreased mobility  Specific instructions for Next Treatment: progress mobility as tolerated  Prognosis: Good;Fair  Decision Making: Low Complexity  Patient Education: Role of PT, safety with mobility, POC, importance of mobility, ther ex; pt verbalized understanding  Barriers to Learning: none  REQUIRES PT FOLLOW UP: Yes  Activity Tolerance  Activity Tolerance: Patient Tolerated treatment well       Patient Diagnosis(es): The primary encounter diagnosis was Dyspnea, unspecified type. Diagnoses of Peripheral edema and Elevated troponin were also pertinent to this visit. has a past medical history of Alcohol abuse, Alcohol abuse, ARF (acute renal failure) ([address] CHF (congestive heart failure) ([address] Hypercholesteremia, Hypertension, Mediastinal adenopathy, and Pulmonary emphysema ([address] has a past surgical history that includes Upper gastrointestinal endoscopy (1/10/2011) and skin biopsy.     Restrictions  Restrictions/Precautions  Restrictions/Precautions: Up as Tolerated, General Precautions  Position Activity Restriction  Other position/activity restrictions: 4L O2, tele  Vision/Hearing  Vision: Impaired  Vision Exceptions: Wears glasses for reading  Hearing: Exceptions to [facility]  Hearing Exceptions: Hard of hearing/hearing concerns     Subjective  General  Chart Reviewed: Yes  Patient assessed for rehabilitation services?: Yes  Response To Previous Treatment: Not applicable  Family / Caregiver Present: No  Referring Practitioner: Dr. Noel Padilla MD  Referral Date : 03/28/19  Diagnosis: CHF, SOB  Follows Commands: Within Functional Limits  General Comment  Comments: Pt resting in bed on approach; RN cleared pt for therapy  Subjective  Subjective: pt agreeable to bed exercises only.  Declines gait or sitting EOB stating he just got back to bed and is comfortable  Pain Screening  Patient Currently in Pain: No          Orientation  Orientation  Overall Orientation Status: Within Functional Limits  Social/Functional History  Social/Functional History  Lives With: Daughter(3 grandchildren)  Type of Home: House  Home Layout: Two level, Able to Live on Main level with bedroom/bathroom  Home Access: Stairs to enter with rails  Entrance Stairs - Number of Steps: 6 SUKUMAR from front door; 9 SUKUMAR from basement level with handrails (these steps are closest to the driveway and the ones patient typically uses)  Bathroom Shower/Tub: Tub/Shower unit  Bathroom Toilet: Standard  Bathroom Equipment: Shower chair  Home Equipment: Cane, Rolling walker  ADL Assistance: Independent  Homemaking Responsibilities: No(dtr completes)  Ambulation Assistance: Independent(with walker since beginning of the year)  Transfer Assistance: Independent  Active : Yes  Occupation: Retired  Type of occupation:   Additional Comments: Pt not fully cooperative during therapy evaluation therefore history taken from prior PT evaluation 2/18/19    Objective     RLE AROM: WFL  LLE AROM : WFL  Strength RLE  Comment: Appears WFL; unable to fully assess due to limited pt cooperation  Strength LLE  Comment: Appears WFL; unable to fully assess due to limited pt cooperation        Bed mobility  Supine to Sit: Unable to assess  Sit to Supine: Unable to assess  Scooting: Unable to assess  Comment: Pt declining OOB mobility despite max encouragement and education              Exercises  Quad Sets: x 15 BLE  Heelslides: x 15 BLE  Gluteal Sets: x 15 BLE  Ankle Pumps: x 15 BLE     Plan   Plan  Times per week: 3-5x/wk  Times per day: Daily  Specific instructions for Next Treatment: progress mobility as tolerated  Current Treatment Recommendations: Strengthening, Gait Training, ROM, Balance Training, Neuromuscular Re-education, Functional Mobility Training, Endurance Training, Transfer Training, Safety Education & Training, Home Exercise Program, Stair training  Safety Devices  Type of devices: All fall risk precautions in place, Call light within reach, Gait belt, Patient at risk for falls, Nurse notified, Bed alarm in place, Left in bed                        AM-PAC Score  AM-PAC Inpatient Mobility Raw Score : 19  AM-PAC Inpatient T-Scale Score : 45.44  Mobility Inpatient CMS 0-100% Score: 41.77  Mobility Inpatient CMS G-Code Modifier : CK          Goals  Short term goals  Time Frame for Short term goals: 1 week (4/4) unless otherwise specified. Short term goal 1: Pt will be indep with bed mobility. Short term goal 2: Pt will participate in OOB mobility including gait and transfers and appropriate goals will be set. Short term goal 3: 3/31: Pt will participate in 12-15 reps of BLE exercises to promote strength and activity tolerance.   Patient Goals   Patient goals : \"to go home\"       Therapy Time   Individual Concurrent Group Co-treatment   Time In 8131         Time Out 1416         Minutes 20         Timed Code Treatment Minutes: [address], [place], [facility]

## 2019-03-29 VITALS
BODY MASS INDEX: 34.65 KG/M2 | WEIGHT: 270 LBS | DIASTOLIC BLOOD PRESSURE: 85 MMHG | TEMPERATURE: 98.6 F | HEART RATE: 87 BPM | RESPIRATION RATE: 16 BRPM | HEIGHT: 74 IN | OXYGEN SATURATION: 93 % | SYSTOLIC BLOOD PRESSURE: 160 MMHG

## 2019-03-29 LAB
ANION GAP SERPL CALCULATED.3IONS-SCNC: 15 MMOL/L (ref 3–16)
BLOOD CULTURE, ROUTINE: NORMAL
BUN BLDV-MCNC: 13 MG/DL (ref 7–20)
CALCIUM SERPL-MCNC: 8.5 MG/DL (ref 8.3–10.6)
CHLORIDE BLD-SCNC: 94 MMOL/L (ref 99–110)
CO2: 34 MMOL/L (ref 21–32)
CREAT SERPL-MCNC: 0.9 MG/DL (ref 0.8–1.3)
GFR AFRICAN AMERICAN: >60
GFR NON-AFRICAN AMERICAN: >60
GLUCOSE BLD-MCNC: 85 MG/DL (ref 70–99)
MAGNESIUM: 1.8 MG/DL (ref 1.8–2.4)
POTASSIUM REFLEX MAGNESIUM: 3.4 MMOL/L (ref 3.5–5.1)
SODIUM BLD-SCNC: 143 MMOL/L (ref 136–145)
SPE/IFE INTERPRETATION: NORMAL

## 2019-03-29 PROCEDURE — 36415 COLL VENOUS BLD VENIPUNCTURE: CPT

## 2019-03-29 PROCEDURE — 99232 SBSQ HOSP IP/OBS MODERATE 35: CPT | Performed by: NURSE PRACTITIONER

## 2019-03-29 PROCEDURE — 6370000000 HC RX 637 (ALT 250 FOR IP): Performed by: INTERNAL MEDICINE

## 2019-03-29 PROCEDURE — 80048 BASIC METABOLIC PNL TOTAL CA: CPT

## 2019-03-29 PROCEDURE — 6370000000 HC RX 637 (ALT 250 FOR IP): Performed by: UROLOGY

## 2019-03-29 PROCEDURE — 2580000003 HC RX 258: Performed by: INTERNAL MEDICINE

## 2019-03-29 PROCEDURE — 6370000000 HC RX 637 (ALT 250 FOR IP): Performed by: NURSE PRACTITIONER

## 2019-03-29 PROCEDURE — 83735 ASSAY OF MAGNESIUM: CPT

## 2019-03-29 PROCEDURE — 2700000000 HC OXYGEN THERAPY PER DAY

## 2019-03-29 PROCEDURE — 94761 N-INVAS EAR/PLS OXIMETRY MLT: CPT

## 2019-03-29 PROCEDURE — 94640 AIRWAY INHALATION TREATMENT: CPT

## 2019-03-29 RX ORDER — PREDNISONE 20 MG/1
40 TABLET ORAL DAILY
Status: DISCONTINUED | OUTPATIENT
Start: 2019-03-29 | End: 2019-03-29 | Stop reason: HOSPADM

## 2019-03-29 RX ORDER — TAMSULOSIN HYDROCHLORIDE 0.4 MG/1
0.4 CAPSULE ORAL DAILY
Qty: 30 CAPSULE | Refills: 3 | Status: ON HOLD | OUTPATIENT
Start: 2019-03-30 | End: 2019-04-30

## 2019-03-29 RX ORDER — FUROSEMIDE 40 MG/1
40 TABLET ORAL 2 TIMES DAILY
Qty: 60 TABLET | Refills: 0 | Status: SHIPPED | OUTPATIENT
Start: 2019-03-30 | End: 2019-04-08 | Stop reason: ALTCHOICE

## 2019-03-29 RX ORDER — FUROSEMIDE 40 MG/1
40 TABLET ORAL DAILY
Qty: 60 TABLET | Refills: 5 | Status: SHIPPED | OUTPATIENT
Start: 2019-03-29 | End: 2019-03-29 | Stop reason: HOSPADM

## 2019-03-29 RX ORDER — POTASSIUM CHLORIDE 20 MEQ/1
20 TABLET, EXTENDED RELEASE ORAL ONCE
Status: COMPLETED | OUTPATIENT
Start: 2019-03-29 | End: 2019-03-29

## 2019-03-29 RX ORDER — METOPROLOL SUCCINATE 50 MG/1
50 TABLET, EXTENDED RELEASE ORAL DAILY
Qty: 30 TABLET | Refills: 0 | Status: SHIPPED | OUTPATIENT
Start: 2019-03-30 | End: 2019-12-28

## 2019-03-29 RX ORDER — PREDNISONE 20 MG/1
20 TABLET ORAL DAILY
Qty: 5 TABLET | Refills: 0 | Status: SHIPPED | OUTPATIENT
Start: 2019-03-30 | End: 2019-03-29

## 2019-03-29 RX ORDER — PREDNISONE 20 MG/1
20 TABLET ORAL DAILY
Qty: 5 TABLET | Refills: 0 | Status: SHIPPED | OUTPATIENT
Start: 2019-03-30 | End: 2019-04-04 | Stop reason: ALTCHOICE

## 2019-03-29 RX ORDER — FUROSEMIDE 40 MG/1
40 TABLET ORAL 2 TIMES DAILY
Qty: 60 TABLET | Refills: 3 | Status: SHIPPED | OUTPATIENT
Start: 2019-03-30 | End: 2019-03-29

## 2019-03-29 RX ORDER — METOPROLOL SUCCINATE 50 MG/1
50 TABLET, EXTENDED RELEASE ORAL DAILY
Qty: 30 TABLET | Refills: 5 | Status: SHIPPED | OUTPATIENT
Start: 2019-03-30 | End: 2019-03-29

## 2019-03-29 RX ORDER — FUROSEMIDE 40 MG/1
40 TABLET ORAL 2 TIMES DAILY
Status: DISCONTINUED | OUTPATIENT
Start: 2019-03-30 | End: 2019-03-29 | Stop reason: HOSPADM

## 2019-03-29 RX ADMIN — Medication 2 PUFF: at 07:34

## 2019-03-29 RX ADMIN — IPRATROPIUM BROMIDE AND ALBUTEROL SULFATE 3 ML: .5; 3 SOLUTION RESPIRATORY (INHALATION) at 07:32

## 2019-03-29 RX ADMIN — DULOXETINE HYDROCHLORIDE 60 MG: 60 CAPSULE, DELAYED RELEASE ORAL at 09:14

## 2019-03-29 RX ADMIN — FOLIC ACID 1 MG: 1 TABLET ORAL at 09:14

## 2019-03-29 RX ADMIN — IPRATROPIUM BROMIDE AND ALBUTEROL SULFATE 3 ML: .5; 3 SOLUTION RESPIRATORY (INHALATION) at 03:46

## 2019-03-29 RX ADMIN — PANTOPRAZOLE SODIUM 40 MG: 20 TABLET, DELAYED RELEASE ORAL at 06:38

## 2019-03-29 RX ADMIN — FUROSEMIDE 40 MG: 40 TABLET ORAL at 09:14

## 2019-03-29 RX ADMIN — LEVOTHYROXINE SODIUM 100 MCG: 100 TABLET ORAL at 06:38

## 2019-03-29 RX ADMIN — Medication 100 MG: at 09:14

## 2019-03-29 RX ADMIN — IPRATROPIUM BROMIDE AND ALBUTEROL SULFATE 3 ML: .5; 3 SOLUTION RESPIRATORY (INHALATION) at 00:37

## 2019-03-29 RX ADMIN — FERROUS SULFATE TAB 325 MG (65 MG ELEMENTAL FE) 325 MG: 325 (65 FE) TAB at 09:14

## 2019-03-29 RX ADMIN — TAMSULOSIN HYDROCHLORIDE 0.4 MG: 0.4 CAPSULE ORAL at 09:14

## 2019-03-29 RX ADMIN — METOPROLOL SUCCINATE 50 MG: 50 TABLET, EXTENDED RELEASE ORAL at 09:14

## 2019-03-29 RX ADMIN — APIXABAN 5 MG: 5 TABLET, FILM COATED ORAL at 09:14

## 2019-03-29 RX ADMIN — PREDNISONE 40 MG: 20 TABLET ORAL at 09:14

## 2019-03-29 RX ADMIN — Medication 1 TABLET: at 09:14

## 2019-03-29 RX ADMIN — ASPIRIN 81 MG: 81 TABLET, COATED ORAL at 09:14

## 2019-03-29 RX ADMIN — IPRATROPIUM BROMIDE AND ALBUTEROL SULFATE 3 ML: .5; 3 SOLUTION RESPIRATORY (INHALATION) at 15:29

## 2019-03-29 RX ADMIN — LOSARTAN POTASSIUM 100 MG: 100 TABLET, FILM COATED ORAL at 09:14

## 2019-03-29 RX ADMIN — IPRATROPIUM BROMIDE AND ALBUTEROL SULFATE 3 ML: .5; 3 SOLUTION RESPIRATORY (INHALATION) at 11:49

## 2019-03-29 RX ADMIN — POTASSIUM CHLORIDE 20 MEQ: 20 TABLET, EXTENDED RELEASE ORAL at 09:14

## 2019-03-29 RX ADMIN — ATORVASTATIN CALCIUM 20 MG: 40 TABLET, FILM COATED ORAL at 09:14

## 2019-03-29 RX ADMIN — Medication 10 ML: at 09:15

## 2019-03-29 NOTE — PROGRESS NOTES
[address]     Cardiology                                     Progress Note    Admission date:  3/23/2019    Reason for follow up visit: CHF    HPI/CC: Victor Manuel Cosby was admitted on 3/24/2019 with SOB. She has been treated for A/C diastolic CHF, abnormal LFT, and ARF. Rhythm has been sinus. Subjective: He complains of chronic SOB. Denies chest pain, palpitations, and dizziness. Vitals:  Blood pressure (!) 155/84, pulse 86, temperature 97.9 °F (36.6 °C), resp. rate 16, height 6' 2\" (1.88 m), weight 270 lb (122.5 kg), SpO2 92 %.   Temp  Av °F (36.7 °C)  Min: 97.6 °F (36.4 °C)  Max: 98.4 °F (36.9 °C)  Pulse  Av.2  Min: 86  Max: 108  BP  Min: 147/79  Max: 167/88  SpO2  Av.8 %  Min: 90 %  Max: 97 %    24 hour I/O    Intake/Output Summary (Last 24 hours) at 3/29/2019 1300  Last data filed at 3/29/2019 1021  Gross per 24 hour   Intake 850 ml   Output 2875 ml   Net -202 ml     Current Facility-Administered Medications   Medication Dose Route Frequency Provider Last Rate Last Dose    predniSONE (DELTASONE) tablet 40 mg  40 mg Oral Daily Kit Barney MD   40 mg at 19    losartan (COZAAR) tablet 100 mg  100 mg Oral Daily VANGIE Alex - CNP   100 mg at 19    furosemide (LASIX) tablet 40 mg  40 mg Oral TID Kit Barney MD   40 mg at 19    tamsulosin (FLOMAX) capsule 0.4 mg  0.4 mg Oral Daily Anitha Jimenez MD   0.4 mg at 19    apixaban (ELIQUIS) tablet 5 mg  5 mg Oral BID Devon Martin MD   5 mg at 19    aspirin EC tablet 81 mg  81 mg Oral Daily Devon Martin MD   81 mg at 19    atorvastatin (LIPITOR) tablet 20 mg  20 mg Oral Daily Devon Martin MD   20 mg at 03/29/19 0914    DULoxetine (CYMBALTA) extended release capsule 60 mg  60 mg Oral Daily Devon Martin MD   60 mg at 19 2614    ferrous sulfate tablet 325 mg  325 mg Oral Daily with breakfast Devon Martin MD   325 mg at 21/00/69 1821    folic acid (FOLVITE) tablet 1 mg  1 mg Oral Daily Wil Menon MD   1 mg at 03/29/19 0914    levothyroxine (SYNTHROID) tablet 100 mcg  100 mcg Oral Daily Wil Menon MD   100 mcg at 03/29/19 1687    pantoprazole (PROTONIX) tablet 40 mg  40 mg Oral QAM AC Wil Menon MD   40 mg at 03/29/19 5588    mometasone-formoterol (DULERA) 200-5 MCG/ACT inhaler 2 puff  2 puff Inhalation BID Wil Mneon MD   2 puff at 03/29/19 0734    hydrALAZINE (APRESOLINE) injection 10 mg  10 mg Intravenous Q4H PRN Wil Menon MD        sodium chloride flush 0.9 % injection 10 mL  10 mL Intravenous 2 times per day Wil Menon MD   10 mL at 03/28/19 2047    sodium chloride flush 0.9 % injection 10 mL  10 mL Intravenous PRN Wil Menon MD        magnesium hydroxide (MILK OF MAGNESIA) 400 MG/5ML suspension 30 mL  30 mL Oral Daily PRN Wil Menon MD        ondansetron [facility]) injection 4 mg  4 mg Intravenous Q6H PRN Wil Menon MD   4 mg at 03/24/19 1218    acetaminophen (TYLENOL) tablet 650 mg  650 mg Oral Q4H PRN Wil Menon MD   650 mg at 03/24/19 0355    ipratropium-albuterol (DUONEB) nebulizer solution 3 mL  3 mL Inhalation Q4H Wil Menon MD   3 mL at 03/29/19 1149    sodium chloride flush 0.9 % injection 10 mL  10 mL Intravenous 2 times per day Adelita Burr MD   10 mL at 03/29/19 0915    sodium chloride flush 0.9 % injection 10 mL  10 mL Intravenous PRN Adelita Burr MD        LORazepam (ATIVAN) tablet 1 mg  1 mg Oral Q1H PRN Adelita Burr MD        Or    LORazepam (ATIVAN) injection 1 mg  1 mg Intravenous Q1H PRN Adelita Burr MD        Or    LORazepam (ATIVAN) tablet 2 mg  2 mg Oral Q1H PRN Adelita Burr MD        Or    LORazepam (ATIVAN) injection 2 mg  2 mg Intravenous Q1H PRN Adelita Burr MD        Or    LORazepam (ATIVAN) tablet 3 mg  3 mg Oral Q1H PRN Adelita Burr MD   3 mg at 03/24/19 1218    Or    LORazepam (ATIVAN) right atrium is mildly dilated. Echo 2/15/2019:  Technically difficult study due to body surface area and poor acoustic window.   Normal left ventricular systolic function with ejection fraction of 55-60%.   No regional wall motion abnormalites are seen.   Mild concentric left ventricular hypertrophy.   Grade I diastolic dysfunction with normal filling pressure.   Compared to previous study from 9- no changes noted. All labs and testing reviewed.   Lab Review     Renal Profile:   Lab Results   Component Value Date    CREATININE 0.9 03/29/2019    BUN 13 03/29/2019     03/29/2019    K 3.4 03/29/2019    CL 94 03/29/2019    CO2 34 03/29/2019     CBC:    Lab Results   Component Value Date    WBC 6.7 03/25/2019    RBC 2.57 03/25/2019    HGB 9.0 03/25/2019    HCT 26.2 03/25/2019    .7 03/25/2019    RDW 18.0 03/25/2019     03/25/2019     BNP:  No results found for: BNP  Fasting Lipid Panel:    Lab Results   Component Value Date    CHOL 121 02/15/2019    HDL 57 02/15/2019    TRIG 66 02/15/2019     Cardiac Enzymes:  CK/MbTroponin  Lab Results   Component Value Date    TROPONINI 0.02 03/24/2019     PT/ INR   Lab Results   Component Value Date    INR 1.03 09/27/2017    INR 0.97 03/10/2016    INR 1.03 11/29/2015    PROTIME 11.6 09/27/2017    PROTIME 11.1 03/10/2016    PROTIME 11.7 11/29/2015     PTT No results found for: PTT   Lab Results   Component Value Date    MG 1.80 03/29/2019      Lab Results   Component Value Date    TSH 1.33 11/29/2015       Assessment:  Acute on chronic diastolic CHF: improving   -good response to metolazone on 3/28   -negative 3.5L since admission (seems inaccurate)    -weight is down 22 pounds  HTN: BP elevated  Elevated troponin: peak 0.04, in the setting of CHF and MCKENNA   -no further workup indicated  Acute renal failure: resolved  Anemia: stable  History of recent PE: noted in 2/2019  History of alcohol abuse  COPD  Chronic pain  History of noncompliance    Plan: 1. Continue Eliquis, statin, Toprol, losartan, and BID Lasix  2. Stop ASA, no indication  3. Daily weight, strict I&O, fluid and sodium restriction, and monitor BP at home  4. Abstaining from alcohol is recommended     Cardiology will sign off but remains available if needed. Patient to follow up with PCP on either 4/1 or 4/4. Will recommend BMP at that visit. Follow up with Krishna Galloway CNP on 4/12/2019.     VANGIE Reilly-ALEXIS  [facility]  [phone]

## 2019-03-29 NOTE — PROGRESS NOTES
[facility]    [facility]            [phone]        Interval Plan:     Cr stable  On po diuretics  Agree with K supplement              Assessment :     Acute Kidney Injury  Cr peaked to 1.4- down to 0.9  Renal USG: R- 11.7 cm, L - 13.23 cm, normal echogenicity,  UA: large blood, RBC> 100, has cardoza, Pr/Cr ratio- negligible at 0.1    Echo- 2/19- EF 55-60%, Grade I DD, mild LVH,   3/19- RV function is normal in size and function    Generalized Edema  edema  Diuresis managed by cardiology  On iv lasix- now Tid , now po  Going home 3/29/19   wt peaked to 292 pounds- now down to 270 pounds    Hypertension   BP: (147-155)/(79-84)  Pulse:  [86-98]    BP high on admission  Coming down    COPD  Anemia- unlikely to be CKD, m/m per Dr Peggye Boast  R- arm cellulitis- on abx   PE- 2/2019  Liver disease, alcohol abuse      Please call with questions at-  24 hrs Answering service [phone]   7 am-5 pm at Perfect [employer], or cell phone  Dr Delgado Lewis        CC/ Reason for consult:     MCKENNA    HPI:     Patient is a 68 y.o. male  presented to  the hospital on 3/23/2019 with shortness of breath,  Low leg edema. Also right arm edema and erythema.     He is found to have MCKENNA, fluid overload and being   Treated with lasix  He is also known to have alcohol abuse        Interval History:     Cr normal  Making urine      Seen with - no family  DW with Mark  ROS -   SOB- present, but better than on admission  Edema- none  Nausea/vomiting- none  Poor appetite-No  Confusion- no  Urinary complaints- no  Any other complaints- no  All other ROS are reviewed and are Negative           Vitals:     Vitals:    03/29/19 1207   BP: (!) 155/84   Pulse: 86   Resp: 16   Temp: 97.9 °F (36.6 °C)   SpO2: 92%         I & O:       Intake/Output Summary (Last 24 hours) at 3/29/2019 1506  Last data filed at 3/29/2019 1021  Gross per 24 hour   Intake 850 ml   Output 2875 ml   Net -2025 ml         Physical Examination: General appearance: Anxious- no, distressed- no, in good spirits- no  Lethargic, obese  HEENT: Lips- normal, teeth- ok , oral mucosa- moist  Neck : Mass- no, appears symmetrical, JVD- not visible  Respiratory: Respiratory effort-  labored, wheeze- no, crackles - no  Cardiovascular: Ausculation- No M/R/G, Edema 1+  Abdomen: visible mass- no, distention- no, scar- no, tenderness- no                            hepatosplenomegaly-  no  Musculoskeletal:  clubbing no,cyanosis- no , digital ischemia- no                           muscle strength- grossly normal , tone - grossly normal  Skin: rashes- no , ulcers- no, induration- no, tightening - no  Psychiatric:  Judgement and insight- normal           AAO X 3      Meds:      predniSONE  40 mg Oral Daily    [START ON 3/30/2019] furosemide  40 mg Oral BID    losartan  100 mg Oral Daily    tamsulosin  0.4 mg Oral Daily    apixaban  5 mg Oral BID    atorvastatin  20 mg Oral Daily    DULoxetine  60 mg Oral Daily    ferrous sulfate  325 mg Oral Daily with breakfast    folic acid  1 mg Oral Daily    levothyroxine  100 mcg Oral Daily    pantoprazole  40 mg Oral QAM AC    mometasone-formoterol  2 puff Inhalation BID    sodium chloride flush  10 mL Intravenous 2 times per day    ipratropium-albuterol  3 mL Inhalation Q4H    sodium chloride flush  10 mL Intravenous 2 times per day    thiamine  100 mg Oral Daily    therapeutic multivitamin-minerals  1 tablet Oral Daily    metoprolol succinate  50 mg Oral Daily       Labs:     No results for input(s): WBC, HGB, HCT, PLT in the last 72 hours.        Recent Labs     03/27/19  0733 03/28/19  0737 03/29/19  0633    142 143   K 4.1 4.6 3.4*    97* 94*   CO2 33* 36* 34*   BUN 14 14 13   CREATININE 0.9 1.0 0.9   GLUCOSE 111* 111* 85   MG  --   --  1.80

## 2019-03-29 NOTE — PROGRESS NOTES
Bladder scanned patient before urinating-368 mL, patient voided 300  mL, bladder scanned after voiding 90 mL resulted. Pt has had trouble using urinal even with assistance, and has been removing urine hat every time he sits on commode, educated upon importance, pt stating, \"I just can't use that urinal or the hat! I never know when I need to pee or poop! \", continuing to monitor and will continue to reinforce importance of I/O's, Connie Emanuel, RN

## 2019-03-29 NOTE — PLAN OF CARE
Patient's EF (Ejection Fraction) is greater than 40%    Patient has a past medical history of Alcohol abuse, Alcohol abuse, ARF (acute renal failure) ([facility] [address] CHF (congestive heart failure) ([facility] [address] Hypercholesteremia, Hypertension, Mediastinal adenopathy, and Pulmonary emphysema ([facility] [address] Comorbidities reviewed and education provided. Patient and family's stated goal of care: be more comfortable prior to discharge    Patient's current functional capacity:  Slight limitation of physical activity. Comfortable at rest. Ordinary physical activity results in fatigue, palpitation, dyspnea. Pt resting in bed at this time on 3 L O2. Pt denies shortness of breath. Pt with nonpitting lower extremity edema.  Patient's weights and intake/output reviewed:    Patient Vitals for the past 96 hrs (Last 3 readings):   Weight   03/28/19 0649 275 lb 9.6 oz (125 kg)   03/27/19 0646 284 lb 8 oz (129 kg)   03/26/19 0626 288 lb 4.8 oz (130.8 kg)       Intake/Output Summary (Last 24 hours) at 3/28/2019 2310  Last data filed at 3/28/2019 2231  Gross per 24 hour   Intake 840 ml   Output 450 ml   Net 390 ml         >>For CHF and Comorbidity documentation on Education Time and Topics, please see Education Tab

## 2019-03-29 NOTE — PROGRESS NOTES
Physical Therapy  Attempted to see pt for physical therapy. Pt currently declining all OOB mobility including gait and transfers to chair. Pt educated on importance of mobility but pt states he already knows the benefit. Will re-attempt later as schedule permits. Thank you.   Kyle Mcdonough, PT, DPT

## 2019-03-29 NOTE — PROGRESS NOTES
Hospitalist Progress Note      PCP: Eldon Tom MD    Date of Admission: 3/23/2019    Chief Complaint: [facility] Course:      Subjective:  sob, LE swelling - better , still has cough with white sputum      Medications:  Reviewed    Infusion Medications   Scheduled Medications    losartan  100 mg Oral Daily    furosemide  40 mg Oral TID    tamsulosin  0.4 mg Oral Daily    apixaban  5 mg Oral BID    aspirin  81 mg Oral Daily    atorvastatin  20 mg Oral Daily    DULoxetine  60 mg Oral Daily    ferrous sulfate  325 mg Oral Daily with breakfast    folic acid  1 mg Oral Daily    levothyroxine  100 mcg Oral Daily    pantoprazole  40 mg Oral QAM AC    mometasone-formoterol  2 puff Inhalation BID    sodium chloride flush  10 mL Intravenous 2 times per day    ipratropium-albuterol  3 mL Inhalation Q4H    sodium chloride flush  10 mL Intravenous 2 times per day    thiamine  100 mg Oral Daily    therapeutic multivitamin-minerals  1 tablet Oral Daily    metoprolol succinate  50 mg Oral Daily     PRN Meds: hydrALAZINE, sodium chloride flush, magnesium hydroxide, ondansetron, acetaminophen, sodium chloride flush, LORazepam **OR** LORazepam **OR** LORazepam **OR** LORazepam **OR** LORazepam **OR** LORazepam **OR** LORazepam **OR** LORazepam      Intake/Output Summary (Last 24 hours) at 3/29/2019 0720  Last data filed at 3/29/2019 0262  Gross per 24 hour   Intake 1080 ml   Output 3025 ml   Net -1945 ml       Physical Exam Performed:    BP (!) 147/79   Pulse 98   Temp 97.7 °F (36.5 °C) (Oral)   Resp 16   Ht 6' 2\" (1.88 m)   Wt 270 lb (122.5 kg)   SpO2 97%   BMI 34.67 kg/m²       General appearance:  No apparent distress, appears stated age and cooperative. On oxygen  HEENT:  Normal cephalic, atraumatic without obvious deformity. Pupils equal, round, and reactive to light. Extra ocular muscles intact. Conjunctivae/corneas clear. Neck: Supple, with full range of motion.  No jugular venous distention. Trachea midline. Respiratory:  Normal respiratory effort. Clear to auscultation, bilaterally with Rales/ occ Wheezes/Rhonchi. Cardiovascular:  Regular rate and rhythm with normal S1/S2 without murmurs, rubs or gallops. Abdomen: Soft, non-tender, non-distended with normal bowel sounds. Has Bermudez  Musculoskeletal:  No clubbing, cyanosis w/ 2+ LE edema bilaterally. Full range of motion without deformity. UE edema rt >left- improving    Skin: Skin color, texture, turgor normal.  No rashes or lesions. Neurologic:  Neurovascularly intact without any focal sensory/motor deficits. Cranial nerves: II-XII intact, grossly non-focal.  Psychiatric:  Alert and oriented, thought content appropriate, normal insight  Capillary Refill: Brisk,< 3 seconds   Peripheral Pulses: +2 palpable, equal bilaterally         Labs:   No results for input(s): WBC, HGB, HCT, PLT in the last 72 hours. Recent Labs     03/27/19  0733 03/28/19  0737 03/29/19  0633    142 143   K 4.1 4.6 3.4*    97* 94*   CO2 33* 36* 34*   BUN 14 14 13   CREATININE 0.9 1.0 0.9   CALCIUM 8.9 8.9 8.5     No results for input(s): AST, ALT, BILIDIR, BILITOT, ALKPHOS in the last 72 hours. No results for input(s): INR in the last 72 hours. No results for input(s): Delcie Salado in the last 72 hours. Urinalysis:      Lab Results   Component Value Date    NITRU Negative 03/25/2019    WBCUA see below 03/25/2019    BACTERIA 3+ 03/25/2019    RBCUA >100 03/25/2019    BLOODU LARGE 03/25/2019    SPECGRAV 1.025 03/25/2019    GLUCOSEU Negative 03/25/2019       Radiology:  XR CHEST STANDARD (2 VW)   Final Result   Mild congestive failure with moderate pleural effusion greater on the left. Ununited and increased displacement of the left lateral rib fractures 5   through 7. No pneumothorax. US RENAL COMPLETE   Final Result   No evidence of hydronephrosis. A right urinary bladder diverticulum is suspected. .         XR CHEST STANDARD (2 VW)   Final Result   Pleural effusions with bibasilar atelectasis or pneumonia. Assessment/Plan:    Active Hospital Problems    Diagnosis Date Noted    Obesity [E66.9] 03/24/2019    ARF (acute renal failure) ([facility] [N17.9] 03/24/2019    Anemia [D64.9] 03/24/2019    CHF (congestive heart failure) ([facility] [I50.9] 03/24/2019    CHF (congestive heart failure), NYHA class I, acute on chronic, combined ([facility] [I50.43] 03/24/2019    Alcohol use [Z78.9] 02/14/2019    Hyperlipidemia [E78.5] 03/18/2015    Hypertension, essential [I10] 03/18/2015       Acute on Chronic Diastolic (LVEF 61-95% 0/8307) CHF-Lasix 40  PO twice a day monitor BMP, continue with the beta blocker      HTN -  , blood pressure better  controlled  l- losartan 100 daily      HyperLipidemia - controlled on home Statin. Continue, w/ f/u and med adjustment w/ PCP     Lactic acidosis- unclear, no infection, ?hypoperfusion from heart failure  trialed ivfs- resolved     Anemia - macrocytic, w/out evidence of active bleeding/hemolysis. Asymptomatic w/out indication for transfusion.     ARF - improved .    -nephro consulted, and following suspect element of cardiorenal syndrome    Alcohol Abuse - .   patient doesn't have any intention to stop alcohol-Beer with meal if ok with cardio    Obesity -  With Body mass index is 34.67 kg/m². Complicating assessment and treatment. Placing patient at risk for multiple co-morbidities as well as early death and contributing to the patient's presentation. Counseled on weight loss.      PE- eliquis     Elevated liver function test- possibly secondary to congestion monitor    COPD-?   -repeat chest x-ray,  congestion, shortness of breath and no rhonchi smooth with CHF rather than COPD- continue the inhalers and nebulizers short course of steroid    Urinary retention- Bermudez Catheter is placed and d/c ed , on flomax  Voiding ,  urology input appreciated    Positive blood culture - contamination

## 2019-03-29 NOTE — PLAN OF CARE
Problem: Nutrition  Goal: Optimal nutrition therapy  Outcome: Ongoing  Note:   Nutrition Problem: Inadequate oral intake  Intervention: Food and/or Nutrient Delivery: Continue current diet(Pt declined ONS)  Nutritional Goals: Pt will have meal intakes 50% or greater this admission

## 2019-03-29 NOTE — PLAN OF CARE
Patient's EF (Ejection Fraction) is greater than 40%    Patient has a past medical history of Alcohol abuse, Alcohol abuse, ARF (acute renal failure) ([facility] [address] CHF (congestive heart failure) ([facility] [address] Hypercholesteremia, Hypertension, Mediastinal adenopathy, and Pulmonary emphysema ([facility] [address] Comorbidities reviewed and education provided. Patient and family's stated goal of care: reduce shortness of breath, increase activity tolerance, better understand heart failure and disease management, be more comfortable and reduce lower extremity edema prior to discharge    Patient's current functional capacity:  Slight limitation of physical activity. Comfortable at rest. Ordinary physical activity results in fatigue, palpitation, dyspnea. Pt resting in bed at this time on 4 L O2. Pt with complaints of shortness of breath. Pt with pitting lower extremity edema.  Patient's weights and intake/output reviewed:    Patient Vitals for the past 96 hrs (Last 3 readings):   Weight   03/29/19 0637 270 lb (122.5 kg)   03/28/19 0649 275 lb 9.6 oz (125 kg)   03/27/19 0646 284 lb 8 oz (129 kg)       Intake/Output Summary (Last 24 hours) at 3/29/2019 1026  Last data filed at 3/29/2019 1021  Gross per 24 hour   Intake 1210 ml   Output 3025 ml   Net -1815 ml         >>For CHF and Comorbidity documentation on Education Time and Topics, please see Education Tab

## 2019-03-30 ENCOUNTER — CARE COORDINATION (OUTPATIENT)
Dept: CASE MANAGEMENT | Age: 74
End: 2019-03-30

## 2019-03-30 NOTE — DISCHARGE SUMMARY
on weight loss.      PE- eliquis      Elevated liver function test- possibly secondary to congestion monitor     COPD-?   -repeat chest x-ray,  congestion, shortness of breath and no rhonchi smooth with CHF rather than COPD- continue the inhalers and nebulizers short course of steroid     Urinary retention- Bermudez Catheter is placed and d/c ed , on flomax  Voiding ,  urology input appreciated     Positive blood culture - contamination       hypokalemia - replaced      Physical decline - poor motivation                 Physical Exam Performed:     BP (!) 160/85   Pulse 87   Temp 98.6 °F (37 °C) (Oral)   Resp 16   Ht 6' 2\" (1.88 m)   Wt 270 lb (122.5 kg)   SpO2 93%   BMI 34.67 kg/m²       General appearance:  No apparent distress, appears stated age and cooperative. On oxygen  HEENT:  Normal cephalic, atraumatic without obvious deformity. Pupils equal, round, and reactive to light.  Extra ocular muscles intact. Conjunctivae/corneas clear. Neck: Supple, with full range of motion. No jugular venous distention. Trachea midline. Respiratory:  Normal respiratory effort. Clear to auscultation, bilaterally with Rales/ occ Wheezes/Rhonchi. Cardiovascular:  Regular rate and rhythm with normal S1/S2 without murmurs, rubs or gallops. Abdomen: Soft, non-tender, non-distended with normal bowel sounds. Has Bermudez  Musculoskeletal:  No clubbing, cyanosis w/ 2+ LE edema bilaterally.  Full range of motion without deformity. UE edema rt >left- improving    Skin: Skin color, texture, turgor normal.  No rashes or lesions. Neurologic:  Neurovascularly intact without any focal sensory/motor deficits. Cranial nerves: II-XII intact, grossly non-focal.  Psychiatric:  Alert and oriented, thought content appropriate, normal insight  Capillary Refill: Brisk,< 3 seconds   Peripheral Pulses: +2 palpable, equal bilaterally           Labs:  For convenience and continuity at follow-up the following most recent labs are provided:      CBC: Lab Results   Component Value Date    WBC 6.7 03/25/2019    HGB 9.0 03/25/2019    HCT 26.2 03/25/2019     03/25/2019       Renal:    Lab Results   Component Value Date     03/29/2019    K 3.4 03/29/2019    CL 94 03/29/2019    CO2 34 03/29/2019    BUN 13 03/29/2019    CREATININE 0.9 03/29/2019    CALCIUM 8.5 03/29/2019    PHOS 2.6 03/25/2019         Significant Diagnostic Studies    Radiology:   XR CHEST STANDARD (2 VW)   Final Result   Mild congestive failure with moderate pleural effusion greater on the left. Ununited and increased displacement of the left lateral rib fractures 5   through 7. No pneumothorax. US RENAL COMPLETE   Final Result   No evidence of hydronephrosis. A right urinary bladder diverticulum is suspected. .         XR CHEST STANDARD (2 VW)   Final Result   Pleural effusions with bibasilar atelectasis or pneumonia.                 Consults:     IP CONSULT TO HOSPITALIST  IP CONSULT TO CARDIOLOGY  IP CONSULT TO NEPHROLOGY  IP CONSULT TO HEART FAILURE NURSE/COORDINATOR  IP CONSULT TO CARDIAC REHAB  IP CONSULT TO UROLOGY  IP CONSULT TO SOCIAL WORK  IP CONSULT TO HOME CARE NEEDS    Disposition:  Home with home care    Condition at Discharge: Stable    Discharge Instructions/Follow-up:  PCP/ cardiology and nephrology    Code Status:  Prior     Activity: activity as tolerated    Diet: cardiac diet      Discharge Medications:     Discharge Medication List as of 3/29/2019  4:58 PM           Details   tamsulosin (FLOMAX) 0.4 MG capsule Take 1 capsule by mouth daily, Disp-30 capsule, R-3Normal              Details   metoprolol succinate (TOPROL XL) 50 MG extended release tablet Take 1 tablet by mouth daily, Disp-30 tablet, R-0Print      predniSONE (DELTASONE) 20 MG tablet Take 1 tablet by mouth daily for 5 days, Disp-5 tablet, R-0Print      furosemide (LASIX) 40 MG tablet Take 1 tablet by mouth 2 times daily, Disp-60 tablet, R-0Print              Details   omeprazole (PRILOSEC) 20 MG delayed release capsule TAKE 1 CAPSULE BY MOUTH EVERY DAY, Disp-90 capsule, R-0Normal      Multiple Vitamins-Minerals ([facility] SPECTRAVITE SENIOR) TABS TAKE 1 TABLET BY MOUTH EVERY DAY, Disp-30 tablet, R-3Normal      losartan (COZAAR) 100 MG tablet TAKE 1 TABLET BY MOUTH EVERY DAY, Disp-90 tablet, R-0Normal      ipratropium-albuterol (DUONEB) 0.5-2.5 (3) MG/3ML SOLN nebulizer solution Inhale 3 mLs into the lungs every 4 hours as needed for Shortness of Breath, Disp-360 mL, R-1Print      apixaban (ELIQUIS) 5 MG TABS tablet Take 1 tablet by mouth 2 times daily, Disp-60 tablet, R-1Print      levothyroxine (SYNTHROID) 100 MCG tablet TAKE 1 TABLET BY MOUTH EVERY DAY, Disp-90 tablet, [id number]      folic acid (FOLVITE) 1 MG tablet Take 1 tablet by mouth daily, Disp-90 tablet, R-1Normal      ferrous sulfate 325 (65 Fe) MG tablet Take 1 tablet by mouth daily (with breakfast), Disp-90 tablet, R-1Normal      SYMBICORT 80-4.5 MCG/ACT AERO INHALE 1 PUFF INTO LUNGS BY MOUTH TWICE A DAY, Disp-30.6 Inhaler, R-2Normal      KLOR-CON M20 20 MEQ extended release tablet TAKE 1 TABLET BY MOUTH EVERY DAY, Disp-90 tablet, R-0Normal      DULoxetine (CYMBALTA) 60 MG extended release capsule Take 1 capsule by mouth daily, Disp-90 capsule, R-0Normal      vitamin D ([facility] VITAMIN D3) 1000 units CAPS Take 1 capsule by mouth daily, Disp-90 capsule, R-0Normal      albuterol sulfate HFA (PROVENTIL HFA) 108 (90 Base) MCG/ACT inhaler Inhale 2 puffs into the lungs every 6 hours as needed for Wheezing, Disp-1 Inhaler, R-3Normal      atorvastatin (LIPITOR) 20 MG tablet Take 1 tablet by mouth daily, Disp-30 tablet, R-5Normal             Time Spent on discharge is more than 30 minutes in the examination, evaluation, counseling and review of medications and discharge plan. Signed:    Sumit Crow MD   3/30/2019      Thank you Juan Washington MD for the opportunity to be involved in this patient's care.  If you have any questions or concerns please feel free to contact me at [phone].

## 2019-04-01 ENCOUNTER — TELEPHONE (OUTPATIENT)
Dept: INTERNAL MEDICINE CLINIC | Age: 74
End: 2019-04-01

## 2019-04-02 ENCOUNTER — CARE COORDINATION (OUTPATIENT)
Dept: CASE MANAGEMENT | Age: 74
End: 2019-04-02

## 2019-04-02 ENCOUNTER — TELEPHONE (OUTPATIENT)
Dept: INTERNAL MEDICINE CLINIC | Age: 74
End: 2019-04-02

## 2019-04-02 NOTE — CARE COORDINATION
[facility] Transitions Follow Up Call    2019    Patient: Victor Manuel Cosby  Patient : 1945   MRN: [id number]  Reason for Admission: CHF ARF   Discharge Date: 3/29/19 RARS: Readmission Risk Score: 21         Spoke with: [facility] Transitions Subsequent and Final Call    Subsequent and Final Calls  Do you have any ongoing symptoms?:  No  Have your medications changed?:  No  Do you have any questions related to your medications?:  No  Do you currently have any active services?:  No  Are you currently active with any services?:  Home Health  Do you have any needs or concerns that I can assist you with?:  No  Identified Barriers:  None  Care Transitions Interventions     Other Services:  Declined   Other Interventions:        Spoke with patient who reports he is doing alright. Patient denied any increase in SOB, no swelling, and no CP. Patient states he doesn't have a scale or BP cuff. [facility] encouraged patient to ask PCP if any resources available for scale or BP cuff at follow up apt on 19. Patient denied any swelling in his feet and stated he is monitoring his sodium and fluid intake. Patient stated he contacted [facility] and they still haven't received script for duonebs, [facility] encouraged patient to reach our to PCP office again to follow up. Patient denied any further needs at this time and agreed to follow up calls. Gave reminder that if they have any questions/concerns at anytime, they can always call PCP/specialist as they always have an MD on call .            Follow Up  Future Appointments   Date Time Provider Basil Lubin   2019  9:20 AM William Torres MD [facility] None   2019  8:45 AM VANGIE Alex - CNP [facility] Cheryle Millman RN, BSN, [address] [facility] Transitions Coordinator    [phone]

## 2019-04-02 NOTE — TELEPHONE ENCOUNTER
----- Message from Gale Mario MD sent at 4/1/2019  4:57 PM EDT -----  Yes  appt    ----- Message -----  From: Karli Jones  Sent: 4/1/2019   4:15 PM  To: Gale Mario MD    Maricel needing office notes for pt to support need for semi electric bed. Does pt need to schedule appt for this?

## 2019-04-04 ENCOUNTER — OFFICE VISIT (OUTPATIENT)
Dept: INTERNAL MEDICINE CLINIC | Age: 74
End: 2019-04-04

## 2019-04-04 VITALS
DIASTOLIC BLOOD PRESSURE: 65 MMHG | HEART RATE: 58 BPM | WEIGHT: 261 LBS | RESPIRATION RATE: 19 BRPM | SYSTOLIC BLOOD PRESSURE: 90 MMHG | BODY MASS INDEX: 33.51 KG/M2

## 2019-04-04 DIAGNOSIS — F10.10 ALCOHOL ABUSE: ICD-10-CM

## 2019-04-04 DIAGNOSIS — I50.33 ACUTE ON CHRONIC DIASTOLIC CONGESTIVE HEART FAILURE (HCC): ICD-10-CM

## 2019-04-04 DIAGNOSIS — I10 HYPERTENSION, ESSENTIAL: ICD-10-CM

## 2019-04-04 DIAGNOSIS — J43.2 CENTRILOBULAR EMPHYSEMA (HCC): ICD-10-CM

## 2019-04-04 DIAGNOSIS — E78.2 MIXED HYPERLIPIDEMIA: ICD-10-CM

## 2019-04-04 DIAGNOSIS — Z09 HOSPITAL DISCHARGE FOLLOW-UP: Primary | ICD-10-CM

## 2019-04-04 DIAGNOSIS — I27.20 PULMONARY HTN (HCC): ICD-10-CM

## 2019-04-04 LAB
ANION GAP SERPL CALCULATED.3IONS-SCNC: 15 MMOL/L (ref 3–16)
BASOPHILS ABSOLUTE: 0 K/UL (ref 0–0.2)
BASOPHILS RELATIVE PERCENT: 0.2 %
BUN BLDV-MCNC: 24 MG/DL (ref 7–20)
CALCIUM SERPL-MCNC: 8.8 MG/DL (ref 8.3–10.6)
CHLORIDE BLD-SCNC: 91 MMOL/L (ref 99–110)
CO2: 35 MMOL/L (ref 21–32)
CREAT SERPL-MCNC: 3 MG/DL (ref 0.8–1.3)
EOSINOPHILS ABSOLUTE: 0 K/UL (ref 0–0.6)
EOSINOPHILS RELATIVE PERCENT: 0 %
GFR AFRICAN AMERICAN: 25
GFR NON-AFRICAN AMERICAN: 21
GLUCOSE BLD-MCNC: 127 MG/DL (ref 70–99)
HCT VFR BLD CALC: 31.9 % (ref 40.5–52.5)
HEMOGLOBIN: 10.7 G/DL (ref 13.5–17.5)
LYMPHOCYTES ABSOLUTE: 0.5 K/UL (ref 1–5.1)
LYMPHOCYTES RELATIVE PERCENT: 4.5 %
MCH RBC QN AUTO: 34.9 PG (ref 26–34)
MCHC RBC AUTO-ENTMCNC: 33.6 G/DL (ref 31–36)
MCV RBC AUTO: 103.9 FL (ref 80–100)
MONOCYTES ABSOLUTE: 0.5 K/UL (ref 0–1.3)
MONOCYTES RELATIVE PERCENT: 4.4 %
NEUTROPHILS ABSOLUTE: 10 K/UL (ref 1.7–7.7)
NEUTROPHILS RELATIVE PERCENT: 90.9 %
PDW BLD-RTO: 17.6 % (ref 12.4–15.4)
PLATELET # BLD: 619 K/UL (ref 135–450)
PMV BLD AUTO: 8.9 FL (ref 5–10.5)
POTASSIUM SERPL-SCNC: 4.1 MMOL/L (ref 3.5–5.1)
RBC # BLD: 3.07 M/UL (ref 4.2–5.9)
SODIUM BLD-SCNC: 141 MMOL/L (ref 136–145)
WBC # BLD: 11 K/UL (ref 4–11)

## 2019-04-04 PROCEDURE — 99214 OFFICE O/P EST MOD 30 MIN: CPT | Performed by: INTERNAL MEDICINE

## 2019-04-04 RX ORDER — IPRATROPIUM BROMIDE AND ALBUTEROL SULFATE 2.5; .5 MG/3ML; MG/3ML
3 SOLUTION RESPIRATORY (INHALATION) EVERY 4 HOURS PRN
Qty: 360 ML | Refills: 1 | Status: SHIPPED | OUTPATIENT
Start: 2019-04-04 | End: 2019-06-04 | Stop reason: SDUPTHER

## 2019-04-04 RX ORDER — LEVOTHYROXINE SODIUM 0.1 MG/1
TABLET ORAL
Qty: 90 TABLET | Refills: 0 | Status: ON HOLD | OUTPATIENT
Start: 2019-04-04 | End: 2019-08-25 | Stop reason: HOSPADM

## 2019-04-04 NOTE — PROGRESS NOTES
Subjective:      Patient ID: Nathaniel Light is a 68 y.o. male. HPI     68 y.o. male  with known h.o alcohol abuse, copd , HTN, hypothyroid,  anxiety here for rcent hospital discharge f.w      Since last visit, pt was admitted twice to [facility] for copd exacerbation/pna and PE  in 2/29 and later in 3/19 for CHF flare up    Reports since last dc , pt was compliant with Eliquis ,  lasix bid, edema in legs and hands improved. Using oxygen now upto 3 L at home for dyspnea. Activity remains limited  Continues to drink   Refused home care, does not want any followup with specialists either  Reports dizzy spells with standing and now with hearing issues      5/18- fall with injury to back bone   Sustained multiple jono rib fractures, right hemothorax , MSSA bacteremia  Required T6- T11 fusion  at . Now completed rehab            Chronic pain syndrome- previously on methadone and has been off for few years  Now on cymbalta   Today reports pain and numbness in fore foot and feels like walking on cotton        HTN -  on metoprolol.  norvasc and losartan , lasix, no dizziness, edema resolved    GERD on PPI        Current Outpatient Medications   Medication Sig Dispense Refill    ipratropium-albuterol (DUONEB) 0.5-2.5 (3) MG/3ML SOLN nebulizer solution Inhale 3 mLs into the lungs every 4 hours as needed for Shortness of Breath 360 mL 1    tamsulosin (FLOMAX) 0.4 MG capsule Take 1 capsule by mouth daily 30 capsule 3    metoprolol succinate (TOPROL XL) 50 MG extended release tablet Take 1 tablet by mouth daily 30 tablet 0    furosemide (LASIX) 40 MG tablet Take 1 tablet by mouth 2 times daily 60 tablet 0    omeprazole (PRILOSEC) 20 MG delayed release capsule TAKE 1 CAPSULE BY MOUTH EVERY DAY 90 capsule 0    Multiple Vitamins-Minerals ([employer] SPECTRAVITE SENIOR) TABS TAKE 1 TABLET BY MOUTH EVERY DAY 30 tablet 3    losartan (COZAAR) 100 MG tablet TAKE 1 TABLET BY MOUTH EVERY DAY 90 tablet 0    apixaban (ELIQUIS) 5 MG TABS tablet Take 1 tablet by mouth 2 times daily 60 tablet 1    levothyroxine (SYNTHROID) 100 MCG tablet TAKE 1 TABLET BY MOUTH EVERY DAY 90 tablet 0    folic acid (FOLVITE) 1 MG tablet Take 1 tablet by mouth daily 90 tablet 1    ferrous sulfate 325 (65 Fe) MG tablet Take 1 tablet by mouth daily (with breakfast) 90 tablet 1    SYMBICORT 80-4.5 MCG/ACT AERO INHALE 1 PUFF INTO LUNGS BY MOUTH TWICE A DAY 30.6 Inhaler 2    KLOR-CON M20 20 MEQ extended release tablet TAKE 1 TABLET BY MOUTH EVERY DAY 90 tablet 0    DULoxetine (CYMBALTA) 60 MG extended release capsule Take 1 capsule by mouth daily 90 capsule 0    vitamin D ([employer]S VITAMIN D3) 1000 units CAPS Take 1 capsule by mouth daily 90 capsule 0    albuterol sulfate HFA (PROVENTIL HFA) 108 (90 Base) MCG/ACT inhaler Inhale 2 puffs into the lungs every 6 hours as needed for Wheezing 1 Inhaler 3    atorvastatin (LIPITOR) 20 MG tablet Take 1 tablet by mouth daily 30 tablet 5     No current facility-administered medications for this visit. Review of Systems   Constitutional: Negative for activity change, fatigue and unexpected weight change. HENT: Negative for ear discharge, hearing loss, postnasal drip and rhinorrhea. Respiratory: Positive for shortness of breath. Negative for chest tightness. Cardiovascular: Negative for chest pain, palpitations and leg swelling. Genitourinary: Negative for frequency and hematuria. Musculoskeletal: Positive for back pain. Negative for neck stiffness. Skin: Negative for color change and wound. Skin tag on left shoulder   Neurological: Positive for numbness. +  dizziness and weakness. Hematological: Negative for adenopathy. Psychiatric/Behavioral: Negative for dysphoric mood and sleep disturbance. The patient is not nervous/anxious. Objective:   Physical Exam    Vitals:    04/04/19 0941   BP: 90/65   Pulse: 58   Resp: 19         General: elderly male,  Awake, alert and oriented.  Appears to be not in any distress  Seen today in wheelchair  Mucous Membranes:  Pink , anicteric  Neck: No JVD, no carotid bruit, no thyromegaly  Chest:  Bilateral air entry , clear with no wheeze  Cardiovascular:  RRR S1S2 heard, no murmurs   Abdomen:  Soft, undistended, non tender, no organomegaly, BS present  Extremities: resolved pedal edema jono with chronic skin changes and erythematous skin    Distal pulses well felt    Wt Readings from Last 3 Encounters:   03/29/19 270 lb (122.5 kg)   02/19/19 260 lb 11.2 oz (118.3 kg)   12/03/18 272 lb (123.4 kg)         Assessment:       Diagnosis Orders   1. Hospital discharge follow-up     2. Centrilobular emphysema (HCC)     3. Pulmonary HTN (HCC)  CBC WITH AUTO DIFFERENTIAL    BASIC METABOLIC PANEL   4. Acute on chronic diastolic congestive heart failure (HCC)  CBC WITH AUTO DIFFERENTIAL    BASIC METABOLIC PANEL   5. Alcohol abuse     6. Hypertension, essential     7. Mixed hyperlipidemia         CTA chest  jono pulm Emboli    [facility]   Summary   Limited for RV function.   The right ventricle is normal in size and function.   Right ventricular function appears same as echo on 3-.   Systolic pulmonic artery pressure (SPAP) is normal estimated at 27 mmHg   (Right atrial pressure of 3 mmHg).   The right atrium is mildly dilated. Plan:        Copd with hypoxic resp failure  - back on oxygen now using 3 L   - remains symptomatic with exertional dyspnea.  Can increase to keep sp02 above 90 % on ambuation   - quit smoking >10 yrs- now off  - add nebulizer with accunebs  - PFT  - continue symbicort- refill meds      PE  - bilateral emboli per recent ct chest  - now on ELIQUIS  - caution with falls and alcohol use advised  - no nsaids, or ASA  - -pt does not want to see any consultants    CHF - chronic diastolic   - now on lasix 40 mg bid  - low salt diet advised  - consider cutting down metoprolol to half for dizziness      Fall with mulitiple rib fractures and T8 vertebral fracture  - from fall, sp T6- 11 fusion 5/18 at [facility]  - no issues  - pt continues to drink heavily    Peripheral neuropathy - likely alcohol induced  - B12 supplements       HTN  - controlled. On multiple meds. Off norvasc,  Continue lasix, losartan, metoprolol   Improved pedal edema,- cut down metoprolol      Alochol abuse - no interest in quittting. Continues to drink heavily      Chronic back pain - off methadone , now on cymbalta and neurontin         Hyperlipidemia - on statins. Well controlled       Vit d def - on supplements    Hypothyroid on synthroid,    F/w 3-4months    Check cbc, cmp    Poor prognosis as pt continues to drink heavily and does not want to comply any recommendations                  Giovanni received counseling on the following healthy behaviors: nutrition, exercise and medication adherence  Reviewed prior labs and health maintenance  Continue current medications, diet and exercise. Discussed use, benefit, and side effects of prescribed medications. Barriers to medication compliance addressed. Patient given educational materials - see patient instructions  Was a self-tracking handout given in paper form or via [email or web address]? Yes    Requested Prescriptions     Signed Prescriptions Disp Refills    ipratropium-albuterol (DUONEB) 0.5-2.5 (3) MG/3ML SOLN nebulizer solution 360 mL 1     Sig: Inhale 3 mLs into the lungs every 4 hours as needed for Shortness of Breath       All patient questions answered. Patient voiced understanding. Quality Measures    There is no height or weight on file to calculate BMI. Elevated. Weight control planned discussed conventional weight loss, daily exercise regimen and Healthy diet and regular exercise. BP: 90/65. Blood pressure is normal. Treatment plan consists of Weight Reduction, DASH Eating Plan, Increased Physical Activity and No treatment change needed. Fall Risk 3/27/2019 2/19/2019 1/11/2018   2 or more falls in past year? no yes no   Fall with injury in past year?  no test (Diabetic or Prediabetic)  12/03/2019    Pneumococcal 65+ years Vaccine (2 of 2 - PCV13) 12/03/2019    Potassium monitoring  03/29/2020    Creatinine monitoring  03/29/2020    DTaP/Tdap/Td vaccine (2 - Td) 03/06/2023    Lipid screen  02/15/2024    Flu vaccine  Completed    Hepatitis C screen  Completed

## 2019-04-04 NOTE — PATIENT INSTRUCTIONS
Patient Self-Management Goal for Health Maintenance  Goal: I will schedule a yearly preventative care visit.   Barriers: none  Plan for overcoming my barriers: N/A  Confidence: 10/10  Anticipated Goal Completion Date: 07/04/19

## 2019-04-05 ENCOUNTER — CARE COORDINATION (OUTPATIENT)
Dept: CASE MANAGEMENT | Age: 74
End: 2019-04-05

## 2019-04-08 ENCOUNTER — TELEPHONE (OUTPATIENT)
Dept: ADMISSION | Age: 74
End: 2019-04-08

## 2019-04-08 ENCOUNTER — HOSPITAL ENCOUNTER (INPATIENT)
Age: 74
LOS: 3 days | Discharge: HOME | DRG: 682 | End: 2019-04-11
Attending: EMERGENCY MEDICINE | Admitting: INTERNAL MEDICINE
Payer: MEDICARE

## 2019-04-08 ENCOUNTER — APPOINTMENT (OUTPATIENT)
Dept: GENERAL RADIOLOGY | Age: 74
DRG: 682 | End: 2019-04-08
Payer: MEDICARE

## 2019-04-08 ENCOUNTER — CARE COORDINATION (OUTPATIENT)
Dept: CASE MANAGEMENT | Age: 74
End: 2019-04-08

## 2019-04-08 ENCOUNTER — APPOINTMENT (OUTPATIENT)
Dept: CT IMAGING | Age: 74
DRG: 682 | End: 2019-04-08
Payer: MEDICARE

## 2019-04-08 DIAGNOSIS — N17.9 ACUTE RENAL INJURY (HCC): Primary | ICD-10-CM

## 2019-04-08 DIAGNOSIS — J44.1 COPD EXACERBATION (HCC): ICD-10-CM

## 2019-04-08 DIAGNOSIS — E86.0 DEHYDRATION: ICD-10-CM

## 2019-04-08 LAB
A/G RATIO: 1 (ref 1.1–2.2)
ALBUMIN SERPL-MCNC: 2.8 G/DL (ref 3.4–5)
ALP BLD-CCNC: 138 U/L (ref 40–129)
ALT SERPL-CCNC: 25 U/L (ref 10–40)
ANION GAP SERPL CALCULATED.3IONS-SCNC: 18 MMOL/L (ref 3–16)
AST SERPL-CCNC: 36 U/L (ref 15–37)
BACTERIA: ABNORMAL /HPF
BACTERIA: ABNORMAL /HPF
BASE EXCESS ARTERIAL: 3.2 MMOL/L (ref -3–3)
BASOPHILS ABSOLUTE: 0.1 K/UL (ref 0–0.2)
BASOPHILS RELATIVE PERCENT: 1.3 %
BILIRUB SERPL-MCNC: 0.5 MG/DL (ref 0–1)
BILIRUBIN URINE: NEGATIVE
BILIRUBIN URINE: NEGATIVE
BLOOD, URINE: NEGATIVE
BLOOD, URINE: NEGATIVE
BUN BLDV-MCNC: 48 MG/DL (ref 7–20)
CALCIUM SERPL-MCNC: 8.6 MG/DL (ref 8.3–10.6)
CARBOXYHEMOGLOBIN ARTERIAL: 0.8 % (ref 0–1.5)
CHLORIDE BLD-SCNC: 92 MMOL/L (ref 99–110)
CLARITY: CLEAR
CLARITY: CLEAR
CO2: 28 MMOL/L (ref 21–32)
COLOR: YELLOW
COLOR: YELLOW
CREAT SERPL-MCNC: 4.1 MG/DL (ref 0.8–1.3)
EKG ATRIAL RATE: 82 BPM
EKG DIAGNOSIS: NORMAL
EKG P AXIS: 76 DEGREES
EKG P-R INTERVAL: 166 MS
EKG Q-T INTERVAL: 420 MS
EKG QRS DURATION: 96 MS
EKG QTC CALCULATION (BAZETT): 490 MS
EKG R AXIS: 18 DEGREES
EKG T AXIS: 27 DEGREES
EKG VENTRICULAR RATE: 82 BPM
EOSINOPHILS ABSOLUTE: 0.1 K/UL (ref 0–0.6)
EOSINOPHILS RELATIVE PERCENT: 1.2 %
EPITHELIAL CELLS, UA: ABNORMAL /HPF
EPITHELIAL CELLS, UA: ABNORMAL /HPF
GFR AFRICAN AMERICAN: 17
GFR NON-AFRICAN AMERICAN: 14
GLOBULIN: 2.8 G/DL
GLUCOSE BLD-MCNC: 97 MG/DL (ref 70–99)
GLUCOSE URINE: NEGATIVE MG/DL
GLUCOSE URINE: NEGATIVE MG/DL
HCO3 ARTERIAL: 28 MMOL/L (ref 21–29)
HCT VFR BLD CALC: 26.6 % (ref 40.5–52.5)
HEMOGLOBIN, ART, EXTENDED: 8.5 G/DL (ref 13.5–17.5)
HEMOGLOBIN: 9 G/DL (ref 13.5–17.5)
KETONES, URINE: NEGATIVE MG/DL
KETONES, URINE: NEGATIVE MG/DL
LACTIC ACID: 2.9 MMOL/L (ref 0.4–2)
LACTIC ACID: 4.1 MMOL/L (ref 0.4–2)
LACTIC ACID: 4.2 MMOL/L (ref 0.4–2)
LEUKOCYTE ESTERASE, URINE: ABNORMAL
LEUKOCYTE ESTERASE, URINE: ABNORMAL
LYMPHOCYTES ABSOLUTE: 1.2 K/UL (ref 1–5.1)
LYMPHOCYTES RELATIVE PERCENT: 12.2 %
MCH RBC QN AUTO: 34.4 PG (ref 26–34)
MCHC RBC AUTO-ENTMCNC: 33.8 G/DL (ref 31–36)
MCV RBC AUTO: 101.7 FL (ref 80–100)
METHEMOGLOBIN ARTERIAL: 0.5 %
MICROSCOPIC EXAMINATION: YES
MICROSCOPIC EXAMINATION: YES
MONOCYTES ABSOLUTE: 0.5 K/UL (ref 0–1.3)
MONOCYTES RELATIVE PERCENT: 5.3 %
NEUTROPHILS ABSOLUTE: 7.8 K/UL (ref 1.7–7.7)
NEUTROPHILS RELATIVE PERCENT: 80 %
NITRITE, URINE: NEGATIVE
NITRITE, URINE: NEGATIVE
O2 CONTENT ARTERIAL: 12 ML/DL
O2 SAT, ARTERIAL: 95.2 %
O2 THERAPY: ABNORMAL
PCO2 ARTERIAL: 43.6 MMHG (ref 35–45)
PDW BLD-RTO: 17 % (ref 12.4–15.4)
PH ARTERIAL: 7.42 (ref 7.35–7.45)
PH UA: 5.5 (ref 5–8)
PH UA: 5.5 (ref 5–8)
PLATELET # BLD: 366 K/UL (ref 135–450)
PMV BLD AUTO: 8.8 FL (ref 5–10.5)
PO2 ARTERIAL: 74.5 MMHG (ref 75–108)
POTASSIUM SERPL-SCNC: 3.9 MMOL/L (ref 3.5–5.1)
PRO-BNP: 727 PG/ML (ref 0–124)
PROTEIN UA: NEGATIVE MG/DL
PROTEIN UA: NEGATIVE MG/DL
RBC # BLD: 2.62 M/UL (ref 4.2–5.9)
RBC UA: ABNORMAL /HPF (ref 0–2)
RBC UA: ABNORMAL /HPF (ref 0–2)
SODIUM BLD-SCNC: 138 MMOL/L (ref 136–145)
SPECIFIC GRAVITY UA: 1.01 (ref 1–1.03)
SPECIFIC GRAVITY UA: <=1.005 (ref 1–1.03)
TCO2 ARTERIAL: 29.3 MMOL/L
TOTAL PROTEIN: 5.6 G/DL (ref 6.4–8.2)
TROPONIN: 0.03 NG/ML
TROPONIN: 0.05 NG/ML
URINE REFLEX TO CULTURE: YES
URINE TYPE: ABNORMAL
UROBILINOGEN, URINE: 0.2 E.U./DL
UROBILINOGEN, URINE: 0.2 E.U./DL
WBC # BLD: 9.8 K/UL (ref 4–11)
WBC UA: ABNORMAL /HPF (ref 0–5)
WBC UA: ABNORMAL /HPF (ref 0–5)

## 2019-04-08 PROCEDURE — 6370000000 HC RX 637 (ALT 250 FOR IP): Performed by: PHYSICIAN ASSISTANT

## 2019-04-08 PROCEDURE — 2580000003 HC RX 258: Performed by: EMERGENCY MEDICINE

## 2019-04-08 PROCEDURE — 93005 ELECTROCARDIOGRAM TRACING: CPT | Performed by: EMERGENCY MEDICINE

## 2019-04-08 PROCEDURE — 84300 ASSAY OF URINE SODIUM: CPT

## 2019-04-08 PROCEDURE — 83935 ASSAY OF URINE OSMOLALITY: CPT

## 2019-04-08 PROCEDURE — 2580000003 HC RX 258: Performed by: PHYSICIAN ASSISTANT

## 2019-04-08 PROCEDURE — 82803 BLOOD GASES ANY COMBINATION: CPT

## 2019-04-08 PROCEDURE — 6370000000 HC RX 637 (ALT 250 FOR IP): Performed by: EMERGENCY MEDICINE

## 2019-04-08 PROCEDURE — 96366 THER/PROPH/DIAG IV INF ADDON: CPT

## 2019-04-08 PROCEDURE — 87086 URINE CULTURE/COLONY COUNT: CPT

## 2019-04-08 PROCEDURE — 80053 COMPREHEN METABOLIC PANEL: CPT

## 2019-04-08 PROCEDURE — 81001 URINALYSIS AUTO W/SCOPE: CPT

## 2019-04-08 PROCEDURE — 99285 EMERGENCY DEPT VISIT HI MDM: CPT

## 2019-04-08 PROCEDURE — 70450 CT HEAD/BRAIN W/O DYE: CPT

## 2019-04-08 PROCEDURE — 6370000000 HC RX 637 (ALT 250 FOR IP): Performed by: INTERNAL MEDICINE

## 2019-04-08 PROCEDURE — 71046 X-RAY EXAM CHEST 2 VIEWS: CPT

## 2019-04-08 PROCEDURE — 36415 COLL VENOUS BLD VENIPUNCTURE: CPT

## 2019-04-08 PROCEDURE — 94761 N-INVAS EAR/PLS OXIMETRY MLT: CPT

## 2019-04-08 PROCEDURE — 84484 ASSAY OF TROPONIN QUANT: CPT

## 2019-04-08 PROCEDURE — 94640 AIRWAY INHALATION TREATMENT: CPT

## 2019-04-08 PROCEDURE — 82570 ASSAY OF URINE CREATININE: CPT

## 2019-04-08 PROCEDURE — 2700000000 HC OXYGEN THERAPY PER DAY

## 2019-04-08 PROCEDURE — 87040 BLOOD CULTURE FOR BACTERIA: CPT

## 2019-04-08 PROCEDURE — 94150 VITAL CAPACITY TEST: CPT

## 2019-04-08 PROCEDURE — 93010 ELECTROCARDIOGRAM REPORT: CPT | Performed by: INTERNAL MEDICINE

## 2019-04-08 PROCEDURE — 85025 COMPLETE CBC W/AUTO DIFF WBC: CPT

## 2019-04-08 PROCEDURE — 83880 ASSAY OF NATRIURETIC PEPTIDE: CPT

## 2019-04-08 PROCEDURE — 82436 ASSAY OF URINE CHLORIDE: CPT

## 2019-04-08 PROCEDURE — 6360000002 HC RX W HCPCS: Performed by: PHYSICIAN ASSISTANT

## 2019-04-08 PROCEDURE — 96365 THER/PROPH/DIAG IV INF INIT: CPT

## 2019-04-08 PROCEDURE — 83605 ASSAY OF LACTIC ACID: CPT

## 2019-04-08 PROCEDURE — 6360000002 HC RX W HCPCS: Performed by: EMERGENCY MEDICINE

## 2019-04-08 PROCEDURE — 84133 ASSAY OF URINE POTASSIUM: CPT

## 2019-04-08 PROCEDURE — 1200000000 HC SEMI PRIVATE

## 2019-04-08 RX ORDER — SODIUM CHLORIDE 0.9 % (FLUSH) 0.9 %
10 SYRINGE (ML) INJECTION EVERY 12 HOURS SCHEDULED
Status: DISCONTINUED | OUTPATIENT
Start: 2019-04-08 | End: 2019-04-11 | Stop reason: HOSPADM

## 2019-04-08 RX ORDER — ONDANSETRON 2 MG/ML
4 INJECTION INTRAMUSCULAR; INTRAVENOUS EVERY 6 HOURS PRN
Status: DISCONTINUED | OUTPATIENT
Start: 2019-04-08 | End: 2019-04-11 | Stop reason: HOSPADM

## 2019-04-08 RX ORDER — IPRATROPIUM BROMIDE AND ALBUTEROL SULFATE 2.5; .5 MG/3ML; MG/3ML
1 SOLUTION RESPIRATORY (INHALATION) ONCE
Status: COMPLETED | OUTPATIENT
Start: 2019-04-08 | End: 2019-04-08

## 2019-04-08 RX ORDER — CHLORDIAZEPOXIDE HYDROCHLORIDE 5 MG/1
10 CAPSULE, GELATIN COATED ORAL 4 TIMES DAILY
Status: DISCONTINUED | OUTPATIENT
Start: 2019-04-08 | End: 2019-04-09

## 2019-04-08 RX ORDER — PREDNISONE 20 MG/1
60 TABLET ORAL ONCE
Status: COMPLETED | OUTPATIENT
Start: 2019-04-08 | End: 2019-04-08

## 2019-04-08 RX ORDER — IPRATROPIUM BROMIDE AND ALBUTEROL SULFATE 2.5; .5 MG/3ML; MG/3ML
1 SOLUTION RESPIRATORY (INHALATION)
Status: DISCONTINUED | OUTPATIENT
Start: 2019-04-09 | End: 2019-04-11 | Stop reason: HOSPADM

## 2019-04-08 RX ORDER — SODIUM CHLORIDE 0.9 % (FLUSH) 0.9 %
10 SYRINGE (ML) INJECTION PRN
Status: DISCONTINUED | OUTPATIENT
Start: 2019-04-08 | End: 2019-04-11 | Stop reason: HOSPADM

## 2019-04-08 RX ORDER — GABAPENTIN 300 MG/1
300 CAPSULE ORAL 3 TIMES DAILY
Status: ON HOLD | COMMUNITY
End: 2019-04-11 | Stop reason: HOSPADM

## 2019-04-08 RX ORDER — FOLIC ACID 1 MG/1
1 TABLET ORAL DAILY
Status: DISCONTINUED | OUTPATIENT
Start: 2019-04-08 | End: 2019-04-10

## 2019-04-08 RX ORDER — METHYLPREDNISOLONE SODIUM SUCCINATE 40 MG/ML
40 INJECTION, POWDER, LYOPHILIZED, FOR SOLUTION INTRAMUSCULAR; INTRAVENOUS DAILY
Status: DISCONTINUED | OUTPATIENT
Start: 2019-04-08 | End: 2019-04-09

## 2019-04-08 RX ORDER — DOXYCYCLINE HYCLATE 100 MG
100 TABLET ORAL EVERY 12 HOURS SCHEDULED
Status: DISCONTINUED | OUTPATIENT
Start: 2019-04-08 | End: 2019-04-11 | Stop reason: HOSPADM

## 2019-04-08 RX ORDER — 0.9 % SODIUM CHLORIDE 0.9 %
500 INTRAVENOUS SOLUTION INTRAVENOUS ONCE
Status: COMPLETED | OUTPATIENT
Start: 2019-04-08 | End: 2019-04-08

## 2019-04-08 RX ORDER — SODIUM CHLORIDE 9 MG/ML
INJECTION, SOLUTION INTRAVENOUS CONTINUOUS
Status: DISCONTINUED | OUTPATIENT
Start: 2019-04-08 | End: 2019-04-11

## 2019-04-08 RX ORDER — FERROUS SULFATE 325(65) MG
325 TABLET ORAL
Status: DISCONTINUED | OUTPATIENT
Start: 2019-04-09 | End: 2019-04-11 | Stop reason: HOSPADM

## 2019-04-08 RX ORDER — PANTOPRAZOLE SODIUM 40 MG/1
40 TABLET, DELAYED RELEASE ORAL
Status: DISCONTINUED | OUTPATIENT
Start: 2019-04-09 | End: 2019-04-11 | Stop reason: HOSPADM

## 2019-04-08 RX ORDER — IPRATROPIUM BROMIDE AND ALBUTEROL SULFATE 2.5; .5 MG/3ML; MG/3ML
1 SOLUTION RESPIRATORY (INHALATION) EVERY 4 HOURS
Status: DISCONTINUED | OUTPATIENT
Start: 2019-04-08 | End: 2019-04-08

## 2019-04-08 RX ORDER — DOCUSATE SODIUM 100 MG/1
100 CAPSULE, LIQUID FILLED ORAL 2 TIMES DAILY
Status: DISCONTINUED | OUTPATIENT
Start: 2019-04-08 | End: 2019-04-11 | Stop reason: HOSPADM

## 2019-04-08 RX ORDER — TAMSULOSIN HYDROCHLORIDE 0.4 MG/1
0.4 CAPSULE ORAL DAILY
Status: DISCONTINUED | OUTPATIENT
Start: 2019-04-08 | End: 2019-04-11 | Stop reason: HOSPADM

## 2019-04-08 RX ORDER — FUROSEMIDE 40 MG/1
40 TABLET ORAL DAILY
Status: ON HOLD | COMMUNITY
End: 2019-04-11 | Stop reason: SDUPTHER

## 2019-04-08 RX ORDER — MAGNESIUM SULFATE IN WATER 40 MG/ML
2 INJECTION, SOLUTION INTRAVENOUS ONCE
Status: COMPLETED | OUTPATIENT
Start: 2019-04-08 | End: 2019-04-08

## 2019-04-08 RX ORDER — LEVOTHYROXINE SODIUM 0.1 MG/1
100 TABLET ORAL DAILY
Status: DISCONTINUED | OUTPATIENT
Start: 2019-04-08 | End: 2019-04-11 | Stop reason: HOSPADM

## 2019-04-08 RX ORDER — ACETAMINOPHEN 325 MG/1
650 TABLET ORAL EVERY 4 HOURS PRN
Status: DISCONTINUED | OUTPATIENT
Start: 2019-04-08 | End: 2019-04-11 | Stop reason: HOSPADM

## 2019-04-08 RX ADMIN — SODIUM CHLORIDE: 9 INJECTION, SOLUTION INTRAVENOUS at 18:12

## 2019-04-08 RX ADMIN — APIXABAN 5 MG: 5 TABLET, FILM COATED ORAL at 20:42

## 2019-04-08 RX ADMIN — PREDNISONE 60 MG: 20 TABLET ORAL at 11:34

## 2019-04-08 RX ADMIN — CHLORDIAZEPOXIDE HYDROCHLORIDE 10 MG: 5 CAPSULE ORAL at 20:42

## 2019-04-08 RX ADMIN — SODIUM CHLORIDE 500 ML: 9 INJECTION, SOLUTION INTRAVENOUS at 11:06

## 2019-04-08 RX ADMIN — METHYLPREDNISOLONE SODIUM SUCCINATE 40 MG: 40 INJECTION, POWDER, FOR SOLUTION INTRAMUSCULAR; INTRAVENOUS at 18:13

## 2019-04-08 RX ADMIN — IPRATROPIUM BROMIDE AND ALBUTEROL SULFATE 1 AMPULE: .5; 3 SOLUTION RESPIRATORY (INHALATION) at 19:26

## 2019-04-08 RX ADMIN — DOXYCYCLINE HYCLATE 100 MG: 100 TABLET, COATED ORAL at 20:42

## 2019-04-08 RX ADMIN — SODIUM CHLORIDE 500 ML: 9 INJECTION, SOLUTION INTRAVENOUS at 12:10

## 2019-04-08 RX ADMIN — MAGNESIUM SULFATE HEPTAHYDRATE 2 G: 40 INJECTION, SOLUTION INTRAVENOUS at 11:34

## 2019-04-08 RX ADMIN — CHLORDIAZEPOXIDE HYDROCHLORIDE 10 MG: 5 CAPSULE ORAL at 18:12

## 2019-04-08 RX ADMIN — IPRATROPIUM BROMIDE AND ALBUTEROL SULFATE 1 AMPULE: .5; 3 SOLUTION RESPIRATORY (INHALATION) at 11:34

## 2019-04-08 ASSESSMENT — ENCOUNTER SYMPTOMS
SHORTNESS OF BREATH: 1
DIARRHEA: 0
COLOR CHANGE: 0
NAUSEA: 0
COUGH: 0
CONSTIPATION: 0
ABDOMINAL PAIN: 0
VOMITING: 0

## 2019-04-08 ASSESSMENT — PAIN DESCRIPTION - PAIN TYPE: TYPE: CHRONIC PAIN

## 2019-04-08 ASSESSMENT — PAIN DESCRIPTION - LOCATION: LOCATION: BACK

## 2019-04-08 ASSESSMENT — PAIN SCALES - GENERAL: PAINLEVEL_OUTOF10: 9

## 2019-04-08 NOTE — ED PROVIDER NOTES
I independently examined and evaluated Brooke Locke. In brief, 52-year-old male with shortness of breath    Focused exam revealed is awake, alert, oriented ×3 in no acute distress, resting comfortably on the gurney. Cardiac is regular rate and rhythm. Lungs have scattered wheezes, worse on the right than the left    ED course: I will begin medications for his wheezing. He is noted to be slightly hypotensive so we will give him fluid boluses as tolerated. At this point, he does not appear to be fluid overloaded, although his renal function is markedly worse. He will need admitted for further care. EKG shows normal sinus rhythm at 82 bpm there is prolonged QT. No other acute changes no STEMI this is an abnormal EKG. In comparison to an EKG of 3/24/19. Rate has decreased no other acute changes. Impression shortness of breath, COPD exacerbation  Renal failure. All diagnostic, treatment, and disposition decisions were made by myself in conjunction with the advanced practice provider. For all further details of the patient's emergency department visit, please see the advanced practice provider's documentation. Comment: Please note this report has been produced using speech recognition software and may contain errors related to that system including errors in grammar, punctuation, and spelling, as well as words and phrases that may be inappropriate. If there are any questions or concerns please feel free to contact the dictating provider for clarification. Estela Baltazar MD  04/08/19 [id number]

## 2019-04-08 NOTE — CARE COORDINATION
[facility] Transitions Follow Up Call    2019    Patient: Herb Vasquez  Patient : 1945   MRN: [id number]  Reason for Admission:   Discharge Date: 3/29/19 RARS: Readmission Risk Score: 21         Spoke with: Govind Ly Daughter    Care Transitions Subsequent and Final Call    Subsequent and Final Calls  Care Transitions Interventions  Other Interventions:             Follow Up  Patients daughter states that patient was just picked up by the EMT's and taken back to [facility].   Future Appointments   Date Time Provider Basil Lubin   2019  8:45 AM VANGIE Hidalgo - CNP [facility]   2019 10:00 AM Anju Campbell MD [facility] None       Aliza Smith RN

## 2019-04-08 NOTE — ED PROVIDER NOTES
eMERGENCY dEPARTMENT eNCOUnter        Pt Name: Kwabena Estrada  MRN: [id number]  [place] 1945  Date of evaluation: 4/8/2019  Provider: LOWELL Sanders  PCP: Geronimo Post MD    This patient was seen and evaluated by the attending physician Miri Cardenas. Juan C Sebastian, [address] [facility]       Chief Complaint   Patient presents with    Shortness of Breath     Pt c/o increased SOB over the past 5 months. hx of COPD CHF. pt on 3 L NC at home and has increased to 4. denies cp and fever.  Fatigue     pt c/o of generalized weakness past couple of days. pt states he has not been able to hold anything in both hands. HISTORY OF PRESENT ILLNESS   (Location/Symptom, Timing/Onset, Context/Setting, Quality, Duration, Modifying Factors, Severity)  Note limiting factors. Kwabena Estrada is a 68 y.o. male who presents the emergency department today with complaints of shortness of breath, weakness and fatigue. The patient states the symptoms have been going on for a year, but today his daughter called the doctor's office and he recommended that he come into the emergency room for evaluation. He states he has not had any chest pain. He states he has been feeling just generally weak, and this morning while in the kitchen getting a drink, he felt like he was going to pass out and fell. He states he did not lose consciousness, he did not hit his head, he denies sustaining any injuries from the fall. He denies any nausea or vomiting, he denies any significant swelling. He denies any change in his urination or bowels. He has no further complaints at this time. Nursing Notes were all reviewed and agreed with or any disagreements were addressed  in the HPI. REVIEW OF SYSTEMS    (2-9 systems for level 4, 10 or more for level 5)     Review of Systems   Constitutional: Positive for fatigue. Negative for chills and fever. Respiratory: Positive for shortness of breath. Negative for cough. Cardiovascular: Negative for chest pain and palpitations. Gastrointestinal: Negative for abdominal pain, constipation, diarrhea, nausea and vomiting. Genitourinary: Negative for dysuria, frequency and urgency. Musculoskeletal: Negative for arthralgias and myalgias. Skin: Negative for color change and rash. Neurological: Positive for dizziness and weakness (generalized). Negative for light-headedness, numbness and headaches. Positives and Pertinent negatives as per HPI. Except as noted abovein the ROS, all other systems were reviewed and negative. PAST MEDICAL HISTORY     Past Medical History:   Diagnosis Date    Alcohol abuse     Alcohol abuse     ARF (acute renal failure) ([facility] 03/24/2019    Dr Geraldo [facility], [phone]    CHF (congestive heart failure) ([facility] 3/24/2019    Hypercholesteremia     Hypertension     Mediastinal adenopathy 2/14/2019    Pulmonary emphysema ([facility] 8/29/2018         SURGICAL HISTORY     Past Surgical History:   Procedure Laterality Date    BACK SURGERY      fusion     SKIN BIOPSY      UPPER GASTROINTESTINAL ENDOSCOPY  1/10/2011         CURRENTMEDICATIONS       Current Discharge Medication List      CONTINUE these medications which have NOT CHANGED    Details   gabapentin (NEURONTIN) 300 MG capsule Take 300 mg by mouth 3 times daily.       !! Multiple Vitamins-Minerals ([employer]S SPECTRAVITE SENIOR PO) Take by mouth      ipratropium-albuterol (DUONEB) 0.5-2.5 (3) MG/3ML SOLN nebulizer solution Inhale 3 mLs into the lungs every 4 hours as needed for Shortness of Breath  Qty: 360 mL, Refills: 1      levothyroxine (SYNTHROID) 100 MCG tablet TAKE 1 TABLET BY MOUTH EVERY DAY  Qty: 90 tablet, Refills: 0      tamsulosin (FLOMAX) 0.4 MG capsule Take 1 capsule by mouth daily  Qty: 30 capsule, Refills: 3      metoprolol succinate (TOPROL XL) 50 MG extended release tablet Take 1 tablet by mouth daily  Qty: 30 tablet, Refills: 0      omeprazole (PRILOSEC) 20 MG delayed release capsule TAKE 1 CAPSULE BY MOUTH EVERY DAY  Qty: 90 capsule, Refills: 0      !! Multiple Vitamins-Minerals (CVS SPECTRAVITE SENIOR) TABS TAKE 1 TABLET BY MOUTH EVERY DAY  Qty: 30 tablet, Refills: 3      losartan (COZAAR) 100 MG tablet TAKE 1 TABLET BY MOUTH EVERY DAY  Qty: 90 tablet, Refills: 0      apixaban (ELIQUIS) 5 MG TABS tablet Take 1 tablet by mouth 2 times daily  Qty: 60 tablet, Refills: 1      folic acid (FOLVITE) 1 MG tablet Take 1 tablet by mouth daily  Qty: 90 tablet, Refills: 1      ferrous sulfate 325 (65 Fe) MG tablet Take 1 tablet by mouth daily (with breakfast)  Qty: 90 tablet, Refills: 1      SYMBICORT 80-4.5 MCG/ACT AERO INHALE 1 PUFF INTO LUNGS BY MOUTH TWICE A DAY  Qty: 30.6 Inhaler, Refills: 2      KLOR-CON M20 20 MEQ extended release tablet TAKE 1 TABLET BY MOUTH EVERY DAY  Qty: 90 tablet, Refills: 0      DULoxetine (CYMBALTA) 60 MG extended release capsule Take 1 capsule by mouth daily  Qty: 90 capsule, Refills: 0      vitamin D (CVS VITAMIN D3) 1000 units CAPS Take 1 capsule by mouth daily  Qty: 90 capsule, Refills: 0      albuterol sulfate HFA (PROVENTIL HFA) 108 (90 Base) MCG/ACT inhaler Inhale 2 puffs into the lungs every 6 hours as needed for Wheezing  Qty: 1 Inhaler, Refills: 3       !! - Potential duplicate medications found. Please discuss with provider. ALLERGIES     Patient has no known allergies. FAMILYHISTORY       Family History   Problem Relation Age of Onset    Other Other         no early lung disease          SOCIAL HISTORY       Social History     Socioeconomic History    Marital status:       Spouse name: None    Number of children: None    Years of education: None    Highest education level: None   Occupational History    None   Social Needs    Financial resource strain: None    Food insecurity:     Worry: None     Inability: None    Transportation needs:     Medical: None     Non-medical: None   Tobacco Use    Smoking lower extremities - pt states this is his baseline   Pulmonary/Chest: Effort normal. No respiratory distress. He has rales (right > left). He exhibits no tenderness. Abdominal: Soft. Bowel sounds are normal. He exhibits no distension and no mass. There is no tenderness. There is no guarding. Musculoskeletal: Normal range of motion. Neurological: He is alert and oriented to person, place, and time. Skin: Skin is warm and dry. He is not diaphoretic. Psychiatric: He has a normal mood and affect. His behavior is normal. Thought content normal.   Nursing note and vitals reviewed.       DIAGNOSTIC RESULTS   LABS:    Labs Reviewed   COMPREHENSIVE METABOLIC PANEL - Abnormal; Notable for the following components:       Result Value    Chloride 92 (*)     Anion Gap 18 (*)     BUN 48 (*)     CREATININE 4.1 (*)     GFR Non- 14 (*)     GFR  17 (*)     Total Protein 5.6 (*)     Alb 2.8 (*)     Albumin/Globulin Ratio 1.0 (*)     Alkaline Phosphatase 138 (*)     All other components within normal limits    Narrative:     Performed at:  [facility],  [place], [facility]   Phone [phone]   CBC WITH AUTO DIFFERENTIAL - Abnormal; Notable for the following components:    RBC 2.62 (*)     Hemoglobin 9.0 (*)     Hematocrit 26.6 (*)     .7 (*)     MCH 34.4 (*)     RDW 17.0 (*)     Neutrophils # 7.8 (*)     All other components within normal limits    Narrative:     Performed at:  [facility],  [place], [facility]   Phone [phone]   LACTIC ACID, PLASMA - Abnormal; Notable for the following components:    Lactic Acid 4.2 (*)     All other components within normal limits    Narrative:     [address] [facility] [place],  Chemistry results called to and read back by Malissa Miner RN, 04/08/2019  11:13, by Catherine Tamez  Performed at:  [facility]  [address] [facility]   Phone [phone]   URINE RT REFLEX TO CULTURE - Abnormal; Notable for the following components:    Leukocyte Esterase, Urine SMALL (*)     All other components within normal limits    Narrative:     Performed at:  [address]   Phone [phone]   TROPONIN - Abnormal; Notable for the following components:    Troponin 0.05 (*)     All other components within normal limits    Narrative:     Performed at:  [address]   Phone [phone]   BRAIN NATRIURETIC PEPTIDE - Abnormal; Notable for the following components:    Pro- (*)     All other components within normal limits    Narrative:     Performed at:  [address]   Phone [phone]   LACTIC ACID, PLASMA - Abnormal; Notable for the following components:    Lactic Acid 4.1 (*)     All other components within normal limits    Narrative:     Casandra Swan. [phone],  Chemistry results called to and read back by Castillo Chavarria RN, 04/08/2019  12:45, by Anselmo Fletcher  Performed at:  [address]   Phone [phone]   BLOOD GAS, ARTERIAL - Abnormal; Notable for the following components:    pO2, Arterial 74.5 (*)     Base Excess, Arterial 3.2 (*)     Hemoglobin, Art, Extended 8.5 (*)     All other components within normal limits    Narrative:     Performed at:  [facility]) - [address]   Phone [phone]   TROPONIN - Abnormal; Notable for the following components:    Troponin 0.03 (*)     All other components within normal limits    Narrative:     Performed at:  [facility]) - [address]   Phone [phone]    CONSULTS:  IP CONSULT TO HOSPITALIST  IP CONSULT TO NEPHROLOGY  IP CONSULT TO PULMONOLOGY  IP CONSULT TO RESPIRATORY CARE      EMERGENCY DEPARTMENT COURSE and DIFFERENTIALDIAGNOSIS/MDM:   Vitals:    Vitals:    04/08/19 1503 04/08/19 1512 04/08/19 1603 04/08/19 1630   BP: (!) 123/59 (!) 116/49 (!) 113/42 (!) 106/54   Pulse: 91 95 91 96   Resp:  21 11 16   Temp:    97.1 °F (36.2 °C)   TempSrc:    Oral   SpO2: 92% (!) 88% 91% 91%   Weight:       Height:           Patient was given thefollowing medications:  Medications   apixaban (ELIQUIS) tablet 5 mg (has no administration in time range)   ferrous sulfate tablet 325 mg (has no administration in time range)   folic acid (FOLVITE) tablet 1 mg (has no administration in time range)   levothyroxine (SYNTHROID) tablet 100 mcg (has no administration in time range)   pantoprazole (PROTONIX) tablet 40 mg (has no administration in time range)   tamsulosin (FLOMAX) capsule 0.4 mg (has no administration in time range)   sodium chloride flush 0.9 % injection 10 mL (has no administration in time range)   sodium chloride flush 0.9 % injection 10 mL (has no administration in time range)   docusate sodium (COLACE) capsule 100 mg (has no administration in time range)   ondansetron (ZOFRAN) injection 4 mg (has no administration in time range)   0.9 % sodium chloride infusion (has no administration in time range)   acetaminophen (TYLENOL) tablet 650 mg (has no administration in time range)   doxycycline hyclate (VIBRA-TABS) tablet 100 mg (has no administration in time range)   ipratropium-albuterol (DUONEB) nebulizer solution 1 ampule (has no administration in time range)   albuterol (PROVENTIL) nebulizer solution 2.5 mg (has no administration in time range)   methylPREDNISolone sodium (SOLU-MEDROL) injection 40 mg (has no administration in time range)   chlordiazePOXIDE (LIBRIUM) capsule 10 mg (has no administration in time range)   0.9 % sodium chloride bolus (0 mLs Intravenous Stopped 4/8/19 1210)   ipratropium-albuterol (DUONEB) nebulizer solution 1 ampule (1 ampule Inhalation Given 4/8/19 1134)   0.9 % sodium chloride bolus (0 mLs Intravenous Stopped 4/8/19 1357)   predniSONE (DELTASONE) tablet 60 mg (60 mg Oral Given 4/8/19 1134)   magnesium sulfate 2 g in 50 mL IVPB premix (0 g Intravenous Stopped 4/8/19 1357)       ED COURSE & MEDICAL DECISION MAKING    Pertinent Labs & Imaging studies reviewed. (See chart for details)   -  Patient seen and evaluated in the emergency department. -  Triage and nursing notes reviewed and incorporated. -  Old chart records reviewed and incorporated. -  Patient case discussed with attending physician, Dr Travis Gates. They saw and examined patient. -  Differential diagnosis includes: benign positional vertigo, labyrinthitis/otitis, sepsis, dehydration/orthostasis, vasovagal reaction, stroke, TIA, intracranial bleed, migraine, anxiety, ACS/dysrhythmia, medication side effects, head trauma  -  Work-up included:  See above  -  ED treatment included:  IV fluids, duoneb, prednisone, magnesium  - Consults: hospitalist  -  Results discussed with patient. Labs show no leukocytosis, he has a hemoglobin of 9, hematocrit 26.6. CMP shows evidence of acute kidney injury with a BUN of 48, creatinine of 4.1, GFR 14. His troponin is 0.05 (repeat is 0.03), BNP is 727. Initial lactic acid is 4.2, this does decrease to 4.1 after IV fluids. Urine shows no evidence of infection. Imaging studies show no acute intracranial abnormality, chest x-ray shows bilateral pleural effusions that are improving. Given his acute kidney injury, his hypotension, his increased oxygen requirement, we do recommend admission for evaluation and management. The patient is agreeable with plan of care and disposition.  -  Disposition:  Admission        FINAL IMPRESSION      1. Acute renal injury ([address]    2.  COPD exacerbation (HCC)    3. Dehydration          DISPOSITION/PLAN

## 2019-04-08 NOTE — TELEPHONE ENCOUNTER
Writer contacted Dr. Lois Mckinley,  ED provider to inform of 30 day readmission risk. ED provider informed writer of readmission.

## 2019-04-08 NOTE — PROGRESS NOTES
4 Eyes Skin Assessment     The patient is being assess for   Admission    I agree that 2 RN's have performed a thorough Head to Toe Skin Assessment on the patient. ALL assessment sites listed below have been assessed. Areas assessed by both nurses: Gia Hinders   [x]   Head, Face, and Ears   [x]   Shoulders, Back, and Chest, Abdomen  [x]   Arms, Elbows, and Hands   [x]   Coccyx, Sacrum, and Ischium  [x]   Legs, Feet, and Heels        Scattered bruising and scabs. Dry BLE.     **SHARE this note so that the co-signing nurse is able to place an eSignature**    Co-signer eSignature:     Does the Patient have Skin Breakdown?   No          Bridger Prevention initiated:  No   Wound Care Orders initiated:  No      WOC nurse consulted for Pressure Injury (Stage 3,4, Unstageable, DTI, NWPT, Complex wounds)and New or Established Ostomies:  No      Primary Nurse eSignature: Electronically signed by Steffany Roblero RN on 4/8/19 at 6:34 PM

## 2019-04-08 NOTE — ED NOTES
Lab called to report PANIC lab    Lactic acid - 4.2    HILDA Cartwright made aware.       Airam Haro RN  04/08/19 3709

## 2019-04-08 NOTE — PLAN OF CARE
[address] [facility] [address] with tele   Recent admission with MCKENNA   Hx of CHF   Now with MCKENNA again with creat 4.1, continue slow IVF hydration   LA with out acute infectious source with improved PNA and pleural effusions  COPD AE  Pulm consult with acute on chronic hypoxic resp failure   ETOH abuse--Scheduled Librium   Elevated trop, no CP, likely 2/2 to MCKENNA, repeat trop pending

## 2019-04-08 NOTE — ED NOTES
Report given to Banner. Pt left ED in stable condition per w/c.      Diomedes Beckford RN  04/08/19 5065

## 2019-04-09 LAB
A/G RATIO: 1 (ref 1.1–2.2)
ALBUMIN SERPL-MCNC: 2.8 G/DL (ref 3.4–5)
ALP BLD-CCNC: 134 U/L (ref 40–129)
ALT SERPL-CCNC: 25 U/L (ref 10–40)
ANION GAP SERPL CALCULATED.3IONS-SCNC: 13 MMOL/L (ref 3–16)
AST SERPL-CCNC: 36 U/L (ref 15–37)
BASE EXCESS ARTERIAL: 7.7 MMOL/L (ref -3–3)
BASOPHILS ABSOLUTE: 0 K/UL (ref 0–0.2)
BASOPHILS RELATIVE PERCENT: 0.2 %
BILIRUB SERPL-MCNC: 0.5 MG/DL (ref 0–1)
BUN BLDV-MCNC: 49 MG/DL (ref 7–20)
CALCIUM SERPL-MCNC: 8.1 MG/DL (ref 8.3–10.6)
CARBOXYHEMOGLOBIN ARTERIAL: 0.7 % (ref 0–1.5)
CHLORIDE BLD-SCNC: 96 MMOL/L (ref 99–110)
CHLORIDE URINE RANDOM: <20 MMOL/L
CO2: 30 MMOL/L (ref 21–32)
CREAT SERPL-MCNC: 2.7 MG/DL (ref 0.8–1.3)
CREATININE URINE: 162.6 MG/DL (ref 39–259)
EOSINOPHILS ABSOLUTE: 0 K/UL (ref 0–0.6)
EOSINOPHILS RELATIVE PERCENT: 0 %
GFR AFRICAN AMERICAN: 28
GFR NON-AFRICAN AMERICAN: 23
GLOBULIN: 2.8 G/DL
GLUCOSE BLD-MCNC: 131 MG/DL (ref 70–99)
GLUCOSE BLD-MCNC: 145 MG/DL (ref 70–99)
GLUCOSE BLD-MCNC: 195 MG/DL (ref 70–99)
GLUCOSE BLD-MCNC: 197 MG/DL (ref 70–99)
HCO3 ARTERIAL: 33.2 MMOL/L (ref 21–29)
HCT VFR BLD CALC: 27.5 % (ref 40.5–52.5)
HEMOGLOBIN, ART, EXTENDED: 9.7 G/DL (ref 13.5–17.5)
HEMOGLOBIN: 9.3 G/DL (ref 13.5–17.5)
LACTIC ACID: 1.1 MMOL/L (ref 0.4–2)
LYMPHOCYTES ABSOLUTE: 0.4 K/UL (ref 1–5.1)
LYMPHOCYTES RELATIVE PERCENT: 4.2 %
MCH RBC QN AUTO: 35.1 PG (ref 26–34)
MCHC RBC AUTO-ENTMCNC: 33.8 G/DL (ref 31–36)
MCV RBC AUTO: 104 FL (ref 80–100)
METHEMOGLOBIN ARTERIAL: 0.4 %
MONOCYTES ABSOLUTE: 0.4 K/UL (ref 0–1.3)
MONOCYTES RELATIVE PERCENT: 4.4 %
NEUTROPHILS ABSOLUTE: 7.7 K/UL (ref 1.7–7.7)
NEUTROPHILS RELATIVE PERCENT: 91.2 %
O2 CONTENT ARTERIAL: 13 ML/DL
O2 SAT, ARTERIAL: 95.4 %
O2 THERAPY: ABNORMAL
OSMOLALITY URINE: 280 MOSM/KG (ref 390–1070)
PCO2 ARTERIAL: 52.1 MMHG (ref 35–45)
PDW BLD-RTO: 16.9 % (ref 12.4–15.4)
PERFORMED ON: ABNORMAL
PH ARTERIAL: 7.42 (ref 7.35–7.45)
PLATELET # BLD: 368 K/UL (ref 135–450)
PMV BLD AUTO: 8.6 FL (ref 5–10.5)
PO2 ARTERIAL: 76.8 MMHG (ref 75–108)
POTASSIUM REFLEX MAGNESIUM: 3.9 MMOL/L (ref 3.5–5.1)
POTASSIUM, UR: 29.5 MMOL/L
RBC # BLD: 2.64 M/UL (ref 4.2–5.9)
SODIUM BLD-SCNC: 139 MMOL/L (ref 136–145)
SODIUM URINE: <20 MMOL/L
TCO2 ARTERIAL: 34.8 MMOL/L
TOTAL PROTEIN: 5.6 G/DL (ref 6.4–8.2)
URINE CULTURE, ROUTINE: NORMAL
WBC # BLD: 8.5 K/UL (ref 4–11)

## 2019-04-09 PROCEDURE — 1200000000 HC SEMI PRIVATE

## 2019-04-09 PROCEDURE — 2580000003 HC RX 258: Performed by: PHYSICIAN ASSISTANT

## 2019-04-09 PROCEDURE — 2580000003 HC RX 258: Performed by: HOSPITALIST

## 2019-04-09 PROCEDURE — 36415 COLL VENOUS BLD VENIPUNCTURE: CPT

## 2019-04-09 PROCEDURE — 6370000000 HC RX 637 (ALT 250 FOR IP): Performed by: INTERNAL MEDICINE

## 2019-04-09 PROCEDURE — 80053 COMPREHEN METABOLIC PANEL: CPT

## 2019-04-09 PROCEDURE — 99233 SBSQ HOSP IP/OBS HIGH 50: CPT | Performed by: INTERNAL MEDICINE

## 2019-04-09 PROCEDURE — 36600 WITHDRAWAL OF ARTERIAL BLOOD: CPT

## 2019-04-09 PROCEDURE — 83605 ASSAY OF LACTIC ACID: CPT

## 2019-04-09 PROCEDURE — 6370000000 HC RX 637 (ALT 250 FOR IP): Performed by: PHYSICIAN ASSISTANT

## 2019-04-09 PROCEDURE — 85025 COMPLETE CBC W/AUTO DIFF WBC: CPT

## 2019-04-09 PROCEDURE — 99223 1ST HOSP IP/OBS HIGH 75: CPT | Performed by: INTERNAL MEDICINE

## 2019-04-09 PROCEDURE — 94762 N-INVAS EAR/PLS OXIMTRY CONT: CPT

## 2019-04-09 PROCEDURE — 6370000000 HC RX 637 (ALT 250 FOR IP): Performed by: HOSPITALIST

## 2019-04-09 PROCEDURE — 2700000000 HC OXYGEN THERAPY PER DAY

## 2019-04-09 PROCEDURE — 94640 AIRWAY INHALATION TREATMENT: CPT

## 2019-04-09 PROCEDURE — 83036 HEMOGLOBIN GLYCOSYLATED A1C: CPT

## 2019-04-09 PROCEDURE — 82803 BLOOD GASES ANY COMBINATION: CPT

## 2019-04-09 RX ORDER — LORAZEPAM 2 MG/1
4 TABLET ORAL
Status: DISCONTINUED | OUTPATIENT
Start: 2019-04-09 | End: 2019-04-10

## 2019-04-09 RX ORDER — LORAZEPAM 1 MG/1
1 TABLET ORAL
Status: DISCONTINUED | OUTPATIENT
Start: 2019-04-09 | End: 2019-04-10

## 2019-04-09 RX ORDER — DEXTROSE MONOHYDRATE 50 MG/ML
100 INJECTION, SOLUTION INTRAVENOUS PRN
Status: DISCONTINUED | OUTPATIENT
Start: 2019-04-09 | End: 2019-04-11 | Stop reason: HOSPADM

## 2019-04-09 RX ORDER — SODIUM CHLORIDE 0.9 % (FLUSH) 0.9 %
10 SYRINGE (ML) INJECTION PRN
Status: DISCONTINUED | OUTPATIENT
Start: 2019-04-09 | End: 2019-04-09 | Stop reason: SDUPTHER

## 2019-04-09 RX ORDER — DEXTROSE MONOHYDRATE 25 G/50ML
12.5 INJECTION, SOLUTION INTRAVENOUS PRN
Status: DISCONTINUED | OUTPATIENT
Start: 2019-04-09 | End: 2019-04-11 | Stop reason: HOSPADM

## 2019-04-09 RX ORDER — PREDNISONE 20 MG/1
40 TABLET ORAL DAILY
Status: DISCONTINUED | OUTPATIENT
Start: 2019-04-09 | End: 2019-04-11 | Stop reason: HOSPADM

## 2019-04-09 RX ORDER — SODIUM CHLORIDE 0.9 % (FLUSH) 0.9 %
10 SYRINGE (ML) INJECTION EVERY 12 HOURS SCHEDULED
Status: DISCONTINUED | OUTPATIENT
Start: 2019-04-09 | End: 2019-04-09 | Stop reason: SDUPTHER

## 2019-04-09 RX ORDER — NICOTINE POLACRILEX 4 MG
15 LOZENGE BUCCAL PRN
Status: DISCONTINUED | OUTPATIENT
Start: 2019-04-09 | End: 2019-04-11 | Stop reason: HOSPADM

## 2019-04-09 RX ORDER — LORAZEPAM 2 MG/1
2 TABLET ORAL
Status: DISCONTINUED | OUTPATIENT
Start: 2019-04-09 | End: 2019-04-10

## 2019-04-09 RX ADMIN — IPRATROPIUM BROMIDE AND ALBUTEROL SULFATE 1 AMPULE: .5; 3 SOLUTION RESPIRATORY (INHALATION) at 14:45

## 2019-04-09 RX ADMIN — FOLIC ACID 1 MG: 1 TABLET ORAL at 15:05

## 2019-04-09 RX ADMIN — DOXYCYCLINE HYCLATE 100 MG: 100 TABLET, COATED ORAL at 15:05

## 2019-04-09 RX ADMIN — IPRATROPIUM BROMIDE AND ALBUTEROL SULFATE 1 AMPULE: .5; 3 SOLUTION RESPIRATORY (INHALATION) at 06:51

## 2019-04-09 RX ADMIN — Medication 10 ML: at 21:32

## 2019-04-09 RX ADMIN — IPRATROPIUM BROMIDE AND ALBUTEROL SULFATE 1 AMPULE: .5; 3 SOLUTION RESPIRATORY (INHALATION) at 11:04

## 2019-04-09 RX ADMIN — PANTOPRAZOLE SODIUM 40 MG: 40 TABLET, DELAYED RELEASE ORAL at 06:03

## 2019-04-09 RX ADMIN — SODIUM CHLORIDE: 9 INJECTION, SOLUTION INTRAVENOUS at 21:32

## 2019-04-09 RX ADMIN — FERROUS SULFATE TAB 325 MG (65 MG ELEMENTAL FE) 325 MG: 325 (65 FE) TAB at 15:05

## 2019-04-09 RX ADMIN — APIXABAN 5 MG: 5 TABLET, FILM COATED ORAL at 15:05

## 2019-04-09 RX ADMIN — INSULIN LISPRO 1 UNITS: 100 INJECTION, SOLUTION INTRAVENOUS; SUBCUTANEOUS at 21:32

## 2019-04-09 RX ADMIN — DOCUSATE SODIUM 100 MG: 100 CAPSULE, LIQUID FILLED ORAL at 21:32

## 2019-04-09 RX ADMIN — PREDNISONE 40 MG: 20 TABLET ORAL at 15:05

## 2019-04-09 RX ADMIN — LORAZEPAM 3 MG: 2 TABLET ORAL at 04:37

## 2019-04-09 RX ADMIN — DOXYCYCLINE HYCLATE 100 MG: 100 TABLET, COATED ORAL at 21:32

## 2019-04-09 RX ADMIN — IPRATROPIUM BROMIDE AND ALBUTEROL SULFATE 1 AMPULE: .5; 3 SOLUTION RESPIRATORY (INHALATION) at 19:20

## 2019-04-09 RX ADMIN — SODIUM CHLORIDE: 9 INJECTION, SOLUTION INTRAVENOUS at 14:45

## 2019-04-09 RX ADMIN — TAMSULOSIN HYDROCHLORIDE 0.4 MG: 0.4 CAPSULE ORAL at 15:05

## 2019-04-09 RX ADMIN — IPRATROPIUM BROMIDE AND ALBUTEROL SULFATE 1 AMPULE: .5; 3 SOLUTION RESPIRATORY (INHALATION) at 22:50

## 2019-04-09 RX ADMIN — APIXABAN 5 MG: 5 TABLET, FILM COATED ORAL at 21:32

## 2019-04-09 RX ADMIN — LEVOTHYROXINE SODIUM 100 MCG: 100 TABLET ORAL at 15:05

## 2019-04-09 NOTE — PROGRESS NOTES
Pt resting in bed. No distress noted. Pt slightly alert to sound, but easily falls back to sleep. AM meds held until pt more alert. Telemetry shows NSR at this time and Sp02 97% on 4L.

## 2019-04-09 NOTE — PROGRESS NOTES
Pt's IV pulled out, new IV placed. Pt refusing to change into a gown. Pt states, \"I need two pockets, I'm keeping my shirt on\". This RN is noticing pt starting to exhibit s/s of ETOH withdraw. Pt has visible tremors at rest and is sweating. Pt also sounding wet, this RN assessed lungs, Crackles in both lung bases.  Message sent to MD

## 2019-04-09 NOTE — PROGRESS NOTES
Pt now able to be roused with difficulty. Falls right back asleep and remains lethargic. ALLISON Mclean alerted. No other changes at this time.

## 2019-04-09 NOTE — PROGRESS NOTES
Dr. Anand Hawk at bedside. Pt not responding to verbal stimuli. Order for ABGs and to call MD with results. Pt is responding to painful stimuli. CHarge nurse updated on pt condition. VS remain stable. Pulse ox added to telemetry. RT here to obtain ABGs. Bed alarm on, will monitor pt closely.

## 2019-04-09 NOTE — PROGRESS NOTES
Pt appears to be asleep as manifested by eyes being closed. No signs of distress noted. Call light within reach. Will continue to monitor.

## 2019-04-09 NOTE — H&P
Hospital Medicine History & Physical      PCP: Melvina Thacker MD    Date of Admission: 4/8/2019    Date of Service: Pt seen/examined on 4/8/2019 and Admitted to Inpatient with expected LOS greater than two midnights due to medical therapy. Chief Complaint:  sob      History Of Present Illness:       68 y.o. male presenting with sob. Patient states he has had worsening sob with home baseline O2 requirement of 3.5 lpm. Denies fevers, chills, cough, chest pain. Denies difficulty urinating, hesitancy, incontinence. Was found to have increased creatinine fro 3.0 to 4.1 and admitted with MCKENNA. Currently patient is in no respiratory distress, on O2 above baseline requirement. Past Medical History:          Diagnosis Date    Alcohol abuse     Alcohol abuse     ARF (acute renal failure) ([facility] 03/24/2019    Dr Bong Garnica [facility], [phone]    [facility] 3[facility]    Hypercholesteremia     Hypertension     Mediastinal adenopathy 2/14/2019    Pul[facility]ma ([facility] 8/29/2018       Past Surgical History:          Procedure Laterality Date    BACK SURGERY      fusion     SKIN BIOPSY      UPPER GASTROINTESTINAL ENDOSCOPY  1/10/2011       Medications Prior to Admission:      Prior to Admission medications    Medication Sig Start Date End Date Taking? Authorizing Provider   gabapentin (NEURONTIN) 300 MG capsule Take 300 mg by mouth 3 times daily.    Yes Historical Provider, MD   Multiple Vitamins-Minerals ([employer]S SPECTRAVITE SENIOR PO) Take by mouth   Yes Historical Provider, MD   furosemide (LASIX) 40 MG tablet Take 40 mg by mouth daily   Yes Historical Provider, MD   ipratropium-albuterol (DUONEB) 0.5-2.5 (3) MG/3ML SOLN nebulizer solution Inhale 3 mLs into the lungs every 4 hours as needed for Shortness of Breath 4/4/19  Yes Melvina Thacker MD   levothyroxine (SYNTHROID) 100 MCG tablet TAKE 1 TABLET BY MOUTH EVERY DAY 4/4/19  Yes Melvina Thacker MD tamsulosin (FLOMAX) 0.4 MG capsule Take 1 capsule by mouth daily 3/30/19  Yes Aniceto Sandhoff, MD   metoprolol succinate (TOPROL XL) 50 MG extended release tablet Take 1 tablet by mouth daily 3/30/19  Yes Aniceto Sandhoff, MD   omeprazole (PRILOSEC) 20 MG delayed release capsule TAKE 1 CAPSULE BY MOUTH EVERY DAY 3/27/19  Yes Gt Rod MD   Multiple Vitamins-Minerals (CVS SPECTRAVITE SENIOR) TABS TAKE 1 TABLET BY MOUTH EVERY DAY 3/20/19  Yes Gt Rod MD   losartan (COZAAR) 100 MG tablet TAKE 1 TABLET BY MOUTH EVERY DAY 3/11/19  Yes Gt Rod MD   apixaban (ELIQUIS) 5 MG TABS tablet Take 1 tablet by mouth 2 times daily  Patient taking differently: Take 10 mg by mouth 2 times daily  2/25/19  Yes Sydni Cali MD   folic acid (FOLVITE) 1 MG tablet Take 1 tablet by mouth daily 12/4/18  Yes Gt Rod MD   ferrous sulfate 325 (65 Fe) MG tablet Take 1 tablet by mouth daily (with breakfast) 12/4/18  Yes Gt Rod MD   SYMBICORT 80-4.5 MCG/ACT AERO INHALE 1 PUFF INTO LUNGS BY MOUTH TWICE A DAY 10/1/18  Yes Gt Rod MD   KLOR-CON M20 20 MEQ extended release tablet TAKE 1 TABLET BY MOUTH EVERY DAY 9/10/18  Yes Gt Rod MD   DULoxetine (CYMBALTA) 60 MG extended release capsule Take 1 capsule by mouth daily 5/8/18  Yes Gt Rod MD   vitamin D (CVS VITAMIN D3) 1000 units CAPS Take 1 capsule by mouth daily 5/8/18  Yes Gt Rod MD   albuterol sulfate HFA (PROVENTIL HFA) 108 (90 Base) MCG/ACT inhaler Inhale 2 puffs into the lungs every 6 hours as needed for Wheezing 4/13/18  Yes Gt Rod MD       Allergies:  Patient has no known allergies. Social History:         TOBACCO:   reports that he quit smoking about 17 years ago. He has a 150.00 pack-year smoking history. He has never used smokeless tobacco.  ETOH:   reports that he drinks about 50.4 oz of alcohol per week.       Family History:         Denies premature CAD        Problem Relation Age of Onset    Other Other         no early lung disease       REVIEW OF SYSTEMS:   Pertinent positives as noted in the HPI. All other systems reviewed and negative. PHYSICAL EXAM PERFORMED:    /64   Pulse 90   Temp 97.3 °F (36.3 °C) (Oral)   Resp 18   Ht 6' 1\" (1.854 m)   Wt 262 lb (118.8 kg)   SpO2 92%   BMI 34.57 kg/m²     General appearance:  No apparent distress, appears stated age and cooperative. HEENT:  Normal cephalic, atraumatic without obvious deformity. Pupils equal, round, and reactive to light. Extra ocular muscles intact. Conjunctivae/corneas clear. Neck: Supple, with full range of motion. No jugular venous distention. Trachea midline. Respiratory:  Normal respiratory effort. Clear to auscultation, bilaterally without Rales/Wheezes/Rhonchi. Cardiovascular:  Regular rate and rhythm with normal S1/S2 without murmurs, rubs or gallops. Abdomen: Soft, non-tender, non-distended with normal bowel sounds. Musculoskeletal:  No clubbing, cyanosis or edema bilaterally. Full range of motion without deformity. Skin: Skin color, texture, turgor normal.  No rashes or lesions. Neurologic: grossly non-focal.  Psychiatric:  Alert and oriented, thought content appropriate, normal insight  Capillary Refill: Brisk,< 3 seconds   Peripheral Pulses: +2 palpable, equal bilaterally       Labs:     Recent Labs     04/08/19  1046   WBC 9.8   HGB 9.0*   HCT 26.6*        Recent Labs     04/08/19  1046      K 3.9   CL 92*   CO2 28   BUN 48*   CREATININE 4.1*   CALCIUM 8.6     Recent Labs     04/08/19  1046   AST 36   ALT 25   BILITOT 0.5   ALKPHOS 138*     No results for input(s): INR in the last 72 hours.   Recent Labs     04/08/19  1046 04/08/19  1405   TROPONINI 0.05* 0.03*       Urinalysis:      Lab Results   Component Value Date    NITRU Negative 04/08/2019    WBCUA 20-50 04/08/2019    BACTERIA Rare 04/08/2019    RBCUA 0-2 04/08/2019    BLOODU

## 2019-04-09 NOTE — FLOWSHEET NOTE
04/08/19 2041   Vital Signs   Temp 97.6 °F (36.4 °C)   Temp Source Oral   Pulse 98   Heart Rate Source Monitor   Resp 18   BP (!) 114/56   BP Location Left upper arm   Patient Position Semi fowlers   Level of Consciousness 0   MEWS Score 1   Patient Currently in Pain Denies   Oxygen Therapy   SpO2 91 %   O2 Device Nasal cannula   O2 Flow Rate (L/min) 5 L/min     Shift assessment completed. See flow sheet. Respiration easy, even and non labored. VSS. Pt alert and oriented. Side rails up x 2. IVF infusing without complications. Pt denies any needs at this time. Call light within reach. Bed in low position. Will continue to monitor.  Chad Kim, RN

## 2019-04-09 NOTE — PROGRESS NOTES
Pt sitting up in bed eating dinner at this time. No distress noted. All needs and call light within reach. Bed check in place.

## 2019-04-09 NOTE — PROGRESS NOTES
RESPIRATORY THERAPY ASSESSMENT    Name:  James Corrigan Record Number:  [id number]  Age: 68 y.o. Gender: male  : 1945  Today's Date:  2019  Room:  [address] [place] [address]-    Assessment     Is the patient being admitted for a COPD or Asthma exacerbation? No   (If yes the patient will be seen every 4 hours for the first 24 hours and then reassessed)    Patient Admission Diagnosis      Allergies  No Known Allergies    Minimum Predicted Vital Capacity:     1251          Actual Vital Capacity:      1170              Pulmonary History: COPD  Home Oxygen Therapy:  3.5lpm  Home Respiratory Therapy: Duoneb Q4WA, Albuterol prn   Current Respiratory Therapy:   DUoneb Q4WA, Albuterol prn  Treatment Type: HHN  Medications: Albuterol/Ipratropium    Respiratory Severity Index(RSI)   Patients with orders for inhalation medications, oxygen, or any therapeutic treatment modality will be placed on Respiratory Protocol. They will be assessed with the first treatment and at least every 72 hours thereafter. The following severity scale will be used to determine frequency of treatment intervention. Smoking History: Mild Exacerbation = 3    Social History  Social History     Tobacco Use    Smoking status: Former Smoker     Packs/day: 3.00     Years: 50.00     Pack years: 150.00     Last attempt to quit: 3/10/2002     Years since quittin.0    Smokeless tobacco: Never Used   Substance Use Topics    Alcohol use:  Yes     Alcohol/week: 50.4 oz     Types: 84 Cans of beer per week     Comment: 12 beers a day and 1/2 liter of liquor    Drug use: No       Recent Surgical History: None = 0  Past Surgical History  Past Surgical History:   Procedure Laterality Date    BACK SURGERY      fusion     SKIN BIOPSY      UPPER GASTROINTESTINAL ENDOSCOPY  1/10/2011       Level of Consciousness: Alert, Oriented, and Cooperative = 0    Level of Activity: Mostly sedentary, minimal walking = 2    Respiratory Pattern: Dyspnea with exertion;Irregular pattern;or RR less than 6 = 2    Breath Sounds: Absent bilaterally and/or with wheezes = 3    Sputum  Sputum Color: Clear,  , Sputum How Obtained: Spontaneous cough  Cough: Strong, spontaneous, non-productive = 0    Vital Signs   BP (!) 106/54   Pulse 96   Temp 97.1 °F (36.2 °C) (Oral)   Resp 16   Ht 6' 1\" (1.854 m)   Wt 262 lb (118.8 kg)   SpO2 92%   BMI 34.57 kg/m²   SPO2 (COPD values may differ): 86-87% on room air or greater than 92% on FiO2 35- 50% = 3    Peak Flow (asthma only): not applicable = 0    RSI: 24-45 = Q6H or QID and Q4HPRN for dyspnea        Plan       Goals:  Medication delivery    Patient/caregiver was educated on the proper method of use for Respiratory Care Devices:  Yes      Level of patient/caregiver understanding able to:   ? Verbalize understanding   ? Demonstrate understanding       ? Teach back        ? Needs reinforcement       ? No available caregiver               ? Other:     Response to education:  Very Good     Is patient being placed on Home Treatment Regimen? Yes     Does the patient have everything they need prior to discharge? NA     Comments:  Chart reviewed, patient assessed    Plan of Care:  Duoneb Q4WA, Albuterol prn    Electronically signed by Angela Mir RCP on 4/8/2019 at 8:02 PM    Respiratory Protocol Guidelines     1. Assessment and treatment by Respiratory Therapy will be initiated for medication and therapeutic interventions upon initiation of aerosolized medication. 2. Physician will be contacted for respiratory rate (RR) greater than 35 breaths per minute. Therapy will be held for heart rate (HR) greater than 140 beats per minute, pending direction from physician. 3. Bronchodilators will be administered via Metered Dose Inhaler (MDI) with spacer when the following criteria are met:  a. Alert and cooperative     b. HR < 140 bpm  c. RR < 30 bpm                d. Can demonstrate a 2-3 second inspiratory hold  4.  Bronchodilators will be administered via Hand Held Nebulizer [facility]) to patients when ANY of the following criteria are met  a. Incognizant or uncooperative          b. Patients treated with HHN at Home        c. Unable to demonstrate proper use of MDI with spacer     d. RR > 30 bpm   5. Bronchodilators will be delivered via Metered Dose Inhaler (MDI), HHN, Aerogen to intubated patients on mechanical ventilation. 6. Inhalation medication orders will be delivered and/or substituted as outlined below. Aerosolized Medications Ordering and Administration Guidelines:    1. All Medications will be ordered by a physician, and their frequency and/or modality will be adjusted as defined by the patients Respiratory Severity Index (RSI) score. 2. If the patient does not have documented COPD, consider discontinuing anticholinergics when RSI is less than 9.  3. If the bronchospasm worsens (increased RSI), then the bronchodilator frequency can be increased to a maximum of every 4 hours. If greater than every 4 hours is required, the physician will be contacted. 4. If the bronchospasm improves, the frequency of the bronchodilator can be decreased, based on the patient's RSI, but not less than home treatment regimen frequency. 5. Bronchodilator(s) will be discontinued if patient has a RSI less than 9 and has received no scheduled or as needed treatment for 72  Hrs. Patients Ordered on a Mucolytic Agent:    1. Must always be administered with a bronchodilator. 2. Discontinue if patient experiences worsened bronchospasm, or secretions have lessened to the point that the patient is able to clear them with a cough. Anti-inflammatory and Combination Medications:    1. If the patient lacks prior history of lung disease, is not using inhaled anti-inflammatory medication at home, and lacks wheezing by examination or by history for at least 24 hours, contact physician for possible discontinuation. done

## 2019-04-09 NOTE — PROGRESS NOTES
IM Progress Note    Admit Date:  4/8/2019  1    Interval history:  Admitted by me for ARF, was in [facility] recently for PE and CHF ( 2 separate adm)     Significant alcohol use       Subjective:  Mr. Ash seen sleeping in bed, hard to arouse - Given ativan and librium overnight and now very drowsy  Remains on 4 L oxygen        Objective:   /72   Pulse 92   Temp 97 °F (36.1 °C) (Oral)   Resp 18   Ht 6' 1\" (1.854 m)   Wt 271 lb 14.4 oz (123.3 kg)   SpO2 95%   BMI 35.87 kg/m²       Intake/Output Summary (Last 24 hours) at 4/9/2019 0804  Last data filed at 4/9/2019 0443  Gross per 24 hour   Intake 1407 ml   Output 850 ml   Net 557 ml       Physical Exam:        General:  Elderly male, chronically sick appearing  Sleepy , unable to be aroused  Comfortable  . Appears to be not in any distress  Mucous Membranes:  Pink , anicteric  Right external ear canal with clotted blood   Neck: No JVD, no carotid bruit, no thyromegaly  Chest:  Clear to auscultation bilaterally, no added sounds  Cardiovascular:  RRR S1S2 heard, no murmurs or gallops  Abdomen:  Soft, undistended, non tender, no organomegaly, BS present  Extremities: improved jono Pedal edema .  Distal pulses well felt  Neurological : very drowsy and sedated      Medications:   Scheduled Medications:    apixaban  5 mg Oral BID    ferrous sulfate  325 mg Oral Daily with breakfast    folic acid  1 mg Oral Daily    levothyroxine  100 mcg Oral Daily    pantoprazole  40 mg Oral QAM AC    tamsulosin  0.4 mg Oral Daily    sodium chloride flush  10 mL Intravenous 2 times per day    docusate sodium  100 mg Oral BID    doxycycline hyclate  100 mg Oral 2 times per day    methylPREDNISolone  40 mg Intravenous Daily    chlordiazePOXIDE  10 mg Oral 4x Daily    ipratropium-albuterol  1 ampule Inhalation Q4H WA     I   sodium chloride 75 mL/hr at 04/09/19 0438     LORazepam **OR** LORazepam **OR** LORazepam **OR** LORazepam, sodium chloride flush, ondansetron, acetaminophen, albuterol    Lab Data:  Recent Labs     04/08/19  1046 04/09/19  0540   WBC 9.8 8.5   HGB 9.0* 9.3*   HCT 26.6* 27.5*   .7* 104.0*    368     Recent Labs     04/08/19  1046 04/09/19  0540    139   K 3.9 3.9   CL 92* 96*   CO2 28 30   BUN 48* 49*   CREATININE 4.1* 2.7*     Recent Labs     04/08/19  1046 04/08/19  1405   TROPONINI 0.05* 0.03*       Coagulation:   Lab Results   Component Value Date    INR 1.03 09/27/2017     Cardiac markers:   Lab Results   Component Value Date    TROPONINI 0.03 04/08/2019         Lab Results   Component Value Date    ALT 25 04/09/2019    AST 36 04/09/2019    ALKPHOS 134 (H) 04/09/2019    BILITOT 0.5 04/09/2019       Lab Results   Component Value Date    INR 1.03 09/27/2017    INR 0.97 03/10/2016    INR 1.03 11/29/2015    PROTIME 11.6 09/27/2017    PROTIME 11.1 03/10/2016    PROTIME 11.7 11/29/2015       Radiology    Chest xray   Improving bilateral pleural effusions and bibasilar airspace opacity over 10   days.  This is favored to represent improving congestive heart failure and   pulmonary edema.  Possibility of superimposed pneumonia in either lung base   cannot be excluded but felt to be less likely. Ct head  No acute intracranial abnormality.       Cerebral atrophy. Cultures:         Jaynee Dakins- 2/19   Summary   Limited for RV function.   The right ventricle is normal in size and function.   Right ventricular function appears same as echo on 0-.   Systolic pulmonic artery pressure (SPAP) is normal estimated at 27 mmHg   (Right atrial pressure of 3 mmHg).   The right atrium is mildly dilated.                   Assessment & Plan:    Acute on chronic hypoxic resp failure  - was placed on oxygen 3 L recently at [place] from [facility]  - now worse with Copd exacerbation - requiring 4-5 L   - started on steroids, HHN  - ABG with no significant hypercarbia  - hold sedative meds   - quit smoking >10 yrs- now off  - pulmonary fw      PE  - bilateral emboli 2/19   - now on ELIQUIS 5 mg bid   - has recurrent falls   - no nsaids, or ASA    ARF   - sec to over diuresis  - his lasix was doubled at last admission for fluid overload  - pt was noted to be dehydrated and increased creatinine in my office last week - advised to cut down but came to ER 3 days later      CHF - chronic diastolic   - holding diuretics for ARF      Fall with mulitiple rib fractures and T8 vertebral fracture  -  sp T6- 11 fusion 5/18 at [facility]  - pt continues to drink heavily- high risk for major bleeding now that he is on NOAC       Alochol abuse -severe  - high risk for delirium [place] - now on CIWA  - holding meds for drowsiness  - no interest in quittting. 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 On multiple meds. ,  Holding lasix, losartan, metoprolol for low BP    Anemia - worsening since last 2 weeks   - also placed on ELIQUIS.  Recent iron studies with mild iron def  - monitor and consider venofer      Elevated sugars - sec to steroids and also prediabetic  - montior with ssi   - decrease steroids if able      dVT prophylaxis  -on ELIQUIS  Poor short term prognosis         Mikki Pop MD 4/9/2019 8:04 AM

## 2019-04-09 NOTE — CARE COORDINATION
Case Management Assessment  Initial Evaluation    Date/Time of Evaluation: 4/9/2019 4:32 PM  Assessment Completed by: Bryce Chand    Patient Name: Dom Mathis  YOB: 1945  Diagnosis: Acute renal failure (ARF) [facility]) [N17.9]  Date / Time: 4/8/2019 10:23 AM  Admission status/Date:  Inpatient 4/8/19  Chart Reviewed: Yes      Patient Interviewed: Yes   Family Interviewed:  No      Hospitalization in the last 30 days:  Yes    Contacts  :     Relationship to Patient:   Phone Number:    Alternate Contact:     Relationship to Patient:     Phone Number:    Met with:    Current PCP  Marco Marcos required for SNF : Y        3 night stay required - [employer]  Support Systems: Children  Transportation: self    Meal Preparation: self    Housing  Home Environment: 2 story home w/daughter, resides on 1st fl  Steps: 9 in  Plans to Return to Present Housing: Yes  Other Identified Issues:     [employer]  Currently active with [employer] : No  Type of Home Care Services: None  Passport/Waiver : No  :                      Phone Number:    Passport/Waiver Services: none          Durable Medical Equipment   DME Provider:   Equipment:  Walker_x_Cane__RTS__ BSC__Shower Chair__  02_x_ HHN__ CPAP__  BiPap__  Hospital Bed__ W/C___ Other__________      Has Home O2 in place on admit:  Yes  Informed of need to bring portable home O2 tank on day of discharge for nursing to connect prior to leaving:   Yes  Verbalized agreement/Understanding:   Yes    Community Service Affiliation  Dialysis:  No    · Name:  · Location  · Dialysis Schedule:  · Phone:   · Fax: Outpatient PT/OT: No    Cancer Center: No     CHF Clinic: No     Pulmonary Rehab: No  Pain Clinic: No  Community Mental Health: No    Wound Clinic: No     Other:     DISCHARGE PLAN:   CM met with patient to discuss discharge plan.   He states he lives in two story home with his daughter but all his needs are met on the first floor. He states he is independent with all ADLs including driving. He states he has oxygen but thinks it is with [facility] and CM could not verify. CM suggested that home care would be beneficial for education and management of disease process. Patient refused home care. Will continue to observe for dc needs. Explained Case Management role/services.

## 2019-04-09 NOTE — FLOWSHEET NOTE
04/09/19 0432   CIWA-Ar   Nausea and Vomiting 1   Tactile Disturbances 0   Tremor 6   Auditory Disturbances 0   Paroxysmal Sweats 4   Visual Disturbances 0   Anxiety 3   Headache, Fullness in Head 0   Agitation 3   Orientation and Clouding of Sensorium 0   CIWA-Ar Total 17       PRN medication given see MAR. Will continue to monitor.  Supa Ca RN

## 2019-04-09 NOTE — PROGRESS NOTES
Daughter here at this time and given update on father. Per daughter, pts right ear does bleed often and years ago he had surgery to try and help with this. Dr. Catina Bennett made aware of this new info.

## 2019-04-09 NOTE — PLAN OF CARE
Problem: Falls - Risk of:  Goal: Will remain free from falls  Description  Will remain free from falls  Outcome: Ongoing     Problem: Infection:  Goal: Will remain free from infection  Description  Will remain free from infection  Outcome: Ongoing     Problem: OXYGENATION/RESPIRATORY FUNCTION  Goal: Patient will maintain patent airway  Outcome: Ongoing     Problem: ACTIVITY INTOLERANCE/IMPAIRED MOBILITY  Goal: Mobility/activity is maintained at optimum level for patient  Outcome: Ongoing

## 2019-04-09 NOTE — CONSULTS
 chlordiazePOXIDE  10 mg Oral 4x Daily    ipratropium-albuterol  1 ampule Inhalation Q4H WA     Continuous Infusions:   sodium chloride 75 mL/hr at 04/09/19 0438     PRN Meds:  LORazepam **OR** LORazepam **OR** LORazepam **OR** LORazepam, sodium chloride flush, ondansetron, acetaminophen, albuterol    ALLERGIES:  Patient has No Known Allergies. REVIEW OF SYSTEMS: Patient is lethargic and somnolent unable to obtain    PHYSICAL EXAM:  Blood pressure 125/72, pulse 92, temperature 97 °F (36.1 °C), temperature source Oral, resp. rate 18, height 6' 1\" (1.854 m), weight 271 lb 14.4 oz (123.3 kg), SpO2 95 %.' on 4 L O2  Gen: No distress. Eyes: PERRL. No sclera icterus. No conjunctival injection. ENT: No discharge. Pharynx clear. Neck: Trachea midline. No obvious mass. Resp: No accessory muscle use. No crackles. Minimal wheezes. No rhonchi. No dullness on percussion. CV: Regular rate. Regular rhythm. No murmur or rub. 1+ edema. GI: Non-tender. Non-distended. No hernia. Skin: Warm and dry. No nodule on exposed extremities. Lymph: No cervical LAD. No supraclavicular LAD. M/S: No cyanosis. No joint deformity. No clubbing. Neuro: Somnolent. Not following commands. Responsive to painful stimuli. Psych: No agitation or anxiety. LABS:  CBC:   Recent Labs     04/08/19  1046 04/09/19  0540   WBC 9.8 8.5   HGB 9.0* 9.3*   HCT 26.6* 27.5*   .7* 104.0*    368     BMP:   Recent Labs     04/08/19 1046 04/09/19  0540    139   K 3.9 3.9   CL 92* 96*   CO2 28 30   BUN 48* 49*   CREATININE 4.1* 2.7*     LIVER PROFILE:   Recent Labs     04/08/19 1046 04/09/19  0540   AST 36 36   ALT 25 25   BILITOT 0.5 0.5   ALKPHOS 138* 134*     PT/INR: No results for input(s): PROTIME, INR in the last 72 hours. APTT: No results for input(s): APTT in the last 72 hours.   UA:  Recent Labs     04/08/19 2114   COLORU Yellow   PHUR 5.5   WBCUA 20-50*   RBCUA 0-2   BACTERIA Rare*   CLARITYU Clear

## 2019-04-10 ENCOUNTER — TELEPHONE (OUTPATIENT)
Dept: PULMONOLOGY | Age: 74
End: 2019-04-10

## 2019-04-10 LAB
ANION GAP SERPL CALCULATED.3IONS-SCNC: 11 MMOL/L (ref 3–16)
BASOPHILS ABSOLUTE: 0 K/UL (ref 0–0.2)
BASOPHILS RELATIVE PERCENT: 0.1 %
BUN BLDV-MCNC: 48 MG/DL (ref 7–20)
CALCIUM SERPL-MCNC: 8.8 MG/DL (ref 8.3–10.6)
CHLORIDE BLD-SCNC: 98 MMOL/L (ref 99–110)
CO2: 32 MMOL/L (ref 21–32)
CREAT SERPL-MCNC: 2.2 MG/DL (ref 0.8–1.3)
EOSINOPHILS ABSOLUTE: 0 K/UL (ref 0–0.6)
EOSINOPHILS RELATIVE PERCENT: 0 %
ESTIMATED AVERAGE GLUCOSE: 122.6 MG/DL
GFR AFRICAN AMERICAN: 36
GFR NON-AFRICAN AMERICAN: 30
GLUCOSE BLD-MCNC: 116 MG/DL (ref 70–99)
GLUCOSE BLD-MCNC: 120 MG/DL (ref 70–99)
GLUCOSE BLD-MCNC: 136 MG/DL (ref 70–99)
GLUCOSE BLD-MCNC: 161 MG/DL (ref 70–99)
GLUCOSE BLD-MCNC: 177 MG/DL (ref 70–99)
HBA1C MFR BLD: 5.9 %
HCT VFR BLD CALC: 26.9 % (ref 40.5–52.5)
HEMOGLOBIN: 9.2 G/DL (ref 13.5–17.5)
LYMPHOCYTES ABSOLUTE: 0.4 K/UL (ref 1–5.1)
LYMPHOCYTES RELATIVE PERCENT: 4.4 %
MCH RBC QN AUTO: 35.6 PG (ref 26–34)
MCHC RBC AUTO-ENTMCNC: 34 G/DL (ref 31–36)
MCV RBC AUTO: 104.6 FL (ref 80–100)
MONOCYTES ABSOLUTE: 0.4 K/UL (ref 0–1.3)
MONOCYTES RELATIVE PERCENT: 4.1 %
NEUTROPHILS ABSOLUTE: 9.1 K/UL (ref 1.7–7.7)
NEUTROPHILS RELATIVE PERCENT: 91.4 %
PDW BLD-RTO: 16.9 % (ref 12.4–15.4)
PERFORMED ON: ABNORMAL
PLATELET # BLD: 380 K/UL (ref 135–450)
PMV BLD AUTO: 8.8 FL (ref 5–10.5)
POTASSIUM SERPL-SCNC: 4.2 MMOL/L (ref 3.5–5.1)
RBC # BLD: 2.57 M/UL (ref 4.2–5.9)
SODIUM BLD-SCNC: 141 MMOL/L (ref 136–145)
WBC # BLD: 9.9 K/UL (ref 4–11)

## 2019-04-10 PROCEDURE — 2500000003 HC RX 250 WO HCPCS: Performed by: INTERNAL MEDICINE

## 2019-04-10 PROCEDURE — 2700000000 HC OXYGEN THERAPY PER DAY

## 2019-04-10 PROCEDURE — 99233 SBSQ HOSP IP/OBS HIGH 50: CPT | Performed by: INTERNAL MEDICINE

## 2019-04-10 PROCEDURE — 6370000000 HC RX 637 (ALT 250 FOR IP): Performed by: INTERNAL MEDICINE

## 2019-04-10 PROCEDURE — 2580000003 HC RX 258: Performed by: HOSPITALIST

## 2019-04-10 PROCEDURE — 1200000000 HC SEMI PRIVATE

## 2019-04-10 PROCEDURE — 6370000000 HC RX 637 (ALT 250 FOR IP): Performed by: HOSPITALIST

## 2019-04-10 PROCEDURE — 80048 BASIC METABOLIC PNL TOTAL CA: CPT

## 2019-04-10 PROCEDURE — 36415 COLL VENOUS BLD VENIPUNCTURE: CPT

## 2019-04-10 PROCEDURE — 2580000003 HC RX 258: Performed by: PHYSICIAN ASSISTANT

## 2019-04-10 PROCEDURE — 94761 N-INVAS EAR/PLS OXIMETRY MLT: CPT

## 2019-04-10 PROCEDURE — 2580000003 HC RX 258: Performed by: INTERNAL MEDICINE

## 2019-04-10 PROCEDURE — 6360000002 HC RX W HCPCS: Performed by: INTERNAL MEDICINE

## 2019-04-10 PROCEDURE — 6370000000 HC RX 637 (ALT 250 FOR IP): Performed by: PHYSICIAN ASSISTANT

## 2019-04-10 PROCEDURE — 99232 SBSQ HOSP IP/OBS MODERATE 35: CPT | Performed by: INTERNAL MEDICINE

## 2019-04-10 PROCEDURE — 94640 AIRWAY INHALATION TREATMENT: CPT

## 2019-04-10 PROCEDURE — 85025 COMPLETE CBC W/AUTO DIFF WBC: CPT

## 2019-04-10 RX ORDER — CHLORDIAZEPOXIDE HYDROCHLORIDE 5 MG/1
5 CAPSULE, GELATIN COATED ORAL 3 TIMES DAILY
Status: DISCONTINUED | OUTPATIENT
Start: 2019-04-10 | End: 2019-04-11 | Stop reason: HOSPADM

## 2019-04-10 RX ADMIN — Medication 10 ML: at 20:55

## 2019-04-10 RX ADMIN — IPRATROPIUM BROMIDE AND ALBUTEROL SULFATE 1 AMPULE: .5; 3 SOLUTION RESPIRATORY (INHALATION) at 15:14

## 2019-04-10 RX ADMIN — IPRATROPIUM BROMIDE AND ALBUTEROL SULFATE 1 AMPULE: .5; 3 SOLUTION RESPIRATORY (INHALATION) at 07:23

## 2019-04-10 RX ADMIN — PANTOPRAZOLE SODIUM 40 MG: 40 TABLET, DELAYED RELEASE ORAL at 06:25

## 2019-04-10 RX ADMIN — APIXABAN 5 MG: 5 TABLET, FILM COATED ORAL at 08:15

## 2019-04-10 RX ADMIN — IPRATROPIUM BROMIDE AND ALBUTEROL SULFATE 1 AMPULE: .5; 3 SOLUTION RESPIRATORY (INHALATION) at 18:56

## 2019-04-10 RX ADMIN — FERROUS SULFATE TAB 325 MG (65 MG ELEMENTAL FE) 325 MG: 325 (65 FE) TAB at 08:15

## 2019-04-10 RX ADMIN — APIXABAN 5 MG: 5 TABLET, FILM COATED ORAL at 20:55

## 2019-04-10 RX ADMIN — LORAZEPAM 1 MG: 1 TABLET ORAL at 08:25

## 2019-04-10 RX ADMIN — DOXYCYCLINE HYCLATE 100 MG: 100 TABLET, COATED ORAL at 20:54

## 2019-04-10 RX ADMIN — PREDNISONE 40 MG: 20 TABLET ORAL at 08:15

## 2019-04-10 RX ADMIN — SODIUM CHLORIDE: 9 INJECTION, SOLUTION INTRAVENOUS at 20:55

## 2019-04-10 RX ADMIN — INSULIN LISPRO 1 UNITS: 100 INJECTION, SOLUTION INTRAVENOUS; SUBCUTANEOUS at 20:55

## 2019-04-10 RX ADMIN — INSULIN LISPRO 1 UNITS: 100 INJECTION, SOLUTION INTRAVENOUS; SUBCUTANEOUS at 17:11

## 2019-04-10 RX ADMIN — IPRATROPIUM BROMIDE AND ALBUTEROL SULFATE 1 AMPULE: .5; 3 SOLUTION RESPIRATORY (INHALATION) at 10:55

## 2019-04-10 RX ADMIN — CHLORDIAZEPOXIDE HYDROCHLORIDE 5 MG: 5 CAPSULE ORAL at 17:11

## 2019-04-10 RX ADMIN — CHLORDIAZEPOXIDE HYDROCHLORIDE 5 MG: 5 CAPSULE ORAL at 20:54

## 2019-04-10 RX ADMIN — TAMSULOSIN HYDROCHLORIDE 0.4 MG: 0.4 CAPSULE ORAL at 08:15

## 2019-04-10 RX ADMIN — LEVOTHYROXINE SODIUM 100 MCG: 100 TABLET ORAL at 08:16

## 2019-04-10 RX ADMIN — FOLIC ACID: 5 INJECTION, SOLUTION INTRAMUSCULAR; INTRAVENOUS; SUBCUTANEOUS at 10:35

## 2019-04-10 RX ADMIN — DOXYCYCLINE HYCLATE 100 MG: 100 TABLET, COATED ORAL at 08:15

## 2019-04-10 RX ADMIN — FOLIC ACID 1 MG: 1 TABLET ORAL at 08:15

## 2019-04-10 RX ADMIN — Medication 10 ML: at 08:16

## 2019-04-10 NOTE — PROGRESS NOTES
Pt up to [facility] and appears in no distress. Pt denies any needs at this time. Will continue to monitor. Call light within reach. Report given to [facility] for transfer of care.

## 2019-04-10 NOTE — TELEPHONE ENCOUNTER
Inpatient Notes   Received:  Today   Message Contents   Abdias Turcios MD  [place] Usman Castro MA             CT chest 2-4 weeks without contrast and follow-up

## 2019-04-10 NOTE — PROGRESS NOTES
Recent Labs     04/08/19  1046 04/09/19  0540 04/10/19  0609   WBC 9.8 8.5 9.9   HGB 9.0* 9.3* 9.2*   HCT 26.6* 27.5* 26.9*   .7* 104.0* 104.6*    368 380     BMP:   Recent Labs     04/08/19  1046 04/09/19  0540 04/10/19  0609    139 141   K 3.9 3.9 4.2   CL 92* 96* 98*   CO2 28 30 32   BUN 48* 49* 48*   CREATININE 4.1* 2.7* 2.2*     LIVER PROFILE:   Recent Labs     04/08/19  1046 04/09/19  0540   AST 36 36   ALT 25 25   BILITOT 0.5 0.5   ALKPHOS 138* 134*     PT/INR: No results for input(s): PROTIME, INR in the last 72 hours. APTT: No results for input(s): APTT in the last 72 hours.   UA:  Recent Labs     04/08/19  2114   COLORU Yellow   PHUR 5.5   WBCUA 20-50*   RBCUA 0-2   BACTERIA Rare*   CLARITYU Clear   SPECGRAV 1.010   LEUKOCYTESUR TRACE*   UROBILINOGEN 0.2   BILIRUBINUR Negative   BLOODU Negative   GLUCOSEU Negative     Recent Labs     04/08/19  1243 04/09/19  0817   PHART 7.425 7.422   CFY7ZFR 43.6 52.1*   PO2ART 74.5* 76.8     Cultures:   Blood culture negative  Urine culture negative    Films:  Chest x-ray 4/8/19 improving bilateral effusion with residual airspace disease        CT head 4/8/19 no acute intracranial abnormalities        ASSESSMENT:  · Acute hypoxemic respiratory failure   · Acute encephalopathy/Metabolic encephalopathy- likely due to sedatives   · COPD with acute exacerbation  · Acute kidney injury  · Diastolic heart failure  · Alcohol abuse- 10-15 beers a day and half a liter whiskey  · PE 2/14/19 on Eliquis  · Mediastinal/hilar adenopathy on CT 2/14/19  · > 100-pack-year history of smoking        PLAN:  · Continuous pulse ox monitoring and telemetry  · Supplemental oxygen to maintain SaO2 >92%; wean as tolerated  · Intensive inhaled bronchodilator therapy  · D/C Solu Medrol and start Prednisone taper   · Doxycycline day #2  · Acapella QID and Mucinex BID  · Continue Eliquis  · Will need CT chest in 2-4 weeks as an outpatient follow-up on adenopathy (Office is notified)

## 2019-04-10 NOTE — PROGRESS NOTES
IM Progress Note    Admit Date:  4/8/2019  2    Interval history:  Admitted by me for ARF, was in [facility] recently for PE and CHF ( 2 separate adm)     Significant alcohol use   Improved mentation with holding CIWA     Subjective:    Mr. Hailey Clement seen wide awake in bed today  . Reports feeling fine today   No sob. Wheezing resolved  Remains on 3 L       Objective:   /77   Pulse 91   Temp 97.3 °F (36.3 °C) (Oral)   Resp 16   Ht 6' 1\" (1.854 m)   Wt 274 lb 4 oz (124.4 kg)   SpO2 94%   BMI 36.18 kg/m²       Intake/Output Summary (Last 24 hours) at 4/10/2019 0751  Last data filed at 4/10/2019 0314  Gross per 24 hour   Intake 470 ml   Output 1425 ml   Net -955 ml       Physical Exam:        General:  Elderly male, chronically sick appearing  Non focal today . Awake, alert and oriented  ,   Comfortable  . Appears to be not in any distress  Mucous Membranes:  Pink , anicteric  Neck: No JVD, no carotid bruit, no thyromegaly  Chest:  Clear to auscultation bilaterally, no added sounds  Cardiovascular:  RRR S1S2 heard, no murmurs or gallops  Abdomen:  Soft, undistended, non tender, no organomegaly, BS present  Extremities: improved jono Pedal edema .  Worsening upper ext edema  Distal pulses well felt  Neurological  - mild tremulous upper ext  Non focal     Medications:   Scheduled Medications:    predniSONE  40 mg Oral Daily    insulin lispro  0-6 Units Subcutaneous TID WC    insulin lispro  0-3 Units Subcutaneous Nightly    apixaban  5 mg Oral BID    ferrous sulfate  325 mg Oral Daily with breakfast    folic acid  1 mg Oral Daily    levothyroxine  100 mcg Oral Daily    pantoprazole  40 mg Oral QAM AC    tamsulosin  0.4 mg Oral Daily    sodium chloride flush  10 mL Intravenous 2 times per day    docusate sodium  100 mg Oral BID    doxycycline hyclate  100 mg Oral 2 times per day    ipratropium-albuterol  1 ampule Inhalation Q4H WA     I   dextrose      sodium chloride 75 mL/hr at 04/09/19 2132     LORazepam **OR** LORazepam **OR** LORazepam **OR** LORazepam, glucose, dextrose, glucagon (rDNA), dextrose, sodium chloride flush, ondansetron, acetaminophen, albuterol    Lab Data:  Recent Labs     04/08/19  1046 04/09/19  0540 04/10/19  0609   WBC 9.8 8.5 9.9   HGB 9.0* 9.3* 9.2*   HCT 26.6* 27.5* 26.9*   .7* 104.0* 104.6*    368 380     Recent Labs     04/08/19  1046 04/09/19  0540 04/10/19  0609    139 141   K 3.9 3.9 4.2   CL 92* 96* 98*   CO2 28 30 32   BUN 48* 49* 48*   CREATININE 4.1* 2.7* 2.2*     Recent Labs     04/08/19  1046 04/08/19  1405   TROPONINI 0.05* 0.03*       Coagulation:   Lab Results   Component Value Date    INR 1.03 09/27/2017     Cardiac markers:   Lab Results   Component Value Date    TROPONINI 0.03 04/08/2019         Lab Results   Component Value Date    ALT 25 04/09/2019    AST 36 04/09/2019    ALKPHOS 134 (H) 04/09/2019    BILITOT 0.5 04/09/2019       Lab Results   Component Value Date    INR 1.03 09/27/2017    INR 0.97 03/10/2016    INR 1.03 11/29/2015    PROTIME 11.6 09/27/2017    PROTIME 11.1 03/10/2016    PROTIME 11.7 11/29/2015       Radiology    Chest xray   Improving bilateral pleural effusions and bibasilar airspace opacity over 10   days.  This is favored to represent improving congestive heart failure and   pulmonary edema.  Possibility of superimposed pneumonia in either lung base   cannot be excluded but felt to be less likely. Ct head  No acute intracranial abnormality.       Cerebral atrophy. Cultures:         Viki Boucher- 2/19   Summary   Limited for RV function.   The right ventricle is normal in size and function.   Right ventricular function appears same as echo on 7-.   Systolic pulmonic artery pressure (SPAP) is normal estimated at 27 mmHg   (Right atrial pressure of 3 mmHg).   The right atrium is mildly dilated.                   Assessment & Plan:    Acute on chronic hypoxic resp failure  - was placed on oxygen 3 L recently at dc from [facility]  - worse with Copd exacerbation - requiring 4-5 L- improving   - started on steroids, HHN  - ABG with no significant hypercarbia  - hold sedative meds   - quit smoking >10 yrs- now off  - pulmonary fw      PE  - Acute  bilateral emboli 2/19     - now on ELIQUIS 5 mg bid   - has recurrent falls   - no nsaids, or ASA    ARF   - sec to over diuresis  - his lasix was doubled at last admission for fluid overload  - pt was noted to be dehydrated and increased creatinine in my office last week - advised to cut down but came to ER 3 days later   - creatinine upto 4 , now down to 2.2 with IVF      CHF - chronic diastolic   - holding diuretics for ARF      Fall with mulitiple rib fractures and T8 vertebral fracture  -  sp T6- 11 fusion 5/18 at [facility]  - pt continues to drink heavily- high risk for major bleeding now that he is on NOAC       Alochol abuse -severe  - high risk for delirium Tremens - now on CIWA  - no interest in quittting. 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 On multiple meds. ,  Holding lasix, losartan, metoprolol for low BP    Anemia - worsening since last 2 weeks   - also placed on ELIQUIS.  Recent iron studies with mild iron def  - monitor and consider venofer      Elevated sugars - sec to steroids and also prediabetic  - montior with ssi   - decrease steroids if able      dVT prophylaxis  -on ELIQUIS  Poor short term prognosis       Star pt  D/w daughter     Cody Jackson MD 4/10/2019 7:51 AM

## 2019-04-10 NOTE — PROGRESS NOTES
Shift assessment completed, see flowsheets. AM meds given per orders. CIWA score 10, prn medications given per orders. Patient denies pain or further needs at this time. Patient currently awake in bed, call light and personal belongings within reach.

## 2019-04-10 NOTE — CARE COORDINATION
INTERDISCIPLINARY PLAN OF CARE CONFERENCE    Date/Time: 4/10/2019 3:44 PM  Completed by: Rojelio Martins Case Management      Patient Name:  Forest Leon  YOB: 1945  Admitting Diagnosis: Acute renal failure (ARF) ([facility] [address] [facility] [N17.9]     Admit Date/Time:  4/8/2019 10:23 AM    Chart reviewed. Interdisciplinary team met to discuss patient progress and discharge plans. Disciplines included Case Management, Nursing, and Dietitian. Current Status:stable    PT/OT recommendation:tbd    Anticipated Discharge Date: tbd  Expected D/C Disposition:  Home  Confirmed plan with patient and/or family Yes-pt  Discharge Plan Comments: Chart reviewed and role of dcp explained. Pt continues with plan to return home with daughter. Pt remians unsure of O2 provider but states he will have portable tank brought from home at discharge. Refusing HHC at this time. Following. Home O2 in place on admit: Yes  Pt informed of need to bring portable home O2 tank on day of discharge for nursing to connect prior to leaving:  Yes  Verbalized agreement/Understanding:   Yes

## 2019-04-11 VITALS
TEMPERATURE: 97.7 F | HEIGHT: 73 IN | WEIGHT: 282.13 LBS | HEART RATE: 86 BPM | DIASTOLIC BLOOD PRESSURE: 87 MMHG | RESPIRATION RATE: 18 BRPM | BODY MASS INDEX: 37.39 KG/M2 | SYSTOLIC BLOOD PRESSURE: 148 MMHG | OXYGEN SATURATION: 95 %

## 2019-04-11 LAB
ANION GAP SERPL CALCULATED.3IONS-SCNC: 11 MMOL/L (ref 3–16)
ANISOCYTOSIS: ABNORMAL
BASOPHILS ABSOLUTE: 0 K/UL (ref 0–0.2)
BASOPHILS RELATIVE PERCENT: 0 %
BUN BLDV-MCNC: 38 MG/DL (ref 7–20)
CALCIUM SERPL-MCNC: 8.4 MG/DL (ref 8.3–10.6)
CHLORIDE BLD-SCNC: 101 MMOL/L (ref 99–110)
CO2: 29 MMOL/L (ref 21–32)
CREAT SERPL-MCNC: 1.6 MG/DL (ref 0.8–1.3)
EOSINOPHILS ABSOLUTE: 0 K/UL (ref 0–0.6)
EOSINOPHILS RELATIVE PERCENT: 0 %
GFR AFRICAN AMERICAN: 52
GFR NON-AFRICAN AMERICAN: 43
GLUCOSE BLD-MCNC: 79 MG/DL (ref 70–99)
GLUCOSE BLD-MCNC: 94 MG/DL (ref 70–99)
GLUCOSE BLD-MCNC: 99 MG/DL (ref 70–99)
HCT VFR BLD CALC: 25.9 % (ref 40.5–52.5)
HEMOGLOBIN: 8.7 G/DL (ref 13.5–17.5)
HYPOCHROMIA: ABNORMAL
LYMPHOCYTES ABSOLUTE: 1.1 K/UL (ref 1–5.1)
LYMPHOCYTES RELATIVE PERCENT: 11 %
MCH RBC QN AUTO: 35.4 PG (ref 26–34)
MCHC RBC AUTO-ENTMCNC: 33.8 G/DL (ref 31–36)
MCV RBC AUTO: 105 FL (ref 80–100)
MONOCYTES ABSOLUTE: 0.5 K/UL (ref 0–1.3)
MONOCYTES RELATIVE PERCENT: 5 %
MYELOCYTE PERCENT: 2 %
NEUTROPHILS ABSOLUTE: 8.1 K/UL (ref 1.7–7.7)
NEUTROPHILS RELATIVE PERCENT: 82 %
OVALOCYTES: ABNORMAL
PDW BLD-RTO: 16.9 % (ref 12.4–15.4)
PERFORMED ON: NORMAL
PERFORMED ON: NORMAL
PLATELET # BLD: 346 K/UL (ref 135–450)
PLATELET SLIDE REVIEW: ADEQUATE
PMV BLD AUTO: 8.6 FL (ref 5–10.5)
POIKILOCYTES: ABNORMAL
POLYCHROMASIA: ABNORMAL
POTASSIUM SERPL-SCNC: 3.8 MMOL/L (ref 3.5–5.1)
RBC # BLD: 2.47 M/UL (ref 4.2–5.9)
SLIDE REVIEW: ABNORMAL
SODIUM BLD-SCNC: 141 MMOL/L (ref 136–145)
STOMATOCYTES: ABNORMAL
TARGET CELLS: ABNORMAL
TEAR DROP CELLS: ABNORMAL
WBC # BLD: 9.7 K/UL (ref 4–11)

## 2019-04-11 PROCEDURE — 36415 COLL VENOUS BLD VENIPUNCTURE: CPT

## 2019-04-11 PROCEDURE — 85025 COMPLETE CBC W/AUTO DIFF WBC: CPT

## 2019-04-11 PROCEDURE — 80048 BASIC METABOLIC PNL TOTAL CA: CPT

## 2019-04-11 PROCEDURE — 6370000000 HC RX 637 (ALT 250 FOR IP): Performed by: PHYSICIAN ASSISTANT

## 2019-04-11 PROCEDURE — 94761 N-INVAS EAR/PLS OXIMETRY MLT: CPT

## 2019-04-11 PROCEDURE — 6360000002 HC RX W HCPCS: Performed by: INTERNAL MEDICINE

## 2019-04-11 PROCEDURE — 6370000000 HC RX 637 (ALT 250 FOR IP): Performed by: INTERNAL MEDICINE

## 2019-04-11 PROCEDURE — 2580000003 HC RX 258: Performed by: PHYSICIAN ASSISTANT

## 2019-04-11 PROCEDURE — 97161 PT EVAL LOW COMPLEX 20 MIN: CPT

## 2019-04-11 PROCEDURE — 97166 OT EVAL MOD COMPLEX 45 MIN: CPT

## 2019-04-11 PROCEDURE — 2700000000 HC OXYGEN THERAPY PER DAY

## 2019-04-11 PROCEDURE — 97530 THERAPEUTIC ACTIVITIES: CPT

## 2019-04-11 PROCEDURE — 99232 SBSQ HOSP IP/OBS MODERATE 35: CPT | Performed by: INTERNAL MEDICINE

## 2019-04-11 PROCEDURE — 94640 AIRWAY INHALATION TREATMENT: CPT

## 2019-04-11 PROCEDURE — 99238 HOSP IP/OBS DSCHRG MGMT 30/<: CPT | Performed by: INTERNAL MEDICINE

## 2019-04-11 RX ORDER — FUROSEMIDE 10 MG/ML
20 INJECTION INTRAMUSCULAR; INTRAVENOUS ONCE
Status: COMPLETED | OUTPATIENT
Start: 2019-04-11 | End: 2019-04-11

## 2019-04-11 RX ORDER — PREDNISONE 10 MG/1
TABLET ORAL
Qty: 18 TABLET | Refills: 0 | Status: ON HOLD | OUTPATIENT
Start: 2019-04-11 | End: 2019-04-30

## 2019-04-11 RX ORDER — FUROSEMIDE 40 MG/1
20 TABLET ORAL DAILY
Qty: 60 TABLET | Refills: 3 | Status: ON HOLD
Start: 2019-04-11 | End: 2019-04-30

## 2019-04-11 RX ORDER — DOXYCYCLINE HYCLATE 100 MG
100 TABLET ORAL EVERY 12 HOURS SCHEDULED
Qty: 6 TABLET | Refills: 0 | Status: SHIPPED | OUTPATIENT
Start: 2019-04-11 | End: 2019-04-15 | Stop reason: ALTCHOICE

## 2019-04-11 RX ADMIN — TAMSULOSIN HYDROCHLORIDE 0.4 MG: 0.4 CAPSULE ORAL at 09:55

## 2019-04-11 RX ADMIN — APIXABAN 5 MG: 5 TABLET, FILM COATED ORAL at 09:54

## 2019-04-11 RX ADMIN — LEVOTHYROXINE SODIUM 100 MCG: 100 TABLET ORAL at 09:54

## 2019-04-11 RX ADMIN — METOPROLOL TARTRATE 25 MG: 25 TABLET ORAL at 09:54

## 2019-04-11 RX ADMIN — Medication 10 ML: at 10:00

## 2019-04-11 RX ADMIN — IPRATROPIUM BROMIDE AND ALBUTEROL SULFATE 1 AMPULE: .5; 3 SOLUTION RESPIRATORY (INHALATION) at 11:11

## 2019-04-11 RX ADMIN — CHLORDIAZEPOXIDE HYDROCHLORIDE 5 MG: 5 CAPSULE ORAL at 09:55

## 2019-04-11 RX ADMIN — PREDNISONE 40 MG: 20 TABLET ORAL at 09:55

## 2019-04-11 RX ADMIN — IPRATROPIUM BROMIDE AND ALBUTEROL SULFATE 1 AMPULE: .5; 3 SOLUTION RESPIRATORY (INHALATION) at 06:51

## 2019-04-11 RX ADMIN — FERROUS SULFATE TAB 325 MG (65 MG ELEMENTAL FE) 325 MG: 325 (65 FE) TAB at 09:55

## 2019-04-11 RX ADMIN — PANTOPRAZOLE SODIUM 40 MG: 40 TABLET, DELAYED RELEASE ORAL at 06:23

## 2019-04-11 RX ADMIN — FUROSEMIDE 20 MG: 10 INJECTION, SOLUTION INTRAVENOUS at 09:54

## 2019-04-11 RX ADMIN — DOXYCYCLINE HYCLATE 100 MG: 100 TABLET, COATED ORAL at 09:54

## 2019-04-11 RX ADMIN — IPRATROPIUM BROMIDE AND ALBUTEROL SULFATE 1 AMPULE: .5; 3 SOLUTION RESPIRATORY (INHALATION) at 00:13

## 2019-04-11 NOTE — PROGRESS NOTES
Inpatient Physical Therapy Evaluation and Treatment    Unit: 2w  Date:  2019  Patient Name:    Elvis Tomlinson  Admitting diagnosis:  Ac[facility] [N17.9]  Admit Date:  2019  Precautions/Restrictions/WB Status/ Lines/ Wounds/ Oxygen: Fall Risk, IV, 4L O2    Treatment Time:  3921-5889  Treatment Number:  1   Timed Code Treatment Minutes: 10 minutes  Total Treatment Minutes:  20  minutes    Patient Goals for Therapy: \" to go home \"        Discharge Recommendations: Home with  assist and Home Therapy  DME needs for discharge: Needs Met    Home Health S4 Level Recommendation:  Level 1 Standard  AM-PAC Mobility Score    AM-PAC Inpatient Mobility Raw Score : 20       Preadmission Environment    Pt. Lives With Family- dtr-  Can be with pt or son who lives close to pt  Home environment:    two story home- Pt stays on the first floor  Steps to enter first floor:   9  Steps to enter with railing  Steps to second floor: Full flight of 13  Bathroom: Bath Tub Shower, Grab bars, Shower Chair  and Standard height toilet  Equipment owned: [facility], [facility], home O2 (3.5 L) continuous, pulse ox and Other:  nebulizer, inhalers     Preadmission Status:  History of falls             No  Pt. Able to drive          Yes  Pt Fully independent with ADL's         Yes (except donning socks)  Pt. Required assistance from family for:  Cooking, Cleaning and Laundry   Pt. Fully independent for transfers and gait and walked with: [place] Noun out side the house, no AD in the home  Sleeps in chair      Pain  Yes  Ratin  Location:back   Pain Medicine Status: RN notified       Cognition    A&O x4   Able to follow 2 step commands    Subjective  Patient sitting on EOB with no family present  Pt agreeable to this PT eval & tx. Pt appeared annoyed/frustrated with therapists' presence and was therefore minimally cooperative, declined to ambulate. Upper Extremity ROM/Strength  Please see OT evaluation.       Lower Extremity ROM / Strength    AROM WFL: Yes    Strength Assessment:  R LE   Quad   5/5   Ant Tib  5/5   Hamstring 5/5   Iliopsoas 5/5  L LE  Quad   5/5   Ant Tib  5/5   Hamstring 5/5   Iliopsoas 5/5    Lower Extremity Sensation    Impaired (tingling to both feet, chronic)    Lower Extremity Proprioception:   WNL    Coordination and Tone  WNL    Balance  Static Sitting:  Good    Tolerance:   Dynamic Sitting:  Good   Static Standing: Good    Tolerance:   Dynamic Standing: Good -     Bed Mobility   Supine to Sit:   Not Tested  Sit to Supine:  Independent  Rolling:   Independent  Scooting at EOB: Independent  Scooting to [facility]:  Independent    Transfer Training     Sit to stand:   Independent  Stand to sit: Independent  Bed to/from [facility]:  Supervision with use of No AD, VC for line management    Gait gait deferred due to fatigue (deferred by pt)    Activity Tolerance   Pt completed therapy session with SOB noted w/activity  SpO2: 90% after first transfer   86% after second transfer, recovering to 93% within 1 min  HR: 120-130bpm with exertion of transfers  BP:     Positioning Needs   Pt returned to bed, call light and needs in reach and alarm set    Exercises Initiated    N/A    Other  None. Patient/Family Education   Pt educated on role of inpatient PT, POC, importance of continued activity, calling for assist with mobility. Assessment  Pt seen for Physical Therapy evaluation in acute care setting. Pt demonstrated decreased Activity tolerance and Safety and decreased independence with Ambulation. Pt is appropriate for acute PT to safely progress functional mobility prior to returning home. Goals : To be met in 3 visits:  1). Independent with LE Ex x 10 reps    To be met in 6 visits:  1). Gait: Ambulate 150 ft   with  SBA  and use of LRAD  2). Tolerate B LE exercises 20 reps  3). Ascend/descend 9 steps with CGA with use of One Hand rail and No AD.     Rehabilitation Potential    Good  Strengths for achieving goals include:   Family Support  Barriers to achieving goals include: Other: motivation     Plan    To be seen 3-5 x / week  while in acute care setting for therapeutic exercises, bed mobility, transfers, progressive gait training, balance training, and family/patient education. Walt Meyer PT, DPT #[id number]     If patient discharges from this facility prior to next visit, this note will serve as the Discharge Summary.

## 2019-04-11 NOTE — DISCHARGE SUMMARY
librium while in hospital  - no interest in quittting. Continued to drink heavily - stopped librium at dc     Peripheral neuropathy   - likely alcohol induced  - B12 supplements      HTN  - controlled  - On multiple meds. - held lasix, losartan, metoprolol for low BP  - resumed as tolerated  - stopped Losartan at d/c     Anemia - worsening since last 2 weeks   - also placed on ELIQUIS. Recent iron studies with mild iron def  - monitored and started on iron supplements     Elevated sugars - A1c 5.9   -sec to steroids and also prediabetic  - monitored with ssi   - decrease steroids if able    Procedures (Please Review Full Report for Details)  N/A    Consults    Pulmonology    Physical Exam at Discharge:    BP (!) 148/87   Pulse 86   Temp 97.7 °F (36.5 °C) (Oral)   Resp 18   Ht 6' 1\" (1.854 m)   Wt 282 lb 2 oz (128 kg)   SpO2 95%   BMI 37.22 kg/m²   General:  Elderly male, chronically sick appearing. Awake, alert and oriented  ,   Comfortable  . Appears to be not in any distress  Mucous Membranes:  Pink , anicteric  Neck: No JVD, no carotid bruit, no thyromegaly  Chest:  Clear to auscultation bilaterally, occasional wheeze +  Cardiovascular:  RRR S1S2 heard, no murmurs or gallops  Abdomen:  Soft, undistended, non tender, no organomegaly, BS present  Extremities: improved jono Pedal edema .  Worsening upper ext edema  Distal pulses well felt  Neurological  - mild tremulous upper ext  Non focal     CBC:   Recent Labs     04/09/19  0540 04/10/19  0609 04/11/19  0544   WBC 8.5 9.9 9.7   HGB 9.3* 9.2* 8.7*   HCT 27.5* 26.9* 25.9*   .0* 104.6* 105.0*    380 346     BMP:   Recent Labs     04/09/19  0540 04/10/19  0609 04/11/19  0544    141 141   K 3.9 4.2 3.8   CL 96* 98* 101   CO2 30 32 29   BUN 49* 48* 38*   CREATININE 2.7* 2.2* 1.6*     LIVER PROFILE:   Recent Labs     04/09/19  0540   AST 36   ALT 25   BILITOT 0.5   ALKPHOS 134*     UA:  Recent Labs     04/08/19  2114   COLORU Yellow   PHUR 5.5 Fe) MG tablet  Take 1 tablet by mouth daily (with breakfast)     folic acid 1 MG tablet  Commonly known as:  FOLVITE  Take 1 tablet by mouth daily     ipratropium-albuterol 0.5-2.5 (3) MG/3ML Soln nebulizer solution  Commonly known as:  DUONEB  Inhale 3 mLs into the lungs every 4 hours as needed for Shortness of Breath     KLOR-CON M20 20 MEQ extended release tablet  Generic drug:  potassium chloride  TAKE 1 TABLET BY MOUTH EVERY DAY     levothyroxine 100 MCG tablet  Commonly known as:  SYNTHROID  TAKE 1 TABLET BY MOUTH EVERY DAY     metoprolol succinate 50 MG extended release tablet  Commonly known as:  TOPROL XL  Take 1 tablet by mouth daily     omeprazole 20 MG delayed release capsule  Commonly known as:  PRILOSEC  TAKE 1 CAPSULE BY MOUTH EVERY DAY     SYMBICORT 80-4.5 MCG/ACT Aero  Generic drug:  budesonide-formoterol  INHALE 1 PUFF INTO LUNGS BY MOUTH TWICE A DAY     tamsulosin 0.4 MG capsule  Commonly known as:  FLOMAX  Take 1 capsule by mouth daily     vitamin D 1000 units Caps  Commonly known as:  [employer] VITAMIN D3  Take 1 capsule by mouth daily        STOP taking these medications    atorvastatin 20 MG tablet  Commonly known as:  LIPITOR     gabapentin 300 MG capsule  Commonly known as:  NEURONTIN     losartan 100 MG tablet  Commonly known as:  COZAAR           Where to Get Your Medications      These medications were sent to [facility]/pharmacy #[id number]- [place], [place] - [address] [phone] - [phone]  [address] [address]    Phone:  [phone]   · doxycycline hyclate 100 MG tablet  · predniSONE 10 MG tablet     Information about where to get these medications is not yet available    Ask your nurse or doctor about these medications  · furosemide 40 MG tablet           Discharged in stable condition to home. Follow Up: Follow up with PCP in 1 week.     Holger Thomas MD 5/23/2019 8:00 AM

## 2019-04-11 NOTE — PROGRESS NOTES
limitation. Upper Extremity Strength:    WFL, pt able to perform all bed mobility, transfers, and gait without ROM limitation. Upper Extremity Sensation    WNL    Upper Extremity Proprioception:   WNL    Coordination and Tone  WNL    Balance  Static Sitting:  Normal  Dynamic Sitting:  Good -   Static Standing: Good   Dynamic Standing: Good  for transfer to [facility]     Bed mobility:    Supine to sit:   Not Tested  Sit to supine:   Not Tested  Scooting to head of bed:   Not Tested  Scooting in sitting:  Independent  Rolling:   Not Tested  Bridging:   Not Tested    Transfers:    Sit to stand:  Supervision  Stand to sit:  Supervision  Bed to Chair:  Not Tested-pt declined  Bed to [facility]:  Supervision    Activity Tolerance   Pt completed therapy session with nausea, dizziness, pain or SOB noted w/activity. SpO2: 91%  HR: 118    Dressing:      UE:  Not Tested  LE:   Not Tested- pt declined  Bathing:    UE: Not Tested  LE: Not Tested  Eating:   Not Tested    Positioning Needs: Returned to bed, call light and needs in reach. Exercise / Activities Initiated:   N/A    Patient/Family Education: Role of OT ; Recommendations for DC     Assessment of Deficits: Pt seen for Occupational therapy evaluation in acute care setting. Pt demonstrated decreased Activity Tolerance, ADL's, Balance and Bathing    Goal(s) : To be met in 3 Visits:  1). Indep with UE ex x 10 reps    To be met in 5 Visits:  1). Supine to Sit: Independent  2). Bed to Chair/BSC: Independent  3). Upper Body Bathing:  Independent  4). Lower Body Bathing:  Min A  5). Upper Body Dressing: Independent  6). Lower Body Dressing: Min A with AD as needed  7). Pt to davide UE exs [id number]    Rehabilitation Potential:  Good for goals listed above. Strengths for achieving goals include: PLOF  Barriers to achieving goals include:  Complexity of condition     Plan:   To be seen 3-5 x / week  while in acute care setting for therapeutic exercises, bed mobility, transfers, dressing, bathing,family/patient education with adaptive equipment, breathing technique instruction.      Tanisha Monet OTR/L [id number]          If patient discharges from this facility prior to next visit, this note will serve as the Discharge Summary

## 2019-04-11 NOTE — CARE COORDINATION
DISCHARGE ORDER  Date/Time 2019 2:01 PM  Completed by: Misbah Gomez, Case Management    Patient Name: Lauren Hendrickson    : 1945  Admitting Diagnosis: Acute renal failure (ARF) ([facility] [address] [N17.9]  Admit Date/Time: 2019 10:23 AM    Noted discharge order. Confirmed discharge plan with patient / family (pt): Yes   Discharge Plan: Reviewed chart. Role of discharge planner explained and patient verbalized understanding. Discharge order is noted. Pt is being d/c'd to home today. Pt's O2 sats are 95% on 4L NC. Pt has home O2, company is unknown, but has O2 tank in room Pt declines HHC. Discharge timeout done  with ALLISON Hodge. All discharge needs and concerns addressed. No further discharge needs needed or noted.

## 2019-04-11 NOTE — PROGRESS NOTES
Pt in bed resting comfortably and appears in no distress. Pt denies any needs at this time. Will continue to monitor. Call light within reach. Bedside report given to Ayesha COOPER for transfer of care. Explained to pt again that doctors would be in to see him today. Pt states that a doctor has not seen him since he has been here. Reassured pt that doctors have been in and reminded pt, he then acted as though he remembered the doctors being in.

## 2019-04-11 NOTE — PROGRESS NOTES
PM assessment completed, see flow sheet. Pt is alert and oriented. Respirations are even & easy at rest expiratory wheezes noted. Held colace due to pt with multiple loose stools in the past two days. No complaints voiced. Pt denies needs at this time. SR up x 2, and bed in low position. Call light is within reach.

## 2019-04-11 NOTE — PROGRESS NOTES
Discharge instructions given, pt verbalized understanding, home oxygen in place at [address]  in working order, pt transported to vehicle via wheelchair for discharge home. Stacy Ford RN

## 2019-04-11 NOTE — PROGRESS NOTES
spo2 95% 3.5lnc at rest. spo2 dropped to 89% 3.5Lnc with activity.  Pt denies any shortness of breath, pt with fairly steady gait with walker and standby Sammy Castro RN

## 2019-04-11 NOTE — PROGRESS NOTES
IM Progress Note    Admit Date:  4/8/2019  3    Interval history:  Admitted by me for ARF, was in [facility] recently for PE and CHF ( 2 separate adm)     Significant alcohol use   No acute issues    Subjective:    Mr. Jenise Werner seen watching tv today   Wishes to go home    . Reports feeling fine today   No sob. Wheezing resolved  Remains on 3 L       Objective:   BP (!) 141/83   Pulse 100   Temp 97.8 °F (36.6 °C) (Oral)   Resp 18   Ht 6' 1\" (1.854 m)   Wt 282 lb 2 oz (128 kg)   SpO2 96%   BMI 37.22 kg/m²       Intake/Output Summary (Last 24 hours) at 4/11/2019 3804  Last data filed at 4/11/2019 6298  Gross per 24 hour   Intake 1647 ml   Output 1775 ml   Net -128 ml       Physical Exam:        General:  Elderly male, chronically sick appearing. Awake, alert and oriented  ,   Comfortable  . Appears to be not in any distress  Mucous Membranes:  Pink , anicteric  Neck: No JVD, no carotid bruit, no thyromegaly  Chest:  Clear to auscultation bilaterally, occasional wheeze +  Cardiovascular:  RRR S1S2 heard, no murmurs or gallops  Abdomen:  Soft, undistended, non tender, no organomegaly, BS present  Extremities: improved jono Pedal edema .  Worsening upper ext edema  Distal pulses well felt  Neurological  - mild tremulous upper ext  Non focal     Medications:   Scheduled Medications:    chlordiazePOXIDE  5 mg Oral TID    predniSONE  40 mg Oral Daily    insulin lispro  0-6 Units Subcutaneous TID WC    insulin lispro  0-3 Units Subcutaneous Nightly    apixaban  5 mg Oral BID    ferrous sulfate  325 mg Oral Daily with breakfast    levothyroxine  100 mcg Oral Daily    pantoprazole  40 mg Oral QAM AC    tamsulosin  0.4 mg Oral Daily    sodium chloride flush  10 mL Intravenous 2 times per day    docusate sodium  100 mg Oral BID    doxycycline hyclate  100 mg Oral 2 times per day    ipratropium-albuterol  1 ampule Inhalation Q4H WA     I   dextrose      sodium chloride 75 mL/hr at 04/10/19 2055     glucose, dextrose, glucagon (rDNA), dextrose, sodium chloride flush, ondansetron, acetaminophen, albuterol    Lab Data:  Recent Labs     04/09/19  0540 04/10/19  0609 04/11/19  0544   WBC 8.5 9.9 9.7   HGB 9.3* 9.2* 8.7*   HCT 27.5* 26.9* 25.9*   .0* 104.6* 105.0*    380 346     Recent Labs     04/09/19  0540 04/10/19  0609 04/11/19  0544    141 141   K 3.9 4.2 3.8   CL 96* 98* 101   CO2 30 32 29   BUN 49* 48* 38*   CREATININE 2.7* 2.2* 1.6*     Recent Labs     04/08/19  1046 04/08/19  1405   TROPONINI 0.05* 0.03*       Coagulation:   Lab Results   Component Value Date    INR 1.03 09/27/2017     Cardiac markers:   Lab Results   Component Value Date    TROPONINI 0.03 04/08/2019         Lab Results   Component Value Date    ALT 25 04/09/2019    AST 36 04/09/2019    ALKPHOS 134 (H) 04/09/2019    BILITOT 0.5 04/09/2019       Lab Results   Component Value Date    INR 1.03 09/27/2017    INR 0.97 03/10/2016    INR 1.03 11/29/2015    PROTIME 11.6 09/27/2017    PROTIME 11.1 03/10/2016    PROTIME 11.7 11/29/2015       Radiology    Chest xray   Improving bilateral pleural effusions and bibasilar airspace opacity over 10   days.  This is favored to represent improving congestive heart failure and   pulmonary edema.  Possibility of superimposed pneumonia in either lung base   cannot be excluded but felt to be less likely. Ct head  No acute intracranial abnormality.       Cerebral atrophy. Cultures:         Christina Stallworth- 2/19   Summary   Limited for RV function.   The right ventricle is normal in size and function.   Right ventricular function appears same as echo on 5-.   Systolic pulmonic artery pressure (SPAP) is normal estimated at 27 mmHg   (Right atrial pressure of 3 mmHg).   The right atrium is mildly dilated.                   Assessment & Plan:    Acute on chronic hypoxic resp failure  - was placed on oxygen 3 L recently at dc from [facility]  -  worse with Copd exacerbation - requiring 4-5 L- improved now with steroids, HHN  - ABG with no significant hypercarbia  - quit smoking >10 yrs-  - pulmonary fw   - ok to dc home with steroid taper     PE  - Acute  bilateral emboli 2/19     - now on ELIQUIS 5 mg bid   - has recurrent falls   - no nsaids, or ASA    ARF   - sec to over diuresis from previous admission   - his lasix was doubled at last admission for fluid overload  - pt was noted to be dehydrated and increased creatinine in my office last week - advised for adm- but came to ER 3 days later   - creatinine upto 4 , now down to 1.6 with IVF - stop IVF, resume low dose lasix     CHF - chronic diastolic   - holding diuretics for ARF - resume lasix and metoprolol today     Fall with mulitiple rib fractures and T8 vertebral fracture  -  sp T6- 11 fusion 5/18 at [facility]  - pt continues to drink heavily- high risk for major bleeding now that he is on NOAC       Alochol abuse -severe  - high risk for delirium Tremens - placed on ativan but dcd due to excessive drowsiness  - ct head on adm done for recent fall neg   - placed on low dose librium while in hospital   -- no interest in quittting. Continues to drink heavily- can stop librium at dc    Peripheral neuropathy - likely alcohol induced  - B12 supplements         HTN  - controlled. On multiple meds. ,  Holding lasix, losartan, metoprolol for low BP  - resume as tolerated    Anemia - worsening since last 2 weeks   - also placed on ELIQUIS.  Recent iron studies with mild iron def  - monitor and started on iron supplements      Elevated sugars - A1c 5.9   -sec to steroids and also prediabetic  - montior with ssi   - decrease steroids if able      dVT prophylaxis  -on ELIQUIS  Poor short term prognosis       Dc home      Elly Muñoz MD 4/11/2019 8:32 AM

## 2019-04-11 NOTE — PROGRESS NOTES
Pt in bed with eyes closed, appears in no acute distress, respirations are even and easy. Will continue to monitor. Call light within reach.

## 2019-04-11 NOTE — PROGRESS NOTES
chloride flush, ondansetron, acetaminophen, albuterol    Labs:  CBC:   Recent Labs     04/09/19  0540 04/10/19  0609 04/11/19  0544   WBC 8.5 9.9 9.7   HGB 9.3* 9.2* 8.7*   HCT 27.5* 26.9* 25.9*   .0* 104.6* 105.0*    380 346     BMP:   Recent Labs     04/09/19  0540 04/10/19  0609 04/11/19  0544    141 141   K 3.9 4.2 3.8   CL 96* 98* 101   CO2 30 32 29   BUN 49* 48* 38*   CREATININE 2.7* 2.2* 1.6*     LIVER PROFILE:   Recent Labs     04/09/19  0540   AST 36   ALT 25   BILITOT 0.5   ALKPHOS 134*     PT/INR: No results for input(s): PROTIME, INR in the last 72 hours. APTT: No results for input(s): APTT in the last 72 hours.   UA:  Recent Labs     04/08/19  2114   COLORU Yellow   PHUR 5.5   WBCUA 20-50*   RBCUA 0-2   BACTERIA Rare*   CLARITYU Clear   SPECGRAV 1.010   LEUKOCYTESUR TRACE*   UROBILINOGEN 0.2   BILIRUBINUR Negative   BLOODU Negative   GLUCOSEU Negative     Recent Labs     04/08/19  1243 04/09/19  0817   PHART 7.425 7.422   JCI7TLD 43.6 52.1*   PO2ART 74.5* 76.8     Cultures:   Blood culture negative   urine culture negative      Films:  No new    Assessment:  · Acute hypoxemic respiratory failure  · Acute encephalopathy likely due to sedatives  · COPD with acute exacerbation  · Acute kidney injury  · Diastolic CHF  · Alcohol abuse  · Pulmonary embolism 2/14/19 on Eliquis  · Mediastinal/hilar adenopathy on CT 2/19  · Greater than 100-pack-year smoking history      Plan:  · Supplemental oxygen to maintain SaO2 >92%; wean as tolerated   · Inhaled bronchodilators  · Prednisone taper  · Doxycycline day 3  · Airway clearance techniques  · Continue Eliquis  · Chest CT in 2-4 weeks as an outpatient- office has been notified

## 2019-04-11 NOTE — PLAN OF CARE
Problem: Falls - Risk of:  Goal: Will remain free from falls  Description  Will remain free from falls  Outcome: Ongoing  Goal: Absence of physical injury  Description  Absence of physical injury  Outcome: Ongoing     Problem: Infection:  Goal: Will remain free from infection  Description  Will remain free from infection  Outcome: Ongoing     Problem: Safety:  Goal: Free from accidental physical injury  Description  Free from accidental physical injury  Outcome: Ongoing  Goal: Free from intentional harm  Description  Free from intentional harm  Outcome: Ongoing     Problem: Discharge Planning:  Goal: Patients continuum of care needs are met  Description  Patients continuum of care needs are met  Outcome: Ongoing     Problem: OXYGENATION/RESPIRATORY FUNCTION  Goal: Patient will maintain patent airway  Outcome: Ongoing  Goal: Patient will achieve/maintain normal respiratory rate/effort  Description  Respiratory rate and effort will be within normal limits for the patient  Outcome: Ongoing     Problem: ACTIVITY INTOLERANCE/IMPAIRED MOBILITY  Goal: Mobility/activity is maintained at optimum level for patient  Outcome: Ongoing     Problem: Risk for Impaired Skin Integrity  Goal: Tissue integrity - skin and mucous membranes  Description  Structural intactness and normal physiological function of skin and  mucous membranes.   Outcome: Ongoing

## 2019-04-12 ENCOUNTER — TELEPHONE (OUTPATIENT)
Dept: PHARMACY | Facility: CLINIC | Age: 74
End: 2019-04-12

## 2019-04-12 ENCOUNTER — CARE COORDINATION (OUTPATIENT)
Dept: CASE MANAGEMENT | Age: 74
End: 2019-04-12

## 2019-04-12 DIAGNOSIS — J44.1 COPD EXACERBATION (HCC): Primary | ICD-10-CM

## 2019-04-12 PROCEDURE — 1111F DSCHRG MED/CURRENT MED MERGE: CPT

## 2019-04-12 NOTE — TELEPHONE ENCOUNTER
CLINICAL PHARMACY NOTE  Post-Discharge Transitions of Care (RAYRAY)    Attempted to reach patient for transitions of care follow-up after discharge from [facility] on 4/11/19. Left voicemail for patient to return phone call. Will attempt to contact the patient again.      Barron De Luna, PharmD, [address]: [phone] C: [phone]  Department, toll free [phone], option 7

## 2019-04-12 NOTE — CARE COORDINATION
Edmund [facility] Transitions Initial Follow Up Call    Call within 2 business days of discharge: Yes    Patient: Mateus Musa Patient : 1945   MRN: [id number]  Reason for Admission: MCKENNA, CHF (PNA, PE prior admission)  Discharge Date: 19 RARS: Readmission Risk Score: 26      Last Discharge [facility]       Complaint Diagnosis Description Type Department Provider    19 Shortness of Breath; Fatigue Acute renal injury ([facility] [address] . .. ED to Hosp-Admission (Discharged) (ADMITTED) 9318 Matthew Hensley MD; Lonza Fresh[place]. ... Facility: [facility]    1st attempt to reach patient for post 24h discharge care transition call. Message left stating purpose of call, contact information and request for return call.        Follow Up  Future Appointments   Date Time Provider Basil Lubin   2019 10:00 AM MD Katya Bullard Int None       Radha Rodriguez RN

## 2019-04-13 LAB
BLOOD CULTURE, ROUTINE: NORMAL
CULTURE, BLOOD 2: NORMAL

## 2019-04-13 NOTE — CARE COORDINATION
Edmund 45 Transitions Initial Follow Up Call    Call within 2 business days of discharge: Yes    Patient: Dallas Garcia Patient : 1945   MRN: [id number]  Reason for Admission:   Discharge Date: 19 RARS: Readmission Risk Score: 26      Last Discharge [address] [address]       Complaint Diagnosis Description Type Department Provider    19 Shortness of Breath; Fatigue Acute renal injury ([facility] [address] . .. ED to Hosp-Admission (Discharged) (ADMITTED) 6082 Matthew Mcgovern MD; Hayder Drew.  ...        2nd attempt at an initial 24 hour call, contact info left on vm       Follow Up  Future Appointments   Date Time Provider Basil Lubin   2019 10:00 AM Eldon Tom MD [facility] Int None       Gabriel Yaquelin, RN

## 2019-04-15 NOTE — TELEPHONE ENCOUNTER
CLINICAL PHARMACY NOTE  Post-Discharge Transitions of Care (RAYRAY)    Non-face-to-face services provided:  Assessment and support for treatment adherence and medication management-medication reconciliation    Subjective/Objective:  Samira Zuniga is a 68 y.o. male. Patient was discharged from [facility] on 4/11/19 with a diagnosis of COPD exacerbation. Patient outreach to review discharge medications and provide medication review and management. Spoke with patient    No Known Allergies    Discharge Medications (as per discharging medication list): There are NEW medications for you. START taking them after you leave the hospital     predniSONE (DELTASONE) 10 MG tablet  · Taking, no issues reported. 30 mg x 3 days, 20 mg x 3 days, 10 mg x 3 days then stop     · Doxycycline - patient states that he completed 3 day course of therapy. You told us you were taking these medications at home, but the amount or how often you take this medication has CHANGED   furosemide (LASIX) 40 MG tablet  · Dose changed to 20 mg daily - pt states he is taking as instructed at discharge.   Take 0.5 tablets by mouth daily     These are medications you told us you were taking at home, CONTINUE taking them after you leave the hospital   Medication Sig    Multiple Vitamins-Minerals ([employer] SPECTRAVITE SENIOR PO) Take 1 tablet by mouth daily     ipratropium-albuterol (DUONEB) 0.5-2.5 (3) MG/3ML SOLN nebulizer solution Inhale 3 mLs into the lungs every 4 hours as needed for Shortness of Breath    levothyroxine (SYNTHROID) 100 MCG tablet TAKE 1 TABLET BY MOUTH EVERY DAY    tamsulosin (FLOMAX) 0.4 MG capsule Take 1 capsule by mouth daily    metoprolol succinate (TOPROL XL) 50 MG extended release tablet Take 1 tablet by mouth daily    omeprazole (PRILOSEC) 20 MG delayed release capsule TAKE 1 CAPSULE BY MOUTH EVERY DAY    apixaban (ELIQUIS) 5 MG TABS tablet Take 1 tablet by mouth 2 times daily    folic acid (FOLVITE) 1 MG tablet Take 1 tablet by mouth daily    ferrous sulfate 325 (65 Fe) MG tablet Take 1 tablet by mouth daily (with breakfast)    SYMBICORT 80-4.5 MCG/ACT AERO INHALE 1 PUFF INTO LUNGS BY MOUTH TWICE A DAY    KLOR-CON M20 20 MEQ extended release tablet TAKE 1 TABLET BY MOUTH EVERY DAY    vitamin D ([employer]S VITAMIN D3) 1000 units CAPS Take 1 capsule by mouth daily    albuterol sulfate HFA (PROVENTIL HFA) 108 (90 Base) MCG/ACT inhaler Inhale 2 puffs into the lungs every 6 hours as needed for Wheezing     These are the medications you have told us you were taking at home, STOP taking them after you leave the hospital   · Losartan, atorvastatin and gabapentin. Meds to Beds:No    Estimated Creatinine Clearance: 58 mL/min (A) (based on SCr of 1.6 mg/dL (H)). Assessment/Plan:  - Medication reconciliation completed. Number of medications reviewed: 16    - Pt is taking medications as directed by discharging physician. Number of discrepancies: 0.     - [facility] active medication list updated:  · Medications Added (0)  · Medications Removed (0)  · Medications Changed (0)    - Identified Potential Medication Interactions: No clinically significant interactions identified via [employer] Interaction Analysis as category D or higher.    - Renal Dosing: No renal adjustments necessary.    - Follow up appointment date (7 days for more severe illness, 14 days for others):    · Patient reminded of upcoming appointment with PCP on 6/11/19. · Patient encouraged to schedule follow up with PCP sooner, but patient declined.      Thank you,    Luz Maria Rolle, PharmD, [address]: [phone] C: [phone]  [facility], toll free [phone], option [address] Call Made?: Yes  [facility] Select Patient?: Yes  Total # of Interventions Recommended: 0  Total # Interventions Accepted: 0  Intervention Severity:   - Level 1 Intervention Present?: No   - Level 2 #: 0   - Level 3 #: 0  Outreach Status: Review Complete  Care Coordinator Outreach to Patient?: No  Provider Contacted?: No  Time Spent (min): 20

## 2019-04-18 ENCOUNTER — TELEPHONE (OUTPATIENT)
Dept: INTERNAL MEDICINE CLINIC | Age: 74
End: 2019-04-18

## 2019-04-19 ENCOUNTER — TELEPHONE (OUTPATIENT)
Dept: INTERNAL MEDICINE CLINIC | Age: 74
End: 2019-04-19

## 2019-04-19 NOTE — TELEPHONE ENCOUNTER
----- Message from Anju Campbell MD sent at 4/18/2019  1:22 PM EDT -----  Contact: pt [phone]  Continue lasix 40 mg daily   Raise right hand when sleeping , keep it about head or on a pillow  Continue blood thinners    ----- Message -----  From: Debra Michaels  Sent: 4/18/2019  11:57 AM  To: Anju Campbell MD    Pt called and said he has swelling in his right arm and hand, he is requesting for you to prescribe him something for it. He would like a call back at [phone]. Last appt. 4-4-19. Next appt. 4-29-19.

## 2019-04-26 ENCOUNTER — TELEPHONE (OUTPATIENT)
Dept: INTERNAL MEDICINE CLINIC | Age: 74
End: 2019-04-26

## 2019-04-29 ENCOUNTER — OFFICE VISIT (OUTPATIENT)
Dept: INTERNAL MEDICINE CLINIC | Age: 74
End: 2019-04-29

## 2019-04-29 ENCOUNTER — TELEPHONE (OUTPATIENT)
Dept: INTERNAL MEDICINE CLINIC | Age: 74
End: 2019-04-29

## 2019-04-29 VITALS
HEART RATE: 115 BPM | SYSTOLIC BLOOD PRESSURE: 125 MMHG | HEIGHT: 73 IN | WEIGHT: 287 LBS | DIASTOLIC BLOOD PRESSURE: 75 MMHG | OXYGEN SATURATION: 94 % | BODY MASS INDEX: 38.04 KG/M2 | RESPIRATION RATE: 20 BRPM

## 2019-04-29 DIAGNOSIS — F10.10 ALCOHOL ABUSE: ICD-10-CM

## 2019-04-29 DIAGNOSIS — N17.9 ACUTE RENAL FAILURE, UNSPECIFIED ACUTE RENAL FAILURE TYPE (HCC): ICD-10-CM

## 2019-04-29 DIAGNOSIS — D50.9 IRON DEFICIENCY ANEMIA, UNSPECIFIED IRON DEFICIENCY ANEMIA TYPE: ICD-10-CM

## 2019-04-29 DIAGNOSIS — Z86.711 HISTORY OF PULMONARY EMBOLISM: ICD-10-CM

## 2019-04-29 DIAGNOSIS — Z09 HOSPITAL DISCHARGE FOLLOW-UP: ICD-10-CM

## 2019-04-29 DIAGNOSIS — I50.43 CHF (CONGESTIVE HEART FAILURE), NYHA CLASS I, ACUTE ON CHRONIC, COMBINED (HCC): ICD-10-CM

## 2019-04-29 DIAGNOSIS — I50.33 ACUTE ON CHRONIC DIASTOLIC CONGESTIVE HEART FAILURE (HCC): ICD-10-CM

## 2019-04-29 DIAGNOSIS — I10 BENIGN ESSENTIAL HTN: ICD-10-CM

## 2019-04-29 DIAGNOSIS — I10 BENIGN ESSENTIAL HTN: Primary | ICD-10-CM

## 2019-04-29 DIAGNOSIS — I27.20 PULMONARY HTN (HCC): ICD-10-CM

## 2019-04-29 PROBLEM — E87.20 LACTIC ACID ACIDOSIS: Status: RESOLVED | Noted: 2019-02-14 | Resolved: 2019-04-29

## 2019-04-29 PROBLEM — D72.829 LEUKOCYTOSIS: Status: RESOLVED | Noted: 2019-02-14 | Resolved: 2019-04-29

## 2019-04-29 PROBLEM — J90 PLEURAL EFFUSION: Status: RESOLVED | Noted: 2019-02-14 | Resolved: 2019-04-29

## 2019-04-29 PROBLEM — J98.11 ATELECTASIS: Status: RESOLVED | Noted: 2019-02-14 | Resolved: 2019-04-29

## 2019-04-29 PROBLEM — R91.8 PULMONARY INFILTRATES: Status: RESOLVED | Noted: 2019-02-14 | Resolved: 2019-04-29

## 2019-04-29 PROBLEM — I50.31 ACUTE DIASTOLIC HEART FAILURE (HCC): Status: RESOLVED | Noted: 2019-02-14 | Resolved: 2019-04-29

## 2019-04-29 LAB
ANION GAP SERPL CALCULATED.3IONS-SCNC: 14 MMOL/L (ref 3–16)
BASOPHILS ABSOLUTE: 0.1 K/UL (ref 0–0.2)
BASOPHILS RELATIVE PERCENT: 0.8 %
BUN BLDV-MCNC: 14 MG/DL (ref 7–20)
CALCIUM SERPL-MCNC: 9.3 MG/DL (ref 8.3–10.6)
CHLORIDE BLD-SCNC: 100 MMOL/L (ref 99–110)
CO2: 31 MMOL/L (ref 21–32)
CREAT SERPL-MCNC: 1.2 MG/DL (ref 0.8–1.3)
EOSINOPHILS ABSOLUTE: 0.2 K/UL (ref 0–0.6)
EOSINOPHILS RELATIVE PERCENT: 2.4 %
GFR AFRICAN AMERICAN: >60
GFR NON-AFRICAN AMERICAN: 59
GLUCOSE BLD-MCNC: 128 MG/DL (ref 70–99)
HCT VFR BLD CALC: 23.9 % (ref 40.5–52.5)
HEMOGLOBIN: 7.9 G/DL (ref 13.5–17.5)
LYMPHOCYTES ABSOLUTE: 0.7 K/UL (ref 1–5.1)
LYMPHOCYTES RELATIVE PERCENT: 9.4 %
MCH RBC QN AUTO: 36.1 PG (ref 26–34)
MCHC RBC AUTO-ENTMCNC: 33.1 G/DL (ref 31–36)
MCV RBC AUTO: 109 FL (ref 80–100)
MONOCYTES ABSOLUTE: 0.5 K/UL (ref 0–1.3)
MONOCYTES RELATIVE PERCENT: 7.3 %
NEUTROPHILS ABSOLUTE: 5.7 K/UL (ref 1.7–7.7)
NEUTROPHILS RELATIVE PERCENT: 80.1 %
PDW BLD-RTO: 17 % (ref 12.4–15.4)
PLATELET # BLD: 259 K/UL (ref 135–450)
PMV BLD AUTO: 8.3 FL (ref 5–10.5)
POTASSIUM SERPL-SCNC: 4.4 MMOL/L (ref 3.5–5.1)
RBC # BLD: 2.2 M/UL (ref 4.2–5.9)
SODIUM BLD-SCNC: 145 MMOL/L (ref 136–145)
WBC # BLD: 7.1 K/UL (ref 4–11)

## 2019-04-29 PROCEDURE — 99214 OFFICE O/P EST MOD 30 MIN: CPT | Performed by: INTERNAL MEDICINE

## 2019-04-29 PROCEDURE — 1111F DSCHRG MED/CURRENT MED MERGE: CPT | Performed by: INTERNAL MEDICINE

## 2019-04-29 NOTE — TELEPHONE ENCOUNTER
----- Message from Melvina Thacker MD sent at 4/26/2019  4:40 PM EDT -----  Contact: pt [phone]  Ok to leak , it leaks with increased pressure  Is he taking lasix once daily    ----- Message -----  From: Reed Balloon  Sent: 4/26/2019   4:19 PM  To: Melvina Thacker MD    Pt called and said his right arm was swelled and he has an appointment scheduled to see you on Monday for a hospital follow, but his arm started leaking a clear fluid today, he wants to know if that is okay or not he said he could wait until Monday if it is not serious. He would like a call back at [phone]. Last appt. 4-4-19. Next appt. 4-29-19.

## 2019-04-29 NOTE — PROGRESS NOTES
325 (65 Fe) MG tablet  Take 1 tablet by mouth daily (with breakfast)             folic acid (FOLVITE) 1 MG tablet  Take 1 tablet by mouth daily             furosemide (LASIX) 40 MG tablet  Take 0.5 tablets by mouth daily             ipratropium-albuterol (DUONEB) 0.5-2.5 (3) MG/3ML SOLN nebulizer solution  Inhale 3 mLs into the lungs every 4 hours as needed for Shortness of Breath             KLOR-CON M20 20 MEQ extended release tablet  TAKE 1 TABLET BY MOUTH EVERY DAY             levothyroxine (SYNTHROID) 100 MCG tablet  TAKE 1 TABLET BY MOUTH EVERY DAY             metoprolol succinate (TOPROL XL) 50 MG extended release tablet  Take 1 tablet by mouth daily             Multiple Vitamins-Minerals (CVS SPECTRAVITE SENIOR PO)  Take 1 tablet by mouth daily              omeprazole (PRILOSEC) 20 MG delayed release capsule  TAKE 1 CAPSULE BY MOUTH EVERY DAY             predniSONE (DELTASONE) 10 MG tablet  30 mg x 3 days, 20 mg x 3 days, 10 mg x 3 days then stop             SYMBICORT 80-4.5 MCG/ACT AERO  INHALE 1 PUFF INTO LUNGS BY MOUTH TWICE A DAY             tamsulosin (FLOMAX) 0.4 MG capsule  Take 1 capsule by mouth daily             vitamin D (CVS VITAMIN D3) 1000 units CAPS  Take 1 capsule by mouth daily                   Medications marked \"taking\" at this time  No outpatient medications have been marked as taking for the 4/29/19 encounter (Office Visit) with Mary Vega MD.        Medications patient taking as of now reconciled against medications ordered at time of hospital discharge: Yes    Chief Complaint   Patient presents with    Follow-Up from Hospital       HPI       68 y.o. male  with known h.o alcohol abuse, copd , HTN, hypothyroid,  anxiety here for rcent hospital discharge f.w      Since last visit, pt was admitted to [facility] for renal failure, copd exacerbation  Creatinine at 4, improved with IVF to 1.6  Reports ongoing dyspnea, fatigue  Compliant with once daily lasix  Right UE swelling worse for few days, started weeping and then improved with more active movements and elevation of ext    Compliant with Eliquis   No bleeding but reports some black stools    2/19- admitted to [facility] for PE and effusions   3/19- admitted to [facility] for CHF       5/18- fall with injury to back bone   Sustained multiple jono rib fractures, right hemothorax , MSSA bacteremia  Required T6- T11 fusion  at . Now completed rehab            Chronic pain syndrome- previously on methadone and has been off for few years  Now on cymbalta   Today reports pain and numbness in fore foot and feels like walking on cotton        HTN -  on metoprolol. norvasc and losartan , lasix, no dizziness, edema resolved    GERD on PPI      Inpatient course: Discharge summary reviewed- see chart. Interval history/Current status: home     Review of Systems    Vitals:    04/29/19 1121   BP: 125/75   Pulse: 115   Resp: 20   SpO2: 94%   Weight: 287 lb (130.2 kg)   Height: 6' 1\" (1.854 m)     Body mass index is 37.87 kg/m². Wt Readings from Last 3 Encounters:   04/29/19 287 lb (130.2 kg)   04/11/19 282 lb 2 oz (128 kg)   04/04/19 261 lb (118.4 kg)     BP Readings from Last 3 Encounters:   04/29/19 125/75   04/11/19 (!) 148/87   04/04/19 90/65       Physical Exam      Vitals:    04/29/19 1121   BP: 125/75   Pulse: 115   Resp: 20   SpO2: 94%         General: eldelry male sick appearing   Awake, alert and oriented. Appears to be not in any distress  Mucous Membranes:  Pale,  anicteric  Neck: No JVD, no carotid bruit, no thyromegaly  Chest:  Clear to auscultation bilaterally, no added sounds  Cardiovascular:  RRR S1S2 heard, no murmurs or gallops  Abdomen:  Soft, undistended, non tender, no organomegaly, BS present  Extremities: diffuse 2 + edema of right UE, improving per pt  1+ tense edema to both legs remains same  No edema or cyanosis. Distal pulses well felt  Neurological : grossly normal          Assessment/Plan:   Diagnosis Orders   1.  Benign essential HTN BASIC METABOLIC PANEL   2. Alcohol abuse     3. Pulmonary HTN ([address]     4. Acute renal failure, unspecified acute renal failure type ([address]  BASIC METABOLIC PANEL   5. Acute on chronic diastolic congestive heart failure ([address]     6. History of pulmonary embolism     7. CHF (congestive heart failure), NYHA class I, acute on chronic, combined ([address]     8. Hospital discharge follow-up  DC DISCHARGE MEDS RECONCILED W/ CURRENT OUTPATIENT MED LIST   9. Iron deficiency anemia, unspecified iron deficiency anemia type  CBC WITH AUTO DIFFERENTIAL    BASIC METABOLIC PANEL    DC DISCHARGE MEDS RECONCILED W/ CURRENT OUTPATIENT MED LIST           Cindy Gonzales- 2/19   Summary   Limited for RV function.   The right ventricle is normal in size and function.   Right ventricular function appears same as echo on 3-.   Systolic pulmonic artery pressure (SPAP) is normal estimated at 27 mmHg   (Right atrial pressure of 3 mmHg).   The right atrium is mildly dilated. Assessment & Plan:    Acute on chronic hypoxic resp failure  COPd exacerbation   - improved and now back to home oxygen 2 L  - completed prednisone      PE  - Acute  bilateral emboli 2/19     - now on ELIQUIS 5 mg bid   - has recurrent falls   - no nsaids, or ASA    ARF   - sec to over diuresis from previous admission   - his lasix was doubled at last admission for fluid overload  - pt was noted to be dehydrated and increased creatinine in my office 2 weeks ago -improved creatinine from 4 to 1.6  With IVF  - cut down lasix to 40 mg daily  - change to demadex if needed for pedal edema  - hold ARB    CHF - chronic diastolic   -  resumed lasix and metoprolol    Fall with mulitiple rib fractures and T8 vertebral fracture  -  sp T6- 11 fusion 5/18 at [facility]  - pt continues to drink heavily- high risk for major bleeding now that he is on NOAC       Alochol abuse -severe   -- no interest in quittting.  Continues to drink heavily- can stop librium at PR    Peripheral neuropathy - likely alcohol induced  - B12 supplements         HTN  - controlled. On multiple meds. ,  - losartan on hold for renal failure  - on metoprolol and lasix , increase toprol as needed      Anemia - worsening since last 2 weeks   - also placed on ELIQUIS.  Recent iron studies with mild iron def  - check CBC today       Elevated sugars - A1c 5.9         Medical Decision Making: moderate complexity

## 2019-04-30 ENCOUNTER — HOSPITAL ENCOUNTER (INPATIENT)
Age: 74
LOS: 2 days | Discharge: HOME | DRG: 811 | End: 2019-05-02
Attending: INTERNAL MEDICINE | Admitting: INTERNAL MEDICINE
Payer: MEDICARE

## 2019-04-30 DIAGNOSIS — Z78.9 ALCOHOL USE: Primary | ICD-10-CM

## 2019-04-30 PROBLEM — D64.9 ACUTE ANEMIA: Status: ACTIVE | Noted: 2019-04-30

## 2019-04-30 PROBLEM — F10.20 ALCOHOL DEPENDENCE (HCC): Status: ACTIVE | Noted: 2019-04-30

## 2019-04-30 PROBLEM — J96.11 CHRONIC RESPIRATORY FAILURE WITH HYPOXIA (HCC): Status: ACTIVE | Noted: 2019-04-30

## 2019-04-30 LAB
ABO/RH: NORMAL
ALBUMIN SERPL-MCNC: 3.4 G/DL (ref 3.4–5)
ALP BLD-CCNC: 123 U/L (ref 40–129)
ALT SERPL-CCNC: 28 U/L (ref 10–40)
ANION GAP SERPL CALCULATED.3IONS-SCNC: 13 MMOL/L (ref 3–16)
ANTIBODY SCREEN: NORMAL
APTT: 30.9 SEC (ref 26–36)
APTT: 33.9 SEC (ref 26–36)
AST SERPL-CCNC: 33 U/L (ref 15–37)
BILIRUB SERPL-MCNC: 0.5 MG/DL (ref 0–1)
BILIRUBIN DIRECT: <0.2 MG/DL (ref 0–0.3)
BILIRUBIN, INDIRECT: ABNORMAL MG/DL (ref 0–1)
BUN BLDV-MCNC: 12 MG/DL (ref 7–20)
CALCIUM SERPL-MCNC: 9 MG/DL (ref 8.3–10.6)
CHLORIDE BLD-SCNC: 102 MMOL/L (ref 99–110)
CO2: 29 MMOL/L (ref 21–32)
CREAT SERPL-MCNC: 1 MG/DL (ref 0.8–1.3)
GFR AFRICAN AMERICAN: >60
GFR NON-AFRICAN AMERICAN: >60
GLUCOSE BLD-MCNC: 121 MG/DL (ref 70–99)
HCT VFR BLD CALC: 22.6 % (ref 40.5–52.5)
HCT VFR BLD CALC: 23.9 % (ref 40.5–52.5)
HEMOGLOBIN: 7.6 G/DL (ref 13.5–17.5)
HEMOGLOBIN: 8.1 G/DL (ref 13.5–17.5)
INR BLD: 1.02 (ref 0.86–1.14)
POTASSIUM REFLEX MAGNESIUM: 4.1 MMOL/L (ref 3.5–5.1)
PROTHROMBIN TIME: 11.6 SEC (ref 9.8–13)
SODIUM BLD-SCNC: 144 MMOL/L (ref 136–145)
TOTAL PROTEIN: 6.2 G/DL (ref 6.4–8.2)

## 2019-04-30 PROCEDURE — 96365 THER/PROPH/DIAG IV INF INIT: CPT

## 2019-04-30 PROCEDURE — 6370000000 HC RX 637 (ALT 250 FOR IP): Performed by: PHYSICIAN ASSISTANT

## 2019-04-30 PROCEDURE — 94761 N-INVAS EAR/PLS OXIMETRY MLT: CPT

## 2019-04-30 PROCEDURE — 85610 PROTHROMBIN TIME: CPT

## 2019-04-30 PROCEDURE — 6370000000 HC RX 637 (ALT 250 FOR IP): Performed by: INTERNAL MEDICINE

## 2019-04-30 PROCEDURE — 2580000003 HC RX 258: Performed by: INTERNAL MEDICINE

## 2019-04-30 PROCEDURE — C9113 INJ PANTOPRAZOLE SODIUM, VIA: HCPCS | Performed by: INTERNAL MEDICINE

## 2019-04-30 PROCEDURE — 96367 TX/PROPH/DG ADDL SEQ IV INF: CPT

## 2019-04-30 PROCEDURE — 2700000000 HC OXYGEN THERAPY PER DAY

## 2019-04-30 PROCEDURE — 6360000002 HC RX W HCPCS: Performed by: INTERNAL MEDICINE

## 2019-04-30 PROCEDURE — 86850 RBC ANTIBODY SCREEN: CPT

## 2019-04-30 PROCEDURE — 36415 COLL VENOUS BLD VENIPUNCTURE: CPT

## 2019-04-30 PROCEDURE — 85730 THROMBOPLASTIN TIME PARTIAL: CPT

## 2019-04-30 PROCEDURE — 85014 HEMATOCRIT: CPT

## 2019-04-30 PROCEDURE — 6360000002 HC RX W HCPCS: Performed by: PHYSICIAN ASSISTANT

## 2019-04-30 PROCEDURE — 86900 BLOOD TYPING SEROLOGIC ABO: CPT

## 2019-04-30 PROCEDURE — 96375 TX/PRO/DX INJ NEW DRUG ADDON: CPT

## 2019-04-30 PROCEDURE — 94640 AIRWAY INHALATION TREATMENT: CPT

## 2019-04-30 PROCEDURE — 96366 THER/PROPH/DIAG IV INF ADDON: CPT

## 2019-04-30 PROCEDURE — 86901 BLOOD TYPING SEROLOGIC RH(D): CPT

## 2019-04-30 PROCEDURE — 99223 1ST HOSP IP/OBS HIGH 75: CPT | Performed by: PHYSICIAN ASSISTANT

## 2019-04-30 PROCEDURE — 96376 TX/PRO/DX INJ SAME DRUG ADON: CPT

## 2019-04-30 PROCEDURE — 85018 HEMOGLOBIN: CPT

## 2019-04-30 PROCEDURE — 80048 BASIC METABOLIC PNL TOTAL CA: CPT

## 2019-04-30 PROCEDURE — 80076 HEPATIC FUNCTION PANEL: CPT

## 2019-04-30 PROCEDURE — 1200000000 HC SEMI PRIVATE

## 2019-04-30 RX ORDER — IPRATROPIUM BROMIDE AND ALBUTEROL SULFATE 2.5; .5 MG/3ML; MG/3ML
3 SOLUTION RESPIRATORY (INHALATION) EVERY 4 HOURS PRN
Status: DISCONTINUED | OUTPATIENT
Start: 2019-04-30 | End: 2019-04-30

## 2019-04-30 RX ORDER — SODIUM CHLORIDE 0.9 % (FLUSH) 0.9 %
10 SYRINGE (ML) INJECTION NIGHTLY
Status: DISCONTINUED | OUTPATIENT
Start: 2019-04-30 | End: 2019-05-02 | Stop reason: HOSPADM

## 2019-04-30 RX ORDER — TAMSULOSIN HYDROCHLORIDE 0.4 MG/1
0.4 CAPSULE ORAL DAILY
Status: DISCONTINUED | OUTPATIENT
Start: 2019-04-30 | End: 2019-05-02 | Stop reason: HOSPADM

## 2019-04-30 RX ORDER — CHLORDIAZEPOXIDE HYDROCHLORIDE 5 MG/1
10 CAPSULE, GELATIN COATED ORAL 4 TIMES DAILY
Status: DISCONTINUED | OUTPATIENT
Start: 2019-04-30 | End: 2019-05-01

## 2019-04-30 RX ORDER — IPRATROPIUM BROMIDE AND ALBUTEROL SULFATE 2.5; .5 MG/3ML; MG/3ML
3 SOLUTION RESPIRATORY (INHALATION) EVERY 4 HOURS PRN
Status: DISCONTINUED | OUTPATIENT
Start: 2019-04-30 | End: 2019-05-02 | Stop reason: HOSPADM

## 2019-04-30 RX ORDER — HEPARIN SODIUM 1000 [USP'U]/ML
4000 INJECTION, SOLUTION INTRAVENOUS; SUBCUTANEOUS PRN
Status: DISCONTINUED | OUTPATIENT
Start: 2019-04-30 | End: 2019-05-01

## 2019-04-30 RX ORDER — FERROUS SULFATE 325(65) MG
325 TABLET ORAL
Status: DISCONTINUED | OUTPATIENT
Start: 2019-05-01 | End: 2019-05-02 | Stop reason: HOSPADM

## 2019-04-30 RX ORDER — ALBUTEROL SULFATE 90 UG/1
2 AEROSOL, METERED RESPIRATORY (INHALATION) EVERY 6 HOURS PRN
Status: DISCONTINUED | OUTPATIENT
Start: 2019-04-30 | End: 2019-04-30

## 2019-04-30 RX ORDER — HEPARIN SODIUM 1000 [USP'U]/ML
4000 INJECTION, SOLUTION INTRAVENOUS; SUBCUTANEOUS ONCE
Status: COMPLETED | OUTPATIENT
Start: 2019-04-30 | End: 2019-04-30

## 2019-04-30 RX ORDER — FUROSEMIDE 10 MG/ML
20 INJECTION INTRAMUSCULAR; INTRAVENOUS ONCE
Status: COMPLETED | OUTPATIENT
Start: 2019-04-30 | End: 2019-04-30

## 2019-04-30 RX ORDER — LORAZEPAM 2 MG/1
2 TABLET ORAL
Status: DISCONTINUED | OUTPATIENT
Start: 2019-04-30 | End: 2019-05-02 | Stop reason: HOSPADM

## 2019-04-30 RX ORDER — THIAMINE MONONITRATE (VIT B1) 100 MG
100 TABLET ORAL DAILY
Status: DISCONTINUED | OUTPATIENT
Start: 2019-04-30 | End: 2019-05-02 | Stop reason: HOSPADM

## 2019-04-30 RX ORDER — LORAZEPAM 2 MG/ML
4 INJECTION INTRAMUSCULAR
Status: DISCONTINUED | OUTPATIENT
Start: 2019-04-30 | End: 2019-05-02 | Stop reason: HOSPADM

## 2019-04-30 RX ORDER — M-VIT,TX,IRON,MINS/CALC/FOLIC 27MG-0.4MG
1 TABLET ORAL DAILY
Status: DISCONTINUED | OUTPATIENT
Start: 2019-04-30 | End: 2019-05-02 | Stop reason: HOSPADM

## 2019-04-30 RX ORDER — LORAZEPAM 1 MG/1
1 TABLET ORAL
Status: DISCONTINUED | OUTPATIENT
Start: 2019-04-30 | End: 2019-05-02 | Stop reason: HOSPADM

## 2019-04-30 RX ORDER — LEVOTHYROXINE SODIUM 0.1 MG/1
100 TABLET ORAL DAILY
Status: DISCONTINUED | OUTPATIENT
Start: 2019-04-30 | End: 2019-05-02 | Stop reason: HOSPADM

## 2019-04-30 RX ORDER — ALBUTEROL SULFATE 90 UG/1
2 AEROSOL, METERED RESPIRATORY (INHALATION) EVERY 6 HOURS PRN
Status: DISCONTINUED | OUTPATIENT
Start: 2019-04-30 | End: 2019-05-02 | Stop reason: HOSPADM

## 2019-04-30 RX ORDER — LORAZEPAM 2 MG/ML
1 INJECTION INTRAMUSCULAR
Status: DISCONTINUED | OUTPATIENT
Start: 2019-04-30 | End: 2019-05-02 | Stop reason: HOSPADM

## 2019-04-30 RX ORDER — ONDANSETRON 2 MG/ML
4 INJECTION INTRAMUSCULAR; INTRAVENOUS EVERY 6 HOURS PRN
Status: DISCONTINUED | OUTPATIENT
Start: 2019-04-30 | End: 2019-05-02 | Stop reason: HOSPADM

## 2019-04-30 RX ORDER — ACETAMINOPHEN 325 MG/1
650 TABLET ORAL EVERY 4 HOURS PRN
Status: DISCONTINUED | OUTPATIENT
Start: 2019-04-30 | End: 2019-05-02 | Stop reason: HOSPADM

## 2019-04-30 RX ORDER — SODIUM CHLORIDE 9 MG/ML
INJECTION, SOLUTION INTRAVENOUS CONTINUOUS
Status: DISCONTINUED | OUTPATIENT
Start: 2019-04-30 | End: 2019-05-02 | Stop reason: HOSPADM

## 2019-04-30 RX ORDER — IPRATROPIUM BROMIDE AND ALBUTEROL SULFATE 2.5; .5 MG/3ML; MG/3ML
1 SOLUTION RESPIRATORY (INHALATION)
Status: DISCONTINUED | OUTPATIENT
Start: 2019-04-30 | End: 2019-05-02 | Stop reason: HOSPADM

## 2019-04-30 RX ORDER — HEPARIN SODIUM 1000 [USP'U]/ML
2000 INJECTION, SOLUTION INTRAVENOUS; SUBCUTANEOUS PRN
Status: DISCONTINUED | OUTPATIENT
Start: 2019-04-30 | End: 2019-05-01

## 2019-04-30 RX ORDER — SODIUM CHLORIDE 0.9 % (FLUSH) 0.9 %
10 SYRINGE (ML) INJECTION EVERY 12 HOURS SCHEDULED
Status: DISCONTINUED | OUTPATIENT
Start: 2019-04-30 | End: 2019-05-02 | Stop reason: HOSPADM

## 2019-04-30 RX ORDER — METOPROLOL SUCCINATE 50 MG/1
50 TABLET, EXTENDED RELEASE ORAL DAILY
Status: DISCONTINUED | OUTPATIENT
Start: 2019-04-30 | End: 2019-05-02 | Stop reason: HOSPADM

## 2019-04-30 RX ORDER — SODIUM CHLORIDE 0.9 % (FLUSH) 0.9 %
10 SYRINGE (ML) INJECTION EVERY 12 HOURS SCHEDULED
Status: DISCONTINUED | OUTPATIENT
Start: 2019-04-30 | End: 2019-04-30 | Stop reason: SDUPTHER

## 2019-04-30 RX ORDER — LORAZEPAM 2 MG/1
4 TABLET ORAL
Status: DISCONTINUED | OUTPATIENT
Start: 2019-04-30 | End: 2019-05-02 | Stop reason: HOSPADM

## 2019-04-30 RX ORDER — FUROSEMIDE 10 MG/ML
40 INJECTION INTRAMUSCULAR; INTRAVENOUS DAILY
Status: DISCONTINUED | OUTPATIENT
Start: 2019-05-01 | End: 2019-05-01

## 2019-04-30 RX ORDER — FOLIC ACID 1 MG/1
1 TABLET ORAL DAILY
Status: DISCONTINUED | OUTPATIENT
Start: 2019-04-30 | End: 2019-05-02 | Stop reason: HOSPADM

## 2019-04-30 RX ORDER — PANTOPRAZOLE SODIUM 40 MG/10ML
40 INJECTION, POWDER, LYOPHILIZED, FOR SOLUTION INTRAVENOUS NIGHTLY
Status: DISCONTINUED | OUTPATIENT
Start: 2019-04-30 | End: 2019-04-30

## 2019-04-30 RX ORDER — TRAMADOL HYDROCHLORIDE 50 MG/1
100 TABLET ORAL EVERY 6 HOURS PRN
Status: DISCONTINUED | OUTPATIENT
Start: 2019-04-30 | End: 2019-05-02 | Stop reason: HOSPADM

## 2019-04-30 RX ORDER — LORAZEPAM 2 MG/ML
2 INJECTION INTRAMUSCULAR
Status: DISCONTINUED | OUTPATIENT
Start: 2019-04-30 | End: 2019-05-02 | Stop reason: HOSPADM

## 2019-04-30 RX ORDER — POTASSIUM CHLORIDE 20 MEQ/1
20 TABLET, EXTENDED RELEASE ORAL
Status: DISCONTINUED | OUTPATIENT
Start: 2019-05-01 | End: 2019-05-02 | Stop reason: HOSPADM

## 2019-04-30 RX ORDER — SODIUM CHLORIDE 0.9 % (FLUSH) 0.9 %
10 SYRINGE (ML) INJECTION PRN
Status: DISCONTINUED | OUTPATIENT
Start: 2019-04-30 | End: 2019-05-02 | Stop reason: HOSPADM

## 2019-04-30 RX ORDER — SODIUM CHLORIDE 0.9 % (FLUSH) 0.9 %
10 SYRINGE (ML) INJECTION PRN
Status: DISCONTINUED | OUTPATIENT
Start: 2019-04-30 | End: 2019-04-30 | Stop reason: SDUPTHER

## 2019-04-30 RX ORDER — LORAZEPAM 2 MG/ML
3 INJECTION INTRAMUSCULAR
Status: DISCONTINUED | OUTPATIENT
Start: 2019-04-30 | End: 2019-05-02 | Stop reason: HOSPADM

## 2019-04-30 RX ORDER — TRAMADOL HYDROCHLORIDE 50 MG/1
50 TABLET ORAL EVERY 6 HOURS PRN
Status: DISCONTINUED | OUTPATIENT
Start: 2019-04-30 | End: 2019-05-02 | Stop reason: HOSPADM

## 2019-04-30 RX ADMIN — OCTREOTIDE ACETATE 25 MCG/HR: 500 INJECTION, SOLUTION INTRAVENOUS; SUBCUTANEOUS at 20:59

## 2019-04-30 RX ADMIN — HEPARIN SODIUM 4000 UNITS: 1000 INJECTION, SOLUTION INTRAVENOUS; SUBCUTANEOUS at 13:22

## 2019-04-30 RX ADMIN — TRAMADOL HYDROCHLORIDE 100 MG: 50 TABLET, FILM COATED ORAL at 15:44

## 2019-04-30 RX ADMIN — Medication 100 MG: at 15:44

## 2019-04-30 RX ADMIN — HEPARIN SODIUM 1000 UNITS/HR: 10000 INJECTION, SOLUTION INTRAVENOUS at 13:23

## 2019-04-30 RX ADMIN — IPRATROPIUM BROMIDE AND ALBUTEROL SULFATE 1 AMPULE: .5; 3 SOLUTION RESPIRATORY (INHALATION) at 22:59

## 2019-04-30 RX ADMIN — IPRATROPIUM BROMIDE AND ALBUTEROL SULFATE 1 AMPULE: .5; 3 SOLUTION RESPIRATORY (INHALATION) at 19:31

## 2019-04-30 RX ADMIN — IPRATROPIUM BROMIDE AND ALBUTEROL SULFATE 3 ML: .5; 3 SOLUTION RESPIRATORY (INHALATION) at 14:25

## 2019-04-30 RX ADMIN — HEPARIN SODIUM 4000 UNITS: 1000 INJECTION, SOLUTION INTRAVENOUS; SUBCUTANEOUS at 20:57

## 2019-04-30 RX ADMIN — Medication 2 PUFF: at 19:31

## 2019-04-30 RX ADMIN — CEFTRIAXONE SODIUM 1 G: 1 INJECTION, POWDER, FOR SOLUTION INTRAMUSCULAR; INTRAVENOUS at 20:56

## 2019-04-30 RX ADMIN — SODIUM CHLORIDE 80 MG: 9 INJECTION, SOLUTION INTRAVENOUS at 14:46

## 2019-04-30 RX ADMIN — MULTIPLE VITAMINS W/ MINERALS TAB 1 TABLET: TAB at 15:44

## 2019-04-30 RX ADMIN — CHLORDIAZEPOXIDE HYDROCHLORIDE 10 MG: 5 CAPSULE ORAL at 17:12

## 2019-04-30 RX ADMIN — FUROSEMIDE 20 MG: 10 INJECTION, SOLUTION INTRAVENOUS at 13:01

## 2019-04-30 RX ADMIN — SODIUM CHLORIDE 8 MG/HR: 9 INJECTION, SOLUTION INTRAVENOUS at 23:16

## 2019-04-30 RX ADMIN — SODIUM CHLORIDE 8 MG/HR: 9 INJECTION, SOLUTION INTRAVENOUS at 14:47

## 2019-04-30 RX ADMIN — FUROSEMIDE 20 MG: 10 INJECTION, SOLUTION INTRAVENOUS at 15:44

## 2019-04-30 RX ADMIN — CHLORDIAZEPOXIDE HYDROCHLORIDE 10 MG: 5 CAPSULE ORAL at 20:57

## 2019-04-30 RX ADMIN — SODIUM CHLORIDE: 9 INJECTION, SOLUTION INTRAVENOUS at 20:56

## 2019-04-30 RX ADMIN — HEPARIN SODIUM 14 ML/HR: 10000 INJECTION, SOLUTION INTRAVENOUS at 20:56

## 2019-04-30 ASSESSMENT — PAIN DESCRIPTION - LOCATION
LOCATION: BACK;KNEE
LOCATION: BACK;KNEE

## 2019-04-30 ASSESSMENT — PAIN DESCRIPTION - PAIN TYPE
TYPE: CHRONIC PAIN
TYPE: CHRONIC PAIN

## 2019-04-30 ASSESSMENT — PAIN DESCRIPTION - DESCRIPTORS
DESCRIPTORS: ACHING;DISCOMFORT
DESCRIPTORS: ACHING;CONSTANT;DISCOMFORT

## 2019-04-30 ASSESSMENT — PAIN DESCRIPTION - ONSET
ONSET: ON-GOING
ONSET: ON-GOING

## 2019-04-30 ASSESSMENT — PAIN SCALES - GENERAL
PAINLEVEL_OUTOF10: 8
PAINLEVEL_OUTOF10: 6
PAINLEVEL_OUTOF10: 8

## 2019-04-30 ASSESSMENT — PAIN DESCRIPTION - PROGRESSION
CLINICAL_PROGRESSION: GRADUALLY WORSENING
CLINICAL_PROGRESSION: GRADUALLY IMPROVING
CLINICAL_PROGRESSION: GRADUALLY WORSENING

## 2019-04-30 ASSESSMENT — PAIN DESCRIPTION - FREQUENCY
FREQUENCY: CONTINUOUS
FREQUENCY: CONTINUOUS

## 2019-04-30 NOTE — CONSULTS
4.8 oz (133 kg)   SpO2 96%   BMI 38.70 kg/m²   General appearance: alert, appears stated age and cooperative  Head: Normocephalic, without obvious abnormality, atraumatic  Lungs: clear to auscultation bilaterally  Heart: regular rate and rhythm, S1, S2 normal, no murmur, click, rub or gallop  Abdomen: soft, non-tender; bowel sounds normal; no masses,  no organomegaly  Extremities: extremities normal, atraumatic, no cyanosis or edema    Lab and Imaging Review   Labs:  CBC:   Recent Labs     04/29/19  1154 04/30/19  1237 04/30/19  1818   WBC 7.1  --   --    HGB 7.9* 8.1* 7.6*   HCT 23.9* 23.9* 22.6*   .0*  --   --      --   --      BMP:   Recent Labs     04/29/19  1154 04/30/19  1237    144   K 4.4 4.1    102   CO2 31 29   BUN 14 12   CREATININE 1.2 1.0     LIVER PROFILE:   Recent Labs     04/30/19  1237   AST 33   ALT 28   PROT 6.2*   BILIDIR <0.2   BILITOT 0.5   ALKPHOS 123     PT/INR:   Recent Labs     04/30/19  1238   INR 1.02       Assessment:     73-year-old male withHistory of alcohol abuse, pulmonary embolism on Eliquis, chronic hypoxic respiratory failure with acute anemia. Plan:   Discussed case with Dr. Daysi Browne. We will plan upper endoscopy for further evaluation. Give octreotide and antibiotics as a precautionary measure.

## 2019-05-01 ENCOUNTER — ANESTHESIA EVENT (OUTPATIENT)
Dept: ENDOSCOPY | Age: 74
DRG: 811 | End: 2019-05-01
Payer: MEDICARE

## 2019-05-01 ENCOUNTER — ANESTHESIA (OUTPATIENT)
Dept: ENDOSCOPY | Age: 74
DRG: 811 | End: 2019-05-01
Payer: MEDICARE

## 2019-05-01 VITALS — DIASTOLIC BLOOD PRESSURE: 60 MMHG | OXYGEN SATURATION: 95 % | SYSTOLIC BLOOD PRESSURE: 119 MMHG

## 2019-05-01 LAB
A/G RATIO: 1.1 (ref 1.1–2.2)
ALBUMIN SERPL-MCNC: 3.3 G/DL (ref 3.4–5)
ALP BLD-CCNC: 128 U/L (ref 40–129)
ALT SERPL-CCNC: 25 U/L (ref 10–40)
ANION GAP SERPL CALCULATED.3IONS-SCNC: 10 MMOL/L (ref 3–16)
APTT: 34.4 SEC (ref 26–36)
APTT: 34.7 SEC (ref 26–36)
AST SERPL-CCNC: 25 U/L (ref 15–37)
BASOPHILS ABSOLUTE: 0.1 K/UL (ref 0–0.2)
BASOPHILS RELATIVE PERCENT: 1 %
BILIRUB SERPL-MCNC: 0.4 MG/DL (ref 0–1)
BUN BLDV-MCNC: 11 MG/DL (ref 7–20)
CALCIUM SERPL-MCNC: 8.8 MG/DL (ref 8.3–10.6)
CHLORIDE BLD-SCNC: 98 MMOL/L (ref 99–110)
CO2: 31 MMOL/L (ref 21–32)
CREAT SERPL-MCNC: 1 MG/DL (ref 0.8–1.3)
EOSINOPHILS ABSOLUTE: 0.2 K/UL (ref 0–0.6)
EOSINOPHILS RELATIVE PERCENT: 3.4 %
GFR AFRICAN AMERICAN: >60
GFR NON-AFRICAN AMERICAN: >60
GLOBULIN: 3 G/DL
GLUCOSE BLD-MCNC: 206 MG/DL (ref 70–99)
HCT VFR BLD CALC: 23.8 % (ref 40.5–52.5)
HCT VFR BLD CALC: 24.4 % (ref 40.5–52.5)
HEMOGLOBIN: 7.9 G/DL (ref 13.5–17.5)
HEMOGLOBIN: 8.1 G/DL (ref 13.5–17.5)
LYMPHOCYTES ABSOLUTE: 0.6 K/UL (ref 1–5.1)
LYMPHOCYTES RELATIVE PERCENT: 11.2 %
MCH RBC QN AUTO: 36.3 PG (ref 26–34)
MCHC RBC AUTO-ENTMCNC: 33.3 G/DL (ref 31–36)
MCV RBC AUTO: 108.9 FL (ref 80–100)
MONOCYTES ABSOLUTE: 0.4 K/UL (ref 0–1.3)
MONOCYTES RELATIVE PERCENT: 7.2 %
NEUTROPHILS ABSOLUTE: 4.4 K/UL (ref 1.7–7.7)
NEUTROPHILS RELATIVE PERCENT: 77.2 %
PDW BLD-RTO: 16.7 % (ref 12.4–15.4)
PLATELET # BLD: 296 K/UL (ref 135–450)
PMV BLD AUTO: 7.5 FL (ref 5–10.5)
POTASSIUM REFLEX MAGNESIUM: 3.7 MMOL/L (ref 3.5–5.1)
RBC # BLD: 2.24 M/UL (ref 4.2–5.9)
SODIUM BLD-SCNC: 139 MMOL/L (ref 136–145)
TOTAL PROTEIN: 6.3 G/DL (ref 6.4–8.2)
WBC # BLD: 5.7 K/UL (ref 4–11)

## 2019-05-01 PROCEDURE — 6360000002 HC RX W HCPCS: Performed by: INTERNAL MEDICINE

## 2019-05-01 PROCEDURE — 6370000000 HC RX 637 (ALT 250 FOR IP): Performed by: PHYSICIAN ASSISTANT

## 2019-05-01 PROCEDURE — 6360000002 HC RX W HCPCS: Performed by: NURSE ANESTHETIST, CERTIFIED REGISTERED

## 2019-05-01 PROCEDURE — 2700000000 HC OXYGEN THERAPY PER DAY

## 2019-05-01 PROCEDURE — 85025 COMPLETE CBC W/AUTO DIFF WBC: CPT

## 2019-05-01 PROCEDURE — 3700000000 HC ANESTHESIA ATTENDED CARE: Performed by: INTERNAL MEDICINE

## 2019-05-01 PROCEDURE — 3609017100 HC EGD: Performed by: INTERNAL MEDICINE

## 2019-05-01 PROCEDURE — 6370000000 HC RX 637 (ALT 250 FOR IP): Performed by: INTERNAL MEDICINE

## 2019-05-01 PROCEDURE — 85018 HEMOGLOBIN: CPT

## 2019-05-01 PROCEDURE — 2580000003 HC RX 258: Performed by: INTERNAL MEDICINE

## 2019-05-01 PROCEDURE — 1200000000 HC SEMI PRIVATE

## 2019-05-01 PROCEDURE — 7100000011 HC PHASE II RECOVERY - ADDTL 15 MIN: Performed by: INTERNAL MEDICINE

## 2019-05-01 PROCEDURE — 99232 SBSQ HOSP IP/OBS MODERATE 35: CPT | Performed by: INTERNAL MEDICINE

## 2019-05-01 PROCEDURE — 6360000002 HC RX W HCPCS: Performed by: PHYSICIAN ASSISTANT

## 2019-05-01 PROCEDURE — C9113 INJ PANTOPRAZOLE SODIUM, VIA: HCPCS | Performed by: INTERNAL MEDICINE

## 2019-05-01 PROCEDURE — 94640 AIRWAY INHALATION TREATMENT: CPT

## 2019-05-01 PROCEDURE — 36415 COLL VENOUS BLD VENIPUNCTURE: CPT

## 2019-05-01 PROCEDURE — 2709999900 HC NON-CHARGEABLE SUPPLY: Performed by: INTERNAL MEDICINE

## 2019-05-01 PROCEDURE — 80053 COMPREHEN METABOLIC PANEL: CPT

## 2019-05-01 PROCEDURE — 85730 THROMBOPLASTIN TIME PARTIAL: CPT

## 2019-05-01 PROCEDURE — 0DJ08ZZ INSPECTION OF UPPER INTESTINAL TRACT, VIA NATURAL OR ARTIFICIAL OPENING ENDOSCOPIC: ICD-10-PCS | Performed by: INTERNAL MEDICINE

## 2019-05-01 PROCEDURE — 7100000010 HC PHASE II RECOVERY - FIRST 15 MIN: Performed by: INTERNAL MEDICINE

## 2019-05-01 PROCEDURE — 2580000003 HC RX 258: Performed by: PHYSICIAN ASSISTANT

## 2019-05-01 PROCEDURE — 94761 N-INVAS EAR/PLS OXIMETRY MLT: CPT

## 2019-05-01 PROCEDURE — 85014 HEMATOCRIT: CPT

## 2019-05-01 RX ORDER — HEPARIN SODIUM 1000 [USP'U]/ML
4000 INJECTION, SOLUTION INTRAVENOUS; SUBCUTANEOUS ONCE
Status: COMPLETED | OUTPATIENT
Start: 2019-05-01 | End: 2019-05-01

## 2019-05-01 RX ORDER — CHLORDIAZEPOXIDE HYDROCHLORIDE 25 MG/1
25 CAPSULE, GELATIN COATED ORAL 4 TIMES DAILY
Status: DISCONTINUED | OUTPATIENT
Start: 2019-05-01 | End: 2019-05-02 | Stop reason: HOSPADM

## 2019-05-01 RX ORDER — FUROSEMIDE 40 MG/1
40 TABLET ORAL DAILY
Status: DISCONTINUED | OUTPATIENT
Start: 2019-05-02 | End: 2019-05-02 | Stop reason: HOSPADM

## 2019-05-01 RX ORDER — HEPARIN SODIUM 1000 [USP'U]/ML
2000 INJECTION, SOLUTION INTRAVENOUS; SUBCUTANEOUS ONCE
Status: COMPLETED | OUTPATIENT
Start: 2019-05-01 | End: 2019-05-01

## 2019-05-01 RX ORDER — PROPOFOL 10 MG/ML
INJECTION, EMULSION INTRAVENOUS PRN
Status: DISCONTINUED | OUTPATIENT
Start: 2019-05-01 | End: 2019-05-01 | Stop reason: SDUPTHER

## 2019-05-01 RX ADMIN — TAMSULOSIN HYDROCHLORIDE 0.4 MG: 0.4 CAPSULE ORAL at 15:43

## 2019-05-01 RX ADMIN — HEPARIN SODIUM 2000 UNITS: 1000 INJECTION, SOLUTION INTRAVENOUS; SUBCUTANEOUS at 02:48

## 2019-05-01 RX ADMIN — SODIUM CHLORIDE: 9 INJECTION, SOLUTION INTRAVENOUS at 06:05

## 2019-05-01 RX ADMIN — IPRATROPIUM BROMIDE AND ALBUTEROL SULFATE 1 AMPULE: .5; 3 SOLUTION RESPIRATORY (INHALATION) at 15:28

## 2019-05-01 RX ADMIN — CHLORDIAZEPOXIDE HYDROCHLORIDE 25 MG: 25 CAPSULE ORAL at 21:44

## 2019-05-01 RX ADMIN — CHLORDIAZEPOXIDE HYDROCHLORIDE 10 MG: 5 CAPSULE ORAL at 15:42

## 2019-05-01 RX ADMIN — FUROSEMIDE 40 MG: 10 INJECTION, SOLUTION INTRAMUSCULAR; INTRAVENOUS at 15:41

## 2019-05-01 RX ADMIN — Medication 100 MG: at 15:43

## 2019-05-01 RX ADMIN — Medication 2 PUFF: at 07:13

## 2019-05-01 RX ADMIN — HEPARIN SODIUM 4000 UNITS: 1000 INJECTION, SOLUTION INTRAVENOUS; SUBCUTANEOUS at 15:30

## 2019-05-01 RX ADMIN — VITAMIN D, TAB 1000IU (100/BT) 1000 UNITS: 25 TAB at 15:43

## 2019-05-01 RX ADMIN — METOPROLOL SUCCINATE 50 MG: 50 TABLET, EXTENDED RELEASE ORAL at 07:59

## 2019-05-01 RX ADMIN — IPRATROPIUM BROMIDE AND ALBUTEROL SULFATE 1 AMPULE: .5; 3 SOLUTION RESPIRATORY (INHALATION) at 19:22

## 2019-05-01 RX ADMIN — PROPOFOL 120 MG: 10 INJECTION, EMULSION INTRAVENOUS at 14:20

## 2019-05-01 RX ADMIN — IPRATROPIUM BROMIDE AND ALBUTEROL SULFATE 1 AMPULE: .5; 3 SOLUTION RESPIRATORY (INHALATION) at 11:12

## 2019-05-01 RX ADMIN — OCTREOTIDE ACETATE 25 MCG/HR: 500 INJECTION, SOLUTION INTRAVENOUS; SUBCUTANEOUS at 03:54

## 2019-05-01 RX ADMIN — POTASSIUM CHLORIDE 20 MEQ: 20 TABLET, EXTENDED RELEASE ORAL at 15:43

## 2019-05-01 RX ADMIN — CHLORDIAZEPOXIDE HYDROCHLORIDE 25 MG: 25 CAPSULE ORAL at 16:47

## 2019-05-01 RX ADMIN — CHLORDIAZEPOXIDE HYDROCHLORIDE 10 MG: 5 CAPSULE ORAL at 07:59

## 2019-05-01 RX ADMIN — MULTIPLE VITAMINS W/ MINERALS TAB 1 TABLET: TAB at 18:09

## 2019-05-01 RX ADMIN — APIXABAN 5 MG: 5 TABLET, FILM COATED ORAL at 18:11

## 2019-05-01 RX ADMIN — IPRATROPIUM BROMIDE AND ALBUTEROL SULFATE 1 AMPULE: .5; 3 SOLUTION RESPIRATORY (INHALATION) at 07:13

## 2019-05-01 RX ADMIN — HEPARIN SODIUM 16 ML/HR: 10000 INJECTION, SOLUTION INTRAVENOUS at 06:05

## 2019-05-01 RX ADMIN — FOLIC ACID 1 MG: 1 TABLET ORAL at 15:43

## 2019-05-01 RX ADMIN — Medication 10 ML: at 21:45

## 2019-05-01 RX ADMIN — Medication 2 PUFF: at 19:22

## 2019-05-01 RX ADMIN — FERROUS SULFATE TAB 325 MG (65 MG ELEMENTAL FE) 325 MG: 325 (65 FE) TAB at 15:43

## 2019-05-01 RX ADMIN — SODIUM CHLORIDE 8 MG/HR: 9 INJECTION, SOLUTION INTRAVENOUS at 09:03

## 2019-05-01 ASSESSMENT — ENCOUNTER SYMPTOMS: SHORTNESS OF BREATH: 1

## 2019-05-01 ASSESSMENT — PAIN - FUNCTIONAL ASSESSMENT: PAIN_FUNCTIONAL_ASSESSMENT: 0-10

## 2019-05-01 ASSESSMENT — COPD QUESTIONNAIRES: CAT_SEVERITY: SEVERE

## 2019-05-01 NOTE — CARE COORDINATION
with pt at bedside and explained the role of the CM. IPTA. Denies any HC or DME needs. Pt lives w/daughter who helps him and plans to return home. Declined HC for Med management offered. +CM will continue to follow. CM consult for DME/hospital bed noted to keep HOB while sleeping greater than 30 degrees. Recommendation discussed with patient. He declines hospital bed at home stating that he has a recliner that he sleeps in and does not want a hospital bed. Explained Case Management role/services.

## 2019-05-01 NOTE — ANESTHESIA PRE PROCEDURE
4/13/18  Yes Pacheco Montgomery MD       Current medications:    Current Facility-Administered Medications   Medication Dose Route Frequency Provider Last Rate Last Dose    vitamin D (CHOLECALCIFEROL) tablet 1,000 Units  1,000 Units Oral Daily Pahceco Montgomery MD        potassium chloride (KLOR-CON M) extended release tablet 20 mEq  20 mEq Oral Daily with breakfast Pacheco Montgomery MD        mometasone-formoterol [facility]) 100-5 MCG/ACT inhaler 2 puff  2 puff Inhalation BID Pacheco Montgomery MD   2 puff at 66/90/17 2089    folic acid (FOLVITE) tablet 1 mg  1 mg Oral Daily Pacheco Montgomery MD        ferrous sulfate tablet 325 mg  325 mg Oral Daily with breakfast Pacheco Montgomery MD        [facility] capsule 0.4 mg  0.4 mg Oral Daily Pacheco Montgomery MD        metoprolol succinate (TOPROL XL) extended release tablet 50 mg  50 mg Oral Daily Pacheco Montgomery MD   50 mg at 05/01/19 0759    levothyroxine (SYNTHROID) tablet 100 mcg  100 mcg Oral Daily Pacheco Montgomery MD        acetaminophen (TYLENOL) tablet 650 mg  650 mg Oral Q4H PRN Pacheco Montgomery MD        ondansetron [facility]) injection 4 mg  4 mg Intravenous Q6H PRN Pacheco Montgomery MD        pantoprazole (PROTONIX) 80 mg in sodium chloride 0.9 % 100 mL infusion  8 mg/hr Intravenous Continuous Pacheco Montgomery MD 10 mL/hr at 05/01/19 0903 8 mg/hr at 05/01/19 0903    sodium chloride flush 0.9 % injection 10 mL  10 mL Intravenous Nightly Pacheco Montgomery MD        heparin (porcine) injection 4,000 Units  4,000 Units Intravenous PRN Pacheco Montgomery MD        heparin (porcine) injection 2,000 Units  2,000 Units Intravenous PRN Pacheco Montgomery MD        furosemide (LASIX) injection 40 mg  40 mg Intravenous Daily Kanika Hayes PA-C        sodium chloride flush 0.9 % injection 10 mL  10 mL Intravenous 2 times per day Chace Burch PA-C        sodium chloride flush 0.9 % infusion  25 mcg/hr Intravenous Continuous Reza Oar López, DO 5 mL/hr at 05/01/19 0354 25 mcg/hr at 05/01/19 0354    cefTRIAXone (ROCEPHIN) 1 g IVPB in 50 mL D5W minibag  1 g Intravenous Q24H Patric Thao,    Stopped at 04/30/19 2126    heparin 25,000 unit in sodium chloride 0.45% 250 mL infusion  16 mL/hr Intravenous Continuous Merlene Bryan MD   Stopped at 05/01/19 4493       Allergies:  No Known Allergies    Problem List:    Patient Active Problem List   Diagnosis Code    DISH (diffuse idiopathic skeletal hyperostosis) M48.10    Hyperlipidemia E78.5    Anxiety F41.9    Benign essential HTN I10    Closed nondisplaced fracture of pubis ([address] S32.509A    Bilateral knee pain M25.561, M25.562    Alcohol abuse F10.10    Centrilobular emphysema ([address] J43.2    Acute on chronic respiratory failure with hypoxia ([facility]) J96.21    Other pulmonary embolism without acute cor pulmonale ([facility]) I26.99    Mediastinal adenopathy R59.0    Former smoker Z87.891    Pulmonary HTN ([address] I27.20    Alcohol use Z78.9    Acute diastolic CHF (congestive heart failure) ([facility]) I50.31    Obesity E66.9    ARF (acute renal failure) ([facility]) N17.9    CHF (congestive heart failure) ([facility]) I50.9    CHF (congestive heart failure), NYHA class I, acute on chronic, combined ([address] I50.43    History of pulmonary embolism Z86.711    Elevated hemoglobin A1c R73.09    Alcohol dependence ([facility]) F10.20    Acute anemia D64.9    Chronic respiratory failure with hypoxia ([facility]) J96.11       Past Medical History:        Diagnosis Date    Alcohol abuse     Alcohol abuse     ARF (acute renal failure) ([address] 03/24/2019    Dr Florencia Winn Nephrology, [phone]    CHF (congestive heart failure) ([address] 3/24/2019    Hypercholesteremia     Hypertension     Mediastinal adenopathy 2/14/2019    Pulmonary emphysema ([address] 8/29/2018       Past Surgical History:        Procedure Laterality Date    BACK SURGERY      fusion    

## 2019-05-01 NOTE — ANESTHESIA POSTPROCEDURE EVALUATION
Department of Anesthesiology  Postprocedure Note    Patient: Lauren Hartman  MRN: [id number]  YOB: 1945  Date of evaluation: 5/1/2019  Time:  3:14 PM     Procedure Summary     Date:  05/01/19 Room / Location:  [facility]  / [facility]    Anesthesia Start:  5201 Anesthesia Stop:  0627    Procedure:  EGD W/ANES. (N/A ) Diagnosis:  (?)    Surgeon:  Jame Murdock DO Responsible Provider:  Prasanna Cruz MD    Anesthesia Type:  MAC ASA Status:  4          Anesthesia Type: MAC    Chris Phase I: Chris Score: 9    Chris Phase II: Chris Score: 9    Last vitals: Reviewed and per EMR flowsheets.        Anesthesia Post Evaluation    Comments: Postoperative Anesthesia Note    Name:    Lauren Hartman  MRN:      [id number]    Patient Vitals in the past 12 hrs:  05/01/19 1503, BP:123/68, Pulse:89, Resp:20, SpO2:96 %  05/01/19 1455, BP:(!) 147/65, Pulse:88, Resp:22, SpO2:95 %  05/01/19 1447, BP:134/74, Pulse:89, Resp:20, SpO2:94 %  05/01/19 1436, BP:127/72, Pulse:88, Resp:20, SpO2:96 %  05/01/19 1431, BP:(!) 126/92, Temp:97.4 °F (36.3 °C), Temp src:Temporal, Pulse:88, Resp:24, SpO2:94 %  05/01/19 1335, BP:(!) 117/99  05/01/19 1329, Temp:97.7 °F (36.5 °C), Pulse:89, Resp:24, SpO2:98 %  05/01/19 1114, SpO2:98 %  05/01/19 0745, BP:130/72, Temp:96.8 °F (36 °C), Temp src:Oral, Pulse:98, Resp:18, SpO2:96 %  05/01/19 0714, SpO2:93 %  05/01/19 0615, Weight:294 lb 11.2 oz (133.7 kg)  05/01/19 0407, BP:135/71, Temp:97.4 °F (36.3 °C), Temp src:Oral, Pulse:99, Resp:17, SpO2:98 %     LABS:    CBC  Lab Results       Component                Value               Date/Time                  WBC                      5.7                 05/01/2019 09:14 AM        HGB                      8.1 (L)             05/01/2019 09:14 AM        HCT                      24.4 (L)            05/01/2019 09:14 AM        PLT                      296                 05/01/2019 09:14 AM   RENAL  Lab Results       Component Value               Date/Time                  NA                       139                 05/01/2019 09:14 AM        K                        3.7                 05/01/2019 09:14 AM        CL                       98 (L)              05/01/2019 09:14 AM        CO2                      31                  05/01/2019 09:14 AM        BUN                      11                  05/01/2019 09:14 AM        CREATININE               1.0                 05/01/2019 09:14 AM        GLUCOSE                  206 (H)             05/01/2019 09:14 AM   COAGS  Lab Results       Component                Value               Date/Time                  PROTIME                  11.6                04/30/2019 12:38 PM        INR                      1.02                04/30/2019 12:38 PM        APTT                     34.4                05/01/2019 09:14 AM     Intake & Output: In: 911.9 (P.O.:240; I.V.:396.9)  Out: 750 (Urine:750)    Nausea & Vomiting:  No    Level of Consciousness:  Awake    Pain Assessment:  Adequate analgesia    Anesthesia Complications:  No apparent anesthetic complications    SUMMARY      Vital signs stable  OK to discharge from Stage I post anesthesia care.   Care transferred from Anesthesiology department on discharge from perioperative area

## 2019-05-01 NOTE — PROGRESS NOTES
4 Eyes Skin Assessment     The patient is being assess for   Admission    I agree that 2 RN's have performed a thorough Head to Toe Skin Assessment on the patient. ALL assessment sites listed below have been assessed. Areas assessed by both nurses:   [x]   Head, Face, and Ears   [x]   Shoulders, Back, and Chest, Abdomen  [x]   Arms, Elbows, and Hands   [x]   Coccyx, Sacrum, and Ischium  [x]   Legs, Feet, and Heels        Skin tear to RFA that is 2x1x0.1 that is red/bleeding, mepilex applied. Dry, flaky feet. Pt refused to put gown on at this time, stated comfortable in own clothing. **SHARE this note so that the co-signing nurse is able to place an eSignature**    Co-signer eSignature: {Esignature:[id number]}    Does the Patient have Skin Breakdown?   Yes LDA WOUND CARE was Initiated documentation include the Kat-wound, Wound Assessment, Measurements, Dressing Treatment, Drainage, and Color\",          Bridger Prevention initiated:  Yes   Wound Care Orders initiated:  No      WOC nurse consulted for Pressure Injury (Stage 3,4, Unstageable, DTI, NWPT, Complex wounds)and New or Established Ostomies:  No      Primary Nurse eSignature: Electronically signed by Jeronimo Hui RN on 4/30/19 at 5:36 PM
Called GI regarding consult Galen Olivares
Called report to floor, Charla Lesch. Will continue to monitor.
Pharmacy-Heparin Drip  Heparin stopped per Dr Maggy Javier at [facility] for [facility]GD.  RN to call when restarted  Jayson Moody 5/5/251713:43 PM  .
Pharmacy-Heparin Drip Low dose  Pt Heparin turned off at 9am this am for EGD, Ptt this am low at 34.4 secs  Will bolus with 4000 units and increase drip to 20ml/hr, next Ptt at 2100 tonight.   Will adust to goal of 54-90 secs  Basil PRATHER 5/1/20192:57 PM  .
Pt alert and oriented. SR up x2,  Call light and bedside table within easy reach. Denies any needs at this time. Bedside report given to ALLISON Barreto. Care transferred.
wheezing by examination or by history for at least 24 hours, contact physician for possible discontinuation.

## 2019-05-01 NOTE — OP NOTE
Esophagogastroduodenoscopy Note    Patient:   Angelia Iniguez    YOB: 1945    Facility:   [facility] [Inpatient]   Referring/PCP: Cheikh Cruz MD    Procedure:   Esophagogastroduodenoscopy --diagnostic  Date:     5/1/2019   Endoscopist:  Toni Adrian     Preoperative Diagnosis:   Acute blood loss anemia    Postoperative Diagnosis:  Duodenal polyp    Anesthesia:  MAC;    Estimated blood loss: None    Complications: None    Description of Procedure:  Informed consent was obtained from the patient after explanation of the procedure including indications, description of the procedure,  benefits and possible risks and complications of the procedure, and alternatives. Questions were answered. The patient's history was reviewed and a directed physical examination was performed prior to the procedure. Patient was monitored throughout the procedure with pulse oximetry and periodic assessment of vital signs. Patient was sedated as noted above. The Nursing staff and I performed a time out. With the patient in the left lateral decubitus position, the Olympus videoendoscope was placed in the patient's mouth and under direct visualization passed into the esophagus. The scope was ultimately passed to the second portion of the duodenum. Visualization was performed during both introduction and withdrawal of the endoscope and retroflexed view of the proximal stomach was obtained. Findings[de-identified]   Esophagus: normal. The findings do not support a diagnosis of Rodrigues's Esophagus. No varices seen. Stomach: normal.  No ulcers, varices, or portal hypertensive gastropathy. Duodenum: Duodenal polyp was seen. Pit pattern appeared adenomatous. No resection performed given clinical status. Recommendations:   1. Avoid alcohol, CIWA protocol  2. Resume anticoagulation  3. Would avoid further endoscopy given cardiorespiratory status.   4.  Will need resection of duodenal

## 2019-05-01 NOTE — FLOWSHEET NOTE
05/01/19 0745   Vital Signs   Temp 96.8 °F (36 °C)   Temp Source Oral   Pulse 98   Heart Rate Source Monitor   Resp 18   /72   BP Location Left upper arm   BP Upper/Lower Upper   Patient Position Supine   Level of Consciousness 0   MEWS Score 1   Oxygen Therapy   SpO2 96 %   O2 Device Nasal cannula   O2 Flow Rate (L/min) 4 L/min   AM assessment completed, see flow sheet. Pt is alert and oriented. Vital signs are WNL. Respirations are even & easy. Rhonchi noted throughout. Per Dr. Imelda Arroyo, ok to given metoprolol and librium, since EGD will be later today. RFA skin tear NIEVES at this time and is draining a large amount of clear fluid. Are cleansed with wound cleanser, skin flap laid in position, and covered with mepilex. No c/o pain to area. Pt denies needs at this time. SR up x 2, and bed in low position. Call light is within reach.

## 2019-05-01 NOTE — CARE COORDINATION
[address] Transitions Interview     2019    Patient: Silvana Lara Patient : 1945   MRN: [id number]  Reason for Admission: low hemaglobin  RARS: Readmission Risk Score: 25         Spoke with: Ramseymarci Danielle (patient)      Readmission Risk  Patient Active Problem List   Diagnosis    DISH (diffuse idiopathic skeletal hyperostosis)    Hyperlipidemia    Anxiety    Benign essential HTN    Closed nondisplaced fracture of pubis ([address]    Bilateral knee pain    Alcohol abuse    [facility]rilobular emphysema ([facility]    Acute on chronic respiratory failure with hypoxia (HCC)    Other pulmonary embolism without acute cor pulmonale ([facility])    Mediastinal adenopathy    Former smoker    Pulmonary HTN ([facility]    Alcohol use    Acute diastolic CHF (congestive heart failure) ([facility]    Obesity    ARF (acute renal failure) (HCC)    CHF (congestive heart failure) ([facility])    CHF (congestive heart failure), NYHA class I, acute on chronic, combined ([facility]    History of pulmonary embolism    Elevated hemoglobin A1c    Alcohol dependence ([facility])    Acute anemia    Chronic respiratory failure with hypoxia [facility])       Inpatient Assessment  Care Transitions Summary    Care Transitions Inpatient Review  Medication Review  Are you able to afford your medications?:  Yes  How often do you have difficulty taking your medications?:  I always take them as prescribed. Housing Review  Who do you live with?:  Child  Are you an active caregiver in your home?:  No  Social Support  Do you have a ?:  No  Do you have a [address] [place] [address]?:  No  Durable Medical Equipment  Patient DME:  Shower chair, Walker, Other  Other Patient DME:  Grab bars in bathroom   Patient Home Equipment:  Nebulizer, Oxygen  Functional Review  Ability to seek help/take action for Emergent/Urgent situations i.e. fire, crime, inclement weather or health crisis. :  Independent  Ability handle personal hygiene needs (bathing/dressing/grooming): Independent  Ability to manage medications:  Needs Assistance  Ability to prepare food:  Needs Assistance  Ability to maintain home (clean home, laundry):  Needs Assistance  Ability to drive and/or has transportation:  Independent  Ability to do shopping:  Dependent  Is patient able to live independently?:  Yes  Hearing and Vision  Visual Impairment:  Reading glasses  Hearing Impairment:  Hard of hearing  Care Transitions Interventions     Other Services:  Declined        Readmission from home with daughter. Declines needs at discharge. States he can afford medications. Has scale, but doesn't weigh daily because he doesn't think it works. Educated him on purpose of daily weights and what to report to MD. Will need reinforcement. Care transition following. Reviewed phone numbers for best contact as [facility] was unable to reach him after last discharge. Chart updated with best contact numbers. Follow Up  Future Appointments   Date Time Provider Basil Lubin   5/8/2019  1:30 PM MARY Catalan Int None   6/11/2019 10:00 AM MD Joslyn Singh Int None       Health Maintenance  There are no preventive care reminders to display for this patient.     Froylan Cueva RN

## 2019-05-02 VITALS
RESPIRATION RATE: 18 BRPM | BODY MASS INDEX: 39.06 KG/M2 | OXYGEN SATURATION: 92 % | TEMPERATURE: 97.6 F | DIASTOLIC BLOOD PRESSURE: 74 MMHG | SYSTOLIC BLOOD PRESSURE: 148 MMHG | HEART RATE: 105 BPM | HEIGHT: 73 IN | WEIGHT: 294.7 LBS

## 2019-05-02 LAB
A/G RATIO: 1.1 (ref 1.1–2.2)
ALBUMIN SERPL-MCNC: 3.1 G/DL (ref 3.4–5)
ALP BLD-CCNC: 119 U/L (ref 40–129)
ALT SERPL-CCNC: 20 U/L (ref 10–40)
ANION GAP SERPL CALCULATED.3IONS-SCNC: 8 MMOL/L (ref 3–16)
AST SERPL-CCNC: 18 U/L (ref 15–37)
BILIRUB SERPL-MCNC: 0.3 MG/DL (ref 0–1)
BUN BLDV-MCNC: 8 MG/DL (ref 7–20)
CALCIUM SERPL-MCNC: 8.8 MG/DL (ref 8.3–10.6)
CHLORIDE BLD-SCNC: 101 MMOL/L (ref 99–110)
CO2: 32 MMOL/L (ref 21–32)
CREAT SERPL-MCNC: 1 MG/DL (ref 0.8–1.3)
GFR AFRICAN AMERICAN: >60
GFR NON-AFRICAN AMERICAN: >60
GLOBULIN: 2.7 G/DL
GLUCOSE BLD-MCNC: 151 MG/DL (ref 70–99)
HCT VFR BLD CALC: 24.1 % (ref 40.5–52.5)
HEMOGLOBIN: 8.2 G/DL (ref 13.5–17.5)
MCH RBC QN AUTO: 37.2 PG (ref 26–34)
MCHC RBC AUTO-ENTMCNC: 33.9 G/DL (ref 31–36)
MCV RBC AUTO: 109.8 FL (ref 80–100)
PDW BLD-RTO: 16.4 % (ref 12.4–15.4)
PLATELET # BLD: 299 K/UL (ref 135–450)
PMV BLD AUTO: 7.6 FL (ref 5–10.5)
POTASSIUM REFLEX MAGNESIUM: 3.6 MMOL/L (ref 3.5–5.1)
RBC # BLD: 2.19 M/UL (ref 4.2–5.9)
SODIUM BLD-SCNC: 141 MMOL/L (ref 136–145)
TOTAL PROTEIN: 5.8 G/DL (ref 6.4–8.2)
WBC # BLD: 4.7 K/UL (ref 4–11)

## 2019-05-02 PROCEDURE — 85027 COMPLETE CBC AUTOMATED: CPT

## 2019-05-02 PROCEDURE — 80053 COMPREHEN METABOLIC PANEL: CPT

## 2019-05-02 PROCEDURE — 6370000000 HC RX 637 (ALT 250 FOR IP): Performed by: INTERNAL MEDICINE

## 2019-05-02 PROCEDURE — 6370000000 HC RX 637 (ALT 250 FOR IP): Performed by: PHYSICIAN ASSISTANT

## 2019-05-02 PROCEDURE — 94761 N-INVAS EAR/PLS OXIMETRY MLT: CPT

## 2019-05-02 PROCEDURE — 36415 COLL VENOUS BLD VENIPUNCTURE: CPT

## 2019-05-02 PROCEDURE — 94640 AIRWAY INHALATION TREATMENT: CPT

## 2019-05-02 PROCEDURE — 99238 HOSP IP/OBS DSCHRG MGMT 30/<: CPT | Performed by: INTERNAL MEDICINE

## 2019-05-02 PROCEDURE — 2700000000 HC OXYGEN THERAPY PER DAY

## 2019-05-02 RX ORDER — FUROSEMIDE 40 MG/1
40 TABLET ORAL DAILY
Qty: 30 TABLET | Refills: 1 | Status: ON HOLD | OUTPATIENT
Start: 2019-05-03 | End: 2019-08-02 | Stop reason: HOSPADM

## 2019-05-02 RX ORDER — TAMSULOSIN HYDROCHLORIDE 0.4 MG/1
0.4 CAPSULE ORAL DAILY
Qty: 30 CAPSULE | Refills: 1 | Status: SHIPPED | OUTPATIENT
Start: 2019-05-03 | End: 2019-12-28

## 2019-05-02 RX ORDER — CHLORDIAZEPOXIDE HYDROCHLORIDE 25 MG/1
25 CAPSULE, GELATIN COATED ORAL 4 TIMES DAILY PRN
Qty: 20 CAPSULE | Refills: 0 | Status: SHIPPED | OUTPATIENT
Start: 2019-05-02 | End: 2019-05-07

## 2019-05-02 RX ADMIN — LEVOTHYROXINE SODIUM 100 MCG: 100 TABLET ORAL at 06:34

## 2019-05-02 RX ADMIN — TAMSULOSIN HYDROCHLORIDE 0.4 MG: 0.4 CAPSULE ORAL at 08:13

## 2019-05-02 RX ADMIN — IPRATROPIUM BROMIDE AND ALBUTEROL SULFATE 1 AMPULE: .5; 3 SOLUTION RESPIRATORY (INHALATION) at 00:13

## 2019-05-02 RX ADMIN — IPRATROPIUM BROMIDE AND ALBUTEROL SULFATE 1 AMPULE: .5; 3 SOLUTION RESPIRATORY (INHALATION) at 07:01

## 2019-05-02 RX ADMIN — APIXABAN 5 MG: 5 TABLET, FILM COATED ORAL at 08:14

## 2019-05-02 RX ADMIN — CHLORDIAZEPOXIDE HYDROCHLORIDE 25 MG: 25 CAPSULE ORAL at 08:14

## 2019-05-02 RX ADMIN — FUROSEMIDE 40 MG: 40 TABLET ORAL at 08:13

## 2019-05-02 RX ADMIN — MULTIPLE VITAMINS W/ MINERALS TAB 1 TABLET: TAB at 12:14

## 2019-05-02 RX ADMIN — METOPROLOL SUCCINATE 50 MG: 50 TABLET, EXTENDED RELEASE ORAL at 08:14

## 2019-05-02 RX ADMIN — FERROUS SULFATE TAB 325 MG (65 MG ELEMENTAL FE) 325 MG: 325 (65 FE) TAB at 08:13

## 2019-05-02 RX ADMIN — Medication 100 MG: at 08:13

## 2019-05-02 RX ADMIN — POTASSIUM CHLORIDE 20 MEQ: 20 TABLET, EXTENDED RELEASE ORAL at 08:14

## 2019-05-02 RX ADMIN — CHLORDIAZEPOXIDE HYDROCHLORIDE 25 MG: 25 CAPSULE ORAL at 12:14

## 2019-05-02 RX ADMIN — VITAMIN D, TAB 1000IU (100/BT) 1000 UNITS: 25 TAB at 08:13

## 2019-05-02 RX ADMIN — IPRATROPIUM BROMIDE AND ALBUTEROL SULFATE 1 AMPULE: .5; 3 SOLUTION RESPIRATORY (INHALATION) at 11:14

## 2019-05-02 RX ADMIN — Medication 2 PUFF: at 07:01

## 2019-05-02 RX ADMIN — FOLIC ACID 1 MG: 1 TABLET ORAL at 08:14

## 2019-05-02 ASSESSMENT — PAIN SCALES - GENERAL: PAINLEVEL_OUTOF10: 0

## 2019-05-02 NOTE — PLAN OF CARE
Problem: Pain:  Description  Pain management should include both nonpharmacologic and pharmacologic interventions.   Goal: Pain level will decrease  Description  Pain level will decrease  4/30/2019 2253 by Chapis Alcaraz RN  Outcome: Met This Shift     Problem: Safety:  Goal: Free from accidental physical injury  Description  Free from accidental physical injury  4/30/2019 2253 by Chapis Alcaraz, ALLISON  Outcome: Met This Shift
Ongoing     Problem: Falls - Risk of:  Goal: Will remain free from falls  Description  Will remain free from falls  5/2/2019 1058 by Sue Marte RN  Outcome: Completed  5/2/2019 0109 by Abdoulaye Montero RN  Outcome: Ongoing  Goal: Absence of physical injury  Description  Absence of physical injury  5/2/2019 1058 by Seu Marte RN  Outcome: Completed  5/2/2019 0109 by Abdoulaye Montero RN  Outcome: Ongoing     Problem: Safety:  Goal: Free from accidental physical injury  Description  Free from accidental physical injury  5/2/2019 1058 by Sue Marte RN  Outcome: Completed  5/2/2019 0109 by Abdoulaye oMntero RN  Outcome: Ongoing  Goal: Free from intentional harm  Description  Free from intentional harm  5/2/2019 1058 by Sue Marte RN  Outcome: Completed  5/2/2019 0109 by Abdoulaye Montero RN  Outcome: Ongoing     Problem: Daily Care:  Goal: Daily care needs are met  Description  Daily care needs are met  5/2/2019 1058 by Sue Marte RN  Outcome: Completed  5/2/2019 0109 by Abdoulaye Montero RN  Outcome: Ongoing

## 2019-05-03 ENCOUNTER — FOLLOWUP TELEPHONE ENCOUNTER (OUTPATIENT)
Dept: TELEMETRY | Age: 74
End: 2019-05-03

## 2019-05-03 ENCOUNTER — TELEPHONE (OUTPATIENT)
Dept: PHARMACY | Facility: CLINIC | Age: 74
End: 2019-05-03

## 2019-05-03 ENCOUNTER — TELEPHONE (OUTPATIENT)
Dept: INTERNAL MEDICINE CLINIC | Age: 74
End: 2019-05-03

## 2019-05-03 NOTE — TELEPHONE ENCOUNTER
[facility] Transitions Initial Follow Up Call    Outreach made within 2 business days of discharge: Yes    Patient: Roddy Lomeli Patient : 1945   MRN: [id number]  Reason for Admission: There are no discharge diagnoses documented for the most recent discharge. Discharge Date: 19       Spoke with: Patient     Discharge department/facility: [facility]    [facility] Interactive Patient Contact:  Was patient able to fill all prescriptions: Yes  Was patient instructed to bring all medications to the follow-up visit: Yes  Is patient taking all medications as directed in the discharge summary?  Yes  Does patient understand their discharge instructions: Yes  Does patient have questions or concerns that need addressed prior to 7-14 day follow up office visit: no    Scheduled appointment with PCP within 7-14 days    Follow Up  Future Appointments   Date Time Provider Basil Lubin   2019  1:30 PM MARY Bennett Int None   2019 10:00 AM MD [facility]an None       Cande Cadet

## 2019-05-03 NOTE — TELEPHONE ENCOUNTER
1st Attempt; No Answer- Left HIPAA compliant voicemail with Non-Urgent Heart Failure Resource Line number for call back.     Dub Bottom HF BSN-RN  Heart Failure Navigator  [address] [facility]  [phone]

## 2019-05-03 NOTE — TELEPHONE ENCOUNTER
3rd Attempt; No Answer- Left HIPAA compliant voicemail with Non-Urgent Heart Failure Resource Line number for call back.     Dois Arianna HF BSN-RN  Heart Failure Navigator  [address] [place] [address]  [phone]

## 2019-05-03 NOTE — LETTER
[address]  [address], [address]  Phone: [phone]  Fax: [address] [place] [address]           05/06/19     Dear India Alvarado,    We tried to reach you recently, after your hospital stay, to review your medications. I was unable to reach you on the telephone. We understand that medications can be confusing after a hospital stay. If you are interested in a pharmacist reviewing your medications, please call [phone] option #7.          Sincerely,       [facility]  Phone: [phone] or [phone], option 7

## 2019-05-03 NOTE — TELEPHONE ENCOUNTER
2nd Attempt; No Answer- Left HIPAA compliant voicemail with Non-Urgent Heart Failure Resource Line number for call back.     Maci Gregory HF BSN-RN  Heart Failure Navigator  [address] [place] [address]  [phone]

## 2019-05-06 NOTE — TELEPHONE ENCOUNTER
Additional follow up call attempt made. Left HIPAA compliant VM with call back number,    Encounter closed.

## 2019-05-07 ENCOUNTER — CARE COORDINATION (OUTPATIENT)
Dept: CASE MANAGEMENT | Age: 74
End: 2019-05-07

## 2019-05-07 NOTE — CARE COORDINATION
[facility] Transitions Follow Up Call    2019    Patient: Adelita Rubalcava  Patient : 1945   MRN: [id number]  Reason for Admission: low hemoglobin  Discharge Date: 19 RARS: Readmission Risk Score: 21    Unable to reach patient by phone. Message left stating purpose of call with contact information requesting return call and reminder of appt tomorrow.       Follow Up  Future Appointments   Date Time Provider Basil Lubin   2019  1:30 PM Pradip Morales PA-C [facility] Int None   2019 10:00 AM Melvina Thacker MD [facility] None       Layla Sandoval RN

## 2019-05-10 NOTE — CARE COORDINATION
[facility] Transitions Follow Up Call    5/10/2019    Patient: Lopez Pina  Patient : 1945   MRN: [id number]  Reason for Admission: low Hgb  Discharge Date: 19 RARS: Readmission Risk Score: 21       3rd/final outreach. Unable to reach - phone rings and rings, no answer.      Follow Up  Future Appointments   Date Time Provider Basil Lubin   2019  4:40 PM MD Isabelle Coy Int None   2019 10:00 AM MD Alexx Goodwin Int None       John Salmeron RN

## 2019-05-28 ENCOUNTER — OFFICE VISIT (OUTPATIENT)
Dept: INTERNAL MEDICINE CLINIC | Age: 74
End: 2019-05-28

## 2019-05-28 VITALS — DIASTOLIC BLOOD PRESSURE: 70 MMHG | SYSTOLIC BLOOD PRESSURE: 115 MMHG | RESPIRATION RATE: 18 BRPM | HEART RATE: 70 BPM

## 2019-05-28 DIAGNOSIS — I26.99 OTHER ACUTE PULMONARY EMBOLISM WITHOUT ACUTE COR PULMONALE (HCC): ICD-10-CM

## 2019-05-28 DIAGNOSIS — D64.9 ACUTE ANEMIA: ICD-10-CM

## 2019-05-28 DIAGNOSIS — F10.10 ALCOHOL ABUSE: ICD-10-CM

## 2019-05-28 DIAGNOSIS — I10 BENIGN ESSENTIAL HTN: ICD-10-CM

## 2019-05-28 DIAGNOSIS — F10.20 UNCOMPLICATED ALCOHOL DEPENDENCE (HCC): ICD-10-CM

## 2019-05-28 DIAGNOSIS — I10 BENIGN ESSENTIAL HTN: Primary | ICD-10-CM

## 2019-05-28 DIAGNOSIS — J96.11 CHRONIC RESPIRATORY FAILURE WITH HYPOXIA (HCC): ICD-10-CM

## 2019-05-28 DIAGNOSIS — F41.9 ANXIETY: ICD-10-CM

## 2019-05-28 DIAGNOSIS — I50.32 CHRONIC DIASTOLIC CONGESTIVE HEART FAILURE (HCC): ICD-10-CM

## 2019-05-28 PROBLEM — I50.31 ACUTE DIASTOLIC CHF (CONGESTIVE HEART FAILURE) (HCC): Status: RESOLVED | Noted: 2019-02-14 | Resolved: 2019-05-28

## 2019-05-28 PROBLEM — N17.9 ARF (ACUTE RENAL FAILURE) (HCC): Status: RESOLVED | Noted: 2019-03-24 | Resolved: 2019-05-28

## 2019-05-28 PROBLEM — J96.21 ACUTE ON CHRONIC RESPIRATORY FAILURE WITH HYPOXIA (HCC): Status: RESOLVED | Noted: 2019-02-14 | Resolved: 2019-05-28

## 2019-05-28 PROBLEM — Z78.9 ALCOHOL USE: Status: RESOLVED | Noted: 2019-02-14 | Resolved: 2019-05-28

## 2019-05-28 LAB
BASOPHILS ABSOLUTE: 0 K/UL (ref 0–0.2)
BASOPHILS RELATIVE PERCENT: 0.2 %
EOSINOPHILS ABSOLUTE: 0.2 K/UL (ref 0–0.6)
EOSINOPHILS RELATIVE PERCENT: 1.7 %
HCT VFR BLD CALC: 29.7 % (ref 40.5–52.5)
HEMOGLOBIN: 9.7 G/DL (ref 13.5–17.5)
LYMPHOCYTES ABSOLUTE: 1 K/UL (ref 1–5.1)
LYMPHOCYTES RELATIVE PERCENT: 8.7 %
MCH RBC QN AUTO: 35.5 PG (ref 26–34)
MCHC RBC AUTO-ENTMCNC: 32.6 G/DL (ref 31–36)
MCV RBC AUTO: 109 FL (ref 80–100)
MONOCYTES ABSOLUTE: 1 K/UL (ref 0–1.3)
MONOCYTES RELATIVE PERCENT: 9.2 %
NEUTROPHILS ABSOLUTE: 8.9 K/UL (ref 1.7–7.7)
NEUTROPHILS RELATIVE PERCENT: 80.2 %
PDW BLD-RTO: 15.3 % (ref 12.4–15.4)
PLATELET # BLD: 410 K/UL (ref 135–450)
PMV BLD AUTO: 8.3 FL (ref 5–10.5)
RBC # BLD: 2.73 M/UL (ref 4.2–5.9)
WBC # BLD: 11.1 K/UL (ref 4–11)

## 2019-05-28 PROCEDURE — 99495 TRANSJ CARE MGMT MOD F2F 14D: CPT | Performed by: INTERNAL MEDICINE

## 2019-05-28 PROCEDURE — 1111F DSCHRG MED/CURRENT MED MERGE: CPT | Performed by: INTERNAL MEDICINE

## 2019-05-28 NOTE — PROGRESS NOTES
Post-Discharge Transitional Care Management Services or Hospital Follow Up      Nathaniel Light   YOB: 1945    Date of Office Visit:  5/28/2019  Date of Hospital Admission: 4/30/19  Date of Hospital Discharge: 5/2/19  Readmission Risk Score(high >=14%.  Medium >=10%):Readmission Risk Score: 21      Care management risk score Rising risk (score 2-5) and Complex Care (Scores >=6): 10     Non face to face  following discharge, date last encounter closed (first attempt may have been earlier): *No documented post hospital discharge outreach found in the last 14 days *No documented post hospital discharge outreach found in the last 14 days    Call initiated 2 business days of discharge: *No response recorded in the last 14 days     Patient Active Problem List   Diagnosis    DISH (diffuse idiopathic skeletal hyperostosis)    Hyperlipidemia    Benign essential HTN    Closed nondisplaced fracture of pubis ([address]    Bilateral knee pain    Alcohol abuse    Centrilobular emphysema ([address]    Other pulmonary embolism without acute cor pulmonale ([address]    Mediastinal adenopathy    Former smoker    Pulmonary HTN ([address]    Obesity    CHF (congestive heart failure) ([address]    CHF (congestive heart failure), NYHA class I, acute on chronic, combined ([address]    History of pulmonary embolism    Elevated hemoglobin A1c    Alcohol dependence ([address]    Acute anemia    Chronic respiratory failure with hypoxia ([address]       No Known Allergies    Medications listed as ordered at the time of discharge from hospital   [place], [address] Medication Instructions LISSA:    Printed on:05/28/19 5127   Medication Information                      albuterol sulfate HFA (PROVENTIL HFA) 108 (90 Base) MCG/ACT inhaler  Inhale 2 puffs into the lungs every 6 hours as needed for Wheezing             apixaban (ELIQUIS) 5 MG TABS tablet  Take 1 tablet by mouth 2 times daily             ferrous sulfate 325 (65 Fe) MG tablet  Take 1 tablet by mouth daily (with breakfast)             folic acid (FOLVITE) 1 MG tablet  Take 1 tablet by mouth daily             furosemide (LASIX) 40 MG tablet  Take 1 tablet by mouth daily             ipratropium-albuterol (DUONEB) 0.5-2.5 (3) MG/3ML SOLN nebulizer solution  Inhale 3 mLs into the lungs every 4 hours as needed for Shortness of Breath             KLOR-CON M20 20 MEQ extended release tablet  TAKE 1 TABLET BY MOUTH EVERY DAY             levothyroxine (SYNTHROID) 100 MCG tablet  TAKE 1 TABLET BY MOUTH EVERY DAY             metoprolol succinate (TOPROL XL) 50 MG extended release tablet  Take 1 tablet by mouth daily             Multiple Vitamins-Minerals ([employer] SPECTRAVITE SENIOR PO)  Take 1 tablet by mouth daily              omeprazole (PRILOSEC) 20 MG delayed release capsule  TAKE 1 CAPSULE BY MOUTH EVERY DAY             SYMBICORT 80-4.5 MCG/ACT AERO  INHALE 1 PUFF INTO LUNGS BY MOUTH TWICE A DAY             tamsulosin (FLOMAX) 0.4 MG capsule  Take 1 capsule by mouth daily             vitamin D ([employer] VITAMIN D3) 1000 units CAPS  Take 1 capsule by mouth daily                   Medications marked \"taking\" at this time  No outpatient medications have been marked as taking for the 5/28/19 encounter (Office Visit) with Sylvia Barber MD.        Medications patient taking as of now reconciled against medications ordered at time of hospital discharge: Yes    Chief Complaint   Patient presents with    Follow-Up from Hospital       HPI       68 y.o. male  with known h.o alcohol abuse, copd , HTN, hypothyroid,  anxiety here for recent hospital discharge f.w      Since last visit, pt was admitted to [facility] for blood loss anemia with hb of 6 , needed one unit prbc, stool positive but neg EGD for source.      Hb upto 8 at PA  And eliquis resumed    Right UE remains stable per pt  Compliant with Eliquis   No bleeding noted further    2/19- admitted to [facility] for PE and effusions   3/19- admitted to [facility] for CHF   4/19- admitted to [facility] for ARF, copd exacerbation  5/19- admitted to [facility] for Anemia       5/18- fall with injury to back bone   Sustained multiple jono rib fractures, right hemothorax , MSSA bacteremia  Required T6- T11 fusion  at . Inpatient course: Discharge summary reviewed- see chart. Interval history/Current status: home with family    Review of Systems    Vitals:    05/28/19 1618   BP: 115/70   Pulse: 70   Resp: 18     There is no height or weight on file to calculate BMI. Wt Readings from Last 3 Encounters:   05/01/19 294 lb 11.2 oz (133.7 kg)   04/29/19 287 lb (130.2 kg)   04/11/19 282 lb 2 oz (128 kg)     BP Readings from Last 3 Encounters:   05/28/19 115/70   05/02/19 (!) 148/74   05/01/19 119/60       Physical Exam    General: eldelry male chronically sick appearing   Awake, alert and oriented. Appears to be not in any distress  Mucous Membranes:  Pale,  anicteric  Neck: No JVD, no carotid bruit, no thyromegaly  Chest:  Clear to auscultation bilaterally, no added sounds  Cardiovascular:  RRR S1S2 heard, no murmurs or gallops  Abdomen:  Soft, undistended, non tender, no organomegaly, BS present  Extremities: diffuse 2 + edema of right UE, improving per pt  1+ tense edema to both legs remains same  No edema or cyanosis. Distal pulses well felt  Neurological : grossly normal    Assessment/Plan:   Diagnosis Orders   1. Benign essential HTN     2. Uncomplicated alcohol dependence ([address]     3. Chronic respiratory failure with hypoxia (HCC)     4. Anxiety     5. Alcohol abuse     6. Other acute pulmonary embolism without acute cor pulmonale (HCC)     7. Chronic diastolic congestive heart failure ([address]     8. Acute anemia           Acute blood loss anemia  - source unclear but had black stools  - EGD with duodenal polyp only  - s.p 1 unit transfusion and improved.    - back on ELIQUIS, continue PPI   - need repeat EGD and colonoscopy by GI - unsure if pt will do      CHF - chronic diastolic   -  resumed lasix and lowered dose of metoprolol  - increase lasix to bid on alternate days     Fall with mulitiple rib fractures and T8 vertebral fracture  -  sp T6- 11 fusion 5/18 at [facility]  - pt continues to drink heavily- high risk for major bleeding now that he is on NOAC       Alochol abuse -severe   -- no interest in quittting. Continues to drink heavily      Peripheral neuropathy - likely alcohol induced  - B12 supplements         HTN  - controlled. On multiple meds.  ,  - losartan on hold for renal failure  - on metoprolol and lasix             Medical Decision Making: moderate complexity

## 2019-05-29 LAB
A/G RATIO: 1.2 (ref 1.1–2.2)
ALBUMIN SERPL-MCNC: 2.9 G/DL (ref 3.4–5)
ALP BLD-CCNC: 219 U/L (ref 40–129)
ALT SERPL-CCNC: 27 U/L (ref 10–40)
ANION GAP SERPL CALCULATED.3IONS-SCNC: 15 MMOL/L (ref 3–16)
AST SERPL-CCNC: 58 U/L (ref 15–37)
BILIRUB SERPL-MCNC: 0.5 MG/DL (ref 0–1)
BUN BLDV-MCNC: 6 MG/DL (ref 7–20)
CALCIUM SERPL-MCNC: 8.6 MG/DL (ref 8.3–10.6)
CHLORIDE BLD-SCNC: 96 MMOL/L (ref 99–110)
CO2: 33 MMOL/L (ref 21–32)
CREAT SERPL-MCNC: 1.1 MG/DL (ref 0.8–1.3)
GFR AFRICAN AMERICAN: >60
GFR NON-AFRICAN AMERICAN: >60
GLOBULIN: 2.5 G/DL
GLUCOSE BLD-MCNC: 126 MG/DL (ref 70–99)
POTASSIUM SERPL-SCNC: 3.8 MMOL/L (ref 3.5–5.1)
SODIUM BLD-SCNC: 144 MMOL/L (ref 136–145)
TOTAL PROTEIN: 5.4 G/DL (ref 6.4–8.2)

## 2019-06-04 RX ORDER — IPRATROPIUM BROMIDE AND ALBUTEROL SULFATE 2.5; .5 MG/3ML; MG/3ML
SOLUTION RESPIRATORY (INHALATION)
Qty: 360 ML | Refills: 1 | Status: SHIPPED | OUTPATIENT
Start: 2019-06-04

## 2019-06-06 RX ORDER — FOLIC ACID 1 MG/1
TABLET ORAL
Qty: 90 TABLET | Refills: 0 | Status: ON HOLD | OUTPATIENT
Start: 2019-06-06 | End: 2019-09-21 | Stop reason: HOSPADM

## 2019-06-06 RX ORDER — FERROUS SULFATE 325(65) MG
TABLET ORAL
Qty: 90 TABLET | Refills: 0 | Status: ON HOLD | OUTPATIENT
Start: 2019-06-06 | End: 2019-09-09 | Stop reason: SDUPTHER

## 2019-06-12 RX ORDER — GLUCOSAMINE SULFATE DIPOT CHLR 500 MG
CAPSULE ORAL
Qty: 30 TABLET | Refills: 0 | OUTPATIENT
Start: 2019-06-12

## 2019-06-12 NOTE — TELEPHONE ENCOUNTER
----- Message from Zechariah Joseph sent at 6/12/2019 12:58 PM EDT -----  Contact: [phone]      ----- Message -----  From: Jackie Morris  Sent: 6/12/2019  12:40 PM  To: Brian Stafford MD    Please call pharmacy and see what the problem is with ferrous sulfate. Pts spouse said that insurance won't cover it so I'm not sure if that means it needs a PA.

## 2019-06-13 DIAGNOSIS — G62.9 NEUROPATHY: Primary | ICD-10-CM

## 2019-06-20 RX ORDER — GABAPENTIN 300 MG/1
300 CAPSULE ORAL 3 TIMES DAILY
Qty: 90 CAPSULE | Refills: 0 | Status: SHIPPED | OUTPATIENT
Start: 2019-06-20 | End: 2019-07-21 | Stop reason: SDUPTHER

## 2019-06-24 RX ORDER — OMEPRAZOLE 20 MG/1
CAPSULE, DELAYED RELEASE ORAL
Qty: 90 CAPSULE | Refills: 0 | Status: ON HOLD | OUTPATIENT
Start: 2019-06-24 | End: 2019-09-16 | Stop reason: SDUPTHER

## 2019-07-05 RX ORDER — GLUCOSAMINE SULFATE DIPOT CHLR 500 MG
CAPSULE ORAL
Qty: 90 TABLET | Refills: 1 | Status: SHIPPED | OUTPATIENT
Start: 2019-07-05 | End: 2019-12-28

## 2019-07-15 ENCOUNTER — TELEPHONE (OUTPATIENT)
Dept: INTERNAL MEDICINE CLINIC | Age: 74
End: 2019-07-15

## 2019-07-21 DIAGNOSIS — G62.9 NEUROPATHY: ICD-10-CM

## 2019-07-22 RX ORDER — GABAPENTIN 300 MG/1
300 CAPSULE ORAL 3 TIMES DAILY
Qty: 90 CAPSULE | Refills: 0 | Status: ON HOLD | OUTPATIENT
Start: 2019-07-22 | End: 2019-08-02 | Stop reason: HOSPADM

## 2019-07-29 ENCOUNTER — OFFICE VISIT (OUTPATIENT)
Dept: INTERNAL MEDICINE CLINIC | Age: 74
End: 2019-07-29

## 2019-07-29 ENCOUNTER — HOSPITAL ENCOUNTER (INPATIENT)
Age: 74
LOS: 4 days | Discharge: HOME | DRG: 871 | End: 2019-08-02
Attending: EMERGENCY MEDICINE | Admitting: INTERNAL MEDICINE
Payer: MEDICARE

## 2019-07-29 ENCOUNTER — APPOINTMENT (OUTPATIENT)
Dept: GENERAL RADIOLOGY | Age: 74
DRG: 871 | End: 2019-07-29
Payer: MEDICARE

## 2019-07-29 ENCOUNTER — CARE COORDINATION (OUTPATIENT)
Dept: INTERNAL MEDICINE CLINIC | Age: 74
End: 2019-07-29

## 2019-07-29 VITALS — DIASTOLIC BLOOD PRESSURE: 40 MMHG | SYSTOLIC BLOOD PRESSURE: 80 MMHG | TEMPERATURE: 96.9 F | HEART RATE: 90 BPM

## 2019-07-29 DIAGNOSIS — J96.11 CHRONIC RESPIRATORY FAILURE WITH HYPOXIA (HCC): ICD-10-CM

## 2019-07-29 DIAGNOSIS — N39.0 URINARY TRACT INFECTION WITHOUT HEMATURIA, SITE UNSPECIFIED: ICD-10-CM

## 2019-07-29 DIAGNOSIS — I95.9 HYPOTENSION, UNSPECIFIED HYPOTENSION TYPE: Primary | ICD-10-CM

## 2019-07-29 DIAGNOSIS — A41.9 SEPSIS, DUE TO UNSPECIFIED ORGANISM: Primary | ICD-10-CM

## 2019-07-29 DIAGNOSIS — L89.322 PRESSURE ULCER OF LEFT BUTTOCK, STAGE 2 (HCC): ICD-10-CM

## 2019-07-29 DIAGNOSIS — L89.329 PRESSURE INJURY OF SKIN OF LEFT BUTTOCK, UNSPECIFIED INJURY STAGE: ICD-10-CM

## 2019-07-29 DIAGNOSIS — J18.9 PNEUMONIA DUE TO ORGANISM: ICD-10-CM

## 2019-07-29 LAB
A/G RATIO: 0.5 (ref 1.1–2.2)
ALBUMIN SERPL-MCNC: 2.3 G/DL (ref 3.4–5)
ALP BLD-CCNC: 533 U/L (ref 40–129)
ALT SERPL-CCNC: 49 U/L (ref 10–40)
AMMONIA: 110 UMOL/L (ref 16–60)
ANION GAP SERPL CALCULATED.3IONS-SCNC: 14 MMOL/L (ref 3–16)
APTT: 40.6 SEC (ref 26–36)
AST SERPL-CCNC: 123 U/L (ref 15–37)
BACTERIA: ABNORMAL /HPF
BASOPHILS ABSOLUTE: 0.1 K/UL (ref 0–0.2)
BASOPHILS RELATIVE PERCENT: 0.6 %
BILIRUB SERPL-MCNC: 3.8 MG/DL (ref 0–1)
BILIRUBIN URINE: ABNORMAL
BLOOD, URINE: ABNORMAL
BUN BLDV-MCNC: 29 MG/DL (ref 7–20)
CALCIUM SERPL-MCNC: 8.7 MG/DL (ref 8.3–10.6)
CHLORIDE BLD-SCNC: 102 MMOL/L (ref 99–110)
CLARITY: ABNORMAL
CO2: 26 MMOL/L (ref 21–32)
COLOR: ABNORMAL
CREAT SERPL-MCNC: 1.8 MG/DL (ref 0.8–1.3)
CRYSTALS, UA: ABNORMAL /HPF
EKG ATRIAL RATE: 96 BPM
EKG DIAGNOSIS: NORMAL
EKG P AXIS: 88 DEGREES
EKG P-R INTERVAL: 178 MS
EKG Q-T INTERVAL: 392 MS
EKG QRS DURATION: 92 MS
EKG QTC CALCULATION (BAZETT): 495 MS
EKG R AXIS: 38 DEGREES
EKG T AXIS: 148 DEGREES
EKG VENTRICULAR RATE: 96 BPM
EOSINOPHILS ABSOLUTE: 0.2 K/UL (ref 0–0.6)
EOSINOPHILS RELATIVE PERCENT: 1 %
EPITHELIAL CELLS, UA: ABNORMAL /HPF
ETHANOL: NORMAL MG/DL (ref 0–0.08)
GFR AFRICAN AMERICAN: 45
GFR NON-AFRICAN AMERICAN: 37
GLOBULIN: 4.2 G/DL
GLUCOSE BLD-MCNC: 122 MG/DL (ref 70–99)
GLUCOSE URINE: NEGATIVE MG/DL
HCT VFR BLD CALC: 27.9 % (ref 40.5–52.5)
HEMOGLOBIN: 9.1 G/DL (ref 13.5–17.5)
INR BLD: 1.44 (ref 0.86–1.14)
KETONES, URINE: NEGATIVE MG/DL
LACTIC ACID, SEPSIS: 1.4 MMOL/L (ref 0.4–1.9)
LACTIC ACID: 0.9 MMOL/L (ref 0.4–2)
LACTIC ACID: 2.5 MMOL/L (ref 0.4–2)
LACTIC ACID: 3.1 MMOL/L (ref 0.4–2)
LEUKOCYTE ESTERASE, URINE: ABNORMAL
LYMPHOCYTES ABSOLUTE: 2.6 K/UL (ref 1–5.1)
LYMPHOCYTES RELATIVE PERCENT: 14 %
MCH RBC QN AUTO: 34.3 PG (ref 26–34)
MCHC RBC AUTO-ENTMCNC: 32.7 G/DL (ref 31–36)
MCV RBC AUTO: 105 FL (ref 80–100)
MICROSCOPIC EXAMINATION: YES
MONOCYTES ABSOLUTE: 1.2 K/UL (ref 0–1.3)
MONOCYTES RELATIVE PERCENT: 6.4 %
NEUTROPHILS ABSOLUTE: 14.5 K/UL (ref 1.7–7.7)
NEUTROPHILS RELATIVE PERCENT: 78 %
NITRITE, URINE: POSITIVE
PDW BLD-RTO: 17.1 % (ref 12.4–15.4)
PH UA: 6 (ref 5–8)
PLATELET # BLD: 494 K/UL (ref 135–450)
PMV BLD AUTO: 8.5 FL (ref 5–10.5)
POTASSIUM REFLEX MAGNESIUM: 3.7 MMOL/L (ref 3.5–5.1)
PRO-BNP: 894 PG/ML (ref 0–124)
PROTEIN UA: ABNORMAL MG/DL
PROTHROMBIN TIME: 16.4 SEC (ref 9.8–13)
RBC # BLD: 2.65 M/UL (ref 4.2–5.9)
RBC UA: ABNORMAL /HPF (ref 0–2)
SODIUM BLD-SCNC: 142 MMOL/L (ref 136–145)
SPECIFIC GRAVITY UA: 1.01 (ref 1–1.03)
TOTAL PROTEIN: 6.5 G/DL (ref 6.4–8.2)
TROPONIN: 0.03 NG/ML
TROPONIN: 0.03 NG/ML
URINE REFLEX TO CULTURE: YES
URINE TYPE: ABNORMAL
UROBILINOGEN, URINE: 4 E.U./DL
VANCOMYCIN TROUGH: 20.6 UG/ML (ref 10–20)
WBC # BLD: 18.6 K/UL (ref 4–11)
WBC UA: >100 /HPF (ref 0–5)

## 2019-07-29 PROCEDURE — 71045 X-RAY EXAM CHEST 1 VIEW: CPT

## 2019-07-29 PROCEDURE — 36415 COLL VENOUS BLD VENIPUNCTURE: CPT

## 2019-07-29 PROCEDURE — 85610 PROTHROMBIN TIME: CPT

## 2019-07-29 PROCEDURE — 99285 EMERGENCY DEPT VISIT HI MDM: CPT

## 2019-07-29 PROCEDURE — 87077 CULTURE AEROBIC IDENTIFY: CPT

## 2019-07-29 PROCEDURE — 2580000003 HC RX 258: Performed by: PHYSICIAN ASSISTANT

## 2019-07-29 PROCEDURE — 93005 ELECTROCARDIOGRAM TRACING: CPT | Performed by: PHYSICIAN ASSISTANT

## 2019-07-29 PROCEDURE — 51701 INSERT BLADDER CATHETER: CPT

## 2019-07-29 PROCEDURE — 2580000003 HC RX 258: Performed by: EMERGENCY MEDICINE

## 2019-07-29 PROCEDURE — 83880 ASSAY OF NATRIURETIC PEPTIDE: CPT

## 2019-07-29 PROCEDURE — 85730 THROMBOPLASTIN TIME PARTIAL: CPT

## 2019-07-29 PROCEDURE — G0480 DRUG TEST DEF 1-7 CLASSES: HCPCS

## 2019-07-29 PROCEDURE — 6360000002 HC RX W HCPCS: Performed by: INTERNAL MEDICINE

## 2019-07-29 PROCEDURE — 93010 ELECTROCARDIOGRAM REPORT: CPT | Performed by: INTERNAL MEDICINE

## 2019-07-29 PROCEDURE — 87086 URINE CULTURE/COLONY COUNT: CPT

## 2019-07-29 PROCEDURE — 81001 URINALYSIS AUTO W/SCOPE: CPT

## 2019-07-29 PROCEDURE — 87186 SC STD MICRODIL/AGAR DIL: CPT

## 2019-07-29 PROCEDURE — 80202 ASSAY OF VANCOMYCIN: CPT

## 2019-07-29 PROCEDURE — 96360 HYDRATION IV INFUSION INIT: CPT

## 2019-07-29 PROCEDURE — 85025 COMPLETE CBC W/AUTO DIFF WBC: CPT

## 2019-07-29 PROCEDURE — 2060000000 HC ICU INTERMEDIATE R&B

## 2019-07-29 PROCEDURE — 2580000003 HC RX 258: Performed by: INTERNAL MEDICINE

## 2019-07-29 PROCEDURE — 6370000000 HC RX 637 (ALT 250 FOR IP): Performed by: INTERNAL MEDICINE

## 2019-07-29 PROCEDURE — 87040 BLOOD CULTURE FOR BACTERIA: CPT

## 2019-07-29 PROCEDURE — 6360000002 HC RX W HCPCS: Performed by: EMERGENCY MEDICINE

## 2019-07-29 PROCEDURE — 99214 OFFICE O/P EST MOD 30 MIN: CPT | Performed by: PHYSICIAN ASSISTANT

## 2019-07-29 PROCEDURE — 80053 COMPREHEN METABOLIC PANEL: CPT

## 2019-07-29 PROCEDURE — 82140 ASSAY OF AMMONIA: CPT

## 2019-07-29 PROCEDURE — 83605 ASSAY OF LACTIC ACID: CPT

## 2019-07-29 PROCEDURE — 84484 ASSAY OF TROPONIN QUANT: CPT

## 2019-07-29 RX ORDER — PANTOPRAZOLE SODIUM 40 MG/1
40 TABLET, DELAYED RELEASE ORAL
Status: DISCONTINUED | OUTPATIENT
Start: 2019-07-30 | End: 2019-08-02 | Stop reason: HOSPADM

## 2019-07-29 RX ORDER — POTASSIUM CHLORIDE 20 MEQ/1
40 TABLET, EXTENDED RELEASE ORAL PRN
Status: DISCONTINUED | OUTPATIENT
Start: 2019-07-29 | End: 2019-08-02 | Stop reason: HOSPADM

## 2019-07-29 RX ORDER — POTASSIUM CHLORIDE 7.45 MG/ML
10 INJECTION INTRAVENOUS PRN
Status: DISCONTINUED | OUTPATIENT
Start: 2019-07-29 | End: 2019-08-02 | Stop reason: HOSPADM

## 2019-07-29 RX ORDER — FERROUS SULFATE 325(65) MG
325 TABLET ORAL
Status: DISCONTINUED | OUTPATIENT
Start: 2019-07-30 | End: 2019-07-30

## 2019-07-29 RX ORDER — LEVOTHYROXINE SODIUM 0.1 MG/1
100 TABLET ORAL
Status: DISCONTINUED | OUTPATIENT
Start: 2019-07-30 | End: 2019-08-02 | Stop reason: HOSPADM

## 2019-07-29 RX ORDER — ACETAMINOPHEN 325 MG/1
650 TABLET ORAL EVERY 4 HOURS PRN
Status: DISCONTINUED | OUTPATIENT
Start: 2019-07-29 | End: 2019-08-02 | Stop reason: HOSPADM

## 2019-07-29 RX ORDER — ALBUTEROL SULFATE 90 UG/1
2 AEROSOL, METERED RESPIRATORY (INHALATION) EVERY 6 HOURS PRN
Status: DISCONTINUED | OUTPATIENT
Start: 2019-07-29 | End: 2019-08-02 | Stop reason: HOSPADM

## 2019-07-29 RX ORDER — IPRATROPIUM BROMIDE AND ALBUTEROL SULFATE 2.5; .5 MG/3ML; MG/3ML
1 SOLUTION RESPIRATORY (INHALATION) 2 TIMES DAILY
Status: DISCONTINUED | OUTPATIENT
Start: 2019-07-30 | End: 2019-08-02 | Stop reason: HOSPADM

## 2019-07-29 RX ORDER — SODIUM CHLORIDE 0.9 % (FLUSH) 0.9 %
10 SYRINGE (ML) INJECTION PRN
Status: DISCONTINUED | OUTPATIENT
Start: 2019-07-29 | End: 2019-08-02 | Stop reason: HOSPADM

## 2019-07-29 RX ORDER — FOLIC ACID 1 MG/1
1 TABLET ORAL DAILY
Status: DISCONTINUED | OUTPATIENT
Start: 2019-07-30 | End: 2019-08-02 | Stop reason: HOSPADM

## 2019-07-29 RX ORDER — 0.9 % SODIUM CHLORIDE 0.9 %
1000 INTRAVENOUS SOLUTION INTRAVENOUS ONCE
Status: COMPLETED | OUTPATIENT
Start: 2019-07-29 | End: 2019-07-29

## 2019-07-29 RX ORDER — MAGNESIUM SULFATE 1 G/100ML
1 INJECTION INTRAVENOUS PRN
Status: DISCONTINUED | OUTPATIENT
Start: 2019-07-29 | End: 2019-08-02 | Stop reason: HOSPADM

## 2019-07-29 RX ORDER — LOSARTAN POTASSIUM 100 MG/1
100 TABLET ORAL DAILY
Status: ON HOLD | COMMUNITY
End: 2019-08-02 | Stop reason: HOSPADM

## 2019-07-29 RX ORDER — M-VIT,TX,IRON,MINS/CALC/FOLIC 27MG-0.4MG
1 TABLET ORAL
Status: DISCONTINUED | OUTPATIENT
Start: 2019-07-30 | End: 2019-07-30

## 2019-07-29 RX ORDER — 0.9 % SODIUM CHLORIDE 0.9 %
2800 INTRAVENOUS SOLUTION INTRAVENOUS ONCE
Status: COMPLETED | OUTPATIENT
Start: 2019-07-29 | End: 2019-07-29

## 2019-07-29 RX ORDER — ONDANSETRON 2 MG/ML
4 INJECTION INTRAMUSCULAR; INTRAVENOUS EVERY 6 HOURS PRN
Status: DISCONTINUED | OUTPATIENT
Start: 2019-07-29 | End: 2019-08-02 | Stop reason: HOSPADM

## 2019-07-29 RX ORDER — SODIUM CHLORIDE 0.9 % (FLUSH) 0.9 %
10 SYRINGE (ML) INJECTION EVERY 12 HOURS SCHEDULED
Status: DISCONTINUED | OUTPATIENT
Start: 2019-07-29 | End: 2019-08-02 | Stop reason: HOSPADM

## 2019-07-29 RX ORDER — GLUCOSAMINE SULFATE DIPOT CHLR 500 MG
1 CAPSULE ORAL DAILY
Status: DISCONTINUED | OUTPATIENT
Start: 2019-07-29 | End: 2019-07-29 | Stop reason: CLARIF

## 2019-07-29 RX ORDER — IPRATROPIUM BROMIDE AND ALBUTEROL SULFATE 2.5; .5 MG/3ML; MG/3ML
1 SOLUTION RESPIRATORY (INHALATION) 4 TIMES DAILY
Status: DISCONTINUED | OUTPATIENT
Start: 2019-07-29 | End: 2019-07-29

## 2019-07-29 RX ORDER — POTASSIUM CHLORIDE 750 MG/1
10 TABLET, EXTENDED RELEASE ORAL
Status: DISCONTINUED | OUTPATIENT
Start: 2019-07-30 | End: 2019-07-31

## 2019-07-29 RX ORDER — SODIUM CHLORIDE 9 MG/ML
INJECTION, SOLUTION INTRAVENOUS CONTINUOUS
Status: DISCONTINUED | OUTPATIENT
Start: 2019-07-29 | End: 2019-07-30

## 2019-07-29 RX ADMIN — ENOXAPARIN SODIUM 40 MG: 40 INJECTION SUBCUTANEOUS at 21:52

## 2019-07-29 RX ADMIN — PIPERACILLIN AND TAZOBACTAM 3.38 G: 3; .375 INJECTION, POWDER, LYOPHILIZED, FOR SOLUTION INTRAVENOUS; PARENTERAL at 16:47

## 2019-07-29 RX ADMIN — PIPERACILLIN SODIUM AND TAZOBACTAM SODIUM 3.38 G: 3; .375 INJECTION, POWDER, LYOPHILIZED, FOR SOLUTION INTRAVENOUS at 23:52

## 2019-07-29 RX ADMIN — SODIUM CHLORIDE 2800 ML: 9 INJECTION, SOLUTION INTRAVENOUS at 16:46

## 2019-07-29 RX ADMIN — SODIUM CHLORIDE 1000 ML: 9 INJECTION, SOLUTION INTRAVENOUS at 15:36

## 2019-07-29 RX ADMIN — Medication 1.5 G: at 17:34

## 2019-07-29 ASSESSMENT — ENCOUNTER SYMPTOMS
SHORTNESS OF BREATH: 0
VOMITING: 0
TROUBLE SWALLOWING: 0
NAUSEA: 0
SORE THROAT: 0

## 2019-07-29 NOTE — PROGRESS NOTES
4 Eyes Skin Assessment     The patient is being assess for   Admission    I agree that 2 RN's have performed a thorough Head to Toe Skin Assessment on the patient. ALL assessment sites listed below have been assessed. Areas assessed by both nurses:   [x]   Head, Face, and Ears   [x]   Shoulders, Back, and Chest, Abdomen  [x]   Arms, Elbows, and Hands   [x]   Coccyx, Sacrum, and Ischium  [x]   Legs, Feet, and Heels        Stage II on L buttock-see flowsheet    Stage II on R buttock-see flowsheet    Split on top of coccyx    Dirt under abdominal folds and groin    **SHARE this note so that the co-signing nurse is able to place an eSignature**    Co-signer eSignature: Electronically signed by Kit Rachel RN on 7/30/19 at 12:40 AM    Does the Patient have Skin Breakdown?   Yes LDA WOUND CARE was Initiated documentation include the Kat-wound, Wound Assessment, Measurements, Dressing Treatment, Drainage, and Color\",          Bridger Prevention initiated:  Yes   Wound Care Orders initiated:  Yes      [address]  nurse consulted for Pressure Injury (Stage 3,4, Unstageable, DTI, NWPT, Complex wounds)and New or Established Ostomies:  Yes      Primary Nurse eSignature: Electronically signed by Néstor Bhakta RN on 7/29/19 at 7:53 PM

## 2019-07-30 ENCOUNTER — APPOINTMENT (OUTPATIENT)
Dept: GENERAL RADIOLOGY | Age: 74
DRG: 871 | End: 2019-07-30
Payer: MEDICARE

## 2019-07-30 LAB
AMMONIA: 94 UMOL/L (ref 16–60)
ANION GAP SERPL CALCULATED.3IONS-SCNC: 11 MMOL/L (ref 3–16)
BASOPHILS ABSOLUTE: 0 K/UL (ref 0–0.2)
BASOPHILS RELATIVE PERCENT: 0.3 %
BUN BLDV-MCNC: 29 MG/DL (ref 7–20)
CALCIUM SERPL-MCNC: 7.9 MG/DL (ref 8.3–10.6)
CHLORIDE BLD-SCNC: 109 MMOL/L (ref 99–110)
CO2: 24 MMOL/L (ref 21–32)
CREAT SERPL-MCNC: 1.8 MG/DL (ref 0.8–1.3)
EOSINOPHILS ABSOLUTE: 0.3 K/UL (ref 0–0.6)
EOSINOPHILS RELATIVE PERCENT: 1.8 %
GFR AFRICAN AMERICAN: 45
GFR NON-AFRICAN AMERICAN: 37
GLUCOSE BLD-MCNC: 115 MG/DL (ref 70–99)
HCT VFR BLD CALC: 22.3 % (ref 40.5–52.5)
HEMOGLOBIN: 7.4 G/DL (ref 13.5–17.5)
LYMPHOCYTES ABSOLUTE: 1.3 K/UL (ref 1–5.1)
LYMPHOCYTES RELATIVE PERCENT: 9.3 %
MAGNESIUM: 1.5 MG/DL (ref 1.8–2.4)
MCH RBC QN AUTO: 34.9 PG (ref 26–34)
MCHC RBC AUTO-ENTMCNC: 33.3 G/DL (ref 31–36)
MCV RBC AUTO: 104.8 FL (ref 80–100)
MONOCYTES ABSOLUTE: 1.1 K/UL (ref 0–1.3)
MONOCYTES RELATIVE PERCENT: 7.7 %
NEUTROPHILS ABSOLUTE: 11.6 K/UL (ref 1.7–7.7)
NEUTROPHILS RELATIVE PERCENT: 80.9 %
PDW BLD-RTO: 17.3 % (ref 12.4–15.4)
PLATELET # BLD: 383 K/UL (ref 135–450)
PMV BLD AUTO: 8.4 FL (ref 5–10.5)
POTASSIUM REFLEX MAGNESIUM: 3 MMOL/L (ref 3.5–5.1)
RBC # BLD: 2.13 M/UL (ref 4.2–5.9)
SODIUM BLD-SCNC: 144 MMOL/L (ref 136–145)
TROPONIN: 0.02 NG/ML
WBC # BLD: 14.4 K/UL (ref 4–11)

## 2019-07-30 PROCEDURE — 82140 ASSAY OF AMMONIA: CPT

## 2019-07-30 PROCEDURE — 2580000003 HC RX 258: Performed by: INTERNAL MEDICINE

## 2019-07-30 PROCEDURE — 6370000000 HC RX 637 (ALT 250 FOR IP): Performed by: INTERNAL MEDICINE

## 2019-07-30 PROCEDURE — 71045 X-RAY EXAM CHEST 1 VIEW: CPT

## 2019-07-30 PROCEDURE — 85025 COMPLETE CBC W/AUTO DIFF WBC: CPT

## 2019-07-30 PROCEDURE — 51702 INSERT TEMP BLADDER CATH: CPT

## 2019-07-30 PROCEDURE — 2060000000 HC ICU INTERMEDIATE R&B

## 2019-07-30 PROCEDURE — 2700000000 HC OXYGEN THERAPY PER DAY

## 2019-07-30 PROCEDURE — 6360000002 HC RX W HCPCS: Performed by: INTERNAL MEDICINE

## 2019-07-30 PROCEDURE — 94761 N-INVAS EAR/PLS OXIMETRY MLT: CPT

## 2019-07-30 PROCEDURE — 83735 ASSAY OF MAGNESIUM: CPT

## 2019-07-30 PROCEDURE — 84484 ASSAY OF TROPONIN QUANT: CPT

## 2019-07-30 PROCEDURE — 94640 AIRWAY INHALATION TREATMENT: CPT

## 2019-07-30 PROCEDURE — 36415 COLL VENOUS BLD VENIPUNCTURE: CPT

## 2019-07-30 PROCEDURE — 80048 BASIC METABOLIC PNL TOTAL CA: CPT

## 2019-07-30 RX ADMIN — IPRATROPIUM BROMIDE AND ALBUTEROL SULFATE 1 AMPULE: .5; 3 SOLUTION RESPIRATORY (INHALATION) at 06:46

## 2019-07-30 RX ADMIN — POTASSIUM CHLORIDE 10 MEQ: 7.46 INJECTION, SOLUTION INTRAVENOUS at 10:48

## 2019-07-30 RX ADMIN — PANTOPRAZOLE SODIUM 40 MG: 40 TABLET, DELAYED RELEASE ORAL at 06:39

## 2019-07-30 RX ADMIN — MAGNESIUM SULFATE HEPTAHYDRATE 1 G: 1 INJECTION, SOLUTION INTRAVENOUS at 09:45

## 2019-07-30 RX ADMIN — PIPERACILLIN SODIUM AND TAZOBACTAM SODIUM 3.38 G: 3; .375 INJECTION, POWDER, LYOPHILIZED, FOR SOLUTION INTRAVENOUS at 16:07

## 2019-07-30 RX ADMIN — POTASSIUM CHLORIDE 10 MEQ: 7.46 INJECTION, SOLUTION INTRAVENOUS at 06:49

## 2019-07-30 RX ADMIN — Medication 2 PUFF: at 06:46

## 2019-07-30 RX ADMIN — Medication 2 PUFF: at 19:40

## 2019-07-30 RX ADMIN — POTASSIUM CHLORIDE 10 MEQ: 7.46 INJECTION, SOLUTION INTRAVENOUS at 09:44

## 2019-07-30 RX ADMIN — FOLIC ACID 1 MG: 1 TABLET ORAL at 08:23

## 2019-07-30 RX ADMIN — Medication 10 ML: at 21:12

## 2019-07-30 RX ADMIN — POTASSIUM CHLORIDE 10 MEQ: 7.46 INJECTION, SOLUTION INTRAVENOUS at 13:19

## 2019-07-30 RX ADMIN — IPRATROPIUM BROMIDE AND ALBUTEROL SULFATE 1 AMPULE: .5; 3 SOLUTION RESPIRATORY (INHALATION) at 19:40

## 2019-07-30 RX ADMIN — PIPERACILLIN SODIUM AND TAZOBACTAM SODIUM 3.38 G: 3; .375 INJECTION, POWDER, LYOPHILIZED, FOR SOLUTION INTRAVENOUS at 08:36

## 2019-07-30 RX ADMIN — POTASSIUM CHLORIDE 10 MEQ: 10 TABLET, EXTENDED RELEASE ORAL at 09:44

## 2019-07-30 RX ADMIN — VANCOMYCIN HYDROCHLORIDE 1750 MG: 1 INJECTION, POWDER, LYOPHILIZED, FOR SOLUTION INTRAVENOUS at 18:06

## 2019-07-30 RX ADMIN — LEVOTHYROXINE SODIUM 100 MCG: 100 TABLET ORAL at 06:39

## 2019-07-30 RX ADMIN — POTASSIUM CHLORIDE 10 MEQ: 7.46 INJECTION, SOLUTION INTRAVENOUS at 08:23

## 2019-07-30 RX ADMIN — MAGNESIUM SULFATE HEPTAHYDRATE 1 G: 1 INJECTION, SOLUTION INTRAVENOUS at 08:24

## 2019-07-30 RX ADMIN — VITAMIN D, TAB 1000IU (100/BT) 1000 UNITS: 25 TAB at 08:23

## 2019-07-30 RX ADMIN — SODIUM CHLORIDE: 9 INJECTION, SOLUTION INTRAVENOUS at 06:49

## 2019-07-30 RX ADMIN — POTASSIUM CHLORIDE 10 MEQ: 7.46 INJECTION, SOLUTION INTRAVENOUS at 11:56

## 2019-07-30 NOTE — PROGRESS NOTES
RESPIRATORY THERAPY ASSESSMENT    Name:  James Corrigan Record Number:  [id number]  Age: 69 y.o. Gender: male  : 1945  Today's Date:  2019  Room:  /0315-02    Assessment     Is the patient being admitted for a COPD or Asthma exacerbation? No   (If yes the patient will be seen every 4 hours for the first 24 hours and then reassessed)    Patient Admission Diagnosis      Allergies  No Known Allergies    Minimum Predicted Vital Capacity:     1292          Actual Vital Capacity: Too sleepy to preform              Pulmonary History:CHF/Pulmonary Edema, emphysema  Home Oxygen Therapy:  room air  Home Respiratory Therapy:None   Current Respiratory Therapy:  Dulera BID, Duoneb QID, Albuterol MDI PRN           Respiratory Severity Index(RSI)   Patients with orders for inhalation medications, oxygen, or any therapeutic treatment modality will be placed on Respiratory Protocol. They will be assessed with the first treatment and at least every 72 hours thereafter. The following severity scale will be used to determine frequency of treatment intervention. Smoking History: Pulmonary Disease or Smoking History, Greater than 15 pack year = 2    Social History  Social History     Tobacco Use    Smoking status: Former Smoker     Packs/day: 3.00     Years: 50.00     Pack years: 150.00     Last attempt to quit: 3/10/2002     Years since quittin.3    Smokeless tobacco: Never Used   Substance Use Topics    Alcohol use: Yes     Alcohol/week: 84.0 standard drinks     Types: 84 Cans of beer per week     Comment: 12-15 beers when goes to bar daily / 1.8 / [place] [facility] at home    Drug use: No       Recent Surgical History: None = 0  Past Surgical History  Past Surgical History:   Procedure Laterality Date    BACK SURGERY      fusion     SKIN BIOPSY      UPPER GASTROINTESTINAL ENDOSCOPY  1/10/2011    UPPER GASTROINTESTINAL ENDOSCOPY N/A 2019    EGD W/AMINTA.  performed by Lacho Andres and lacks wheezing by examination or by history for at least 24 hours, contact physician for possible discontinuation.

## 2019-07-30 NOTE — PLAN OF CARE
Problem: Falls - Risk of:  Goal: Will remain free from falls  Description  Will remain free from falls  7/30/2019 1334 by Scott Burnett RN  Outcome: Ongoing  7/30/2019 0052 by Jamia Marion RN  Outcome: Ongoing  Note:   Pt will remain free of falls during shift. Fall Precautions in place. Bed locked in lowest position. Bed alarm engaged. Call light within reach. Goal: Absence of physical injury  Description  Absence of physical injury  7/30/2019 1334 by Scott Burnett RN  Outcome: Ongoing  7/30/2019 0052 by Jamia Mairon RN  Outcome: Ongoing     Problem: Risk for Impaired Skin Integrity  Goal: Tissue integrity - skin and mucous membranes  Description  Structural intactness and normal physiological function of skin and  mucous membranes. 7/30/2019 1334 by Scott Burnett RN  Outcome: Ongoing  7/30/2019 0052 by Jamia Marion RN  Outcome: Ongoing  Note:   Pt remain free of further skin breakdown during hospital stay. Pt encouraged to self turn frequently. Will assess skin per shift or as needed. Pt with stage II pressure injuries on both buttocks. See flowsheet. Problem: SAFETY  Goal: Free from accidental physical injury  Outcome: Ongoing  Goal: Free from intentional harm  Outcome: Ongoing     Problem: DAILY CARE  Goal: Daily care needs are met  7/30/2019 1334 by Scott Burnett RN  Outcome: Ongoing  7/30/2019 0052 by Jamia Marion RN  Outcome: Ongoing     Problem: PAIN  Goal: Patient's pain/discomfort is manageable  Outcome: Ongoing     Problem: SKIN INTEGRITY  Goal: Skin integrity is maintained or improved  7/30/2019 1334 by Scott Burnett RN  Outcome: Ongoing  7/30/2019 0052 by Jamia Marion RN  Outcome: Ongoing     Problem: KNOWLEDGE DEFICIT  Goal: Patient/S.O. demonstrates understanding of disease process, treatment plan, medications, and discharge instructions.   Outcome: Ongoing     Problem: DISCHARGE BARRIERS  Goal: Patient's continuum of care needs are

## 2019-07-30 NOTE — PROGRESS NOTES
2 g total of magnesium IV replacement completed for a mag level of 1.5 this AM per PRN protocol orders.

## 2019-07-30 NOTE — PROGRESS NOTES
Shift assessment complete. See flow sheet. Patient resting with eyes closed, rouses for exam.  NS infusing at 125 ml/h. Lovenox injection given. VSS. No needs stated. Call light and bedside table in reach. Bed locked in lowest position, 2/4 siderails up, alarm is on. Will follow.

## 2019-07-30 NOTE — PROGRESS NOTES
Progress Note    Admit Date:  7/29/2019       Admitted for sepsis. Work-up so far possible UTI      Subjective:  Mr. Joaquina Bruno is better today. He is awake alert and oriented. Oxygen saturation stable on 4 L-he is on home oxygen 3.5 to 4 L    Afebrile today, hypotension resolved, white count improved. Objective:   Patient Vitals for the past 4 hrs:   BP Temp Temp src Pulse Resp SpO2   07/30/19 0947 108/62 97.7 °F (36.5 °C) Oral 91 17 96 %   07/30/19 0651 -- -- -- -- 16 95 %        Intake/Output Summary (Last 24 hours) at 7/30/2019 1007  Last data filed at 7/30/2019 0845  Gross per 24 hour   Intake 5787 ml   Output 200 ml   Net 5587 ml       Physical Exam:  CNS:  Currently alert and awake. PSYCH:  Cooperative. EYES:  Pupils are reactive to light. ENT:  Extraocular muscle movements intact. RESPIRATORY SYSTEM:  No rales, rubs or rhonchi. CARDIOVASCULAR:  S1 and S2 are heard. No murmurs or rubs. ABDOMEN:  Soft. No guarding, rigidity or rebound. MUSCULOSKELETAL:  No acute deformities. SKIN:  No rashes or lesions. The patient does have some edema in the  lower extremities.    NEURO : non focal       Scheduled Meds:   sodium chloride flush  10 mL Intravenous 2 times per day    vitamin D  1,000 Units Oral Daily    potassium chloride  10 mEq Oral Daily with breakfast    mometasone-formoterol  2 puff Inhalation BID    levothyroxine  100 mcg Oral QAM AC    folic acid  1 mg Oral Daily    pantoprazole  40 mg Oral QAM AC    piperacillin-tazobactam  3.375 g Intravenous Q8H    vancomycin  1,750 mg Intravenous Q24H    ipratropium-albuterol  1 ampule Inhalation BID       Continuous Infusions:   sodium chloride 80 mL/hr at 07/30/19 0649       PRN Meds:  sodium chloride flush, potassium chloride **OR** potassium alternative oral replacement **OR** potassium chloride, magnesium sulfate, magnesium hydroxide, ondansetron, albuterol sulfate HFA, acetaminophen      Data:  CBC:   Recent Labs     07/29/19  1515

## 2019-07-30 NOTE — PLAN OF CARE
Problem: Falls - Risk of:  Goal: Will remain free from falls  Description  Will remain free from falls  Outcome: Ongoing  Note:   Pt will remain free of falls during shift. Fall Precautions in place. Bed locked in lowest position. Bed alarm engaged. Call light within reach. Goal: Absence of physical injury  Description  Absence of physical injury  Outcome: Ongoing     Problem: Risk for Impaired Skin Integrity  Goal: Tissue integrity - skin and mucous membranes  Description  Structural intactness and normal physiological function of skin and  mucous membranes. Outcome: Ongoing  Note:   Pt remain free of further skin breakdown during hospital stay. Pt encouraged to self turn frequently. Will assess skin per shift or as needed. Pt with stage II pressure injuries on both buttocks. See flowsheet.        Problem: DAILY CARE  Goal: Daily care needs are met  Outcome: Ongoing     Problem: SKIN INTEGRITY  Goal: Skin integrity is maintained or improved  Outcome: Ongoing     Problem: DISCHARGE BARRIERS  Goal: Patient's continuum of care needs are met  Outcome: Ongoing

## 2019-07-30 NOTE — PROGRESS NOTES
16Fr cardoza cath placed for urinary retention per MD order. 600ml urine returned, brown, cloudy, strong odor noted. Pt tolerated well. 10 ml balloon. Will monitor.

## 2019-07-30 NOTE — H&P
[address]                 [address] [address]                              HISTORY AND PHYSICAL    PATIENT NAME: Romana Burrs                      :        1945  MED REC NO:   [id number]                          ROOM:         ACCOUNT NO:   [de-identified]                           ADMIT DATE: 2019  PROVIDER:     Heike Scanlon MD    I obtained a history and performed a physical exam on the patient on the  medical floor on 2019. CHIEF COMPLAINT:  Sepsis, low blood pressure. HISTORY OF PRESENT ILLNESS:  The patient is a 67-year-old  male  who is an extremely poor historian, presents to the hospital with what  he describes as a 1-week history of \"feeling like crap\" along with just  progressively feeling weaker and weaker for a week with onset of the  symptoms about a week ago without any concomitant nausea or vomiting,  but presence of significant concomitant anorexia and progressive  inability to eat or drink much with some fevers and some subjective  sensation of chills at home. No other constitutional symptoms. At the  time of my exam, the patient feels sleepy, but is cooperative. PAST MEDICAL/PAST SURGICAL HISTORY:  1. Chronic alcoholism. 2.  Acute kidney injury. 3.  Congestive heart failure. 4.  Hypotension. 5.  Dyslipidemia. 6.  Emphysema. 7.  Back surgery. 8.  EGD. FAMILY HISTORY:  Reviewed by me and is currently noncontributory. SOCIAL HISTORY:  1. Active alcohol use. 2.  Previous smoker, stopped smoking in . When asked about his alcohol use and asked how much [place] he  drinks at home, the patient replied with \"I don't measure it. \"    MEDICATIONS:  Cozaar, Neurontin, multivitamins, Prilosec, Folvite,  ferrous sulfate, DuoNeb, Lasix, Synthroid, Toprol-XL, Klor-Con, vitamin  D3, Flomax, Symbicort, Proventil.     REVIEW OF SYSTEMS:  The patient's review of systems is

## 2019-07-30 NOTE — ED PROVIDER NOTES
Leukocyte Esterase, Urine LARGE (A) Negative    Microscopic Examination YES     Urine Reflex to Culture Yes     Urine Type Not Specified    Lactic Acid, Plasma   Result Value Ref Range    Lactic Acid 3.1 (H) 0.4 - 2.0 mmol/L   Ammonia   Result Value Ref Range    Ammonia 110 (HH) 16 - 60 umol/L   Ethanol   Result Value Ref Range    Ethanol Lvl None Detected mg/dL   Lactate, Sepsis   Result Value Ref Range    Lactic Acid, Sepsis 1.4 0.4 - 1.9 mmol/L   Microscopic Urinalysis   Result Value Ref Range    WBC, UA >100 (A) 0 - 5 /HPF    RBC, UA 3-5 (A) 0 - 2 /HPF    Epi Cells 3-5 /HPF    Bacteria, UA 3+ (A) /HPF    Crystals 2+ Uric Acid (A) /HPF   Lactic Acid, Plasma   Result Value Ref Range    Lactic Acid 2.5 (H) 0.4 - 2.0 mmol/L   Troponin   Result Value Ref Range    Troponin 0.03 (H) <0.01 ng/mL   Vancomycin, trough   Result Value Ref Range    Vancomycin Tr 20.6 (HH) 10.0 - 20.0 ug/mL   EKG 12 Lead   Result Value Ref Range    Ventricular Rate 96 BPM    Atrial Rate 96 BPM    P-R Interval 178 ms    QRS Duration 92 ms    Q-T Interval 392 ms    QTc Calculation (Bazett) 495 ms    P Axis 88 degrees    R Axis 38 degrees    T Axis 148 degrees    Diagnosis       Sinus rhythm with occasional Premature ventricular complexesT wave abnormality, consider anterior ischemiaProlonged QTAbnormal ECGNo previous ECGs availableConfirmed by FRANCISCO MCCLENDON MD ([id number]) on 7/29/2019 6:04:31 PM       ECG  The Ekg interpreted by me shows  normal sinus rhythm with a rate of 96  Axis is   Normal  QTc is  495ms  Intervals and Durations are unremarkable.       ST Segments: nonspecific changes  No significant change from prior EKG dated 4/8/19    RADIOLOGY  Xr Chest Portable    Result Date: 7/29/2019  EXAMINATION: ONE XRAY VIEW OF THE CHEST 7/29/2019 3:23 pm COMPARISON: 04/08/2019 HISTORY: ORDERING SYSTEM PROVIDED HISTORY: other TECHNOLOGIST PROVIDED HISTORY: Reason for exam:->other Reason for Exam: sob Acuity: Acute Type of Exam: Initial FINDINGS: no administration in time range)   therapeutic multivitamin-minerals 1 tablet (has no administration in time range)   enoxaparin (LOVENOX) injection 40 mg (has no administration in time range)   vancomycin (VANCOCIN) 1,750 mg in dextrose 5 % 500 mL IVPB (has no administration in time range)   ipratropium-albuterol (DUONEB) nebulizer solution 1 ampule (has no administration in time range)   0.9 % sodium chloride bolus (0 mLs Intravenous Stopped 7/29/19 1650)   0.9 % sodium chloride IV bolus 2,800 mL (0 mLs Intravenous Stopped 7/29/19 1801)   piperacillin-tazobactam (ZOSYN) 3.375 g in sodium chloride 0.9 % 100 mL IVPB (mini-bag) (0 g Intravenous Stopped 7/29/19 1734)   vancomycin 1.5 g in dextrose 5% 300 mL IVPB (0 g Intravenous Stopped 7/29/19 2009)        CLINICAL IMPRESSION  1. Sepsis, due to unspecified organism ([facility]    2. Urinary tract infection without hematuria, site unspecified    3. Pressure injury of skin of left buttock, unspecified injury stage    4. Pneumonia due to organism    5. Chronic respiratory failure with hypoxia ([facility])        Blood pressure 102/67, pulse 123, temperature 97.5 °F (36.4 °C), temperature source Oral, resp. rate 18, height 6' 1\" (1.854 m), weight 272 lb 6.4 oz (123.6 kg), SpO2 95 %. DISPOSITION  Richard Andre was admitted in stable condition. Patient was given scripts for the following medications. I counseled patient how to take these medications. Current Discharge Medication List          Follow-up with:  Barb Cox MD  [id number]  [facility]  [phone]            DISCLAIMER: This chart was created using Dragon dictation software. Efforts were made by me to ensure accuracy, however some errors may be present due to limitations of this technology and occasionally words are not transcribed correctly.         Kenny Santiago MD  07/29/19 [address] [facility] Marguerite Mathur MD  08/09/19 0800

## 2019-07-30 NOTE — PROGRESS NOTES
H/p dictation id [id number]. Date of service 07/30/19.  uti with sepsis. Kemal. Chronic alcoholism. Anemia.

## 2019-07-31 LAB
ANION GAP SERPL CALCULATED.3IONS-SCNC: 12 MMOL/L (ref 3–16)
BASOPHILS ABSOLUTE: 0.1 K/UL (ref 0–0.2)
BASOPHILS RELATIVE PERCENT: 0.8 %
BUN BLDV-MCNC: 23 MG/DL (ref 7–20)
CALCIUM SERPL-MCNC: 8 MG/DL (ref 8.3–10.6)
CHLORIDE BLD-SCNC: 106 MMOL/L (ref 99–110)
CO2: 24 MMOL/L (ref 21–32)
CREAT SERPL-MCNC: 1.7 MG/DL (ref 0.8–1.3)
EOSINOPHILS ABSOLUTE: 0.3 K/UL (ref 0–0.6)
EOSINOPHILS RELATIVE PERCENT: 2.5 %
GFR AFRICAN AMERICAN: 48
GFR NON-AFRICAN AMERICAN: 40
GLUCOSE BLD-MCNC: 132 MG/DL (ref 70–99)
HCT VFR BLD CALC: 22.4 % (ref 40.5–52.5)
HEMOGLOBIN: 7.4 G/DL (ref 13.5–17.5)
LYMPHOCYTES ABSOLUTE: 1.3 K/UL (ref 1–5.1)
LYMPHOCYTES RELATIVE PERCENT: 10.1 %
MAGNESIUM: 1.7 MG/DL (ref 1.8–2.4)
MCH RBC QN AUTO: 35.1 PG (ref 26–34)
MCHC RBC AUTO-ENTMCNC: 33.1 G/DL (ref 31–36)
MCV RBC AUTO: 106.2 FL (ref 80–100)
MONOCYTES ABSOLUTE: 0.9 K/UL (ref 0–1.3)
MONOCYTES RELATIVE PERCENT: 7.2 %
NEUTROPHILS ABSOLUTE: 10.3 K/UL (ref 1.7–7.7)
NEUTROPHILS RELATIVE PERCENT: 79.4 %
PDW BLD-RTO: 17.3 % (ref 12.4–15.4)
PLATELET # BLD: 369 K/UL (ref 135–450)
PMV BLD AUTO: 8.7 FL (ref 5–10.5)
POTASSIUM REFLEX MAGNESIUM: 3.2 MMOL/L (ref 3.5–5.1)
RBC # BLD: 2.1 M/UL (ref 4.2–5.9)
SODIUM BLD-SCNC: 142 MMOL/L (ref 136–145)
WBC # BLD: 13 K/UL (ref 4–11)

## 2019-07-31 PROCEDURE — 99232 SBSQ HOSP IP/OBS MODERATE 35: CPT | Performed by: INTERNAL MEDICINE

## 2019-07-31 PROCEDURE — 97166 OT EVAL MOD COMPLEX 45 MIN: CPT

## 2019-07-31 PROCEDURE — 94640 AIRWAY INHALATION TREATMENT: CPT

## 2019-07-31 PROCEDURE — 97530 THERAPEUTIC ACTIVITIES: CPT

## 2019-07-31 PROCEDURE — 94761 N-INVAS EAR/PLS OXIMETRY MLT: CPT

## 2019-07-31 PROCEDURE — 2700000000 HC OXYGEN THERAPY PER DAY

## 2019-07-31 PROCEDURE — 97535 SELF CARE MNGMENT TRAINING: CPT

## 2019-07-31 PROCEDURE — 80048 BASIC METABOLIC PNL TOTAL CA: CPT

## 2019-07-31 PROCEDURE — 2580000003 HC RX 258: Performed by: INTERNAL MEDICINE

## 2019-07-31 PROCEDURE — 6370000000 HC RX 637 (ALT 250 FOR IP): Performed by: INTERNAL MEDICINE

## 2019-07-31 PROCEDURE — 36415 COLL VENOUS BLD VENIPUNCTURE: CPT

## 2019-07-31 PROCEDURE — 97162 PT EVAL MOD COMPLEX 30 MIN: CPT

## 2019-07-31 PROCEDURE — 83735 ASSAY OF MAGNESIUM: CPT

## 2019-07-31 PROCEDURE — 2060000000 HC ICU INTERMEDIATE R&B

## 2019-07-31 PROCEDURE — 85025 COMPLETE CBC W/AUTO DIFF WBC: CPT

## 2019-07-31 PROCEDURE — 6360000002 HC RX W HCPCS: Performed by: INTERNAL MEDICINE

## 2019-07-31 RX ORDER — LACTULOSE 10 G/15ML
20 SOLUTION ORAL 3 TIMES DAILY
Status: DISCONTINUED | OUTPATIENT
Start: 2019-07-31 | End: 2019-08-01

## 2019-07-31 RX ORDER — TAMSULOSIN HYDROCHLORIDE 0.4 MG/1
0.4 CAPSULE ORAL DAILY
Status: DISCONTINUED | OUTPATIENT
Start: 2019-07-31 | End: 2019-08-02 | Stop reason: HOSPADM

## 2019-07-31 RX ORDER — METOPROLOL SUCCINATE 50 MG/1
50 TABLET, EXTENDED RELEASE ORAL DAILY
Status: DISCONTINUED | OUTPATIENT
Start: 2019-07-31 | End: 2019-08-02 | Stop reason: HOSPADM

## 2019-07-31 RX ORDER — POTASSIUM CHLORIDE 20 MEQ/1
20 TABLET, EXTENDED RELEASE ORAL
Status: DISCONTINUED | OUTPATIENT
Start: 2019-07-31 | End: 2019-08-02 | Stop reason: HOSPADM

## 2019-07-31 RX ORDER — LEVOFLOXACIN 500 MG/1
500 TABLET, FILM COATED ORAL DAILY
Status: DISCONTINUED | OUTPATIENT
Start: 2019-07-31 | End: 2019-08-02 | Stop reason: HOSPADM

## 2019-07-31 RX ADMIN — ENOXAPARIN SODIUM 40 MG: 40 INJECTION SUBCUTANEOUS at 09:20

## 2019-07-31 RX ADMIN — LACTULOSE 20 G: 10 SOLUTION ORAL at 13:48

## 2019-07-31 RX ADMIN — METOPROLOL SUCCINATE 50 MG: 50 TABLET, EXTENDED RELEASE ORAL at 13:49

## 2019-07-31 RX ADMIN — LEVOFLOXACIN 500 MG: 500 TABLET, FILM COATED ORAL at 13:48

## 2019-07-31 RX ADMIN — IPRATROPIUM BROMIDE AND ALBUTEROL SULFATE 1 AMPULE: .5; 3 SOLUTION RESPIRATORY (INHALATION) at 19:16

## 2019-07-31 RX ADMIN — POTASSIUM CHLORIDE 40 MEQ: 20 TABLET, EXTENDED RELEASE ORAL at 09:20

## 2019-07-31 RX ADMIN — PANTOPRAZOLE SODIUM 40 MG: 40 TABLET, DELAYED RELEASE ORAL at 05:56

## 2019-07-31 RX ADMIN — POTASSIUM CHLORIDE 20 MEQ: 20 TABLET, EXTENDED RELEASE ORAL at 13:48

## 2019-07-31 RX ADMIN — Medication 2 PUFF: at 07:28

## 2019-07-31 RX ADMIN — PIPERACILLIN SODIUM AND TAZOBACTAM SODIUM 3.38 G: 3; .375 INJECTION, POWDER, LYOPHILIZED, FOR SOLUTION INTRAVENOUS at 01:53

## 2019-07-31 RX ADMIN — Medication 2 PUFF: at 19:16

## 2019-07-31 RX ADMIN — FOLIC ACID 1 MG: 1 TABLET ORAL at 09:20

## 2019-07-31 RX ADMIN — LACTULOSE 20 G: 10 SOLUTION ORAL at 21:00

## 2019-07-31 RX ADMIN — LEVOTHYROXINE SODIUM 100 MCG: 100 TABLET ORAL at 05:56

## 2019-07-31 RX ADMIN — PIPERACILLIN SODIUM AND TAZOBACTAM SODIUM 3.38 G: 3; .375 INJECTION, POWDER, LYOPHILIZED, FOR SOLUTION INTRAVENOUS at 09:21

## 2019-07-31 RX ADMIN — POTASSIUM CHLORIDE 10 MEQ: 10 TABLET, EXTENDED RELEASE ORAL at 09:20

## 2019-07-31 RX ADMIN — VITAMIN D, TAB 1000IU (100/BT) 1000 UNITS: 25 TAB at 09:19

## 2019-07-31 RX ADMIN — TAMSULOSIN HYDROCHLORIDE 0.4 MG: 0.4 CAPSULE ORAL at 13:48

## 2019-07-31 RX ADMIN — IPRATROPIUM BROMIDE AND ALBUTEROL SULFATE 1 AMPULE: .5; 3 SOLUTION RESPIRATORY (INHALATION) at 07:28

## 2019-07-31 RX ADMIN — Medication 10 ML: at 21:00

## 2019-07-31 NOTE — PLAN OF CARE
Problem: Falls - Risk of:  Goal: Will remain free from falls  Description  Will remain free from falls  Outcome: Ongoing  Goal: Absence of physical injury  Description  Absence of physical injury  Outcome: Ongoing     Problem: Risk for Impaired Skin Integrity  Goal: Tissue integrity - skin and mucous membranes  Description  Structural intactness and normal physiological function of skin and  mucous membranes. Outcome: Ongoing     Problem: SAFETY  Goal: Free from accidental physical injury  Outcome: Ongoing  Goal: Free from intentional harm  Outcome: Ongoing     Problem: DAILY CARE  Goal: Daily care needs are met  Outcome: Ongoing     Problem: PAIN  Goal: Patient's pain/discomfort is manageable  Outcome: Ongoing     Problem: SKIN INTEGRITY  Goal: Skin integrity is maintained or improved  Outcome: Ongoing     Problem: KNOWLEDGE DEFICIT  Goal: Patient/S.O. demonstrates understanding of disease process, treatment plan, medications, and discharge instructions.   Outcome: Ongoing     Problem: DISCHARGE BARRIERS  Goal: Patient's continuum of care needs are met  Outcome: Ongoing

## 2019-07-31 NOTE — PROGRESS NOTES
Nutrition Assessment    Type and Reason for Visit: Positive Nutrition Screen, Initial(wounds)    Nutrition Recommendations:   1. Ensure High Protein BID  2. Changed diet to dental soft  D/t jaw pain     Nutrition Assessment: Pt. nutritionally compromised AEB multi stage 2 PU. At risk for further nutrition compromise r/t c/o pain in jaw and unable to chew. Will change diet to dental soft and add ensure high protein BID . Malnutrition Assessment:  · Malnutrition Status: At risk for malnutrition  · Context: Acute illness or injury  · Findings of the 6 clinical characteristics of malnutrition (Minimum of 2 out of 6 clinical characteristics is required to make the diagnosis of moderate or severe Protein Calorie Malnutrition based on AND/ASPEN Guidelines):  1. Energy Intake-Greater than 75% of estimated energy requirement, Greater than or equal to 7 days    2. Weight Loss-No significant weight loss, in 1 week  3. Fat Loss-No significant subcutaneous fat loss, Orbital  4. Muscle Loss- , Temples (temporalis muscle)  5. Fluid Accumulation-Unable to assess,    6.  Strength-Not measured    Nutrition Risk Level:  Moderate    Nutrient Needs:  · Estimated Daily Total Kcal: 7382-6268 based on 15-18 kcal/kg CBW  · Estimated Daily Protein (g): 117-125 based on 1.4-1.5 gr/kg IBW  · Estimated Daily Total Fluid (ml/day): 7950-1748    Nutrition Diagnosis:   · Problem: Biting/chewing difficulty  · Etiology: related to Pain     Signs and symptoms:  as evidenced by Patient report of, Presence of wounds(jaw bone pain cant chew)    Objective Information:  · Nutrition-Focused Physical Findings: Eldery Obese white male lying in bed with O2 /NC; he reorts that he eats well and naythng he wants; currently has jaw pain on the lower left side and cannot chew agreed to a dental soft diet;   · Wound Type: Pressure Ulcer, Stage II  · Current Nutrition Therapies:  · Oral Diet Orders: General   · Oral Diet intake: %  · Oral Nutrition

## 2019-07-31 NOTE — PROGRESS NOTES
Attempted to assess pt's wounds; pt refused to allow assessment or pictures; updated primary RN of situation; NS wound wash left at bedside; instructed RN to cleanse wounds with NS; will attempt to assess pt on Friday 8/2/19  Mauro Panda RN

## 2019-07-31 NOTE — PROGRESS NOTES
Vancomycin Day: 3    Patient's labs, cultures, vitals, and vancomycin regimen reviewed. No changes today.   Trough due on 7/31 at [address] [place] [address] D 1/97/850063:91 AM  .

## 2019-07-31 NOTE — PROGRESS NOTES
Inpatient Physical Therapy Evaluation and Treatment    Unit: PCU  Date:  7/31/2019  Patient Name:    Justin Cox \"Bassam\"  Admitting diagnosis:  Sepsis [facility]) [A41.9]  Admit Date:  7/29/2019  Precautions/Restrictions/WB Status/ Lines/ Wounds/ Oxygen: fall risk, IV, bed/chair alarm, cardoza catheter , supplemental O2 (4L) and telemetry, wounds buttocks, [place]    Treatment Time:  7540-1253  Treatment Number:  1   Timed Code Treatment Minutes: 40 minutes  Total Treatment Minutes:  53  minutes    Patient Goals for Therapy: Not stated          Discharge Recommendations: SNF  DME needs for discharge: defer to facility       Therapy recommendation for EMS Transport: requires transport by cot due to decreased sitting tolerance with wounds    Therapy recommendations for staff:   Assist of 1 with use of No AD for all transfers to/from BSC/chair    Home Health S4 Level Recommendation:  NA  AM-PAC Mobility Score    AM-PAC Inpatient Mobility Raw Score : 16       Preadmission Environment    Pt. Lives with family (dtr) - son comes over from next door when dtr not present  Home environment:  two story home  Steps to enter first floor: 6-7 steps to enter and one hand rail  Steps to second floor: N/A - does not go upstairs  Bathroom: Tub/Shower unit - walk-in tub  Equipment owned: [facility], [address] [facility], [place] O2 (3.5 L) continuous and pulse ox    Preadmission Status:  Pt. Able to drive: Yes  Pt Fully independent with ADLs: Yes  Pt. Required assistance from family for: Independent PTA  Pt. Fully independent for transfers and gait and walked with No Device  History of falls No    Pain   Yes  Location: Back   Rating: moderate   Pain Medicine Status: No request made    Cognition    A&O x4   Able to follow 2 step commands    Subjective  Patient lying supine in bed with no family present  Pt agreeable to this PT eval & tx. Upper Extremity ROM/Strength  Please see OT evaluation.       Lower Extremity ROM / Strength    AROM WFL: Yes  ROM limitations:     Formal strength testing deferred due to inability to hold bowels at EOB. Lower Extremity Sensation    WFL    Lower Extremity Proprioception:   WFL    Coordination and Tone  WFL    Balance  Static Sitting:  Fair +   Tolerance:   Dynamic Sitting:  Fair   Static Standing: Fair    Tolerance: ~ 2 min, 2 bouts with UE support on IV pole  Dynamic Standing: Fair -    Bed Mobility   Supine to Sit:   Min A with HOB minimally elevated  Sit to Supine: Mod A for management of LEs  Rolling: Mod A to L/R  Scooting at EOB: Supervision  Scooting to [address] [place] [address] 200:  Max A of 2    Transfer Training     Sit to stand:   CGA from EOB, from [address] [place] [address],[address] to sit:   CGA to [facility], to chair  Bed to/from [facility]:  CGA with use of No AD    Gait gait deferred due to fatigue; pt ambulated 0 ft. Distance:   ft  Deviations (firm surface/linoleum): N/A   Assistive Device Used:  N/A  Level of Assist: N/A  Comment:     Stair Training deferred, pt unsafe/not appropriate to complete stairs at this time  Pt ascended/descended  stairs with N/A with N/A, and use of N/A. Pattern: N/A    Activity Tolerance   Pt completed therapy session with SOB noted w/activity  SpO2: 94% on 4L O2  HR: 90s  BP:     Positioning Needs   Pt returned to bed, call light and needs in reach, alarm set and heels floated on pillow . Pt in semi-L side lying    Exercises Initiated    AP    Other  None. Patient/Family Education   Pt educated on role of inpatient PT, POC, importance of continued activity, DC recommendations, safety awareness, transfer techniques, HEP and calling for assist with mobility. Assessment  Pt seen for Physical Therapy evaluation in acute care setting. Pt demonstrated decreased Activity tolerance, Balance, Safety and Strength and decreased independence with Ambulation, Bed Mobility  and Transfers. Goals : To be met in 3 visits:  1). Independent with LE Ex x 10 reps    To be met in 6 visits:  1). Supine to/from sit: Supervision  2).   Sit

## 2019-07-31 NOTE — PLAN OF CARE
Problem: Falls - Risk of:  Goal: Will remain free from falls  Description  Will remain free from falls  7/30/2019 2329 by Jess Fine RN  Outcome: Ongoing  Note:   Pt will remain free of falls during shift. Fall Precautions in place. Bed locked in lowest position. Bed alarm engaged. Call light within reach. 7/30/2019 1334 by Diony Solomon RN  Outcome: Ongoing  Goal: Absence of physical injury  Description  Absence of physical injury  7/30/2019 1334 by Diony Solomon RN  Outcome: Ongoing     Problem: Risk for Impaired Skin Integrity  Goal: Tissue integrity - skin and mucous membranes  Description  Structural intactness and normal physiological function of skin and  mucous membranes. 7/30/2019 2329 by Jess Fine RN  Outcome: Ongoing  Note:   Pt presented with a pre-existing stage II pressure injury on each buttock. Wound consult placed. Wounds dressed with mepilex. Offloading turns q2h and prn   7/30/2019 1334 by Diony Solomon RN  Outcome: Ongoing     Problem: SAFETY  Goal: Free from accidental physical injury  7/30/2019 1334 by Diony Solomon RN  Outcome: Ongoing  Goal: Free from intentional harm  7/30/2019 1334 by Diony Solomon RN  Outcome: Ongoing     Problem: DAILY CARE  Goal: Daily care needs are met  7/30/2019 2329 by Jess Fine RN  Outcome: Ongoing  7/30/2019 1334 by Diony Solomon RN  Outcome: Ongoing     Problem: PAIN  Goal: Patient's pain/discomfort is manageable  7/30/2019 1334 by Diony Solomon RN  Outcome: Ongoing     Problem: SKIN INTEGRITY  Goal: Skin integrity is maintained or improved  7/30/2019 1334 by Diony Solomon RN  Outcome: Ongoing     Problem: KNOWLEDGE DEFICIT  Goal: Patient/S.O. demonstrates understanding of disease process, treatment plan, medications, and discharge instructions.   7/30/2019 1334 by Diony Solomon RN  Outcome: Ongoing     Problem: DISCHARGE BARRIERS  Goal: Patient's continuum of care needs are met  7/30/2019 1334 by Betty Terrell RN  Outcome: Ongoing

## 2019-08-01 LAB
AMMONIA: 28 UMOL/L (ref 16–60)
ANION GAP SERPL CALCULATED.3IONS-SCNC: 12 MMOL/L (ref 3–16)
BASOPHILS ABSOLUTE: 0 K/UL (ref 0–0.2)
BASOPHILS RELATIVE PERCENT: 0.1 %
BUN BLDV-MCNC: 19 MG/DL (ref 7–20)
CALCIUM SERPL-MCNC: 8.6 MG/DL (ref 8.3–10.6)
CHLORIDE BLD-SCNC: 111 MMOL/L (ref 99–110)
CO2: 25 MMOL/L (ref 21–32)
CREAT SERPL-MCNC: 1.6 MG/DL (ref 0.8–1.3)
EOSINOPHILS ABSOLUTE: 0.2 K/UL (ref 0–0.6)
EOSINOPHILS RELATIVE PERCENT: 2 %
GFR AFRICAN AMERICAN: 52
GFR NON-AFRICAN AMERICAN: 43
GLUCOSE BLD-MCNC: 125 MG/DL (ref 70–99)
HCT VFR BLD CALC: 23.4 % (ref 40.5–52.5)
HEMOGLOBIN: 7.7 G/DL (ref 13.5–17.5)
LYMPHOCYTES ABSOLUTE: 1.1 K/UL (ref 1–5.1)
LYMPHOCYTES RELATIVE PERCENT: 8.5 %
MCH RBC QN AUTO: 35.3 PG (ref 26–34)
MCHC RBC AUTO-ENTMCNC: 33 G/DL (ref 31–36)
MCV RBC AUTO: 107.1 FL (ref 80–100)
MONOCYTES ABSOLUTE: 0.9 K/UL (ref 0–1.3)
MONOCYTES RELATIVE PERCENT: 7.2 %
NEUTROPHILS ABSOLUTE: 10.3 K/UL (ref 1.7–7.7)
NEUTROPHILS RELATIVE PERCENT: 82.2 %
ORGANISM: ABNORMAL
PDW BLD-RTO: 17.6 % (ref 12.4–15.4)
PLATELET # BLD: 374 K/UL (ref 135–450)
PMV BLD AUTO: 8.5 FL (ref 5–10.5)
POTASSIUM REFLEX MAGNESIUM: 4 MMOL/L (ref 3.5–5.1)
RBC # BLD: 2.18 M/UL (ref 4.2–5.9)
SODIUM BLD-SCNC: 148 MMOL/L (ref 136–145)
URINE CULTURE, ROUTINE: ABNORMAL
URINE CULTURE, ROUTINE: ABNORMAL
WBC # BLD: 12.5 K/UL (ref 4–11)

## 2019-08-01 PROCEDURE — 2700000000 HC OXYGEN THERAPY PER DAY

## 2019-08-01 PROCEDURE — 36415 COLL VENOUS BLD VENIPUNCTURE: CPT

## 2019-08-01 PROCEDURE — 94640 AIRWAY INHALATION TREATMENT: CPT

## 2019-08-01 PROCEDURE — 6370000000 HC RX 637 (ALT 250 FOR IP): Performed by: INTERNAL MEDICINE

## 2019-08-01 PROCEDURE — 94761 N-INVAS EAR/PLS OXIMETRY MLT: CPT

## 2019-08-01 PROCEDURE — 85025 COMPLETE CBC W/AUTO DIFF WBC: CPT

## 2019-08-01 PROCEDURE — 80048 BASIC METABOLIC PNL TOTAL CA: CPT

## 2019-08-01 PROCEDURE — 2060000000 HC ICU INTERMEDIATE R&B

## 2019-08-01 PROCEDURE — 99232 SBSQ HOSP IP/OBS MODERATE 35: CPT | Performed by: INTERNAL MEDICINE

## 2019-08-01 PROCEDURE — 6360000002 HC RX W HCPCS: Performed by: INTERNAL MEDICINE

## 2019-08-01 PROCEDURE — 97535 SELF CARE MNGMENT TRAINING: CPT

## 2019-08-01 PROCEDURE — 82140 ASSAY OF AMMONIA: CPT

## 2019-08-01 PROCEDURE — 2580000003 HC RX 258: Performed by: INTERNAL MEDICINE

## 2019-08-01 RX ORDER — LACTULOSE 10 G/15ML
20 SOLUTION ORAL 2 TIMES DAILY
Status: DISCONTINUED | OUTPATIENT
Start: 2019-08-01 | End: 2019-08-02 | Stop reason: HOSPADM

## 2019-08-01 RX ADMIN — TAMSULOSIN HYDROCHLORIDE 0.4 MG: 0.4 CAPSULE ORAL at 09:03

## 2019-08-01 RX ADMIN — POTASSIUM CHLORIDE 20 MEQ: 20 TABLET, EXTENDED RELEASE ORAL at 09:03

## 2019-08-01 RX ADMIN — LACTULOSE 20 G: 10 SOLUTION ORAL at 09:03

## 2019-08-01 RX ADMIN — IPRATROPIUM BROMIDE AND ALBUTEROL SULFATE 1 AMPULE: .5; 3 SOLUTION RESPIRATORY (INHALATION) at 07:44

## 2019-08-01 RX ADMIN — LACTULOSE 20 G: 10 SOLUTION ORAL at 20:40

## 2019-08-01 RX ADMIN — Medication 10 ML: at 20:40

## 2019-08-01 RX ADMIN — LEVOTHYROXINE SODIUM 100 MCG: 100 TABLET ORAL at 05:33

## 2019-08-01 RX ADMIN — PANTOPRAZOLE SODIUM 40 MG: 40 TABLET, DELAYED RELEASE ORAL at 05:33

## 2019-08-01 RX ADMIN — LEVOFLOXACIN 500 MG: 500 TABLET, FILM COATED ORAL at 09:03

## 2019-08-01 RX ADMIN — VITAMIN D, TAB 1000IU (100/BT) 1000 UNITS: 25 TAB at 09:03

## 2019-08-01 RX ADMIN — FOLIC ACID 1 MG: 1 TABLET ORAL at 09:03

## 2019-08-01 RX ADMIN — Medication 2 PUFF: at 07:45

## 2019-08-01 RX ADMIN — IPRATROPIUM BROMIDE AND ALBUTEROL SULFATE 1 AMPULE: .5; 3 SOLUTION RESPIRATORY (INHALATION) at 19:06

## 2019-08-01 RX ADMIN — ENOXAPARIN SODIUM 40 MG: 40 INJECTION SUBCUTANEOUS at 09:02

## 2019-08-01 RX ADMIN — Medication 2 PUFF: at 19:06

## 2019-08-01 RX ADMIN — Medication 10 ML: at 09:02

## 2019-08-01 RX ADMIN — METOPROLOL SUCCINATE 50 MG: 50 TABLET, EXTENDED RELEASE ORAL at 09:03

## 2019-08-01 NOTE — PROGRESS NOTES
Occupational Therapy Daily Treatment Note    Unit: PCU  Date:  8/1/2019  Patient Name:    Arlin Moore  Admitting diagnosis:  Sepsis [facility]) [A41.9]  Admit Date:  7/29/2019  Precautions/Restrictions:  fall risk, IV, bed/chair alarm, cardoza catheter  and supplemental O2 (4L)      Discharge Recommendations: SNF  DME needs for discharge: defer to facility       Therapy recommendations for staff:   Assist of 1 with use of gait belt for all transfers to/from BSC/chair    AM-PAC Score: [address] S4 Level: NA       Treatment Time:  9:15-9:52  Treatment number:  2   Total Treatment Time:   37 minutes      Subjective:  Pt reluctant to participate however with encouragement pt agreed     Pain   Yes  Rating: moderate  Location:back and bottom from ulcer  Pain Medicine Status: Denies need      Bed Mobility:   Supine to Sit:  SBA  Sit to Supine:  SBA  Rolling:           SBA  Scooting:        SBA    Transfer Training:   Sit to stand:   CGA  Stand to sit:  CGA  Bed to Chair:  CGA  Bed to BSC:   CGA  Standard toilet:   Not Tested    Activity Tolerance   Pt completed therapy session with SOB noted w/activity      ADL Training:   Upper body dressing:  SBA  Upper body bathing:  Min A  Lower body dressing:  Max A  Lower body bathing: Mod A  Toileting:   Min A  Grooming/Hygiene:  SBA    Therapeutic Exercise:   N/A    Patient Education:   Role of OT  Energy conservation techniques    Positioning Needs: In bed, call light and needs in reach. Family Present:  No    Assessment: Pt would benefit from con't OT tx to work toward Ind with ADL's, transfers, balance, endurance, and overall strength to return home. Pt completed bathing task sitting EOB however declined pooja care. Pt was SBA-Min A for all areas however needed Tot A to wash feet (however pt did attempt). Pt completed toileting task on [facility] with CGA for transfer using bedrail for support. GOALS  1). Bed to toilet/BSC: Supervision      To be met in 5 Visits:  1).

## 2019-08-01 NOTE — PROGRESS NOTES
Dental Soft  Code Status: Full Code      -Continue current management, continue Levaquin, monitor blood pressure and renal function. Discontinue Bermudez catheter and monitor urine output. Continue PT OT. Plan discharge in a.m., SNF refused .  likely discharge home with home health care      Lupe Mccurdy MD   8/1/2019

## 2019-08-02 VITALS
HEIGHT: 73 IN | HEART RATE: 83 BPM | TEMPERATURE: 97.1 F | RESPIRATION RATE: 18 BRPM | BODY MASS INDEX: 36.45 KG/M2 | OXYGEN SATURATION: 94 % | WEIGHT: 275 LBS | SYSTOLIC BLOOD PRESSURE: 112 MMHG | DIASTOLIC BLOOD PRESSURE: 64 MMHG

## 2019-08-02 PROBLEM — A41.9 SEPSIS (HCC): Status: RESOLVED | Noted: 2019-07-29 | Resolved: 2019-08-02

## 2019-08-02 LAB
ANION GAP SERPL CALCULATED.3IONS-SCNC: 9 MMOL/L (ref 3–16)
BASOPHILS ABSOLUTE: 0.1 K/UL (ref 0–0.2)
BASOPHILS RELATIVE PERCENT: 0.8 %
BUN BLDV-MCNC: 15 MG/DL (ref 7–20)
CALCIUM SERPL-MCNC: 8.9 MG/DL (ref 8.3–10.6)
CHLORIDE BLD-SCNC: 111 MMOL/L (ref 99–110)
CO2: 24 MMOL/L (ref 21–32)
CREAT SERPL-MCNC: 1.4 MG/DL (ref 0.8–1.3)
EOSINOPHILS ABSOLUTE: 0.2 K/UL (ref 0–0.6)
EOSINOPHILS RELATIVE PERCENT: 2 %
GFR AFRICAN AMERICAN: >60
GFR NON-AFRICAN AMERICAN: 50
GLUCOSE BLD-MCNC: 124 MG/DL (ref 70–99)
HCT VFR BLD CALC: 24.2 % (ref 40.5–52.5)
HEMOGLOBIN: 8 G/DL (ref 13.5–17.5)
LYMPHOCYTES ABSOLUTE: 1 K/UL (ref 1–5.1)
LYMPHOCYTES RELATIVE PERCENT: 9.4 %
MAGNESIUM: 1.8 MG/DL (ref 1.8–2.4)
MCH RBC QN AUTO: 35.2 PG (ref 26–34)
MCHC RBC AUTO-ENTMCNC: 32.9 G/DL (ref 31–36)
MCV RBC AUTO: 106.9 FL (ref 80–100)
MONOCYTES ABSOLUTE: 0.8 K/UL (ref 0–1.3)
MONOCYTES RELATIVE PERCENT: 7 %
NEUTROPHILS ABSOLUTE: 8.7 K/UL (ref 1.7–7.7)
NEUTROPHILS RELATIVE PERCENT: 80.8 %
PDW BLD-RTO: 18 % (ref 12.4–15.4)
PLATELET # BLD: 358 K/UL (ref 135–450)
PMV BLD AUTO: 8.6 FL (ref 5–10.5)
POTASSIUM REFLEX MAGNESIUM: 3.3 MMOL/L (ref 3.5–5.1)
RBC # BLD: 2.26 M/UL (ref 4.2–5.9)
SODIUM BLD-SCNC: 144 MMOL/L (ref 136–145)
WBC # BLD: 10.7 K/UL (ref 4–11)

## 2019-08-02 PROCEDURE — 2700000000 HC OXYGEN THERAPY PER DAY

## 2019-08-02 PROCEDURE — 94761 N-INVAS EAR/PLS OXIMETRY MLT: CPT

## 2019-08-02 PROCEDURE — 83735 ASSAY OF MAGNESIUM: CPT

## 2019-08-02 PROCEDURE — 80048 BASIC METABOLIC PNL TOTAL CA: CPT

## 2019-08-02 PROCEDURE — 99238 HOSP IP/OBS DSCHRG MGMT 30/<: CPT | Performed by: INTERNAL MEDICINE

## 2019-08-02 PROCEDURE — 94640 AIRWAY INHALATION TREATMENT: CPT

## 2019-08-02 PROCEDURE — 2580000003 HC RX 258: Performed by: INTERNAL MEDICINE

## 2019-08-02 PROCEDURE — 6370000000 HC RX 637 (ALT 250 FOR IP): Performed by: INTERNAL MEDICINE

## 2019-08-02 PROCEDURE — 85025 COMPLETE CBC W/AUTO DIFF WBC: CPT

## 2019-08-02 PROCEDURE — 6360000002 HC RX W HCPCS: Performed by: INTERNAL MEDICINE

## 2019-08-02 PROCEDURE — 36415 COLL VENOUS BLD VENIPUNCTURE: CPT

## 2019-08-02 RX ORDER — LACTULOSE 10 G/15ML
20 SOLUTION ORAL 2 TIMES DAILY
Qty: 1 BOTTLE | Refills: 1 | Status: ON HOLD | OUTPATIENT
Start: 2019-08-02 | End: 2019-08-25 | Stop reason: HOSPADM

## 2019-08-02 RX ORDER — LEVOFLOXACIN 500 MG/1
500 TABLET, FILM COATED ORAL DAILY
Qty: 5 TABLET | Refills: 0 | Status: SHIPPED | OUTPATIENT
Start: 2019-08-03 | End: 2019-08-08

## 2019-08-02 RX ADMIN — FOLIC ACID 1 MG: 1 TABLET ORAL at 08:33

## 2019-08-02 RX ADMIN — LEVOFLOXACIN 500 MG: 500 TABLET, FILM COATED ORAL at 08:33

## 2019-08-02 RX ADMIN — METOPROLOL SUCCINATE 50 MG: 50 TABLET, EXTENDED RELEASE ORAL at 08:33

## 2019-08-02 RX ADMIN — TAMSULOSIN HYDROCHLORIDE 0.4 MG: 0.4 CAPSULE ORAL at 08:33

## 2019-08-02 RX ADMIN — Medication 2 PUFF: at 07:46

## 2019-08-02 RX ADMIN — ENOXAPARIN SODIUM 40 MG: 40 INJECTION SUBCUTANEOUS at 08:32

## 2019-08-02 RX ADMIN — POTASSIUM CHLORIDE 20 MEQ: 20 TABLET, EXTENDED RELEASE ORAL at 08:33

## 2019-08-02 RX ADMIN — PANTOPRAZOLE SODIUM 40 MG: 40 TABLET, DELAYED RELEASE ORAL at 05:29

## 2019-08-02 RX ADMIN — POTASSIUM BICARBONATE 40 MEQ: 782 TABLET, EFFERVESCENT ORAL at 08:33

## 2019-08-02 RX ADMIN — Medication 10 ML: at 08:32

## 2019-08-02 RX ADMIN — IPRATROPIUM BROMIDE AND ALBUTEROL SULFATE 1 AMPULE: .5; 3 SOLUTION RESPIRATORY (INHALATION) at 07:46

## 2019-08-02 RX ADMIN — LACTULOSE 20 G: 10 SOLUTION ORAL at 08:32

## 2019-08-02 RX ADMIN — LEVOTHYROXINE SODIUM 100 MCG: 100 TABLET ORAL at 05:29

## 2019-08-02 RX ADMIN — VITAMIN D, TAB 1000IU (100/BT) 1000 UNITS: 25 TAB at 08:33

## 2019-08-02 NOTE — FLOWSHEET NOTE
08/01/19 2036   Vital Signs   Temp 97.9 °F (36.6 °C)   Temp Source Oral   Pulse 84   Heart Rate Source Monitor   Resp 16   /69   BP Location Right Arm   BP Upper/Lower Upper   MAP (mmHg) 86   Level of Consciousness 0   MEWS Score 1   Patient Currently in Pain Denies   Oxygen Therapy   SpO2 95 %   O2 Device Nasal cannula   Shift assessment complete, see flow sheets. Patient is alert and oriented,Pain assessed and no c/o pain and appears to be in no distress. Pt refusing repositioning from staff, stating he can turn self, will continue to encourage offloading of weight . Vital signs stable, call light within reach, will continue to monitor. Bed alarm on for safety.

## 2019-08-02 NOTE — PROGRESS NOTES
Occupational Therapy  Attempted to see patient for therapy this am, but patient refused. States he will be doing Physical Therapy later today and that's all the therapy he needs for today. States he is going home and has no ADL concerns as he will have his walk-in jacuzzi tub there. Will continue to follow.   Eric Lopez, OTR/L [id number]

## 2019-08-02 NOTE — DISCHARGE INSTR - COC
([facility] I26.99    Mediastinal adenopathy R59.0    Former smoker Z87.891    Pulmonary HTN ([facility] I27.20    Obesity E66.9    CHF (congestive heart failure) ([facility]) I50.9    CHF (congestive heart failure), NYHA class I, acute on chronic, combined ([facility]) I50.43    History of pulmonary embolism Z86.711    Elevated hemoglobin A1c R73.09    Alcohol dependence ([facility]) F10.20    Acute anemia D64.9    Chronic respiratory failure with hypoxia ([facility]) J96.11    Urinary tract infection without hematuria N39.0    Pneumonia due to organism J18.9       Isolation/Infection:   Isolation          No Isolation            Nurse Assessment:  Last Vital Signs: /64   Pulse 83   Temp 97.1 °F (36.2 °C) (Oral)   Resp 18   Ht 6' 1\" (1.854 m)   Wt 275 lb (124.7 kg)   SpO2 94%   BMI 36.28 kg/m²     Last documented pain score (0-10 scale):    Last Weight:   Wt Readings from Last 1 Encounters:   08/02/19 275 lb (124.7 kg)     Mental Status:  oriented, alert, coherent, logical, thought processes intact and able to concentrate and follow conversation    IV Access:  - None    Nursing Mobility/ADLs:  Walking   Independent  Transfer  Independent  Bathing  Assisted  Dressing  Assisted  Toileting  Assisted  Feeding  Independent  Med Admin  Assisted  Med Delivery   whole    Wound Care Documentation and Therapy:  Wound 04/30/19 Arm Anterior;Proximal;Right;Upper Open skin tear (Active)   Number of days: 93       Wound 07/29/19 Buttocks Left (Active)   Wound Image   8/2/2019 10:02 AM   Wound Pressure Stage  2 8/2/2019 10:02 AM   Dressing Status Intact; New drainage 8/2/2019 10:02 AM   Dressing Changed Changed/New 8/2/2019  8:46 AM   Dressing/Treatment Alginate with Ag; Foam 8/2/2019 10:02 AM   Wound Cleansed Rinsed/Irrigated with saline 8/1/2019  8:36 PM   Dressing Change Due 08/02/19 8/2/2019 10:02 AM   Wound Length (cm) 7 cm 8/2/2019 10:02 AM   Wound Width (cm) 4 cm 8/2/2019 10:02 AM   Wound Depth (cm) 0.1 cm 8/2/2019 10:02 AM   Wound Surface Blanchable erythema 8/2/2019 10:02 AM   [place]%Wound Bed 15 7/31/2019  9:20 AM   Red%Wound Bed 100 8/2/2019 10:02 AM   Number of days: 3       Wound 07/29/19 Coccyx split at top of buttocks crack (Active)   Dressing Status Clean;Dry; Intact 8/2/2019  8:46 AM   Dressing Changed Changed/New 8/2/2019  8:46 AM   Dressing/Treatment Foam 8/2/2019  8:46 AM   Wound Cleansed Rinsed/Irrigated with saline 8/1/2019  8:36 PM   Wound Length (cm) 1 cm 7/30/2019  8:23 AM   Wound Width (cm) 1 cm 7/30/2019  8:23 AM   Wound Surface Area (cm^2) 1 cm^2 7/30/2019  8:23 AM   Change in Wound Size % (l*w) 0 7/30/2019  8:23 AM   Wound Assessment Intact 8/2/2019  8:46 AM   Drainage Amount None 8/2/2019  8:46 AM   Odor Mild 8/2/2019  8:46 AM   Kat-wound Assessment Dry; Intact; Pink 8/2/2019  8:46 AM   Red%Wound Bed 100 7/31/2019  9:20 AM   Number of days: 3        Elimination:  Continence:   · Bowel: Yes  · Bladder: Yes  Urinary Catheter: None   Colostomy/Ileostomy/Ileal Conduit: No       Date of Last BM: 8-2-19    Intake/Output Summary (Last 24 hours) at 8/2/2019 1051  Last data filed at 8/2/2019 0852  Gross per 24 hour   Intake 840 ml   Output 625 ml   Net 215 ml     I/O last 3 completed shifts: In: 12 [P.O.:897]  Out: 625 [Urine:625]    Safety Concerns: At Risk for Falls    Impairments/Disabilities:      None    Nutrition Therapy:  Current Nutrition Therapy:   - Oral Diet:  General    Routes of Feeding: Oral  Liquids: No Restrictions  Daily Fluid Restriction: no  Last Modified Barium Swallow with Video (Video Swallowing Test): not done    Treatments at the Time of Hospital Discharge:   Respiratory Treatments:   Oxygen Therapy:  is on oxygen at 3.5 L/min per nasal cannula.   Ventilator:    - No ventilator support    Rehab Therapies: Physical Therapy, Occupational Therapy and skilled nursing, wound care  Weight Bearing Status/Restrictions: No weight bearing restirctions  Other Medical Equipment (for information only, NOT a DME order):

## 2019-08-02 NOTE — PROGRESS NOTES
4 Eyes Skin Assessment     The patient is being assess for   Shift Handoff    I agree that 2 RN's have performed a thorough Head to Toe Skin Assessment on the patient. ALL assessment sites listed below have been assessed. Areas assessed by both nurses:   [x]   Head, Face, and Ears   [x]   Shoulders, Back, and Chest, Abdomen  [x]   Arms, Elbows, and Hands   [x]   Coccyx, Sacrum, and Ischium  [x]   Legs, Feet, and Heels        Stage 3-L buttocks, stage 2 R buttock,  horizontal crack in sacrum. Blanchable redness on R outer thigh. Abrasion/bruising to jono knees. Bruising, abrasions-scattered    **SHARE this note so that the co-signing nurse is able to place an eSignature**    Co-signer eSignature: Electronically signed by Radha Valerio RN on 8/2/19 at 7:50 AM    Does the Patient have Skin Breakdown?   Yes LDA WOUND CARE was Initiated documentation include the Kat-wound, Wound Assessment, Measurements, Dressing Treatment, Drainage, and Color\",          Bridger Prevention initiated:  Yes   Wound Care Orders initiated:  Yes      [address]  nurse consulted for Pressure Injury (Stage 3,4, Unstageable, DTI, NWPT, Complex wounds)and New or Established Ostomies:  Yes      Primary Nurse eSignature: Electronically signed by Jesus Alberto Thompson RN on 8/2/19 at 7:27 AM

## 2019-08-02 NOTE — PLAN OF CARE
Problem: Falls - Risk of:  Goal: Will remain free from falls  Description  Will remain free from falls  8/2/2019 0748 by Adolph Lockhart RN  Outcome: Ongoing  8/1/2019 2221 by Domitila Hernandez RN  Outcome: Ongoing  Goal: Absence of physical injury  Description  Absence of physical injury  8/2/2019 0748 by Adolph Lockhart RN  Outcome: Ongoing  8/1/2019 2221 by Domitila Hernandez RN  Outcome: Ongoing     Problem: Risk for Impaired Skin Integrity  Goal: Tissue integrity - skin and mucous membranes  Description  Structural intactness and normal physiological function of skin and  mucous membranes. 8/2/2019 0748 by Adolph Lockhart RN  Outcome: Ongoing  8/1/2019 2221 by Domitila Hernandez RN  Outcome: Ongoing     Problem: SAFETY  Goal: Free from accidental physical injury  8/2/2019 0748 by Adolph Lockhart RN  Outcome: Ongoing  8/1/2019 2221 by Domitila Hernandez RN  Outcome: Ongoing  Goal: Free from intentional harm  8/2/2019 0748 by Adolph Lockhart RN  Outcome: Ongoing  8/1/2019 2221 by Domitila Hernandez RN  Outcome: Ongoing     Problem: DAILY CARE  Goal: Daily care needs are met  8/2/2019 0748 by Adolph Lockhart RN  Outcome: Ongoing  8/1/2019 2221 by Domitila Hernnadez RN  Outcome: Ongoing     Problem: PAIN  Goal: Patient's pain/discomfort is manageable  8/2/2019 0748 by Adolph Lockhart RN  Outcome: Ongoing  8/1/2019 2221 by Domitila Hernandez RN  Outcome: Ongoing     Problem: SKIN INTEGRITY  Goal: Skin integrity is maintained or improved  8/2/2019 0748 by Adolph Lockhart RN  Outcome: Ongoing  8/1/2019 2221 by Domitila Hernandez RN  Outcome: Ongoing     Problem: KNOWLEDGE DEFICIT  Goal: Patient/S.O. demonstrates understanding of disease process, treatment plan, medications, and discharge instructions.   8/2/2019 0748 by Adolph Lockhart RN  Outcome: Ongoing  8/1/2019 2221 by Domitila Hernandez RN  Outcome: Ongoing     Problem: DISCHARGE BARRIERS  Goal: Patient's continuum of care needs are met  8/2/2019 0748 by Adolph Lockhart RN  Outcome: Ongoing  8/1/2019 2221 by Haleigh Jones RN  Outcome: Ongoing     Problem: Nutrition  Goal: Optimal nutrition therapy  8/2/2019 0748 by Jannice Cowden, RN  Outcome: Ongoing  8/1/2019 2221 by Haleigh Jones RN  Outcome: Ongoing     Problem: ABCDS Injury Assessment  Goal: Absence of physical injury  8/2/2019 0748 by Jannice Cowden, RN  Outcome: Ongoing  8/1/2019 2221 by Haleigh Jones RN  Outcome: Ongoing

## 2019-08-02 NOTE — PROGRESS NOTES
Assessment completed as charted. Patient denies any needs. Resp. E/e patient remains on 3.5 LNC. VSS. Call light in reach will monitor.

## 2019-08-02 NOTE — PROGRESS NOTES
Attempted to assess patient's buttocks for pressure injuries; patient refused, stated he just got comfortable; stated to patient I will try to come back later to assess; patient was agreeable

## 2019-08-02 NOTE — CONSULTS
7/31/2019  9:20 AM   Number of days: 3     Response to treatment:  With complaints of pain.      Pain Assessment:  Severity:  2 / 10  Quality of pain: tender  Wound Pain Timing/Severity: intermittent  Premedicated: No    Plan   Plan of Care: Wound 07/29/19 Buttocks Left-Dressing/Treatment: Alginate with Ag, Foam  Wound 07/29/19 Buttocks Right-Dressing/Treatment: Foam  Wound 07/29/19 Coccyx split at top of buttocks crack-Dressing/Treatment: Foam  Recommendation: L buttock- Clean with NSS; apply alginate Ag to wound bed; cover with foam dressing; change every 3 days or prn soilage  R buttock - Clean with NSS; apply foam dressing; change dressing every 3 days or prn for soilage    Specialty Bed Required : No   [] Low Air Loss   [x] Pressure Redistribution  [] Fluid Immersion  [] Bariatric  [] Total Pressure Relief  [] Other:     Current Diet: DIET GENERAL; Dental Soft  Dietician consult:  No    Discharge Plan:  Placement for patient upon discharge: home with support    Patient appropriate for Outpatient [address]: No    Referrals:  [x]  following - spoke to discharge planner regarding home care; pt has refused although educated on importance of wound care for bilateral buttocks  [] [address] [employer]  [] Supplies  [] Other    Patient/Caregiver Teaching:  Level of patient/caregiver understanding able to:   [x] Indicates understanding       [] Needs reinforcement  [] Unsuccessful      [] Verbal Understanding  [] Demonstrated understanding       [] No evidence of learning  [] Refused teaching         [] N/A       Electronically signed by Faye Urbina RN, Hawa Selby on 8/2/2019 at 10:08 AM

## 2019-08-02 NOTE — CARE COORDINATION
DISCHARGE ORDER  Date/Time 2019 11:29 AM  Completed by: Reynaldo Blanco, Case Management    Patient Name: Brianne Duffy    : 1945  Admitting Diagnosis: Sepsis ([address] [A41.9]  Admit Date/Time: 2019  3:02 PM    Noted discharge order. Confirmed discharge plan with patient / family (pt): Yes   Discharge Plan: Reviewed chart. Role of discharge planner explained and patient verbalized understanding. Discharge order is noted. Pt is being d/c'd to home today, as pt lives with his daughter. Pt's O2 sats are 94% on 3.5L. Pt is active with [facility] for home O2. Pt is aware that PT/OT recommends SNF. CM spoke to pt regarding this yesterday and today. Pt refuses SNF, still. CM recommended HHC. Pt refuses HHC. CM stated that pt has an ulcer on his buttocks that needed to be monitored, CM explained the benefits of SNF and HHC. Pt still refuses HHC and refuses SNF. Pt states that his daughter provides 24/7 supervision and help with his ADLs. Discharge timeout done with Alissa Hong RN. CM also informed Colton Mccormack RN, with Wound Care, of the above, as well. All discharge needs and concerns addressed. No further discharge needs needed or noted. Addendum 1240 19: As pt was wheeling out of the room, he infomred his nurse that he would like a [address] [place] [address] Street from [facility] to come to his home. Daughter stated that\"maybe he didn't know what that all meant\". CM expalined that CM explained at length what SNF and HHC each meant, at length, both days, as did the Wound Care nurse Valerie June) today, and pt still refused. \" Daughter stated, \"oh\". Now pt is agreeable for HHC only. CM called [facility] with [facility] and left a voicemail for her and placd orders for [address] for SN/PT/OT/and wound care for pt. Pt and pt's daughter do have the phone number for [facility].

## 2019-08-02 NOTE — DISCHARGE SUMMARY
Name:  Nissa Delgado  Room:  /2245-21  MRN:    [id number]    Discharge Summary      This discharge summary is in conjunction with a complete physical exam done on the day of discharge. Discharging Physician: Dr. Dawit Mirza: 7/29/2019  Discharge:  8/2/2019    HPI taken from admission H&P:    The patient is a 71-year-old  male  who is an extremely poor historian, presents to the hospital with what he describes as a 1-week history of \"feeling like crap\" along with just progressively feeling weaker and weaker for a week with onset of the symptoms about a week ago without any concomitant nausea or vomiting, but presence of significant concomitant anorexia and progressive inability to eat or drink much with some fevers and some subjective  sensation of chills at home. No other constitutional symptoms. At the time of my exam, the patient feels sleepy, but is cooperative. Diagnoses this Admission and Hospital Course   Sepsis    POA (leukocytosis, fevers (at home), LA, source)   - suspect 2/2 UTI /possible pneumonia  - repeat chest x-ray to rule out pneumonia-showed bibasilar atelectasis/pneumonia  - initially started on IV Abx -vancomycin and Zosyn. Switched to Levaquin  - Blood  negative so far, urien cx + staph  - Hypotension resolved with IV fluids , did not need pressors   -Leukocytosis improving, white count down from 18K - >14 K - >13 K ->  12 K -> 10 K today.   -  lactic acidosis resolved  -Sepsis resolved     UTI  - abx as above   -Urine cultures growing greater than 100,000 staph( epidermis)  - D/c home on Levaquin      Community-acquired pneumonia   -suspect strep pneumonia or other gram positive organism  -was on  IV antibiotics vanc/Zosyn - switched  to Po levaquin now     Urinary retention  -Placed Bermudez catheter,  resumed flomax .  -Bermudez catheter removed yesterday, patient with good urine output now.      History of hypertension   Status post hypotension  -Patient was  Cooperative. EYES:  Pupils are reactive to light. ENT:  Extraocular muscle movements intact. RESPIRATORY SYSTEM:  No rales, rubs or rhonchi. Clear to auscultation  CARDIOVASCULAR:  S1 and S2 are heard.  No murmurs or rubs. ABDOMEN:  Soft.  No guarding, rigidity or rebound. MUSCULOSKELETAL:  No acute deformities. SKIN:   Pressure ulcer on the buttock, present on admission , see wound care notes   NEURO : non focal    CBC:   Recent Labs     07/31/19 0448 08/01/19 0453 08/02/19 0437   WBC 13.0* 12.5* 10.7   HGB 7.4* 7.7* 8.0*   HCT 22.4* 23.4* 24.2*   .2* 107.1* 106.9*    374 358     BMP:   Recent Labs     07/31/19 0448 08/01/19 0453 08/02/19 0437    148* 144   K 3.2* 4.0 3.3*    111* 111*   CO2 24 25 24   BUN 23* 19 15   CREATININE 1.7* 1.6* 1.4*       CULTURES  Urine Cx: Staphylococcus epidermidis  Blood Cx: NGTD    RADIOLOGY  XR CHEST PORTABLE   Final Result   Stable bibasilar opacification possibly representing pleural fluid combined   with atelectasis or pneumonia         XR CHEST PORTABLE   Final Result   New opacification at the right lung base could represent atelectasis or   pneumonia combined with pleural fluid. Stable opacification at the left lung   base most likely representing scarring.              Discharge Medications     Medication List      START taking these medications    lactulose 10 GM/15ML solution  Commonly known as:  CHRONULAC  Take 30 mLs by mouth 2 times daily     levofloxacin 500 MG tablet  Commonly known as:  LEVAQUIN  Take 1 tablet by mouth daily for 5 days  Start taking on:  8/3/2019        CONTINUE taking these medications    albuterol sulfate  (90 Base) MCG/ACT inhaler  Inhale 2 puffs into the lungs every 6 hours as needed for Wheezing     [employer] SPECTRAVITE SENIOR Tabs  TAKE 1 TABLET BY MOUTH EVERY DAY     ferrous sulfate 325 (65 Fe) MG tablet  TAKE 1 TABLET BY MOUTH EVERY DAY WITH BREAKFAST     folic acid 1 MG tablet  Commonly known as: FOLVITE  TAKE 1 TABLET BY MOUTH EVERY DAY     ipratropium-albuterol 0.5-2.5 (3) MG/3ML Soln nebulizer solution  Commonly known as:  DUONEB  INHALE THE CONTENTS OF 1 VIAL INTO THE LUNGS EVERY 4 HOURS AS NEEDED FOR SHORTNESS OF BREATH     KLOR-CON M20 20 MEQ extended release tablet  Generic drug:  potassium chloride  TAKE 1 TABLET BY MOUTH EVERY DAY     levothyroxine 100 MCG tablet  Commonly known as:  SYNTHROID  TAKE 1 TABLET BY MOUTH EVERY DAY     metoprolol succinate 50 MG extended release tablet  Commonly known as:  TOPROL XL  Take 1 tablet by mouth daily     omeprazole 20 MG delayed release capsule  Commonly known as:  PRILOSEC  TAKE 1 CAPSULE BY MOUTH EVERY DAY     SYMBICORT 80-4.5 MCG/ACT Aero  Generic drug:  budesonide-formoterol  INHALE 1 PUFF INTO LUNGS BY MOUTH TWICE A DAY     tamsulosin 0.4 MG capsule  Commonly known as:  FLOMAX  Take 1 capsule by mouth daily     vitamin D 1000 units Caps  Take 1 capsule by mouth daily        STOP taking these medications    furosemide 40 MG tablet  Commonly known as:  LASIX     gabapentin 300 MG capsule  Commonly known as:  NEURONTIN     losartan 100 MG tablet  Commonly known as:  COZAAR           Where to Get Your Medications      You can get these medications from any pharmacy    Bring a paper prescription for each of these medications  · lactulose 10 GM/15ML solution  · levofloxacin 500 MG tablet         Discharged in stable condition to Home with [facility] (refused [facility])    Follow Up:   Follow up with PCP in 1 week       Kai Olivares MD   8/2/19

## 2019-08-03 ENCOUNTER — CARE COORDINATION (OUTPATIENT)
Dept: CASE MANAGEMENT | Age: 74
End: 2019-08-03

## 2019-08-03 LAB
BLOOD CULTURE, ROUTINE: NORMAL
CULTURE, BLOOD 2: NORMAL

## 2019-08-03 NOTE — CARE COORDINATION
[facility] Transitions Initial Follow Up Call    Call within 2 business days of discharge: Yes    Patient: Sallie Woodard Patient : 1945   MRN: <[id number]>   Reason for Admission: CHF, UTI, Pneumonia, Sepsis   Discharge Date: 19 RARS: Readmission Risk Score: 24      Last Discharge [facility]       Complaint Diagnosis Description Type Department Provider    19 Hypotension Sepsis, due to unspecified organism ([facility] [address] . .. ED to Hosp-Admission (Discharged) (ADMITTED) Rik Ryder PCU Cindy Hill MD; Ce Naik. Spoke with: [address]: [facility]    Non-face-to-face services provided:  Obtained and reviewed discharge summary and/or continuity of care documents  Education of patient/family/caregiver/guardian to support self-management-.   Assessment and support for treatment adherence and medication management-.    Care Transitions 24 Hour Call    Schedule Follow Up Appointment with PCP:  Declined  Do you have any ongoing symptoms?:  Yes  Patient-reported symptoms:  Cough, Shortness of Breath  Interventions for patient-reported symptoms:  Notified Adriana 86 you have a copy of your discharge instructions?:  Yes  Do you have all of your prescriptions and are they filled?:  Yes  Have you been contacted by a [facility] Pharmacist?:  No  Have you scheduled your follow up appointment?:  Yes  How are you going to get to your appointment?:  Car - family or friend to transport  Were you discharged with any Home Care or Post Acute Services:  Yes  Post Acute Services:  Home Health (Comment: [facility] )  Patient DME:  Shower chair, Walker, Other  Other Patient DME:  Grab bars in bathroom   Patient Home Equipment:  Nebulizer, Oxygen  Do you have support at home?:  Child  Do you feel like you have everything you need to keep you well at home?:  Yes  Are you an active caregiver in your home?:  No  Care Transitions Interventions  No Identified Needs     Other Services:  Declined

## 2019-08-05 ENCOUNTER — CARE COORDINATION (OUTPATIENT)
Dept: CASE MANAGEMENT | Age: 74
End: 2019-08-05

## 2019-08-05 ENCOUNTER — TELEPHONE (OUTPATIENT)
Dept: PHARMACY | Facility: CLINIC | Age: 74
End: 2019-08-05

## 2019-08-06 ENCOUNTER — CARE COORDINATION (OUTPATIENT)
Dept: CASE MANAGEMENT | Age: 74
End: 2019-08-06

## 2019-08-06 NOTE — CARE COORDINATION
[facility] Transitions Follow Up Call    2019    Patient: Justin Cox  Patient : 1945   MRN: [id number]  Reason for Admission: UTI, MCKENNA, anemia  Discharge Date: 19 RARS: Readmission Risk Score: 24    Unable to reach patient by phone. Message left stating purpose of call with contact information requesting return call on both home and mobile numbers. Included reminder of appointment with PA in 2 days as below.       Future Appointments   Date Time Provider Basil Barretti   2019  2:00 PM [facility] [place], [place] [facility] None   2019  1:00 PM Arpan Andrade MD [facility] None       Duane Rosales RN

## 2019-08-11 ENCOUNTER — APPOINTMENT (OUTPATIENT)
Dept: CT IMAGING | Age: 74
DRG: 080 | End: 2019-08-11
Payer: MEDICARE

## 2019-08-11 ENCOUNTER — APPOINTMENT (OUTPATIENT)
Dept: GENERAL RADIOLOGY | Age: 74
DRG: 080 | End: 2019-08-11
Payer: MEDICARE

## 2019-08-11 ENCOUNTER — TELEPHONE (OUTPATIENT)
Dept: OTHER | Facility: CLINIC | Age: 74
End: 2019-08-11

## 2019-08-11 ENCOUNTER — HOSPITAL ENCOUNTER (INPATIENT)
Age: 74
LOS: 14 days | Discharge: HOME HEALTH | DRG: 080 | End: 2019-08-25
Attending: EMERGENCY MEDICINE | Admitting: INTERNAL MEDICINE
Payer: MEDICARE
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
## 2019-08-11 DIAGNOSIS — S31.829A WOUND OF LEFT BUTTOCK, INITIAL ENCOUNTER: ICD-10-CM

## 2019-08-11 DIAGNOSIS — I95.9 HYPOTENSION, UNSPECIFIED HYPOTENSION TYPE: Primary | ICD-10-CM

## 2019-08-11 DIAGNOSIS — N17.9 ACUTE KIDNEY INJURY (HCC): ICD-10-CM

## 2019-08-11 DIAGNOSIS — D72.829 LEUKOCYTOSIS, UNSPECIFIED TYPE: ICD-10-CM

## 2019-08-11 LAB
A/G RATIO: 0.7 (ref 1.1–2.2)
ALBUMIN SERPL-MCNC: 2.3 G/DL (ref 3.4–5)
ALP BLD-CCNC: 312 U/L (ref 40–129)
ALT SERPL-CCNC: 23 U/L (ref 10–40)
AMORPHOUS: ABNORMAL /HPF
ANION GAP SERPL CALCULATED.3IONS-SCNC: 16 MMOL/L (ref 3–16)
AST SERPL-CCNC: 55 U/L (ref 15–37)
BACTERIA: ABNORMAL /HPF
BASOPHILS ABSOLUTE: 0.2 K/UL (ref 0–0.2)
BASOPHILS RELATIVE PERCENT: 1.4 %
BILIRUB SERPL-MCNC: 0.9 MG/DL (ref 0–1)
BILIRUBIN URINE: NEGATIVE
BLOOD, URINE: NEGATIVE
BUN BLDV-MCNC: 23 MG/DL (ref 7–20)
C DIFF TOXIN/ANTIGEN: NORMAL
CALCIUM SERPL-MCNC: 8.6 MG/DL (ref 8.3–10.6)
CHLORIDE BLD-SCNC: 97 MMOL/L (ref 99–110)
CLARITY: ABNORMAL
CO2: 22 MMOL/L (ref 21–32)
COLOR: ABNORMAL
CREAT SERPL-MCNC: 3.2 MG/DL (ref 0.8–1.3)
CRYSTALS, UA: ABNORMAL /HPF
EKG ATRIAL RATE: 88 BPM
EKG DIAGNOSIS: NORMAL
EKG P AXIS: 83 DEGREES
EKG P-R INTERVAL: 168 MS
EKG Q-T INTERVAL: 400 MS
EKG QRS DURATION: 84 MS
EKG QTC CALCULATION (BAZETT): 484 MS
EKG R AXIS: 18 DEGREES
EKG T AXIS: 30 DEGREES
EKG VENTRICULAR RATE: 88 BPM
EOSINOPHILS ABSOLUTE: 0.1 K/UL (ref 0–0.6)
EOSINOPHILS RELATIVE PERCENT: 0.9 %
EPITHELIAL CELLS, UA: ABNORMAL /HPF
GFR AFRICAN AMERICAN: 23
GFR NON-AFRICAN AMERICAN: 19
GLOBULIN: 3.5 G/DL
GLUCOSE BLD-MCNC: 176 MG/DL (ref 70–99)
GLUCOSE URINE: NEGATIVE MG/DL
HCT VFR BLD CALC: 25.1 % (ref 40.5–52.5)
HEMOGLOBIN: 8.1 G/DL (ref 13.5–17.5)
KETONES, URINE: NEGATIVE MG/DL
LACTIC ACID, SEPSIS: 3.7 MMOL/L (ref 0.4–1.9)
LACTIC ACID: 3 MMOL/L (ref 0.4–2)
LACTIC ACID: 3.6 MMOL/L (ref 0.4–2)
LACTIC ACID: 5.8 MMOL/L (ref 0.4–2)
LEUKOCYTE ESTERASE, URINE: ABNORMAL
LYMPHOCYTES ABSOLUTE: 0.9 K/UL (ref 1–5.1)
LYMPHOCYTES RELATIVE PERCENT: 6.3 %
MCH RBC QN AUTO: 34.6 PG (ref 26–34)
MCHC RBC AUTO-ENTMCNC: 32.4 G/DL (ref 31–36)
MCV RBC AUTO: 106.7 FL (ref 80–100)
MICROSCOPIC EXAMINATION: YES
MONOCYTES ABSOLUTE: 0.9 K/UL (ref 0–1.3)
MONOCYTES RELATIVE PERCENT: 5.8 %
NEUTROPHILS ABSOLUTE: 12.8 K/UL (ref 1.7–7.7)
NEUTROPHILS RELATIVE PERCENT: 85.6 %
NITRITE, URINE: NEGATIVE
PDW BLD-RTO: 16.2 % (ref 12.4–15.4)
PH UA: 5.5 (ref 5–8)
PLATELET # BLD: 368 K/UL (ref 135–450)
PMV BLD AUTO: 8.9 FL (ref 5–10.5)
POTASSIUM SERPL-SCNC: 4.3 MMOL/L (ref 3.5–5.1)
PRO-BNP: 785 PG/ML (ref 0–124)
PROCALCITONIN: 1.59 NG/ML (ref 0–0.15)
PROTEIN UA: NEGATIVE MG/DL
RBC # BLD: 2.35 M/UL (ref 4.2–5.9)
RBC UA: ABNORMAL /HPF (ref 0–2)
SODIUM BLD-SCNC: 135 MMOL/L (ref 136–145)
SPECIFIC GRAVITY UA: 1.02 (ref 1–1.03)
TOTAL CK: 17 U/L (ref 39–308)
TOTAL PROTEIN: 5.8 G/DL (ref 6.4–8.2)
TROPONIN: 0.03 NG/ML
URINE REFLEX TO CULTURE: YES
URINE TYPE: ABNORMAL
UROBILINOGEN, URINE: 0.2 E.U./DL
WBC # BLD: 14.9 K/UL (ref 4–11)
WBC UA: ABNORMAL /HPF (ref 0–5)

## 2019-08-11 PROCEDURE — 36415 COLL VENOUS BLD VENIPUNCTURE: CPT

## 2019-08-11 PROCEDURE — 93010 ELECTROCARDIOGRAM REPORT: CPT | Performed by: INTERNAL MEDICINE

## 2019-08-11 PROCEDURE — 96367 TX/PROPH/DG ADDL SEQ IV INF: CPT

## 2019-08-11 PROCEDURE — 80053 COMPREHEN METABOLIC PANEL: CPT

## 2019-08-11 PROCEDURE — 51701 INSERT BLADDER CATHETER: CPT

## 2019-08-11 PROCEDURE — 84443 ASSAY THYROID STIM HORMONE: CPT

## 2019-08-11 PROCEDURE — 87449 NOS EACH ORGANISM AG IA: CPT

## 2019-08-11 PROCEDURE — 82550 ASSAY OF CK (CPK): CPT

## 2019-08-11 PROCEDURE — 71045 X-RAY EXAM CHEST 1 VIEW: CPT

## 2019-08-11 PROCEDURE — 83880 ASSAY OF NATRIURETIC PEPTIDE: CPT

## 2019-08-11 PROCEDURE — 1200000000 HC SEMI PRIVATE

## 2019-08-11 PROCEDURE — 2500000003 HC RX 250 WO HCPCS: Performed by: INTERNAL MEDICINE

## 2019-08-11 PROCEDURE — 83605 ASSAY OF LACTIC ACID: CPT

## 2019-08-11 PROCEDURE — 85025 COMPLETE CBC W/AUTO DIFF WBC: CPT

## 2019-08-11 PROCEDURE — 84145 PROCALCITONIN (PCT): CPT

## 2019-08-11 PROCEDURE — 6360000002 HC RX W HCPCS: Performed by: INTERNAL MEDICINE

## 2019-08-11 PROCEDURE — 6370000000 HC RX 637 (ALT 250 FOR IP): Performed by: INTERNAL MEDICINE

## 2019-08-11 PROCEDURE — 96365 THER/PROPH/DIAG IV INF INIT: CPT

## 2019-08-11 PROCEDURE — 93005 ELECTROCARDIOGRAM TRACING: CPT | Performed by: EMERGENCY MEDICINE

## 2019-08-11 PROCEDURE — 6360000002 HC RX W HCPCS: Performed by: EMERGENCY MEDICINE

## 2019-08-11 PROCEDURE — 81001 URINALYSIS AUTO W/SCOPE: CPT

## 2019-08-11 PROCEDURE — 84484 ASSAY OF TROPONIN QUANT: CPT

## 2019-08-11 PROCEDURE — 2580000003 HC RX 258: Performed by: EMERGENCY MEDICINE

## 2019-08-11 PROCEDURE — 87040 BLOOD CULTURE FOR BACTERIA: CPT

## 2019-08-11 PROCEDURE — 74176 CT ABD & PELVIS W/O CONTRAST: CPT

## 2019-08-11 PROCEDURE — 99291 CRITICAL CARE FIRST HOUR: CPT

## 2019-08-11 PROCEDURE — 87086 URINE CULTURE/COLONY COUNT: CPT

## 2019-08-11 PROCEDURE — 87493 C DIFF AMPLIFIED PROBE: CPT

## 2019-08-11 PROCEDURE — 94150 VITAL CAPACITY TEST: CPT

## 2019-08-11 PROCEDURE — 96361 HYDRATE IV INFUSION ADD-ON: CPT

## 2019-08-11 PROCEDURE — 96366 THER/PROPH/DIAG IV INF ADDON: CPT

## 2019-08-11 PROCEDURE — 87324 CLOSTRIDIUM AG IA: CPT

## 2019-08-11 PROCEDURE — 2580000003 HC RX 258: Performed by: INTERNAL MEDICINE

## 2019-08-11 RX ORDER — ACETAMINOPHEN 325 MG/1
650 TABLET ORAL EVERY 4 HOURS PRN
Status: DISCONTINUED | OUTPATIENT
Start: 2019-08-11 | End: 2019-08-25 | Stop reason: HOSPADM

## 2019-08-11 RX ORDER — SODIUM CHLORIDE 0.9 % (FLUSH) 0.9 %
10 SYRINGE (ML) INJECTION EVERY 12 HOURS SCHEDULED
Status: DISCONTINUED | OUTPATIENT
Start: 2019-08-11 | End: 2019-08-25 | Stop reason: HOSPADM

## 2019-08-11 RX ORDER — SODIUM CHLORIDE 9 MG/ML
1000 INJECTION, SOLUTION INTRAVENOUS CONTINUOUS
Status: DISCONTINUED | OUTPATIENT
Start: 2019-08-11 | End: 2019-08-12

## 2019-08-11 RX ORDER — IPRATROPIUM BROMIDE AND ALBUTEROL SULFATE 2.5; .5 MG/3ML; MG/3ML
1 SOLUTION RESPIRATORY (INHALATION)
Status: DISCONTINUED | OUTPATIENT
Start: 2019-08-11 | End: 2019-08-25 | Stop reason: HOSPADM

## 2019-08-11 RX ORDER — LACTULOSE 10 G/15ML
20 SOLUTION ORAL 2 TIMES DAILY
Status: DISCONTINUED | OUTPATIENT
Start: 2019-08-11 | End: 2019-08-13

## 2019-08-11 RX ORDER — SODIUM CHLORIDE 0.9 % (FLUSH) 0.9 %
10 SYRINGE (ML) INJECTION PRN
Status: DISCONTINUED | OUTPATIENT
Start: 2019-08-11 | End: 2019-08-25 | Stop reason: HOSPADM

## 2019-08-11 RX ORDER — TAMSULOSIN HYDROCHLORIDE 0.4 MG/1
0.4 CAPSULE ORAL DAILY
Status: DISCONTINUED | OUTPATIENT
Start: 2019-08-11 | End: 2019-08-25 | Stop reason: HOSPADM

## 2019-08-11 RX ORDER — 0.9 % SODIUM CHLORIDE 0.9 %
30 INTRAVENOUS SOLUTION INTRAVENOUS ONCE
Status: COMPLETED | OUTPATIENT
Start: 2019-08-11 | End: 2019-08-11

## 2019-08-11 RX ORDER — 0.9 % SODIUM CHLORIDE 0.9 %
1000 INTRAVENOUS SOLUTION INTRAVENOUS ONCE
Status: COMPLETED | OUTPATIENT
Start: 2019-08-11 | End: 2019-08-11

## 2019-08-11 RX ORDER — LEVOTHYROXINE SODIUM 0.1 MG/1
100 TABLET ORAL DAILY
Status: DISCONTINUED | OUTPATIENT
Start: 2019-08-11 | End: 2019-08-12

## 2019-08-11 RX ORDER — FOLIC ACID 1 MG/1
1000 TABLET ORAL DAILY
Status: DISCONTINUED | OUTPATIENT
Start: 2019-08-11 | End: 2019-08-25 | Stop reason: HOSPADM

## 2019-08-11 RX ORDER — PANTOPRAZOLE SODIUM 40 MG/1
40 TABLET, DELAYED RELEASE ORAL
Status: DISCONTINUED | OUTPATIENT
Start: 2019-08-12 | End: 2019-08-13

## 2019-08-11 RX ADMIN — SODIUM CHLORIDE 1000 ML: 9 INJECTION, SOLUTION INTRAVENOUS at 12:58

## 2019-08-11 RX ADMIN — LEVOTHYROXINE SODIUM 100 MCG: 100 TABLET ORAL at 19:39

## 2019-08-11 RX ADMIN — VANCOMYCIN HYDROCHLORIDE 1750 MG: 1 INJECTION, POWDER, LYOPHILIZED, FOR SOLUTION INTRAVENOUS at 14:23

## 2019-08-11 RX ADMIN — Medication 20 G: at 19:38

## 2019-08-11 RX ADMIN — METRONIDAZOLE 500 MG: 500 INJECTION, SOLUTION INTRAVENOUS at 20:22

## 2019-08-11 RX ADMIN — SODIUM CHLORIDE 2397 ML: 9 INJECTION, SOLUTION INTRAVENOUS at 14:22

## 2019-08-11 RX ADMIN — PIPERACILLIN SODIUM,TAZOBACTAM SODIUM 3.38 G: 3; .375 INJECTION, POWDER, FOR SOLUTION INTRAVENOUS at 13:39

## 2019-08-11 RX ADMIN — FOLIC ACID 1000 MCG: 1 TABLET ORAL at 19:39

## 2019-08-11 RX ADMIN — TAMSULOSIN HYDROCHLORIDE 0.4 MG: 0.4 CAPSULE ORAL at 19:39

## 2019-08-11 RX ADMIN — CEFEPIME 1 G: 1 INJECTION, POWDER, FOR SOLUTION INTRAMUSCULAR; INTRAVENOUS at 19:40

## 2019-08-11 RX ADMIN — SODIUM CHLORIDE 1000 ML: 9 INJECTION, SOLUTION INTRAVENOUS at 17:56

## 2019-08-11 RX ADMIN — Medication 10 ML: at 19:40

## 2019-08-11 RX ADMIN — ENOXAPARIN SODIUM 30 MG: 30 INJECTION SUBCUTANEOUS at 19:38

## 2019-08-11 ASSESSMENT — ENCOUNTER SYMPTOMS
ABDOMINAL PAIN: 0
NAUSEA: 0
COUGH: 1
BACK PAIN: 1
SHORTNESS OF BREATH: 1
VOMITING: 0
SORE THROAT: 0

## 2019-08-11 ASSESSMENT — PAIN SCALES - GENERAL
PAINLEVEL_OUTOF10: 0
PAINLEVEL_OUTOF10: 7

## 2019-08-11 ASSESSMENT — PAIN DESCRIPTION - LOCATION: LOCATION: BACK;BUTTOCKS

## 2019-08-11 ASSESSMENT — PAIN DESCRIPTION - PAIN TYPE: TYPE: ACUTE PAIN

## 2019-08-11 NOTE — CONSULTS
Pharmacy to Dose Vancomycin    Dx: sepsis  Goal trough = 15-20 mcg/mL  Pt wt = 124.7kg  Estimated Creatinine Clearance: 28 mL/min (A) (based on SCr of 3.2 mg/dL (H)). Vancomycin 1750mg IVPB x 1 in ED at 1430 today. Vanc level in AM  Helga Calhoun [facility] 7:01 PM 8/11/2019    Day 3  Estimated Creatinine Clearance: 44 mL/min (based on SCr of 2.1 mg/dL). Pulse dose Vancomycin - level this AM = 14.9 mcg/mL. Vancomycin 1000mg IVPB x 1. Vancomycin random level tomorrow AM.  Briana Hodgkin. [place] PharmD, BCPS   8/13/2019 7:13 AM

## 2019-08-11 NOTE — ED NOTES
Ps [email or web address]  Re: admit.  Septic, colitis, wounds on buttock, MCKENNA per Cheryl Smith returned [email or web address]     Lucina Hector Naegele  08/11/19 [id number]

## 2019-08-11 NOTE — ED NOTES
2x blood cultures obtained, pt medicated per orders and updated on plan of care; waiting on lab results, waiting on provider to discuss findings.       Donna Arceo RN  08/11/19 [id number]

## 2019-08-11 NOTE — ED TRIAGE NOTES
Pt presents to ED for fall that occurred this morning, about 40min prior to arrival.  Pt fell out of chair attempting to get up. Pt reports that he thought the chair was collapsing. Chair didn't collapse, pt mis judged the location and fell. Pt was down for approx 1 hour prior to EMS arrival.  EMS reports 80/40 BP in route. Pt denies hitting his head. Pt states \"my ass hit the floor now it, and my back, hurt\". Pt reports 7 out of 10 pain that he took some medication for, but does not know what he took. Pt reports daughter knows. Pt wears 3-4L at home. SpO2 was 88% on RA upon arrival.  Pt placed on 3L NC and now SpO2 is 97%. Pt denies any chest pain, N/V, lightheadedness and dizziness. IV access established, blood specimens obtained and sent to lab. Medication list updated per pt reporting no change since the last update 2 weeks ago and is believed to be complete and accurate.

## 2019-08-11 NOTE — PROGRESS NOTES
met:  a. Alert and cooperative     b. HR < 140 bpm  c. RR < 30 bpm                d. Can demonstrate a 2-3 second inspiratory hold  4. Bronchodilators will be administered via Hand Held Nebulizer [facility]) to patients when ANY of the following criteria are met  a. Incognizant or uncooperative          b. Patients treated with HHN at Home        c. Unable to demonstrate proper use of MDI with spacer     d. RR > 30 bpm   5. Bronchodilators will be delivered via Metered Dose Inhaler (MDI), HHN, Aerogen to intubated patients on mechanical ventilation. 6. Inhalation medication orders will be delivered and/or substituted as outlined below. Aerosolized Medications Ordering and Administration Guidelines:    1. All Medications will be ordered by a physician, and their frequency and/or modality will be adjusted as defined by the patients Respiratory Severity Index (RSI) score. 2. If the patient does not have documented COPD, consider discontinuing anticholinergics when RSI is less than 9.  3. If the bronchospasm worsens (increased RSI), then the bronchodilator frequency can be increased to a maximum of every 4 hours. If greater than every 4 hours is required, the physician will be contacted. 4. If the bronchospasm improves, the frequency of the bronchodilator can be decreased, based on the patient's RSI, but not less than home treatment regimen frequency. 5. Bronchodilator(s) will be discontinued if patient has a RSI less than 9 and has received no scheduled or as needed treatment for 72  Hrs. Patients Ordered on a Mucolytic Agent:    1. Must always be administered with a bronchodilator. 2. Discontinue if patient experiences worsened bronchospasm, or secretions have lessened to the point that the patient is able to clear them with a cough. Anti-inflammatory and Combination Medications:    1.  If the patient lacks prior history of lung disease, is not using inhaled anti-inflammatory medication at home, and lacks

## 2019-08-11 NOTE — ED PROVIDER NOTES
[facility] C3 TELE/MED [facility]  EMERGENCY DEPARTMENT ENCOUNTER      Pt Name: Krissy Schultz  MRN: [id number]  [place] 1945  Date of evaluation: 8/11/2019  Provider: Jelani Anne MD    CHIEF COMPLAINT       Chief Complaint   Patient presents with   Emily Bonnet Fall     pt fell out of recliner. Thought the chair collapsed but it didn't. Pt is not on anticoagulation meds, did not hit head, \"ass hit the floor\" denies LOC    Back Pain     broke back a year ago, hurts now         HISTORY OF PRESENT ILLNESS   (Location/Symptom, Timing/Onset, Context/Setting, Quality, Duration, Modifying Factors, Severity)  Note limiting factors. Krissy Schultz is a 69 y.o. male who presents to the emergency department      Patient is a 67-year-old male with a past medical history of hypertension, hyperlipidemia, pulmonary embolism on anticoagulation with Eliquis, CHF, alcohol abuse who presents after a fall out of his chair and found to have a low blood pressure. Patient reports that he was sitting in his recliner when he thought that the chair broke but really he slid out of it onto the floor. Neither his son nor his daughter could help him get back up and so they had to call EMS for assistance. EMS was able to get patient back up while he was down there he had an episode of bowel incontinence. Patient was evaluated by EMS and found to have a low blood pressure and so patient requested to come to the emergency department. Patient denies any fevers, chills, chest pain, nausea, vomiting, abdominal pain. Patient does report feeling short of breath but states that this is normal for him. Patient also reports pain in his low back but states that this is also normal for him as he previously has broken his back. The history is provided by the patient. Nursing Notes were reviewed. REVIEW OF SYSTEMS    (2-9 systems for level 4, 10 or more for level 5)     Review of Systems   Constitutional: Negative for chills and fever. ferrous sulfate 325 (65 Fe) MG tablet TAKE 1 TABLET BY MOUTH EVERY DAY WITH BREAKFAST  Qty: 90 tablet, Refills: 0      ipratropium-albuterol (DUONEB) 0.5-2.5 (3) MG/3ML SOLN nebulizer solution INHALE THE CONTENTS OF 1 VIAL INTO THE LUNGS EVERY 4 HOURS AS NEEDED FOR SHORTNESS OF BREATH  Qty: 360 mL, Refills: 1      tamsulosin (FLOMAX) 0.4 MG capsule Take 1 capsule by mouth daily  Qty: 30 capsule, Refills: 1      levothyroxine (SYNTHROID) 100 MCG tablet TAKE 1 TABLET BY MOUTH EVERY DAY  Qty: 90 tablet, Refills: 0      metoprolol succinate (TOPROL XL) 50 MG extended release tablet Take 1 tablet by mouth daily  Qty: 30 tablet, Refills: 0      SYMBICORT 80-4.5 MCG/ACT AERO INHALE 1 PUFF INTO LUNGS BY MOUTH TWICE A DAY  Qty: 30.6 Inhaler, Refills: 2      KLOR-CON M20 20 MEQ extended release tablet TAKE 1 TABLET BY MOUTH EVERY DAY  Qty: 90 tablet, Refills: 0      vitamin D ([employer]S VITAMIN D3) 1000 units CAPS Take 1 capsule by mouth daily  Qty: 90 capsule, Refills: 0      albuterol sulfate HFA (PROVENTIL HFA) 108 (90 Base) MCG/ACT inhaler Inhale 2 puffs into the lungs every 6 hours as needed for Wheezing  Qty: 1 Inhaler, Refills: 3             ALLERGIES     Patient has no known allergies. FAMILY HISTORY       Family History   Problem Relation Age of Onset    Other Other         no early lung disease          SOCIAL HISTORY       Social History     Socioeconomic History    Marital status:       Spouse name: None    Number of children: None    Years of education: None    Highest education level: None   Occupational History    None   Social Needs    Financial resource strain: None    Food insecurity:     Worry: None     Inability: None    Transportation needs:     Medical: None     Non-medical: None   Tobacco Use    Smoking status: Former Smoker     Packs/day: 3.00     Years: 50.00     Pack years: 150.00     Last attempt to quit: 3/10/2002     Years since quittin.4    Smokeless tobacco: Never Used Substance and Sexual Activity    Alcohol use: Yes     Comment: 1-2 medium size glasses crown royal    Drug use: No    Sexual activity: Not Currently   Lifestyle    Physical activity:     Days per week: None     Minutes per session: None    Stress: None   Relationships    Social connections:     Talks on phone: None     Gets together: None     Attends Anabaptism service: None     Active member of club or organization: None     Attends meetings of clubs or organizations: None     Relationship status: None    Intimate partner violence:     Fear of current or ex partner: None     Emotionally abused: None     Physically abused: None     Forced sexual activity: None   Other Topics Concern    None   Social History Narrative    None       SCREENINGS    Sami Coma Scale  Eye Opening: Spontaneous  Best Verbal Response: Oriented  Best Motor Response: Obeys commands  Sami Coma Scale Score: 15          PHYSICAL EXAM    (up to 7 for level 4, 8 or more for level 5)     ED Triage Vitals [08/11/19 1216]   BP Temp Temp Source Pulse Resp SpO2 Height Weight   (!) 93/46 98 °F (36.7 °C) Oral 93 16 (!) 87 % 6' 1\" (1.854 m) 275 lb (124.7 kg)       Physical Exam   Constitutional: He is oriented to person, place, and time. He appears well-developed and well-nourished. No distress. HENT:   Head: Normocephalic and atraumatic. Eyes: Conjunctivae are normal.   Neck: Normal range of motion. Neck supple. Cardiovascular: Normal rate, regular rhythm, normal heart sounds and intact distal pulses. No murmur heard. Pulmonary/Chest: Effort normal. No respiratory distress. He has decreased breath sounds. Abdominal: Soft. He exhibits no distension. There is no tenderness. Musculoskeletal: Normal range of motion. He exhibits edema. Neurological: He is alert and oriented to person, place, and time. Skin: Skin is warm and dry. He is not diaphoretic. Nursing note and vitals reviewed. DIAGNOSTIC RESULTS     EKG:  All [facility]  Patient evaluated and previous record reviewed. Patient presents after a fall but found to be hypotensive. Vital signs notable for initial blood pressure 70 systolic but improved to 90 systolic with IV fluids. Physical exam as documented above. Differential includes sepsis, pneumonia, UTI, skin wounds, electrolyte abnormality, MCKENNA. Lab work obtained and notable for leukocytosis, creatinine increased to 3.2 from 1.4 previous, UA does have bacteria with trace leukocyte esterase, troponin slightly elevated at 0.03, lactic acid initially 5.8 but repeat is 3.6. Patient given 30 mL/kg of IV fluids, covered broadly with vancomycin and Zosyn. Patient admitted to inpatient service for further work-up and management. CONSULTS:  PHARMACY TO DOSE VANCOMYCIN  IP CONSULT TO NEPHROLOGY  PHARMACY TO DOSE VANCOMYCIN    PROCEDURES:  Unless otherwise noted below, none     Critical Care  Performed by: Guera Enrique MD  Authorized by: Sunday Lagos MD     Critical care provider statement:     Critical care time (minutes):  32    Critical care time was exclusive of:  Separately billable procedures and treating other patients and teaching time    Critical care was necessary to treat or prevent imminent or life-threatening deterioration of the following conditions:  Sepsis and dehydration    Critical care was time spent personally by me on the following activities:  Ordering and performing treatments and interventions, ordering and review of laboratory studies, ordering and review of radiographic studies, development of treatment plan with patient or surrogate, re-evaluation of patient's condition, pulse oximetry, examination of patient, review of old charts and interpretation of cardiac output measurements            FINAL IMPRESSION      1. Hypotension, unspecified hypotension type    2. Acute kidney injury ([address]    3. Fall, initial encounter    4. Wound of left buttock, initial encounter    5.  Leukocytosis,

## 2019-08-12 PROBLEM — E03.5 MYXEDEMA COMA (HCC): Status: ACTIVE | Noted: 2019-08-12

## 2019-08-12 LAB
A/G RATIO: 0.5 (ref 1.1–2.2)
ALBUMIN SERPL-MCNC: 1.7 G/DL (ref 3.4–5)
ALP BLD-CCNC: 263 U/L (ref 40–129)
ALT SERPL-CCNC: 22 U/L (ref 10–40)
AMMONIA: 65 UMOL/L (ref 16–60)
ANION GAP SERPL CALCULATED.3IONS-SCNC: 10 MMOL/L (ref 3–16)
AST SERPL-CCNC: 50 U/L (ref 15–37)
BASOPHILS ABSOLUTE: 0.1 K/UL (ref 0–0.2)
BASOPHILS RELATIVE PERCENT: 0.8 %
BILIRUB SERPL-MCNC: 0.8 MG/DL (ref 0–1)
BUN BLDV-MCNC: 24 MG/DL (ref 7–20)
C. DIFFICILE TOXIN MOLECULAR: ABNORMAL
CALCIUM SERPL-MCNC: 7.8 MG/DL (ref 8.3–10.6)
CHLORIDE BLD-SCNC: 102 MMOL/L (ref 99–110)
CO2: 23 MMOL/L (ref 21–32)
CREAT SERPL-MCNC: 2.9 MG/DL (ref 0.8–1.3)
EOSINOPHILS ABSOLUTE: 0.2 K/UL (ref 0–0.6)
EOSINOPHILS RELATIVE PERCENT: 1.4 %
GFR AFRICAN AMERICAN: 26
GFR NON-AFRICAN AMERICAN: 21
GLOBULIN: 3.4 G/DL
GLUCOSE BLD-MCNC: 108 MG/DL (ref 70–99)
HCT VFR BLD CALC: 21.8 % (ref 40.5–52.5)
HEMOGLOBIN: 7.2 G/DL (ref 13.5–17.5)
LACTIC ACID: 2.8 MMOL/L (ref 0.4–2)
LYMPHOCYTES ABSOLUTE: 1 K/UL (ref 1–5.1)
LYMPHOCYTES RELATIVE PERCENT: 7.4 %
MCH RBC QN AUTO: 35.4 PG (ref 26–34)
MCHC RBC AUTO-ENTMCNC: 32.9 G/DL (ref 31–36)
MCV RBC AUTO: 107.5 FL (ref 80–100)
MONOCYTES ABSOLUTE: 0.8 K/UL (ref 0–1.3)
MONOCYTES RELATIVE PERCENT: 5.8 %
NEUTROPHILS ABSOLUTE: 12 K/UL (ref 1.7–7.7)
NEUTROPHILS RELATIVE PERCENT: 84.6 %
ORGANISM: ABNORMAL
PDW BLD-RTO: 16.5 % (ref 12.4–15.4)
PHOSPHORUS: 4.9 MG/DL (ref 2.5–4.9)
PLATELET # BLD: 289 K/UL (ref 135–450)
PMV BLD AUTO: 8.8 FL (ref 5–10.5)
POTASSIUM REFLEX MAGNESIUM: 4.4 MMOL/L (ref 3.5–5.1)
RBC # BLD: 2.03 M/UL (ref 4.2–5.9)
SODIUM BLD-SCNC: 135 MMOL/L (ref 136–145)
TOTAL PROTEIN: 5.1 G/DL (ref 6.4–8.2)
TSH SERPL DL<=0.05 MIU/L-ACNC: 142.6 UIU/ML (ref 0.27–4.2)
URINE CULTURE, ROUTINE: NORMAL
VANCOMYCIN RANDOM: 12.1 UG/ML
WBC # BLD: 14.2 K/UL (ref 4–11)

## 2019-08-12 PROCEDURE — 85025 COMPLETE CBC W/AUTO DIFF WBC: CPT

## 2019-08-12 PROCEDURE — 6360000002 HC RX W HCPCS: Performed by: INTERNAL MEDICINE

## 2019-08-12 PROCEDURE — 94761 N-INVAS EAR/PLS OXIMETRY MLT: CPT

## 2019-08-12 PROCEDURE — 2500000003 HC RX 250 WO HCPCS: Performed by: INTERNAL MEDICINE

## 2019-08-12 PROCEDURE — 94640 AIRWAY INHALATION TREATMENT: CPT

## 2019-08-12 PROCEDURE — 83605 ASSAY OF LACTIC ACID: CPT

## 2019-08-12 PROCEDURE — 51702 INSERT TEMP BLADDER CATH: CPT

## 2019-08-12 PROCEDURE — 84100 ASSAY OF PHOSPHORUS: CPT

## 2019-08-12 PROCEDURE — 2580000003 HC RX 258: Performed by: INTERNAL MEDICINE

## 2019-08-12 PROCEDURE — 82140 ASSAY OF AMMONIA: CPT

## 2019-08-12 PROCEDURE — 6370000000 HC RX 637 (ALT 250 FOR IP): Performed by: INTERNAL MEDICINE

## 2019-08-12 PROCEDURE — 80053 COMPREHEN METABOLIC PANEL: CPT

## 2019-08-12 PROCEDURE — 2000000000 HC ICU R&B

## 2019-08-12 PROCEDURE — 36415 COLL VENOUS BLD VENIPUNCTURE: CPT

## 2019-08-12 PROCEDURE — 2700000000 HC OXYGEN THERAPY PER DAY

## 2019-08-12 PROCEDURE — 80202 ASSAY OF VANCOMYCIN: CPT

## 2019-08-12 PROCEDURE — P9047 ALBUMIN (HUMAN), 25%, 50ML: HCPCS | Performed by: INTERNAL MEDICINE

## 2019-08-12 RX ORDER — LEVOTHYROXINE SODIUM ANHYDROUS 100 UG/5ML
50 INJECTION, POWDER, LYOPHILIZED, FOR SOLUTION INTRAVENOUS DAILY
Status: DISCONTINUED | OUTPATIENT
Start: 2019-08-13 | End: 2019-08-15

## 2019-08-12 RX ORDER — LEVOTHYROXINE SODIUM ANHYDROUS 100 UG/5ML
200 INJECTION, POWDER, LYOPHILIZED, FOR SOLUTION INTRAVENOUS ONCE
Status: COMPLETED | OUTPATIENT
Start: 2019-08-12 | End: 2019-08-12

## 2019-08-12 RX ORDER — DEXAMETHASONE SODIUM PHOSPHATE 10 MG/ML
10 INJECTION INTRAMUSCULAR; INTRAVENOUS ONCE
Status: COMPLETED | OUTPATIENT
Start: 2019-08-12 | End: 2019-08-12

## 2019-08-12 RX ORDER — ALBUMIN (HUMAN) 12.5 G/50ML
25 SOLUTION INTRAVENOUS ONCE
Status: COMPLETED | OUTPATIENT
Start: 2019-08-12 | End: 2019-08-12

## 2019-08-12 RX ORDER — LEVOTHYROXINE SODIUM ANHYDROUS 100 UG/5ML
50 INJECTION, POWDER, LYOPHILIZED, FOR SOLUTION INTRAVENOUS DAILY
Status: DISCONTINUED | OUTPATIENT
Start: 2019-08-12 | End: 2019-08-12

## 2019-08-12 RX ORDER — SODIUM CHLORIDE 9 MG/ML
INJECTION, SOLUTION INTRAVENOUS
Status: DISPENSED
Start: 2019-08-12 | End: 2019-08-13

## 2019-08-12 RX ADMIN — Medication 10 ML: at 08:40

## 2019-08-12 RX ADMIN — CEFEPIME 1 G: 1 INJECTION, POWDER, FOR SOLUTION INTRAMUSCULAR; INTRAVENOUS at 21:27

## 2019-08-12 RX ADMIN — TAMSULOSIN HYDROCHLORIDE 0.4 MG: 0.4 CAPSULE ORAL at 08:41

## 2019-08-12 RX ADMIN — ALBUMIN (HUMAN) 25 G: 0.25 INJECTION, SOLUTION INTRAVENOUS at 22:02

## 2019-08-12 RX ADMIN — Medication 2 PUFF: at 19:16

## 2019-08-12 RX ADMIN — PANTOPRAZOLE SODIUM 40 MG: 40 TABLET, DELAYED RELEASE ORAL at 05:19

## 2019-08-12 RX ADMIN — MICONAZOLE NITRATE: 20 CREAM TOPICAL at 12:41

## 2019-08-12 RX ADMIN — Medication 20 G: at 22:49

## 2019-08-12 RX ADMIN — CEFEPIME 1 G: 1 INJECTION, POWDER, FOR SOLUTION INTRAMUSCULAR; INTRAVENOUS at 08:39

## 2019-08-12 RX ADMIN — IPRATROPIUM BROMIDE AND ALBUTEROL SULFATE 1 AMPULE: .5; 3 SOLUTION RESPIRATORY (INHALATION) at 15:43

## 2019-08-12 RX ADMIN — METRONIDAZOLE 500 MG: 500 INJECTION, SOLUTION INTRAVENOUS at 05:19

## 2019-08-12 RX ADMIN — VANCOMYCIN HYDROCHLORIDE 1000 MG: 10 INJECTION, POWDER, LYOPHILIZED, FOR SOLUTION INTRAVENOUS at 10:55

## 2019-08-12 RX ADMIN — Medication 20 G: at 08:40

## 2019-08-12 RX ADMIN — LEVOTHYROXINE SODIUM 100 MCG: 100 TABLET ORAL at 08:41

## 2019-08-12 RX ADMIN — ENOXAPARIN SODIUM 30 MG: 30 INJECTION SUBCUTANEOUS at 08:40

## 2019-08-12 RX ADMIN — MICONAZOLE NITRATE: 20 CREAM TOPICAL at 22:56

## 2019-08-12 RX ADMIN — Medication 10 ML: at 21:29

## 2019-08-12 RX ADMIN — IPRATROPIUM BROMIDE AND ALBUTEROL SULFATE 1 AMPULE: .5; 3 SOLUTION RESPIRATORY (INHALATION) at 19:16

## 2019-08-12 RX ADMIN — METRONIDAZOLE 500 MG: 500 INJECTION, SOLUTION INTRAVENOUS at 22:50

## 2019-08-12 RX ADMIN — METRONIDAZOLE 500 MG: 500 INJECTION, SOLUTION INTRAVENOUS at 13:53

## 2019-08-12 RX ADMIN — FOLIC ACID 1000 MCG: 1 TABLET ORAL at 08:41

## 2019-08-12 RX ADMIN — Medication 2 PUFF: at 12:48

## 2019-08-12 RX ADMIN — DEXAMETHASONE SODIUM PHOSPHATE 10 MG: 10 INJECTION INTRAMUSCULAR; INTRAVENOUS at 18:49

## 2019-08-12 RX ADMIN — LEVOTHYROXINE SODIUM ANHYDROUS 200 MCG: 100 INJECTION, POWDER, LYOPHILIZED, FOR SOLUTION INTRAVENOUS at 18:51

## 2019-08-12 RX ADMIN — IPRATROPIUM BROMIDE AND ALBUTEROL SULFATE 1 AMPULE: .5; 3 SOLUTION RESPIRATORY (INHALATION) at 12:47

## 2019-08-12 ASSESSMENT — PAIN DESCRIPTION - LOCATION: LOCATION: BUTTOCKS;BACK

## 2019-08-12 ASSESSMENT — PAIN DESCRIPTION - PAIN TYPE: TYPE: ACUTE PAIN

## 2019-08-12 ASSESSMENT — PAIN SCALES - GENERAL: PAINLEVEL_OUTOF10: 8

## 2019-08-12 NOTE — CONSULTS
has not been behaving as expected. Therefore, we will reevaluate his need for kidney biopsy once he is more stable hemodynamically overall. No other intervention needed at this time point from renal aspect. Fluid overload/anasarca: The patient will require aggressive diuresis. Once he is more stable hemodynamically. Anemia:   The patient has been performing anemic. I'll request plasma kappa and lambda ratio. I'll also request serum protein electrophoresis. Hypotension:  I'll request a.m. cortisol level. S: fell and weakness    HPI:  69years old  male patient initially presented to the [facility] emergency room on 8/11/2019 after he fell out of a recliner, could not get back to the chair, was on the floor for about 30 minutes. The patient very drowsy/lethargic, it is hard to get detailed information from the patient. He has history of obesity, hypertension, hyperlipidemia, history of pulmonary embolism, CHF, history of alcohol abuse, pneumonia, and history of multiple episodes of MCKENNA since March 2019. Per patient, he was on the recliner, fell off from the recliner, he was so weak, he could not get up. Therefore, he was on the floor for possible 30 minutes before he was rescued. He denies of any major injury. Therefore, the patient was brought to the hospital for further evaluation. Upon arrival, the patient blood pressure was on the low side, in the 90s, but quickly, his blood pressure dropped to the 70s. The patient's lactic acid level was high at 5.8. The patient will phone have MCKENNA with much worsening of kidney function as compared to the value about 10 days ago. Therefore, the patient being administered hospital for further evaluation and management. He is now on cefepime, vancomycin and the Flagyl. Per patient, he lives with his daughter and he was able to get around with a walker. He uses oxygen continuously at 4 L.   No other information is available at this time point. We are kindly asked to see the patient for evaluation of his renal failure. I saw the patient in his room with his nurse at his bedside. The patient looked pale and frail, drowsy/lethargic, hard to wake him up. The patient blood pressure is on the low side. The patient is on oxygen supplementation via nasal cannula 4 L/m. The patient has diffuse edema.   Problem list:  Patient Active Problem List   Diagnosis    DISH (diffuse idiopathic skeletal hyperostosis)    Hyperlipidemia    Benign essential HTN    Closed nondisplaced fracture of pubis (HCC)    Bilateral knee pain    Alcohol abuse    Centrilobular emphysema (HCC)    Other pulmonary embolism without acute cor pulmonale (HCC)    Mediastinal adenopathy    Former smoker    Pulmonary HTN ([address]    Obesity    CHF (congestive heart failure) ([address]    CHF (congestive heart failure), NYHA class I, acute on chronic, combined ([address]    History of pulmonary embolism    Elevated hemoglobin A1c    Alcohol dependence (HCC)    Acute anemia    Chronic respiratory failure with hypoxia (HCC)    Urinary tract infection without hematuria    Pneumonia due to organism    Acute kidney failure, unspecified ([address]    Acute renal failure (ARF) ([address]     Current Facility-Administered Medications   Medication Dose Route Frequency Provider Last Rate Last Dose    vancomycin 1000 mg IVPB in 250 mL D5W addavial  1,000 mg Intravenous Once Amalia Randolph MD        folic acid (FOLVITE) tablet 1,000 mcg  1,000 mcg Oral Daily Amalia Randolph MD   1,000 mcg at 08/12/19 0841    ipratropium-albuterol (DUONEB) nebulizer solution 1 ampule  1 ampule Inhalation Q4H WA Solange Mckeon MD        lactulose (CHRONULAC) 10 GM/15ML solution 20 g  20 g Oral BID Amalia Randolph MD   20 g at 08/12/19 0840    levothyroxine (SYNTHROID) tablet 100 mcg  100 mcg Oral Daily Amalia Randolph MD   100 mcg at 08/12/19 0841    pantoprazole (PROTONIX) tablet 40 mg  40 mg Oral QAM AC Viky Askew MD   40 mg at 19 0519    mometasone-formoterol (DULERA) 100-5 MCG/ACT inhaler 2 puff  2 puff Inhalation BID Viky Askew MD        tamsulosin (FLOMAX) capsule 0.4 mg  0.4 mg Oral Daily Solange Mckeon MD   0.4 mg at 19 0841    0.9 % sodium chloride infusion  1,000 mL Intravenous Continuous Viky Askew  mL/hr at 19 1756 1,000 mL at 19 1756    sodium chloride flush 0.9 % injection 10 mL  10 mL Intravenous 2 times per day Viky Askew MD   10 mL at 19 0840    sodium chloride flush 0.9 % injection 10 mL  10 mL Intravenous PRN Viky Askew MD        enoxaparin (LOVENOX) injection 30 mg  30 mg Subcutaneous Daily Viky Askew MD   30 mg at 19 0840    acetaminophen (TYLENOL) tablet 650 mg  650 mg Oral Q4H PRN Viky Askew MD        cefepime (MAXIPIME) 1 g IVPB minibag  1 g Intravenous Q12H Viky Askew  mL/hr at 19 0839 1 g at 19 0839    metronidazole (FLAGYL) 500 mg in NaCl 100 mL IVPB premix  500 mg Intravenous Julia Husain MD   Stopped at 19 0630    vancomycin (VANCOCIN) intermittent dosing (placeholder)   Other RX Placeholder Viky Askew MD         Social History     Socioeconomic History    Marital status:       Spouse name: Not on file    Number of children: Not on file    Years of education: Not on file    Highest education level: Not on file   Occupational History    Not on file   Social Needs    Financial resource strain: Not on file    Food insecurity:     Worry: Not on file     Inability: Not on file    Transportation needs:     Medical: Not on file     Non-medical: Not on file   Tobacco Use    Smoking status: Former Smoker     Packs/day: 3.00     Years: 50.00     Pack years: 150.00     Last attempt to quit: 3/10/2002     Years since quittin.4    Smokeless tobacco: Never Used   Substance and Sexual Activity    Alcohol use: Yes     Comment: 1-2

## 2019-08-12 NOTE — PROGRESS NOTES
Hospitalist Progress Note      PCP: Beckie Forrester MD    Date of Admission: 8/11/2019    Chief Complaint: confusion, shortness of breath     Hospital Course: admitted with suspicion for congestive heart failure and pneumonia. Acute renal failure noted. TSH elevated. Supposed to be on Synthroid. Unlikely that patient is taking     Subjective: weak, denies pain, no nausea or vomiting       Medications:  Reviewed    Infusion Medications   Scheduled Medications    miconazole   Topical BID    dexamethasone  10 mg Intravenous Once    [START ON 8/13/2019] levothyroxine  50 mcg Intravenous Daily    levothyroxine  200 mcg Intravenous Once    folic acid  9,832 mcg Oral Daily    ipratropium-albuterol  1 ampule Inhalation Q4H WA    lactulose  20 g Oral BID    pantoprazole  40 mg Oral QAM AC    mometasone-formoterol  2 puff Inhalation BID    tamsulosin  0.4 mg Oral Daily    sodium chloride flush  10 mL Intravenous 2 times per day    enoxaparin  30 mg Subcutaneous Daily    cefepime  1 g Intravenous Q12H    metroNIDAZOLE  500 mg Intravenous Q8H    vancomycin (VANCOCIN) intermittent dosing (placeholder)   Other RX Placeholder     PRN Meds: sodium chloride flush, acetaminophen      Intake/Output Summary (Last 24 hours) at 8/12/2019 1819  Last data filed at 8/12/2019 1529  Gross per 24 hour   Intake 1451 ml   Output 575 ml   Net 876 ml       Physical Exam Performed:    BP (!) 90/56   Pulse 91   Temp 97.5 °F (36.4 °C)   Resp 16   Ht 6' 1\" (1.854 m)   Wt 292 lb 8.8 oz (132.7 kg)   SpO2 95%   BMI 38.60 kg/m²     General appearance: No apparent distress, appears stated age. Weak, lethargic  HEENT: Pupils equal, round, and reactive to light. Conjunctivae/corneas clear. Neck: Supple, with full range of motion. No jugular venous distention. Trachea midline. Respiratory:  Weak effort  Cardiovascular: Regular rate and rhythm with normal S1/S2 without murmurs, rubs or gallops.   Abdomen: Soft, non-tender, non-distended with normal bowel sounds. Musculoskeletal: No clubbing or cyanosis. 2+ pitting edema bilaterally. Full range of motion without deformity. Skin: Skin color, texture, turgor normal.  No rashes or lesions. Neurologic:  Neurovascularly intact without any focal sensory/motor deficits. Cranial nerves: II-XII intact, grossly non-focal.  Psychiatric: Lethargic and arousable  Capillary Refill: Brisk,< 3 seconds   Peripheral Pulses: +2 palpable, equal bilaterally       Labs:   Recent Labs     08/11/19  1225 08/12/19  0524   WBC 14.9* 14.2*   HGB 8.1* 7.2*   HCT 25.1* 21.8*    289     Recent Labs     08/11/19  1225 08/12/19  0524   * 135*   K 4.3 4.4   CL 97* 102   CO2 22 23   BUN 23* 24*   CREATININE 3.2* 2.9*   CALCIUM 8.6 7.8*   PHOS  --  4.9     Recent Labs     08/11/19  1225 08/12/19  0524   AST 55* 50*   ALT 23 22   BILITOT 0.9 0.8   ALKPHOS 312* 263*     No results for input(s): INR in the last 72 hours. Recent Labs     08/11/19  1225   CKTOTAL 17*   TROPONINI 0.03*       Urinalysis:      Lab Results   Component Value Date    NITRU Negative 08/11/2019    WBCUA 0-2 08/11/2019    BACTERIA 2+ 08/11/2019    RBCUA 3-5 08/11/2019    BLOODU Negative 08/11/2019    SPECGRAV 1.020 08/11/2019    GLUCOSEU Negative 08/11/2019       Radiology:  CT ABDOMEN PELVIS WO CONTRAST Additional Contrast? None   Final Result   Inflammatory changes surrounding the distal sigmoid colon suggesting a mild   colitis. Consider infectious and inflammatory etiologies. No bowel   obstruction. Moderate bilateral pleural effusions. Associated bibasilar lung opacities   are nonspecific and may represent atelectasis versus pneumonia. XR CHEST PORTABLE   Final Result   Increased large amount of bibasilar airspace disease, with adjacent pleural   effusions, increased on the right over the past 2 weeks, unchanged on the   left.   The basilar airspace disease is due to atelectasis, with suspected   superimposed

## 2019-08-12 NOTE — PROGRESS NOTES
Transfer to  from [place] [address] and C2 performed complete skin assessment on transfer. Assessment revealed already existing wounds in [facility]. Upon transferring patient to [facility] level of care, patients medications were gathered from the following locations and given to receiving nurse during bedside report:      Lock Box in room :     [x] Yes   [] No   Pyxis Bin:       [x] Yes   [] No      Pyxis Refrigerator:      [x] Yes   [] No   Tube System:       [x] Yes   [] No   LDA's documented:  [x] Yes   [] No          The following paperwork was transferred with patient:    Blue medication book:       [x] Yes   [] No      12 hour chart check:       [x] Yes   [] No   Patient belongings:       [x] Yes   [] No      Continuous pulse ox:   [x] Yes   [] No      [] N/A      Tele monitor number no tele assigned to patient and placed on patient prior to transfer.     CMU Notified at Transfer: [x] Yes   [] No     Name of [address] [place] [address] Staff:  ____________________________       MD notified via Perfect Serve:   [x] Yes   [] No    Family notified of transfer:    [x] Yes   [] No    Spoke with:  ___________________________________

## 2019-08-12 NOTE — PROGRESS NOTES
Bermudez placed per order, tele placed;  Paged doctor because pt has increasing edema, soft pressures and is very sleepy.

## 2019-08-12 NOTE — CARE COORDINATION
CASE MANAGEMENT INITIAL ASSESSMENT      Reviewed chart and met with patient today, re: Triggered by readmit to hospital in less than 3 days, age and diagnosis  Explained Case Management role/services: Attempted to see patient     Family present: None   Primary contact information: Jamia Her daughter [phone]    Admit date/status: inpatient 8/11/19  Diagnosis: Acute Renal Failure     Insurance: [employer] Medicare   Precert required for SNF - Y   3 night stay required - N    Living arrangements, Adls, care needs, prior to admission: Lives with daughter in 2 story home with 7 steps to enter and no handrails    Transportation: TBD    Durable Medical Equipment at home: Walker_X_Cane_X_RTS__ BSC__Shower Chair_X_  02_X ([employer] 3.5 to 4 liters nc)_ HHN__ CPAP__  BiPap__  Hospital Bed__ W/C___ Other_____x_____ grab bars    Services in the home and/or outpatient, prior to admission: Active with [address] [place] [address]    PT/OT recs: Not seen yet this admission    Hospital Exemption Notification (HEN): N/A    Barriers to discharge: None    Plan/comments: Patient sound asleep and would not arouse. Information obtained via phone conversation with daughter Aida Adrian who patient resides with. She states he will not agree to SNF at all. She states the plan will be for him to return home with her and she and her brother assist with all his care. They would like to resume services with [address] [place] [address] at discharge.      ECOC on chart for MD signature

## 2019-08-12 NOTE — TELEPHONE ENCOUNTER
Note patient is readmitted. Will sign off at this time.     CLINICAL PHARMACY NOTE   POST-DISCHARGE TELEPHONE FOLLOW-UP ADDENDUM  For Pharmacy Admin Tracking Only  TCM Call Made?: No  [facility]) Select Patient?: Yes  Total # of Interventions Recommended: 0  Total # Interventions Accepted: 0  Intervention Severity:   - Level 1 Intervention Present?: No   - Level 2 #: 0   - Level 3 #: 0  Outreach Status: Patient Unreachable  Care Coordinator Outreach to Patient?: Yes  Provider Contacted?: No  Waiting on response from: n/a  Time Spent (min): 5

## 2019-08-12 NOTE — PROGRESS NOTES
Patient is alert and oriented, but very sleepy;  BP is soft, but other VSS; Shift assessment completed; Bed locked and in lowest position. Bedside table and call light within reach; Pt denies further needs. Pt not eating, but encouraged to try. Pt is being turned to prevent further breakdown of skin. Will continue to monitor.

## 2019-08-13 ENCOUNTER — APPOINTMENT (OUTPATIENT)
Dept: GENERAL RADIOLOGY | Age: 74
DRG: 080 | End: 2019-08-13
Payer: MEDICARE

## 2019-08-13 LAB
A/G RATIO: 0.7 (ref 1.1–2.2)
ABO/RH: NORMAL
ALBUMIN SERPL-MCNC: 2.1 G/DL (ref 3.4–5)
ALP BLD-CCNC: 247 U/L (ref 40–129)
ALT SERPL-CCNC: 19 U/L (ref 10–40)
ANION GAP SERPL CALCULATED.3IONS-SCNC: 8 MMOL/L (ref 3–16)
ANTIBODY SCREEN: NORMAL
AST SERPL-CCNC: 39 U/L (ref 15–37)
BASOPHILS ABSOLUTE: 0 K/UL (ref 0–0.2)
BASOPHILS RELATIVE PERCENT: 0.4 %
BILIRUB SERPL-MCNC: 0.7 MG/DL (ref 0–1)
BLOOD BANK DISPENSE STATUS: NORMAL
BLOOD BANK PRODUCT CODE: NORMAL
BPU ID: NORMAL
BUN BLDV-MCNC: 23 MG/DL (ref 7–20)
CALCIUM SERPL-MCNC: 8 MG/DL (ref 8.3–10.6)
CHLORIDE BLD-SCNC: 105 MMOL/L (ref 99–110)
CO2: 25 MMOL/L (ref 21–32)
CORTISOL - AM: 7.5 UG/DL (ref 4.3–22.4)
CREAT SERPL-MCNC: 2.1 MG/DL (ref 0.8–1.3)
DESCRIPTION BLOOD BANK: NORMAL
EOSINOPHILS ABSOLUTE: 0 K/UL (ref 0–0.6)
EOSINOPHILS RELATIVE PERCENT: 0 %
FERRITIN: 526.8 NG/ML (ref 30–400)
GFR AFRICAN AMERICAN: 38
GFR NON-AFRICAN AMERICAN: 31
GLOBULIN: 2.9 G/DL
GLUCOSE BLD-MCNC: 178 MG/DL (ref 70–99)
HCT VFR BLD CALC: 19.8 % (ref 40.5–52.5)
HCT VFR BLD CALC: 24.1 % (ref 40.5–52.5)
HEMOGLOBIN: 6.7 G/DL (ref 13.5–17.5)
HEMOGLOBIN: 8.2 G/DL (ref 13.5–17.5)
IRON SATURATION: 66 % (ref 20–50)
IRON: 39 UG/DL (ref 59–158)
LYMPHOCYTES ABSOLUTE: 0.3 K/UL (ref 1–5.1)
LYMPHOCYTES RELATIVE PERCENT: 3.3 %
MCH RBC QN AUTO: 35.5 PG (ref 26–34)
MCHC RBC AUTO-ENTMCNC: 33.6 G/DL (ref 31–36)
MCV RBC AUTO: 105.8 FL (ref 80–100)
MONOCYTES ABSOLUTE: 0.2 K/UL (ref 0–1.3)
MONOCYTES RELATIVE PERCENT: 2 %
NEUTROPHILS ABSOLUTE: 9.5 K/UL (ref 1.7–7.7)
NEUTROPHILS RELATIVE PERCENT: 94.3 %
PDW BLD-RTO: 16.1 % (ref 12.4–15.4)
PHOSPHORUS: 5.6 MG/DL (ref 2.5–4.9)
PLATELET # BLD: 300 K/UL (ref 135–450)
PMV BLD AUTO: 8.7 FL (ref 5–10.5)
POTASSIUM REFLEX MAGNESIUM: 4.2 MMOL/L (ref 3.5–5.1)
RBC # BLD: 1.87 M/UL (ref 4.2–5.9)
SODIUM BLD-SCNC: 138 MMOL/L (ref 136–145)
TOTAL IRON BINDING CAPACITY: 59 UG/DL (ref 260–445)
TOTAL PROTEIN: 5 G/DL (ref 6.4–8.2)
VANCOMYCIN RANDOM: 14.9 UG/ML
WBC # BLD: 10.1 K/UL (ref 4–11)

## 2019-08-13 PROCEDURE — 84165 PROTEIN E-PHORESIS SERUM: CPT

## 2019-08-13 PROCEDURE — 87641 MR-STAPH DNA AMP PROBE: CPT

## 2019-08-13 PROCEDURE — C8923 2D TTE W OR W/O FOL W/CON,CO: HCPCS

## 2019-08-13 PROCEDURE — 36556 INSERT NON-TUNNEL CV CATH: CPT | Performed by: NURSE PRACTITIONER

## 2019-08-13 PROCEDURE — 83550 IRON BINDING TEST: CPT

## 2019-08-13 PROCEDURE — 86900 BLOOD TYPING SEROLOGIC ABO: CPT

## 2019-08-13 PROCEDURE — 83540 ASSAY OF IRON: CPT

## 2019-08-13 PROCEDURE — 85018 HEMOGLOBIN: CPT

## 2019-08-13 PROCEDURE — 86901 BLOOD TYPING SEROLOGIC RH(D): CPT

## 2019-08-13 PROCEDURE — 6370000000 HC RX 637 (ALT 250 FOR IP): Performed by: INTERNAL MEDICINE

## 2019-08-13 PROCEDURE — 2000000000 HC ICU R&B

## 2019-08-13 PROCEDURE — 2580000003 HC RX 258: Performed by: INTERNAL MEDICINE

## 2019-08-13 PROCEDURE — 6360000002 HC RX W HCPCS: Performed by: INTERNAL MEDICINE

## 2019-08-13 PROCEDURE — 80202 ASSAY OF VANCOMYCIN: CPT

## 2019-08-13 PROCEDURE — 71045 X-RAY EXAM CHEST 1 VIEW: CPT

## 2019-08-13 PROCEDURE — 36556 INSERT NON-TUNNEL CV CATH: CPT

## 2019-08-13 PROCEDURE — 82533 TOTAL CORTISOL: CPT

## 2019-08-13 PROCEDURE — 94640 AIRWAY INHALATION TREATMENT: CPT

## 2019-08-13 PROCEDURE — 83883 ASSAY NEPHELOMETRY NOT SPEC: CPT

## 2019-08-13 PROCEDURE — 99291 CRITICAL CARE FIRST HOUR: CPT | Performed by: INTERNAL MEDICINE

## 2019-08-13 PROCEDURE — 2500000003 HC RX 250 WO HCPCS: Performed by: INTERNAL MEDICINE

## 2019-08-13 PROCEDURE — 82728 ASSAY OF FERRITIN: CPT

## 2019-08-13 PROCEDURE — 86334 IMMUNOFIX E-PHORESIS SERUM: CPT

## 2019-08-13 PROCEDURE — C9113 INJ PANTOPRAZOLE SODIUM, VIA: HCPCS | Performed by: INTERNAL MEDICINE

## 2019-08-13 PROCEDURE — 85025 COMPLETE CBC W/AUTO DIFF WBC: CPT

## 2019-08-13 PROCEDURE — 80053 COMPREHEN METABOLIC PANEL: CPT

## 2019-08-13 PROCEDURE — 6360000002 HC RX W HCPCS

## 2019-08-13 PROCEDURE — 94669 MECHANICAL CHEST WALL OSCILL: CPT

## 2019-08-13 PROCEDURE — 85014 HEMATOCRIT: CPT

## 2019-08-13 PROCEDURE — P9016 RBC LEUKOCYTES REDUCED: HCPCS

## 2019-08-13 PROCEDURE — 36415 COLL VENOUS BLD VENIPUNCTURE: CPT

## 2019-08-13 PROCEDURE — 84155 ASSAY OF PROTEIN SERUM: CPT

## 2019-08-13 PROCEDURE — 36430 TRANSFUSION BLD/BLD COMPNT: CPT

## 2019-08-13 PROCEDURE — 86923 COMPATIBILITY TEST ELECTRIC: CPT

## 2019-08-13 PROCEDURE — 02HV33Z INSERTION OF INFUSION DEVICE INTO SUPERIOR VENA CAVA, PERCUTANEOUS APPROACH: ICD-10-PCS | Performed by: INTERNAL MEDICINE

## 2019-08-13 PROCEDURE — 86850 RBC ANTIBODY SCREEN: CPT

## 2019-08-13 PROCEDURE — 84100 ASSAY OF PHOSPHORUS: CPT

## 2019-08-13 RX ORDER — MIDAZOLAM HYDROCHLORIDE 1 MG/ML
INJECTION INTRAMUSCULAR; INTRAVENOUS
Status: COMPLETED
Start: 2019-08-13 | End: 2019-08-13

## 2019-08-13 RX ORDER — FUROSEMIDE 40 MG/1
TABLET ORAL
Qty: 180 TABLET | Refills: 0 | Status: SHIPPED | OUTPATIENT
Start: 2019-08-13 | End: 2019-08-25 | Stop reason: HOSPADM

## 2019-08-13 RX ORDER — 0.9 % SODIUM CHLORIDE 0.9 %
250 INTRAVENOUS SOLUTION INTRAVENOUS ONCE
Status: COMPLETED | OUTPATIENT
Start: 2019-08-13 | End: 2019-08-14

## 2019-08-13 RX ORDER — SACCHAROMYCES BOULARDII 250 MG
250 CAPSULE ORAL 2 TIMES DAILY
Status: DISCONTINUED | OUTPATIENT
Start: 2019-08-13 | End: 2019-08-25 | Stop reason: HOSPADM

## 2019-08-13 RX ORDER — HEPARIN SODIUM 5000 [USP'U]/ML
5000 INJECTION, SOLUTION INTRAVENOUS; SUBCUTANEOUS EVERY 8 HOURS SCHEDULED
Status: DISCONTINUED | OUTPATIENT
Start: 2019-08-14 | End: 2019-08-25 | Stop reason: HOSPADM

## 2019-08-13 RX ORDER — MIDAZOLAM HYDROCHLORIDE 1 MG/ML
2 INJECTION INTRAMUSCULAR; INTRAVENOUS ONCE
Status: COMPLETED | OUTPATIENT
Start: 2019-08-13 | End: 2019-08-13

## 2019-08-13 RX ORDER — FUROSEMIDE 10 MG/ML
40 INJECTION INTRAMUSCULAR; INTRAVENOUS 2 TIMES DAILY
Status: DISCONTINUED | OUTPATIENT
Start: 2019-08-13 | End: 2019-08-17

## 2019-08-13 RX ORDER — FAMOTIDINE 20 MG/1
20 TABLET, FILM COATED ORAL DAILY
Status: DISCONTINUED | OUTPATIENT
Start: 2019-08-13 | End: 2019-08-13

## 2019-08-13 RX ORDER — PANTOPRAZOLE SODIUM 40 MG/10ML
40 INJECTION, POWDER, LYOPHILIZED, FOR SOLUTION INTRAVENOUS DAILY
Status: DISCONTINUED | OUTPATIENT
Start: 2019-08-13 | End: 2019-08-25 | Stop reason: HOSPADM

## 2019-08-13 RX ADMIN — HYDROCORTISONE SODIUM SUCCINATE 100 MG: 100 INJECTION, POWDER, FOR SOLUTION INTRAMUSCULAR; INTRAVENOUS at 09:01

## 2019-08-13 RX ADMIN — Medication 125 MG: at 08:46

## 2019-08-13 RX ADMIN — Medication 250 MG: at 20:02

## 2019-08-13 RX ADMIN — MIDAZOLAM 2 MG: 1 INJECTION INTRAMUSCULAR; INTRAVENOUS at 10:22

## 2019-08-13 RX ADMIN — ENOXAPARIN SODIUM 40 MG: 40 INJECTION SUBCUTANEOUS at 08:47

## 2019-08-13 RX ADMIN — IPRATROPIUM BROMIDE AND ALBUTEROL SULFATE 1 AMPULE: .5; 3 SOLUTION RESPIRATORY (INHALATION) at 12:11

## 2019-08-13 RX ADMIN — PANTOPRAZOLE SODIUM 40 MG: 40 TABLET, DELAYED RELEASE ORAL at 08:40

## 2019-08-13 RX ADMIN — Medication 250 MG: at 20:03

## 2019-08-13 RX ADMIN — METRONIDAZOLE 500 MG: 500 INJECTION, SOLUTION INTRAVENOUS at 05:52

## 2019-08-13 RX ADMIN — IPRATROPIUM BROMIDE AND ALBUTEROL SULFATE 1 AMPULE: .5; 3 SOLUTION RESPIRATORY (INHALATION) at 20:24

## 2019-08-13 RX ADMIN — MUPIROCIN: 20 OINTMENT TOPICAL at 20:02

## 2019-08-13 RX ADMIN — MUPIROCIN: 20 OINTMENT TOPICAL at 08:40

## 2019-08-13 RX ADMIN — TAMSULOSIN HYDROCHLORIDE 0.4 MG: 0.4 CAPSULE ORAL at 08:40

## 2019-08-13 RX ADMIN — PANTOPRAZOLE SODIUM 40 MG: 40 INJECTION, POWDER, FOR SOLUTION INTRAVENOUS at 16:37

## 2019-08-13 RX ADMIN — SODIUM CHLORIDE 250 ML: 9 INJECTION, SOLUTION INTRAVENOUS at 12:17

## 2019-08-13 RX ADMIN — Medication 125 MG: at 00:06

## 2019-08-13 RX ADMIN — FAMOTIDINE 20 MG: 20 TABLET ORAL at 12:17

## 2019-08-13 RX ADMIN — MIDAZOLAM HYDROCHLORIDE 2 MG: 1 INJECTION INTRAMUSCULAR; INTRAVENOUS at 10:22

## 2019-08-13 RX ADMIN — FUROSEMIDE 40 MG: 10 INJECTION, SOLUTION INTRAMUSCULAR; INTRAVENOUS at 16:50

## 2019-08-13 RX ADMIN — CEFEPIME 1 G: 1 INJECTION, POWDER, FOR SOLUTION INTRAMUSCULAR; INTRAVENOUS at 20:01

## 2019-08-13 RX ADMIN — CEFEPIME 1 G: 1 INJECTION, POWDER, FOR SOLUTION INTRAMUSCULAR; INTRAVENOUS at 08:58

## 2019-08-13 RX ADMIN — FOLIC ACID 1000 MCG: 1 TABLET ORAL at 08:40

## 2019-08-13 RX ADMIN — HYDROCORTISONE SODIUM SUCCINATE 100 MG: 100 INJECTION, POWDER, FOR SOLUTION INTRAMUSCULAR; INTRAVENOUS at 16:37

## 2019-08-13 RX ADMIN — IPRATROPIUM BROMIDE AND ALBUTEROL SULFATE 1 AMPULE: .5; 3 SOLUTION RESPIRATORY (INHALATION) at 16:33

## 2019-08-13 RX ADMIN — Medication 20 G: at 08:40

## 2019-08-13 RX ADMIN — Medication 10 ML: at 08:40

## 2019-08-13 RX ADMIN — Medication 250 MG: at 12:16

## 2019-08-13 RX ADMIN — Medication 250 MG: at 12:17

## 2019-08-13 RX ADMIN — LEVOTHYROXINE SODIUM ANHYDROUS 50 MCG: 100 INJECTION, POWDER, LYOPHILIZED, FOR SOLUTION INTRAVENOUS at 08:39

## 2019-08-13 ASSESSMENT — ENCOUNTER SYMPTOMS
STRIDOR: 0
ABDOMINAL PAIN: 0
CHEST TIGHTNESS: 0
DIARRHEA: 0
EYE ITCHING: 0
SORE THROAT: 0
CHOKING: 0
EYE PAIN: 0
CONSTIPATION: 0
EYE DISCHARGE: 0
VOICE CHANGE: 0

## 2019-08-13 ASSESSMENT — PAIN SCALES - GENERAL: PAINLEVEL_OUTOF10: 0

## 2019-08-13 NOTE — PROGRESS NOTES
Patient admitted from C3 transfer to ICU due to hypotension. 98/46(61) upon arrival. Patient bathed with chlorhexidene wipes. 4 eye skin assessment completed. Multiple wounds and DTI noted to patients buttocks and coccyx. Patient attached to all ICU monitors. Instructed patient on routine of ICU including multidisciplinary rounds. Patient is alert and agreeable to plan of care. Call light given to patient. Instructed to call with any needs.  Will continue to monitor

## 2019-08-13 NOTE — PROGRESS NOTES
much alert today. Discussed with intensivist      DVT Prophylaxis: Heparin  Diet: DIET LOW SODIUM 2 GM;  Dietary Nutrition Supplements: Low Calorie High Protein Supplement  Code Status: Full Code    PT/OT Eval Status:  Will try when stable    Dispo - inpatient, unknown length of stay    Petra Sneed MD

## 2019-08-13 NOTE — PROGRESS NOTES
Labile blood pressure. 85/40 manual, 91/45(60) automatic cuff pressure. Discussed with nocturnist Dr Oliva Jones. Per Dr Oliva Jones monitor systolic pressure and if less than 80 repage and update him. Discussed that patients Albumin is 1.7 order received for 25 G.  Order received for Oral Vanc patient is diagnosed with C. diff

## 2019-08-13 NOTE — PROGRESS NOTES
Shift handoff at the bedside. Vitals stable. Call light within reach. The Patient is sleeping at this time.

## 2019-08-13 NOTE — CONSULTS
Pulmonary & Critical Care Consultation Note   Nelda iKmble MD    REASONFOR CONSULTATION/CC: assistance with critical care management    Consult at request of  Kika Leiva MD  PCP: Cheikh Cruz MD    HISTORYOF PRESENT ILLNESS:    69y.o. year old male initially presented to the ED via EMS with hypotension and a fall. Workup was significant for sepsis and MCKENNA, he was admitted and given IVF as well as empiric broad atb coverage. Due to anticipated need for vasopressor support he was placed in the ICU for close monitoring. Also of note his thyroid studies were consistent with myxedema and he was given corticosteroids as well as thyroid replacement. Nephrology has been involved in his care       Past Medical History:   Diagnosis Date    Alcohol abuse     Alcohol abuse     ARF [facility]acute renal failure) ([facility] 2019    Dr Keyon Mcbride Nephrology, [phone]    CHF (congestive heart failure) ([facility] 3/24/2019    Clostridium difficile infection 2019    Clostridium difficile infection 2019    Hypercholesteremia     Hypertension     Mediastinal adenopathy 2019    Pneumonia due to organism     Pulmonary emphysema ([facility] 2018       Past Surgical History:   Procedure Laterality Date    BACK SURGERY      fusion     SKIN BIOPSY      UPPER GASTROINTESTINAL ENDOSCOPY  1/10/2011    UPPER GASTROINTESTINAL ENDOSCOPY N/A 2019    EGD W/ANES. performed by Gabo Joshua DO at [address] [facility]       family history includes Other in an other family member.     Social History     Tobacco Use    Smoking status: Former Smoker     Packs/day: 3.00     Years: 50.00     Pack years: 150.00     Last attempt to quit: 3/10/2002     Years since quittin.4    Smokeless tobacco: Never Used   Substance Use Topics    Alcohol use: Yes     Comment: 1-2 medium size glasses crown royal        Scheduled Meds:   mupirocin   Nasal BID    hydrocortisone sodium succinate PF  100 mg

## 2019-08-14 ENCOUNTER — APPOINTMENT (OUTPATIENT)
Dept: GENERAL RADIOLOGY | Age: 74
DRG: 080 | End: 2019-08-14
Payer: MEDICARE

## 2019-08-14 LAB
A/G RATIO: 0.7 (ref 1.1–2.2)
ALBUMIN SERPL-MCNC: 2.2 G/DL (ref 3.1–4.9)
ALBUMIN SERPL-MCNC: 2.3 G/DL (ref 3.4–5)
ALP BLD-CCNC: 248 U/L (ref 40–129)
ALPHA-1-GLOBULIN: 0.3 G/DL (ref 0.2–0.4)
ALPHA-2-GLOBULIN: 0.6 G/DL (ref 0.4–1.1)
ALT SERPL-CCNC: 16 U/L (ref 10–40)
ANION GAP SERPL CALCULATED.3IONS-SCNC: 9 MMOL/L (ref 3–16)
AST SERPL-CCNC: 37 U/L (ref 15–37)
BASOPHILS ABSOLUTE: 0 K/UL (ref 0–0.2)
BASOPHILS RELATIVE PERCENT: 0.4 %
BETA GLOBULIN: 1.1 G/DL (ref 0.9–1.6)
BILIRUB SERPL-MCNC: 0.7 MG/DL (ref 0–1)
BUN BLDV-MCNC: 23 MG/DL (ref 7–20)
CALCIUM SERPL-MCNC: 8 MG/DL (ref 8.3–10.6)
CHLORIDE BLD-SCNC: 106 MMOL/L (ref 99–110)
CO2: 26 MMOL/L (ref 21–32)
CREAT SERPL-MCNC: 1.7 MG/DL (ref 0.8–1.3)
EOSINOPHILS ABSOLUTE: 0 K/UL (ref 0–0.6)
EOSINOPHILS RELATIVE PERCENT: 0 %
GAMMA GLOBULIN: 0.8 G/DL (ref 0.6–1.8)
GFR AFRICAN AMERICAN: 48
GFR NON-AFRICAN AMERICAN: 40
GLOBULIN: 3.2 G/DL
GLUCOSE BLD-MCNC: 150 MG/DL (ref 70–99)
HCT VFR BLD CALC: 23.1 % (ref 40.5–52.5)
HEMOGLOBIN: 7.9 G/DL (ref 13.5–17.5)
KAPPA, FREE LIGHT CHAINS, SERUM: 37.73 MG/L (ref 3.3–19.4)
KAPPA/LAMBDA RATIO: 0.73 (ref 0.26–1.65)
KAPPA/LAMBDA TEST COMMENT: ABNORMAL
LAMBDA, FREE LIGHT CHAINS, SERUM: 51.84 MG/L (ref 5.71–26.3)
LYMPHOCYTES ABSOLUTE: 0.7 K/UL (ref 1–5.1)
LYMPHOCYTES RELATIVE PERCENT: 6.2 %
MCH RBC QN AUTO: 34.8 PG (ref 26–34)
MCHC RBC AUTO-ENTMCNC: 34.3 G/DL (ref 31–36)
MCV RBC AUTO: 101.6 FL (ref 80–100)
MONOCYTES ABSOLUTE: 0.4 K/UL (ref 0–1.3)
MONOCYTES RELATIVE PERCENT: 3.8 %
MRSA SCREEN RT-PCR: NORMAL
NEUTROPHILS ABSOLUTE: 10 K/UL (ref 1.7–7.7)
NEUTROPHILS RELATIVE PERCENT: 89.6 %
PDW BLD-RTO: 18 % (ref 12.4–15.4)
PHOSPHORUS: 4.2 MG/DL (ref 2.5–4.9)
PLATELET # BLD: 327 K/UL (ref 135–450)
PMV BLD AUTO: 8.5 FL (ref 5–10.5)
POTASSIUM REFLEX MAGNESIUM: 3.7 MMOL/L (ref 3.5–5.1)
RBC # BLD: 2.28 M/UL (ref 4.2–5.9)
SODIUM BLD-SCNC: 141 MMOL/L (ref 136–145)
TOTAL PROTEIN: 5.5 G/DL (ref 6.4–8.2)
WBC # BLD: 11.2 K/UL (ref 4–11)

## 2019-08-14 PROCEDURE — 6360000002 HC RX W HCPCS: Performed by: INTERNAL MEDICINE

## 2019-08-14 PROCEDURE — 84100 ASSAY OF PHOSPHORUS: CPT

## 2019-08-14 PROCEDURE — 80053 COMPREHEN METABOLIC PANEL: CPT

## 2019-08-14 PROCEDURE — 99291 CRITICAL CARE FIRST HOUR: CPT | Performed by: INTERNAL MEDICINE

## 2019-08-14 PROCEDURE — 2580000003 HC RX 258: Performed by: INTERNAL MEDICINE

## 2019-08-14 PROCEDURE — C9113 INJ PANTOPRAZOLE SODIUM, VIA: HCPCS | Performed by: INTERNAL MEDICINE

## 2019-08-14 PROCEDURE — 2700000000 HC OXYGEN THERAPY PER DAY

## 2019-08-14 PROCEDURE — 97166 OT EVAL MOD COMPLEX 45 MIN: CPT

## 2019-08-14 PROCEDURE — 6370000000 HC RX 637 (ALT 250 FOR IP): Performed by: INTERNAL MEDICINE

## 2019-08-14 PROCEDURE — 97110 THERAPEUTIC EXERCISES: CPT

## 2019-08-14 PROCEDURE — 97530 THERAPEUTIC ACTIVITIES: CPT

## 2019-08-14 PROCEDURE — 85025 COMPLETE CBC W/AUTO DIFF WBC: CPT

## 2019-08-14 PROCEDURE — 94669 MECHANICAL CHEST WALL OSCILL: CPT

## 2019-08-14 PROCEDURE — 71045 X-RAY EXAM CHEST 1 VIEW: CPT

## 2019-08-14 PROCEDURE — 2500000003 HC RX 250 WO HCPCS: Performed by: INTERNAL MEDICINE

## 2019-08-14 PROCEDURE — 97162 PT EVAL MOD COMPLEX 30 MIN: CPT

## 2019-08-14 PROCEDURE — 2060000000 HC ICU INTERMEDIATE R&B

## 2019-08-14 PROCEDURE — 94640 AIRWAY INHALATION TREATMENT: CPT

## 2019-08-14 PROCEDURE — 94761 N-INVAS EAR/PLS OXIMETRY MLT: CPT

## 2019-08-14 RX ORDER — SPIRONOLACTONE 25 MG/1
25 TABLET ORAL DAILY
Status: DISCONTINUED | OUTPATIENT
Start: 2019-08-14 | End: 2019-08-16

## 2019-08-14 RX ORDER — SODIUM CHLORIDE 9 MG/ML
INJECTION, SOLUTION INTRAVENOUS
Status: DISPENSED
Start: 2019-08-14 | End: 2019-08-15

## 2019-08-14 RX ORDER — POTASSIUM CHLORIDE 20 MEQ/1
20 TABLET, EXTENDED RELEASE ORAL DAILY
Status: DISCONTINUED | OUTPATIENT
Start: 2019-08-14 | End: 2019-08-16

## 2019-08-14 RX ORDER — CHLOROTHIAZIDE SODIUM 500 MG/1
500 INJECTION INTRAVENOUS ONCE
Status: COMPLETED | OUTPATIENT
Start: 2019-08-14 | End: 2019-08-14

## 2019-08-14 RX ORDER — SODIUM CHLORIDE 9 MG/ML
INJECTION, SOLUTION INTRAVENOUS
Status: DISPENSED
Start: 2019-08-14 | End: 2019-08-14

## 2019-08-14 RX ADMIN — IPRATROPIUM BROMIDE AND ALBUTEROL SULFATE 1 AMPULE: .5; 3 SOLUTION RESPIRATORY (INHALATION) at 12:28

## 2019-08-14 RX ADMIN — MUPIROCIN: 20 OINTMENT TOPICAL at 10:52

## 2019-08-14 RX ADMIN — Medication 250 MG: at 00:50

## 2019-08-14 RX ADMIN — LEVOTHYROXINE SODIUM ANHYDROUS 50 MCG: 100 INJECTION, POWDER, LYOPHILIZED, FOR SOLUTION INTRAVENOUS at 06:33

## 2019-08-14 RX ADMIN — HYDROCORTISONE SODIUM SUCCINATE 100 MG: 100 INJECTION, POWDER, FOR SOLUTION INTRAMUSCULAR; INTRAVENOUS at 00:49

## 2019-08-14 RX ADMIN — IPRATROPIUM BROMIDE AND ALBUTEROL SULFATE 1 AMPULE: .5; 3 SOLUTION RESPIRATORY (INHALATION) at 20:39

## 2019-08-14 RX ADMIN — POTASSIUM CHLORIDE 20 MEQ: 20 TABLET, EXTENDED RELEASE ORAL at 15:13

## 2019-08-14 RX ADMIN — Medication 250 MG: at 20:24

## 2019-08-14 RX ADMIN — FOLIC ACID 1000 MCG: 1 TABLET ORAL at 08:07

## 2019-08-14 RX ADMIN — HEPARIN SODIUM 5000 UNITS: 5000 INJECTION INTRAVENOUS; SUBCUTANEOUS at 15:13

## 2019-08-14 RX ADMIN — Medication 250 MG: at 11:14

## 2019-08-14 RX ADMIN — Medication 10 ML: at 20:28

## 2019-08-14 RX ADMIN — CHLOROTHIAZIDE SODIUM 500 MG: 500 INJECTION, POWDER, LYOPHILIZED, FOR SOLUTION INTRAVENOUS at 12:57

## 2019-08-14 RX ADMIN — Medication 250 MG: at 08:06

## 2019-08-14 RX ADMIN — HYDROCORTISONE SODIUM SUCCINATE 75 MG: 100 INJECTION, POWDER, FOR SOLUTION INTRAMUSCULAR; INTRAVENOUS at 18:40

## 2019-08-14 RX ADMIN — Medication 250 MG: at 19:46

## 2019-08-14 RX ADMIN — HEPARIN SODIUM 5000 UNITS: 5000 INJECTION INTRAVENOUS; SUBCUTANEOUS at 20:24

## 2019-08-14 RX ADMIN — Medication 10 ML: at 00:50

## 2019-08-14 RX ADMIN — CEFEPIME 1 G: 1 INJECTION, POWDER, FOR SOLUTION INTRAMUSCULAR; INTRAVENOUS at 08:02

## 2019-08-14 RX ADMIN — TAMSULOSIN HYDROCHLORIDE 0.4 MG: 0.4 CAPSULE ORAL at 08:07

## 2019-08-14 RX ADMIN — SPIRONOLACTONE 25 MG: 25 TABLET ORAL at 15:13

## 2019-08-14 RX ADMIN — HYDROCORTISONE SODIUM SUCCINATE 75 MG: 100 INJECTION, POWDER, FOR SOLUTION INTRAMUSCULAR; INTRAVENOUS at 23:43

## 2019-08-14 RX ADMIN — Medication 250 MG: at 23:45

## 2019-08-14 RX ADMIN — HYDROCORTISONE SODIUM SUCCINATE 100 MG: 100 INJECTION, POWDER, FOR SOLUTION INTRAMUSCULAR; INTRAVENOUS at 10:52

## 2019-08-14 RX ADMIN — PANTOPRAZOLE SODIUM 40 MG: 40 INJECTION, POWDER, FOR SOLUTION INTRAVENOUS at 10:52

## 2019-08-14 RX ADMIN — Medication 10 ML: at 08:04

## 2019-08-14 RX ADMIN — Medication 250 MG: at 06:33

## 2019-08-14 RX ADMIN — FUROSEMIDE 40 MG: 10 INJECTION, SOLUTION INTRAMUSCULAR; INTRAVENOUS at 08:04

## 2019-08-14 RX ADMIN — FUROSEMIDE 40 MG: 10 INJECTION, SOLUTION INTRAMUSCULAR; INTRAVENOUS at 18:40

## 2019-08-14 RX ADMIN — CEFEPIME 1 G: 1 INJECTION, POWDER, FOR SOLUTION INTRAMUSCULAR; INTRAVENOUS at 20:24

## 2019-08-14 RX ADMIN — IPRATROPIUM BROMIDE AND ALBUTEROL SULFATE 1 AMPULE: .5; 3 SOLUTION RESPIRATORY (INHALATION) at 08:12

## 2019-08-14 ASSESSMENT — PAIN DESCRIPTION - PAIN TYPE
TYPE: CHRONIC PAIN
TYPE: CHRONIC PAIN

## 2019-08-14 ASSESSMENT — PAIN DESCRIPTION - LOCATION
LOCATION: BACK
LOCATION: BACK

## 2019-08-14 ASSESSMENT — PAIN SCALES - GENERAL
PAINLEVEL_OUTOF10: 4
PAINLEVEL_OUTOF10: 7
PAINLEVEL_OUTOF10: 0
PAINLEVEL_OUTOF10: 0

## 2019-08-14 ASSESSMENT — PAIN DESCRIPTION - FREQUENCY: FREQUENCY: CONTINUOUS

## 2019-08-14 ASSESSMENT — PAIN DESCRIPTION - ORIENTATION
ORIENTATION: LOWER
ORIENTATION: LOWER

## 2019-08-14 NOTE — PROGRESS NOTES
Pulmonary & Critical Care Inpatient Progress Note   Francine Vasquez MD     REASON FOR TODAY'S VISIT:  Critical care management    SUBJECTIVE:   Remains on high flow oxygen  Good urine output with diuresis but still net positive over 2.6L    Scheduled Meds:   chlorothiazide  500 mg Intravenous Once    mupirocin   Nasal BID    hydrocortisone sodium succinate PF  100 mg Intravenous Q8H    vancomycin  250 mg Oral 4 times per day    saccharomyces boulardii  250 mg Oral BID    heparin (porcine)  5,000 Units Subcutaneous 3 times per day    furosemide  40 mg Intravenous BID    pantoprazole  40 mg Intravenous Daily    levothyroxine  50 mcg Intravenous Daily    folic acid  5,297 mcg Oral Daily    ipratropium-albuterol  1 ampule Inhalation Q4H WA    tamsulosin  0.4 mg Oral Daily    sodium chloride flush  10 mL Intravenous 2 times per day    cefepime  1 g Intravenous Q12H       Continuous Infusions:   sodium chloride         PRN Meds:  perflutren lipid microspheres, sodium chloride flush, acetaminophen    ALLERGIES:  Patient has No Known Allergies. Objective:   PHYSICAL EXAM:  /78   Pulse 95   Temp 97.5 °F (36.4 °C) (Oral)   Resp 25   Ht 6' 1\" (1.854 m)   Wt 279 lb 8.7 oz (126.8 kg)   SpO2 98%   BMI 36.88 kg/m²    Physical Exam   Constitutional: He appears well-developed and well-nourished. No distress. HENT:   Head: Normocephalic and atraumatic. Mouth/Throat: Oropharynx is clear and moist. No oropharyngeal exudate. Eyes: Pupils are equal, round, and reactive to light. EOM are normal.   Neck: Neck supple. No JVD present. Cardiovascular: Normal heart sounds. Exam reveals no gallop and no friction rub. No murmur heard. Pulmonary/Chest: Effort normal. He has no wheezes. He has no rales. Equal chest rise and expansion bilaterally   Abdominal: Soft. Bowel sounds are normal. He exhibits no distension. There is no tenderness. Musculoskeletal: Normal range of motion. He exhibits no edema. and renal function    Due to life threatening resp failure, renal failure this patient is critically ill, total critical care time involved in his care was 31 mins     Jerardo Herrera MD

## 2019-08-14 NOTE — CONSULTS
I26.99    Mediastinal adenopathy R59.0    Former smoker Z87.891    Pulmonary HTN ([facility] I27.20    Obesity E66.9    CHF (congestive heart failure) ([facility]) I50.9    CHF (congestive heart failure), NYHA class I, acute on chronic, combined ([facility]) I50.43    History of pulmonary embolism Z86.711    Elevated hemoglobin A1c R73.09    Alcohol dependence ([facility]) F10.20    Acute anemia D64.9    Chronic respiratory failure with hypoxia ([facility]) J96.11    Urinary tract infection without hematuria N39.0    Pneumonia due to organism J18.9    Acute kidney failure, unspecified ([facility]) N17.9    Acute renal failure (ARF) ([facility]) N17.9    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    Hypotension I95.9       Measurements:  Wound 04/30/19 Arm Anterior;Proximal;Right;Upper Open skin tear (Active)   Number of days: 105       Wound 07/29/19 Buttocks Left (Active)   Wound Image   8/14/2019  9:57 AM   Wound Pressure Stage  2 8/14/2019  9:57 AM   Dressing Status Intact 8/14/2019  9:57 AM   Dressing Changed Changed/New 8/14/2019  9:57 AM   Dressing/Treatment Foam 8/14/2019  9:57 AM   Wound Cleansed Other (Comment) 8/14/2019  9:57 AM   Dressing Change Due 08/17/19 8/14/2019  9:57 AM   Wound Length (cm) 3.5 cm 8/14/2019  9:57 AM   Wound Width (cm) 2.5 cm 8/14/2019  9:57 AM   Wound Depth (cm) 0.1 cm 8/14/2019  9:57 AM   Wound Surface Area (cm^2) 8.75 cm^2 8/14/2019  9:57 AM   Change in Wound Size % (l*w) 79.29 8/14/2019  9:57 AM   Wound Volume (cm^3) 0.88 cm^3 8/14/2019  9:57 AM   Wound Healing % 93 8/14/2019  9:57 AM   Tunneling Position ___ O'Clock 0 8/14/2019  9:57 AM   Undermining Starts ___ O'Clock 0 8/14/2019  9:57 AM   Undermining Ends___ O'Clock 0 8/14/2019  9:57 AM   Undermining Maxium Distance (cm) 0 8/14/2019  9:57 AM   Wound Assessment Intact; Red 8/14/2019  9:57 AM   Drainage Amount Scant 8/14/2019  9:57 AM   Drainage Description Serosanguinous 8/14/2019  9:57 AM   Odor None 8/14/2019  9:57 AM   Margins Attached edges; Defined edges 8/14/2019  9:57

## 2019-08-14 NOTE — PROGRESS NOTES
Restrictions  Restrictions/Precautions  Restrictions/Precautions: General Precautions, Fall Risk, Contact Precautions  Position Activity Restriction  Other position/activity restrictions: High fall risk per nursing assessment, up with assistance, contact(+) precautions 2* C. diff, 4L O2, Bermudez, rectal tube, IV lines, telemetry    Subjective   General  Chart Reviewed: Yes  Patient assessed for rehabilitation services?: Yes  Family / Caregiver Present: No  Referring Practitioner: Oswaldo Huertas  Diagnosis: acute renal failure  Subjective  Subjective: Pt reports we are too pushy. Decling any out of bed activity because \"he doesn't want to\"  Patient Currently in Pain: Yes  Pain Assessment  Pain Assessment: 0-10  Pain Level: 7  Pain Type: Chronic pain  Pain Location: Back  Pain Orientation: Lower  Pain Frequency: Continuous  Non-Pharmaceutical Pain Intervention(s): Ambulation/Increased Activity;Repositioned; Therapeutic presence  Vital Signs  Patient Currently in Pain: Yes  Social/Functional History  Social/Functional History  Lives With: Daughter(and 3 grandchildren)  Type of Home: House  Home Layout: Two level, Able to Live on Main level with bedroom/bathroom  Home Access: Stairs to enter with rails(3+5 SUKUMAR (no handrails near first 3 steps, B handrails near last 5 steps))  Bathroom Shower/Tub: Tub/Shower unit(walk-in tub)  Bathroom Toilet: Standard  Bathroom Equipment: Shower chair, Grab bars in shower  Home Equipment: Cane, Rolling walker, Oxygen, Grab bars(4L home O2)  Receives Help From: Family, Home health(nursing 3x's a week for vitals)  ADL Assistance: Independent  Homemaking Responsibilities: No(daughter completes)  Ambulation Assistance: Independent(without AD)  Transfer Assistance: Independent  Active : Yes  Occupation: Retired  Type of occupation: [employer]  Leisure & Hobbies: Question not asked due to increasing pt agitation       Objective        Orientation  Overall Orientation Status: Within

## 2019-08-14 NOTE — PROGRESS NOTES
Hospitalist Progress Note      PCP: Tyler Benavides MD    Date of Admission: 8/11/2019    Chief Complaint: confusion, shortness of breath     Hospital Course: admitted with suspicion for congestive heart failure and pneumonia. Acute renal failure noted. Patient was noted to have very elevated TSH. Started on IV Synthroid and IV steroids and transferred to ICU because of myxedema. Never required pressors  Mental status on blood pressure improved with IV steroids and IV levothyroxine. C. difficile tested positive and confirmation tests. Patient started on oral vancomycin, to which seems to be responding fine. Subjective: weak, denies pain, no nausea or vomiting. Still has liquid stool from rectal tube.       Medications:  Reviewed    Infusion Medications    sodium chloride       Scheduled Medications    hydrocortisone sodium succinate PF  75 mg Intravenous Q8H    potassium chloride  20 mEq Oral Daily    spironolactone  25 mg Oral Daily    mupirocin   Nasal BID    vancomycin  250 mg Oral 4 times per day    saccharomyces boulardii  250 mg Oral BID    heparin (porcine)  5,000 Units Subcutaneous 3 times per day    furosemide  40 mg Intravenous BID    pantoprazole  40 mg Intravenous Daily    levothyroxine  50 mcg Intravenous Daily    folic acid  9,105 mcg Oral Daily    ipratropium-albuterol  1 ampule Inhalation Q4H WA    tamsulosin  0.4 mg Oral Daily    sodium chloride flush  10 mL Intravenous 2 times per day    cefepime  1 g Intravenous Q12H     PRN Meds: perflutren lipid microspheres, sodium chloride flush, acetaminophen      Intake/Output Summary (Last 24 hours) at 8/14/2019 1449  Last data filed at 8/14/2019 1052  Gross per 24 hour   Intake 1757.67 ml   Output 2365 ml   Net -607.33 ml       Physical Exam Performed:    BP (!) 144/73   Pulse 105   Temp 97.5 °F (36.4 °C) (Oral)   Resp 20   Ht 6' 1\" (1.854 m)   Wt 279 lb 8.7 oz (126.8 kg)   SpO2 92%   BMI 36.88 kg/m²     General bleed.    Acute renal failure  Nephrology input appreciated. Creatinine significantly improved compared to admission. Continue to monitor. Hyponatremia  Resolved with medication management and fluid supplementation  Continue to monitor    Abnormal chest x-ray, possible but not definite pneumonia  Cefepime being continued empirically     C. difficile colitis  Started on oral vancomycin. D/w nursing  Discussed with patient, to baseline alertness      DVT Prophylaxis: Heparin  Diet: DIET LOW SODIUM 2 GM; 1800 ml  Dietary Nutrition Supplements: Frozen Oral Supplement  Code Status: Full Code    PT/OT Eval Status: Requested.     Dispo - inpatient, unknown length of stay    Antoni Childs MD       Addendum    Diagnostic clarification:    Sepsis was initially considered, but later ruled out after test results became available    Electronically signed by Antoni Childs MD on 8/14/2019 at 8:34 PM

## 2019-08-15 ENCOUNTER — APPOINTMENT (OUTPATIENT)
Dept: GENERAL RADIOLOGY | Age: 74
DRG: 080 | End: 2019-08-15
Payer: MEDICARE

## 2019-08-15 LAB
A/G RATIO: 0.8 (ref 1.1–2.2)
ALBUMIN SERPL-MCNC: 2.6 G/DL (ref 3.4–5)
ALP BLD-CCNC: 241 U/L (ref 40–129)
ALT SERPL-CCNC: 17 U/L (ref 10–40)
ANION GAP SERPL CALCULATED.3IONS-SCNC: 10 MMOL/L (ref 3–16)
APPEARANCE FLUID: NORMAL
AST SERPL-CCNC: 34 U/L (ref 15–37)
BASOPHILS ABSOLUTE: 0.1 K/UL (ref 0–0.2)
BASOPHILS RELATIVE PERCENT: 0.7 %
BILIRUB SERPL-MCNC: 0.7 MG/DL (ref 0–1)
BUN BLDV-MCNC: 26 MG/DL (ref 7–20)
CALCIUM SERPL-MCNC: 8.3 MG/DL (ref 8.3–10.6)
CELL COUNT FLUID TYPE: NORMAL
CHLORIDE BLD-SCNC: 104 MMOL/L (ref 99–110)
CLOT EVALUATION: NORMAL
CO2: 26 MMOL/L (ref 21–32)
COLOR FLUID: NORMAL
CREAT SERPL-MCNC: 1.7 MG/DL (ref 0.8–1.3)
EOSINOPHILS ABSOLUTE: 0 K/UL (ref 0–0.6)
EOSINOPHILS RELATIVE PERCENT: 0 %
FLUID PATH CONSULT: YES
FLUID TYPE: NORMAL
GFR AFRICAN AMERICAN: 48
GFR NON-AFRICAN AMERICAN: 40
GLOBULIN: 3.3 G/DL
GLUCOSE BLD-MCNC: 140 MG/DL (ref 70–99)
GLUCOSE, FLUID: 146 MG/DL
HCT VFR BLD CALC: 25.9 % (ref 40.5–52.5)
HEMOGLOBIN: 8.5 G/DL (ref 13.5–17.5)
LD, FLUID: 54 U/L
LYMPHOCYTES ABSOLUTE: 0.7 K/UL (ref 1–5.1)
LYMPHOCYTES RELATIVE PERCENT: 5.4 %
LYMPHOCYTES, BODY FLUID: 21 %
MACROPHAGE FLUID: 42 %
MAGNESIUM: 1.9 MG/DL (ref 1.8–2.4)
MCH RBC QN AUTO: 33.6 PG (ref 26–34)
MCHC RBC AUTO-ENTMCNC: 32.9 G/DL (ref 31–36)
MCV RBC AUTO: 102.1 FL (ref 80–100)
MESOTHELIAL FLUID: 1 %
MONOCYTES ABSOLUTE: 0.6 K/UL (ref 0–1.3)
MONOCYTES RELATIVE PERCENT: 4.7 %
MONONUCLEAR UNIDENTIFIED CELLS FLUID: 4 %
NEUTROPHIL, FLUID: 32 %
NEUTROPHILS ABSOLUTE: 12.1 K/UL (ref 1.7–7.7)
NEUTROPHILS RELATIVE PERCENT: 89.2 %
NUCLEATED CELLS FLUID: 131 /CUMM
NUMBER OF CELLS COUNTED FLUID: 100
PDW BLD-RTO: 17.5 % (ref 12.4–15.4)
PH, BODY FLUID: 7.7
PHOSPHORUS: 4.3 MG/DL (ref 2.5–4.9)
PLATELET # BLD: 346 K/UL (ref 135–450)
PMV BLD AUTO: 8.5 FL (ref 5–10.5)
POTASSIUM REFLEX MAGNESIUM: 3.4 MMOL/L (ref 3.5–5.1)
PROTEIN FLUID: 1.4 G/DL
RBC # BLD: 2.53 M/UL (ref 4.2–5.9)
RBC FLUID: 500 /CUMM
SODIUM BLD-SCNC: 140 MMOL/L (ref 136–145)
TOTAL PROTEIN: 5.9 G/DL (ref 6.4–8.2)
WBC # BLD: 13.6 K/UL (ref 4–11)

## 2019-08-15 PROCEDURE — 84157 ASSAY OF PROTEIN OTHER: CPT

## 2019-08-15 PROCEDURE — 99233 SBSQ HOSP IP/OBS HIGH 50: CPT | Performed by: INTERNAL MEDICINE

## 2019-08-15 PROCEDURE — 2060000000 HC ICU INTERMEDIATE R&B

## 2019-08-15 PROCEDURE — 88305 TISSUE EXAM BY PATHOLOGIST: CPT

## 2019-08-15 PROCEDURE — 84100 ASSAY OF PHOSPHORUS: CPT

## 2019-08-15 PROCEDURE — 2500000003 HC RX 250 WO HCPCS: Performed by: INTERNAL MEDICINE

## 2019-08-15 PROCEDURE — 94669 MECHANICAL CHEST WALL OSCILL: CPT

## 2019-08-15 PROCEDURE — 82945 GLUCOSE OTHER FLUID: CPT

## 2019-08-15 PROCEDURE — 89051 BODY FLUID CELL COUNT: CPT

## 2019-08-15 PROCEDURE — 83615 LACTATE (LD) (LDH) ENZYME: CPT

## 2019-08-15 PROCEDURE — 83986 ASSAY PH BODY FLUID NOS: CPT

## 2019-08-15 PROCEDURE — 87206 SMEAR FLUORESCENT/ACID STAI: CPT

## 2019-08-15 PROCEDURE — 83735 ASSAY OF MAGNESIUM: CPT

## 2019-08-15 PROCEDURE — 87102 FUNGUS ISOLATION CULTURE: CPT

## 2019-08-15 PROCEDURE — 94660 CPAP INITIATION&MGMT: CPT

## 2019-08-15 PROCEDURE — 88112 CYTOPATH CELL ENHANCE TECH: CPT

## 2019-08-15 PROCEDURE — 88185 FLOWCYTOMETRY/TC ADD-ON: CPT

## 2019-08-15 PROCEDURE — 71045 X-RAY EXAM CHEST 1 VIEW: CPT

## 2019-08-15 PROCEDURE — 87205 SMEAR GRAM STAIN: CPT

## 2019-08-15 PROCEDURE — C9113 INJ PANTOPRAZOLE SODIUM, VIA: HCPCS | Performed by: INTERNAL MEDICINE

## 2019-08-15 PROCEDURE — 97530 THERAPEUTIC ACTIVITIES: CPT

## 2019-08-15 PROCEDURE — 94761 N-INVAS EAR/PLS OXIMETRY MLT: CPT

## 2019-08-15 PROCEDURE — 94640 AIRWAY INHALATION TREATMENT: CPT

## 2019-08-15 PROCEDURE — 0W9930Z DRAINAGE OF RIGHT PLEURAL CAVITY WITH DRAINAGE DEVICE, PERCUTANEOUS APPROACH: ICD-10-PCS | Performed by: INTERNAL MEDICINE

## 2019-08-15 PROCEDURE — 6370000000 HC RX 637 (ALT 250 FOR IP): Performed by: INTERNAL MEDICINE

## 2019-08-15 PROCEDURE — 97535 SELF CARE MNGMENT TRAINING: CPT

## 2019-08-15 PROCEDURE — 2700000000 HC OXYGEN THERAPY PER DAY

## 2019-08-15 PROCEDURE — 6360000002 HC RX W HCPCS: Performed by: INTERNAL MEDICINE

## 2019-08-15 PROCEDURE — 80053 COMPREHEN METABOLIC PANEL: CPT

## 2019-08-15 PROCEDURE — 88184 FLOWCYTOMETRY/ TC 1 MARKER: CPT

## 2019-08-15 PROCEDURE — 2580000003 HC RX 258: Performed by: INTERNAL MEDICINE

## 2019-08-15 PROCEDURE — 87077 CULTURE AEROBIC IDENTIFY: CPT

## 2019-08-15 PROCEDURE — 32557 INSERT CATH PLEURA W/ IMAGE: CPT | Performed by: INTERNAL MEDICINE

## 2019-08-15 PROCEDURE — 87015 SPECIMEN INFECT AGNT CONCNTJ: CPT

## 2019-08-15 PROCEDURE — 85025 COMPLETE CBC W/AUTO DIFF WBC: CPT

## 2019-08-15 PROCEDURE — 87070 CULTURE OTHR SPECIMN AEROBIC: CPT

## 2019-08-15 PROCEDURE — 87116 MYCOBACTERIA CULTURE: CPT

## 2019-08-15 RX ADMIN — TAMSULOSIN HYDROCHLORIDE 0.4 MG: 0.4 CAPSULE ORAL at 09:21

## 2019-08-15 RX ADMIN — IPRATROPIUM BROMIDE AND ALBUTEROL SULFATE 1 AMPULE: .5; 3 SOLUTION RESPIRATORY (INHALATION) at 08:59

## 2019-08-15 RX ADMIN — PANTOPRAZOLE SODIUM 40 MG: 40 INJECTION, POWDER, FOR SOLUTION INTRAVENOUS at 09:20

## 2019-08-15 RX ADMIN — CEFEPIME 1 G: 1 INJECTION, POWDER, FOR SOLUTION INTRAMUSCULAR; INTRAVENOUS at 22:00

## 2019-08-15 RX ADMIN — Medication 250 MG: at 22:17

## 2019-08-15 RX ADMIN — POTASSIUM CHLORIDE 20 MEQ: 20 TABLET, EXTENDED RELEASE ORAL at 09:21

## 2019-08-15 RX ADMIN — LEVOTHYROXINE SODIUM ANHYDROUS 50 MCG: 100 INJECTION, POWDER, LYOPHILIZED, FOR SOLUTION INTRAVENOUS at 05:11

## 2019-08-15 RX ADMIN — HYDROCORTISONE SODIUM SUCCINATE 50 MG: 100 INJECTION, POWDER, FOR SOLUTION INTRAMUSCULAR; INTRAVENOUS at 17:15

## 2019-08-15 RX ADMIN — Medication 250 MG: at 17:17

## 2019-08-15 RX ADMIN — SPIRONOLACTONE 25 MG: 25 TABLET ORAL at 09:21

## 2019-08-15 RX ADMIN — HEPARIN SODIUM 5000 UNITS: 5000 INJECTION INTRAVENOUS; SUBCUTANEOUS at 22:27

## 2019-08-15 RX ADMIN — HYDROCORTISONE SODIUM SUCCINATE 75 MG: 100 INJECTION, POWDER, FOR SOLUTION INTRAMUSCULAR; INTRAVENOUS at 09:20

## 2019-08-15 RX ADMIN — IPRATROPIUM BROMIDE AND ALBUTEROL SULFATE 1 AMPULE: .5; 3 SOLUTION RESPIRATORY (INHALATION) at 20:20

## 2019-08-15 RX ADMIN — HEPARIN SODIUM 5000 UNITS: 5000 INJECTION INTRAVENOUS; SUBCUTANEOUS at 05:09

## 2019-08-15 RX ADMIN — Medication 10 ML: at 17:16

## 2019-08-15 RX ADMIN — CEFEPIME 1 G: 1 INJECTION, POWDER, FOR SOLUTION INTRAMUSCULAR; INTRAVENOUS at 09:21

## 2019-08-15 RX ADMIN — FUROSEMIDE 40 MG: 10 INJECTION, SOLUTION INTRAMUSCULAR; INTRAVENOUS at 09:21

## 2019-08-15 RX ADMIN — Medication 250 MG: at 02:50

## 2019-08-15 RX ADMIN — IPRATROPIUM BROMIDE AND ALBUTEROL SULFATE 1 AMPULE: .5; 3 SOLUTION RESPIRATORY (INHALATION) at 15:56

## 2019-08-15 RX ADMIN — FUROSEMIDE 40 MG: 10 INJECTION, SOLUTION INTRAMUSCULAR; INTRAVENOUS at 17:15

## 2019-08-15 RX ADMIN — IPRATROPIUM BROMIDE AND ALBUTEROL SULFATE 1 AMPULE: .5; 3 SOLUTION RESPIRATORY (INHALATION) at 12:10

## 2019-08-15 RX ADMIN — FOLIC ACID 1000 MCG: 1 TABLET ORAL at 09:21

## 2019-08-15 RX ADMIN — Medication 250 MG: at 11:33

## 2019-08-15 RX ADMIN — IPRATROPIUM BROMIDE AND ALBUTEROL SULFATE 1 AMPULE: .5; 3 SOLUTION RESPIRATORY (INHALATION) at 03:17

## 2019-08-15 RX ADMIN — HEPARIN SODIUM 5000 UNITS: 5000 INJECTION INTRAVENOUS; SUBCUTANEOUS at 14:29

## 2019-08-15 ASSESSMENT — PAIN SCALES - GENERAL
PAINLEVEL_OUTOF10: 0

## 2019-08-15 NOTE — PROGRESS NOTES
cervical adenopathy. Neurological: He is alert. No cranial nerve deficit. CN 2-12 grossly intact   Skin: Skin is warm and dry. No rash noted. He is not diaphoretic. Data Reviewed:   LABS:  CBC:  Recent Labs     08/13/19 0424 08/13/19 1815 08/14/19  0505 08/15/19  0515   WBC 10.1  --  11.2* 13.6*   HGB 6.7* 8.2* 7.9* 8.5*   HCT 19.8* 24.1* 23.1* 25.9*   .8*  --  101.6* 102.1*     --  327 346     BMP:  Recent Labs     08/13/19 0424 08/14/19  0505 08/15/19  0515    141 140   K 4.2 3.7 3.4*    106 104   CO2 25 26 26   PHOS 5.6* 4.2 4.3   BUN 23* 23* 26*   CREATININE 2.1* 1.7* 1.7*     LIVER PROFILE:   Recent Labs     08/13/19 0424 08/14/19  0505 08/15/19  0515   AST 39* 37 34   ALT 19 16 17   BILITOT 0.7 0.7 0.7   ALKPHOS 247* 248* 241*     PT/INR:No results for input(s): PROTIME, INR in the last 72 hours. APTT: No results for input(s): APTT in the last 72 hours. UA:  No results for input(s): NITRITE, COLORU, PHUR, LABCAST, WBCUA, RBCUA, MUCUS, TRICHOMONAS, YEAST, BACTERIA, CLARITYU, SPECGRAV, LEUKOCYTESUR, UROBILINOGEN, BILIRUBINUR, BLOODU, GLUCOSEU, AMORPHOUS in the last 72 hours. Invalid input(s): KETONESU  No results for input(s): PHART, JOP1MVM, PO2ART in the last 72 hours. Vent Information  FiO2 : 50 %    CXR personally reviewed, bilateral pleural effusions and edema           Assessment:     1. Acute resp failure, hypoxic              -acute pulm edema  2. MCKENNA  3. Sepsis, unclear source  4. Hypothyroidism, severe  5. Cdiff acute colitis infection  6.  Anemia    Plan:      -Continue diuresis per nephro  -Discussed thoracentesis with the patient he agreed to proceed\  -PO vanc for potential cdiff  -Cefepime for pneumonia, can discontinue from a pulm standpoint  -Decrease hydrocortisone dose  -Nephro involved, monitor sodium and renal function    Ronaldo Levy MD

## 2019-08-15 NOTE — PROGRESS NOTES
Physical Therapy  Facility/Department: [place] [address] [facility]  Daily Treatment Note  NAME: Angelia Iniguez  : 1945  MRN: [id number]    Date of Service: 8/15/2019    Discharge Recommendations:  Subacute/Skilled Nursing Facility   PT Equipment Recommendations  Equipment Needed: No    Assessment   Body structures, Functions, Activity limitations: Decreased functional mobility ; Decreased endurance;Decreased strength;Decreased balance  Assessment: Pt referred for PT evaluation during current hospital stay with dx of ARF and myxedema coma. Pt also (+) for C. diff infection. Pt requires increased encouragement to participate, requries assist of 2 for functional transfers. At this time, therapy recommends SNF upon d/c as pt is very deconditioned. Treatment Diagnosis: Generalized weakness  Prognosis: Fair  Decision Making: Medium Complexity  Barriers to Learning: Decreased motivation, agitation with staff at times  REQUIRES PT FOLLOW UP: Yes  Activity Tolerance  Activity Tolerance: Patient limited by fatigue;Patient limited by endurance     Patient Diagnosis(es): The primary encounter diagnosis was Hypotension, unspecified hypotension type. Diagnoses of Acute kidney injury ([address] Fall, initial encounter, Wound of left buttock, initial encounter, and Leukocytosis, unspecified type were also pertinent to this visit. has a past medical history of Alcohol abuse, Alcohol abuse, ARF (acute renal failure) ([address] CHF (congestive heart failure) ([address] Clostridium difficile infection, Hypercholesteremia, Hypertension, Mediastinal adenopathy, Pneumonia due to organism, and Pulmonary emphysema ([address] has a past surgical history that includes Upper gastrointestinal endoscopy (1/10/2011); skin biopsy; back surgery; and Upper gastrointestinal endoscopy (N/A, 2019).     Restrictions  Restrictions/Precautions  Restrictions/Precautions: General Precautions, Fall Risk, Contact Precautions  Position Activity Restriction  Other

## 2019-08-15 NOTE — PROGRESS NOTES
BMI 37.81 kg/m²     General appearance: No apparent distress, appears stated age. Pleasant and cooperative  HEENT: Pupils equal, round, and reactive to light. Conjunctivae/corneas clear. Neck: Supple, with full range of motion. No jugular venous distention. Trachea midline. Respiratory:  Weak effort, few rhonchi and bibasilar crackles, no wheezes  Cardiovascular: Regular rate and rhythm with normal S1/S2 without murmurs, rubs or gallops. Abdomen: Soft, non-tender, non-distended with normal bowel sounds. Musculoskeletal: No clubbing or cyanosis. 2+ pitting edema bilaterally. Full range of motion without deformity. Skin: Skin color, texture, turgor normal.  No rashes or lesions. Neurologic:  Neurovascularly intact without any focal sensory/motor deficits. Cranial nerves: II-XII intact, grossly non-focal.  Psychiatric: Alert, back to baseline alertness, able to engage in full conversation  Capillary Refill: Brisk,< 3 seconds   Peripheral Pulses: +2 palpable, equal bilaterally         Labs:   Recent Labs     08/13/19 0424 08/13/19 1815 08/14/19  0505 08/15/19  0515   WBC 10.1  --  11.2* 13.6*   HGB 6.7* 8.2* 7.9* 8.5*   HCT 19.8* 24.1* 23.1* 25.9*     --  327 346     Recent Labs     08/13/19 0424 08/14/19  0505 08/15/19  0515    141 140   K 4.2 3.7 3.4*    106 104   CO2 25 26 26   BUN 23* 23* 26*   CREATININE 2.1* 1.7* 1.7*   CALCIUM 8.0* 8.0* 8.3   PHOS 5.6* 4.2 4.3     Recent Labs     08/13/19 0424 08/14/19  0505 08/15/19  0515   AST 39* 37 34   ALT 19 16 17   BILITOT 0.7 0.7 0.7   ALKPHOS 247* 248* 241*     No results for input(s): INR in the last 72 hours. No results for input(s): [place] in the last 72 hours.     Urinalysis:      Lab Results   Component Value Date    NITRU Negative 08/11/2019    WBCUA 0-2 08/11/2019    BACTERIA 2+ 08/11/2019    RBCUA 3-5 08/11/2019    BLOODU Negative 08/11/2019    SPECGRAV 1.020 08/11/2019    GLUCOSEU Negative 08/11/2019

## 2019-08-16 ENCOUNTER — APPOINTMENT (OUTPATIENT)
Dept: GENERAL RADIOLOGY | Age: 74
DRG: 080 | End: 2019-08-16
Payer: MEDICARE

## 2019-08-16 LAB
A/G RATIO: 0.8 (ref 1.1–2.2)
ALBUMIN SERPL-MCNC: 2 G/DL (ref 3.4–5)
ALP BLD-CCNC: 175 U/L (ref 40–129)
ALT SERPL-CCNC: 16 U/L (ref 10–40)
ANION GAP SERPL CALCULATED.3IONS-SCNC: 9 MMOL/L (ref 3–16)
ANISOCYTOSIS: ABNORMAL
AST SERPL-CCNC: 33 U/L (ref 15–37)
BANDED NEUTROPHILS RELATIVE PERCENT: 2 % (ref 0–7)
BASOPHILIC STIPPLING: ABNORMAL
BASOPHILS ABSOLUTE: 0 K/UL (ref 0–0.2)
BASOPHILS RELATIVE PERCENT: 0 %
BILIRUB SERPL-MCNC: 0.5 MG/DL (ref 0–1)
BLOOD CULTURE, ROUTINE: NORMAL
BUN BLDV-MCNC: 24 MG/DL (ref 7–20)
CALCIUM SERPL-MCNC: 6.5 MG/DL (ref 8.3–10.6)
CHLORIDE BLD-SCNC: 116 MMOL/L (ref 99–110)
CO2: 22 MMOL/L (ref 21–32)
CREAT SERPL-MCNC: 1.3 MG/DL (ref 0.8–1.3)
CULTURE, BLOOD 2: NORMAL
EOSINOPHILS ABSOLUTE: 0 K/UL (ref 0–0.6)
EOSINOPHILS RELATIVE PERCENT: 0 %
FOLATE: >20 NG/ML (ref 4.78–24.2)
GFR AFRICAN AMERICAN: >60
GFR NON-AFRICAN AMERICAN: 54
GLOBULIN: 2.4 G/DL
GLUCOSE BLD-MCNC: 98 MG/DL (ref 70–99)
HCT VFR BLD CALC: 20.4 % (ref 40.5–52.5)
HCT VFR BLD CALC: 25.6 % (ref 40.5–52.5)
HEMATOLOGY PATH CONSULT: YES
HEMOGLOBIN: 6.9 G/DL (ref 13.5–17.5)
HEMOGLOBIN: 8.6 G/DL (ref 13.5–17.5)
LYMPHOCYTES ABSOLUTE: 0.3 K/UL (ref 1–5.1)
LYMPHOCYTES RELATIVE PERCENT: 3 %
MACROCYTES: ABNORMAL
MAGNESIUM: 1.5 MG/DL (ref 1.8–2.4)
MCH RBC QN AUTO: 34.7 PG (ref 26–34)
MCHC RBC AUTO-ENTMCNC: 33.7 G/DL (ref 31–36)
MCV RBC AUTO: 102.9 FL (ref 80–100)
MONOCYTES ABSOLUTE: 0.4 K/UL (ref 0–1.3)
MONOCYTES RELATIVE PERCENT: 4 %
NEUTROPHILS ABSOLUTE: 9.6 K/UL (ref 1.7–7.7)
NEUTROPHILS RELATIVE PERCENT: 91 %
NUCLEATED RED BLOOD CELLS: 1 /100 WBC
NUCLEATED RED BLOOD CELLS: 1 /100 WBC
PATH CONSULT FLUID: NORMAL
PDW BLD-RTO: 16.9 % (ref 12.4–15.4)
PHOSPHORUS: 3.6 MG/DL (ref 2.5–4.9)
PLATELET # BLD: 283 K/UL (ref 135–450)
PMV BLD AUTO: 8 FL (ref 5–10.5)
POTASSIUM REFLEX MAGNESIUM: 3.1 MMOL/L (ref 3.5–5.1)
RBC # BLD: 1.98 M/UL (ref 4.2–5.9)
SLIDE REVIEW: ABNORMAL
SODIUM BLD-SCNC: 147 MMOL/L (ref 136–145)
SPE/IFE INTERPRETATION: NORMAL
STOMATOCYTES: ABNORMAL
TEAR DROP CELLS: ABNORMAL
TOTAL PROTEIN: 4.4 G/DL (ref 6.4–8.2)
TOXIC GRANULATION: PRESENT
VITAMIN B-12: 926 PG/ML (ref 211–911)
WBC # BLD: 10.3 K/UL (ref 4–11)

## 2019-08-16 PROCEDURE — 97110 THERAPEUTIC EXERCISES: CPT

## 2019-08-16 PROCEDURE — 80053 COMPREHEN METABOLIC PANEL: CPT

## 2019-08-16 PROCEDURE — 85018 HEMOGLOBIN: CPT

## 2019-08-16 PROCEDURE — 97530 THERAPEUTIC ACTIVITIES: CPT

## 2019-08-16 PROCEDURE — 6370000000 HC RX 637 (ALT 250 FOR IP): Performed by: INTERNAL MEDICINE

## 2019-08-16 PROCEDURE — 94640 AIRWAY INHALATION TREATMENT: CPT

## 2019-08-16 PROCEDURE — 2060000000 HC ICU INTERMEDIATE R&B

## 2019-08-16 PROCEDURE — 94761 N-INVAS EAR/PLS OXIMETRY MLT: CPT

## 2019-08-16 PROCEDURE — 6360000002 HC RX W HCPCS: Performed by: INTERNAL MEDICINE

## 2019-08-16 PROCEDURE — 97535 SELF CARE MNGMENT TRAINING: CPT

## 2019-08-16 PROCEDURE — 71045 X-RAY EXAM CHEST 1 VIEW: CPT

## 2019-08-16 PROCEDURE — 84100 ASSAY OF PHOSPHORUS: CPT

## 2019-08-16 PROCEDURE — 99233 SBSQ HOSP IP/OBS HIGH 50: CPT | Performed by: INTERNAL MEDICINE

## 2019-08-16 PROCEDURE — C9113 INJ PANTOPRAZOLE SODIUM, VIA: HCPCS | Performed by: INTERNAL MEDICINE

## 2019-08-16 PROCEDURE — 2700000000 HC OXYGEN THERAPY PER DAY

## 2019-08-16 PROCEDURE — 82746 ASSAY OF FOLIC ACID SERUM: CPT

## 2019-08-16 PROCEDURE — 85014 HEMATOCRIT: CPT

## 2019-08-16 PROCEDURE — 85025 COMPLETE CBC W/AUTO DIFF WBC: CPT

## 2019-08-16 PROCEDURE — 94660 CPAP INITIATION&MGMT: CPT

## 2019-08-16 PROCEDURE — 82607 VITAMIN B-12: CPT

## 2019-08-16 PROCEDURE — 2580000003 HC RX 258: Performed by: INTERNAL MEDICINE

## 2019-08-16 PROCEDURE — 83735 ASSAY OF MAGNESIUM: CPT

## 2019-08-16 RX ORDER — POTASSIUM CHLORIDE 20 MEQ/1
40 TABLET, EXTENDED RELEASE ORAL DAILY
Status: DISCONTINUED | OUTPATIENT
Start: 2019-08-17 | End: 2019-08-25

## 2019-08-16 RX ORDER — POTASSIUM CHLORIDE 20 MEQ/1
40 TABLET, EXTENDED RELEASE ORAL ONCE
Status: COMPLETED | OUTPATIENT
Start: 2019-08-16 | End: 2019-08-16

## 2019-08-16 RX ORDER — MAGNESIUM SULFATE IN WATER 40 MG/ML
2 INJECTION, SOLUTION INTRAVENOUS ONCE
Status: COMPLETED | OUTPATIENT
Start: 2019-08-16 | End: 2019-08-16

## 2019-08-16 RX ORDER — SPIRONOLACTONE 25 MG/1
50 TABLET ORAL DAILY
Status: DISCONTINUED | OUTPATIENT
Start: 2019-08-17 | End: 2019-08-25

## 2019-08-16 RX ORDER — PREDNISONE 20 MG/1
60 TABLET ORAL DAILY
Status: DISCONTINUED | OUTPATIENT
Start: 2019-08-16 | End: 2019-08-17

## 2019-08-16 RX ORDER — METOLAZONE 2.5 MG/1
5 TABLET ORAL ONCE
Status: COMPLETED | OUTPATIENT
Start: 2019-08-16 | End: 2019-08-16

## 2019-08-16 RX ADMIN — CEFEPIME 1 G: 1 INJECTION, POWDER, FOR SOLUTION INTRAMUSCULAR; INTRAVENOUS at 08:40

## 2019-08-16 RX ADMIN — IPRATROPIUM BROMIDE AND ALBUTEROL SULFATE 1 AMPULE: .5; 3 SOLUTION RESPIRATORY (INHALATION) at 20:49

## 2019-08-16 RX ADMIN — HEPARIN SODIUM 5000 UNITS: 5000 INJECTION INTRAVENOUS; SUBCUTANEOUS at 15:59

## 2019-08-16 RX ADMIN — Medication 250 MG: at 20:19

## 2019-08-16 RX ADMIN — HEPARIN SODIUM 5000 UNITS: 5000 INJECTION INTRAVENOUS; SUBCUTANEOUS at 20:17

## 2019-08-16 RX ADMIN — POTASSIUM CHLORIDE 20 MEQ: 20 TABLET, EXTENDED RELEASE ORAL at 08:40

## 2019-08-16 RX ADMIN — IPRATROPIUM BROMIDE AND ALBUTEROL SULFATE 1 AMPULE: .5; 3 SOLUTION RESPIRATORY (INHALATION) at 08:29

## 2019-08-16 RX ADMIN — Medication 10 ML: at 01:07

## 2019-08-16 RX ADMIN — FUROSEMIDE 40 MG: 10 INJECTION, SOLUTION INTRAMUSCULAR; INTRAVENOUS at 08:40

## 2019-08-16 RX ADMIN — POTASSIUM CHLORIDE 40 MEQ: 20 TABLET, EXTENDED RELEASE ORAL at 12:54

## 2019-08-16 RX ADMIN — Medication 250 MG: at 08:42

## 2019-08-16 RX ADMIN — TAMSULOSIN HYDROCHLORIDE 0.4 MG: 0.4 CAPSULE ORAL at 08:40

## 2019-08-16 RX ADMIN — Medication 250 MG: at 12:55

## 2019-08-16 RX ADMIN — METOLAZONE 5 MG: 2.5 TABLET ORAL at 12:54

## 2019-08-16 RX ADMIN — FOLIC ACID 1000 MCG: 1 TABLET ORAL at 08:39

## 2019-08-16 RX ADMIN — FUROSEMIDE 40 MG: 10 INJECTION, SOLUTION INTRAMUSCULAR; INTRAVENOUS at 18:19

## 2019-08-16 RX ADMIN — Medication 250 MG: at 18:19

## 2019-08-16 RX ADMIN — LEVOTHYROXINE SODIUM 125 MCG: 100 TABLET ORAL at 08:46

## 2019-08-16 RX ADMIN — Medication 250 MG: at 01:30

## 2019-08-16 RX ADMIN — PREDNISONE 60 MG: 20 TABLET ORAL at 08:39

## 2019-08-16 RX ADMIN — Medication 10 ML: at 08:41

## 2019-08-16 RX ADMIN — PANTOPRAZOLE SODIUM 40 MG: 40 INJECTION, POWDER, FOR SOLUTION INTRAVENOUS at 08:39

## 2019-08-16 RX ADMIN — IPRATROPIUM BROMIDE AND ALBUTEROL SULFATE 1 AMPULE: .5; 3 SOLUTION RESPIRATORY (INHALATION) at 04:46

## 2019-08-16 RX ADMIN — HYDROCORTISONE SODIUM SUCCINATE 50 MG: 100 INJECTION, POWDER, FOR SOLUTION INTRAMUSCULAR; INTRAVENOUS at 01:13

## 2019-08-16 RX ADMIN — MAGNESIUM SULFATE HEPTAHYDRATE 2 G: 40 INJECTION, SOLUTION INTRAVENOUS at 15:50

## 2019-08-16 RX ADMIN — Medication 250 MG: at 08:40

## 2019-08-16 RX ADMIN — SPIRONOLACTONE 25 MG: 25 TABLET ORAL at 08:40

## 2019-08-16 RX ADMIN — IPRATROPIUM BROMIDE AND ALBUTEROL SULFATE 1 AMPULE: .5; 3 SOLUTION RESPIRATORY (INHALATION) at 16:37

## 2019-08-16 ASSESSMENT — PAIN SCALES - GENERAL
PAINLEVEL_OUTOF10: 0

## 2019-08-16 NOTE — PROGRESS NOTES
exhibits no edema. Lymphadenopathy:     He has no cervical adenopathy. Neurological: He is alert. No cranial nerve deficit. CN 2-12 grossly intact   Skin: Skin is warm and dry. No rash noted. He is not diaphoretic. Data Reviewed:   LABS:  CBC:  Recent Labs     08/14/19  0505 08/15/19  0515 08/16/19  0440 08/16/19  1300   WBC 11.2* 13.6* 10.3  --    HGB 7.9* 8.5* 6.9* 8.6*   HCT 23.1* 25.9* 20.4* 25.6*   .6* 102.1* 102.9*  --     346 283  --      BMP:  Recent Labs     08/14/19  0505 08/15/19  0515 08/16/19  0440    140 147*   K 3.7 3.4* 3.1*    104 116*   CO2 26 26 22   PHOS 4.2 4.3 3.6   BUN 23* 26* 24*   CREATININE 1.7* 1.7* 1.3     LIVER PROFILE:   Recent Labs     08/14/19  0505 08/15/19  0515 08/16/19  0440   AST 37 34 33   ALT 16 17 16   BILITOT 0.7 0.7 0.5   ALKPHOS 248* 241* 175*     PT/INR:No results for input(s): PROTIME, INR in the last 72 hours. APTT: No results for input(s): APTT in the last 72 hours. UA:  No results for input(s): NITRITE, COLORU, PHUR, LABCAST, WBCUA, RBCUA, MUCUS, TRICHOMONAS, YEAST, BACTERIA, CLARITYU, SPECGRAV, LEUKOCYTESUR, UROBILINOGEN, BILIRUBINUR, BLOODU, GLUCOSEU, AMORPHOUS in the last 72 hours. Invalid input(s): KETONESU  No results for input(s): PHART, YHZ2QDJ, PO2ART in the last 72 hours. Vent Information  FiO2 : 50 %    CXR personally reviewed, bilateral pleural effusions and edema           Assessment:     1. Acute resp failure, hypoxic              -acute pulm edema  2. MCKENNA  3. Sepsis, unclear source  4. Hypothyroidism, severe  5. Cdiff acute colitis infection  6.  Anemia    Plan:      -Continue diuresis per nephro, was escalated today  -Pleural fluid appears transudative consistent with volume overload  -atb per primary service, no atb needed from a pulm standpoint  -Wean FIO2 as tolerated    Sheryl Patel MD

## 2019-08-16 NOTE — PROGRESS NOTES
Hospitalist Progress Note      PCP: Arpan Andrade MD    Date of Admission: 8/11/2019    Chief Complaint: confusion, shortness of breath     Hospital Course: admitted with suspicion for congestive heart failure and pneumonia. Acute renal failure noted. Patient was noted to have very elevated TSH. Started on IV Synthroid and IV steroids and transferred to ICU because of myxedema. Never required pressors  Mental status on blood pressure improved with IV steroids and IV levothyroxine. C. difficile tested positive and confirmation tests. Patient started on oral vancomycin, to which seems to be responding fine. Breathing better after thoracentesis    Subjective: weak, denies pain, no nausea or vomiting. Still has liquid stool from rectal tube. Shortness of breath is better.       Medications:  Reviewed    Infusion Medications     Scheduled Medications    predniSONE  60 mg Oral Daily    [START ON 8/17/2019] spironolactone  50 mg Oral Daily    [START ON 8/17/2019] potassium chloride  40 mEq Oral Daily    levothyroxine  125 mcg Oral Daily    vancomycin  250 mg Oral 4 times per day    saccharomyces boulardii  250 mg Oral BID    heparin (porcine)  5,000 Units Subcutaneous 3 times per day    furosemide  40 mg Intravenous BID    pantoprazole  40 mg Intravenous Daily    folic acid  3,092 mcg Oral Daily    ipratropium-albuterol  1 ampule Inhalation Q4H WA    tamsulosin  0.4 mg Oral Daily    sodium chloride flush  10 mL Intravenous 2 times per day     PRN Meds: perflutren lipid microspheres, sodium chloride flush, acetaminophen      Intake/Output Summary (Last 24 hours) at 8/16/2019 1852  Last data filed at 8/16/2019 1818  Gross per 24 hour   Intake 620 ml   Output 975 ml   Net -355 ml       Physical Exam Performed:    BP (!) 148/87   Pulse 101   Temp 97.6 °F (36.4 °C) (Oral)   Resp 20   Ht 6' 1\" (1.854 m)   Wt 283 lb 11.7 oz (128.7 kg)   SpO2 96%   BMI 37.43 kg/m²     General appearance: No ([facility] [N17.9]    Acute renal failure (ARF) (H[facility]) [N17.9]    Benign essential HTN [I10]    Hyperlipidemia [E78.5]     PLAN:    Myxedema coma  Resolved, was present on arrival  On oral Synthroid and steroid now  No need for antibiotics. Will stop    Anemia, likely acute blood loss, unknown source  Required transfusion on admission  Morning Hgb was low, repeat was better. Repeat in AM. Checking B12 and folate  May require hem/onc consult    Acute renal failure  Nephrology input appreciated. Creatinine improved, close to normal    Hyponatremia  Resolved with medication management and fluid supplementation  Continue to monitor daily basic metabolic panel    Abnormal chest x-ray  No evidence of infection.  Cefepime stopped    C. difficile colitis  Continue oral vancomycin    Discussed with patient, questions answered    D/w nursing      DVT Prophylaxis: Heparin  Diet: DIET LOW SODIUM 2 GM; 1800 ml  Dietary Nutrition Supplements: Frozen Oral Supplement  Code Status: Full Code    PT/OT Eval Status: Requested and in process    Dispo - inpatient, unknown length of stay    Hannah Recinos MD

## 2019-08-16 NOTE — PROGRESS NOTES
Inhalation Q4H WA    tamsulosin  0.4 mg Oral Daily    sodium chloride flush  10 mL Intravenous 2 times per day    cefepime  1 g Intravenous Q12H       Labs:     Recent Labs     08/14/19  0505 08/15/19  0515 08/16/19  0440   WBC 11.2* 13.6* 10.3   HGB 7.9* 8.5* 6.9*   HCT 23.1* 25.9* 20.4*    346 283          Recent Labs     08/14/19  0505 08/15/19  0515 08/16/19  0440    140 147*   K 3.7 3.4* 3.1*    104 116*   CO2 26 26 22   BUN 23* 26* 24*   CREATININE 1.7* 1.7* 1.3   GLUCOSE 150* 140* 98   MG  --  1.90 1.50*   PHOS 4.2 4.3 3.6

## 2019-08-17 ENCOUNTER — APPOINTMENT (OUTPATIENT)
Dept: GENERAL RADIOLOGY | Age: 74
DRG: 080 | End: 2019-08-17
Payer: MEDICARE

## 2019-08-17 LAB
A/G RATIO: 0.8 (ref 1.1–2.2)
A/G RATIO: 0.9 (ref 1.1–2.2)
ALBUMIN SERPL-MCNC: 2.6 G/DL (ref 3.4–5)
ALBUMIN SERPL-MCNC: 2.6 G/DL (ref 3.4–5)
ALP BLD-CCNC: 245 U/L (ref 40–129)
ALP BLD-CCNC: 274 U/L (ref 40–129)
ALT SERPL-CCNC: 24 U/L (ref 10–40)
ALT SERPL-CCNC: 33 U/L (ref 10–40)
ANION GAP SERPL CALCULATED.3IONS-SCNC: 12 MMOL/L (ref 3–16)
ANION GAP SERPL CALCULATED.3IONS-SCNC: 9 MMOL/L (ref 3–16)
ANISOCYTOSIS: ABNORMAL
AST SERPL-CCNC: 52 U/L (ref 15–37)
AST SERPL-CCNC: 83 U/L (ref 15–37)
BANDED NEUTROPHILS RELATIVE PERCENT: 3 % (ref 0–7)
BASE EXCESS VENOUS: -1.3 MMOL/L (ref -3–3)
BASOPHILIC STIPPLING: ABNORMAL
BASOPHILS ABSOLUTE: 0 K/UL (ref 0–0.2)
BASOPHILS RELATIVE PERCENT: 0 %
BILIRUB SERPL-MCNC: 0.6 MG/DL (ref 0–1)
BILIRUB SERPL-MCNC: 0.7 MG/DL (ref 0–1)
BUN BLDV-MCNC: 31 MG/DL (ref 7–20)
BUN BLDV-MCNC: 31 MG/DL (ref 7–20)
BURR CELLS: ABNORMAL
CALCIUM SERPL-MCNC: 8.1 MG/DL (ref 8.3–10.6)
CALCIUM SERPL-MCNC: 8.3 MG/DL (ref 8.3–10.6)
CARBOXYHEMOGLOBIN: 2.4 % (ref 0–1.5)
CHLORIDE BLD-SCNC: 104 MMOL/L (ref 99–110)
CHLORIDE BLD-SCNC: 109 MMOL/L (ref 99–110)
CO2: 26 MMOL/L (ref 21–32)
CO2: 28 MMOL/L (ref 21–32)
CREAT SERPL-MCNC: 1.5 MG/DL (ref 0.8–1.3)
CREAT SERPL-MCNC: 1.7 MG/DL (ref 0.8–1.3)
EOSINOPHILS ABSOLUTE: 0 K/UL (ref 0–0.6)
EOSINOPHILS RELATIVE PERCENT: 0 %
GFR AFRICAN AMERICAN: 48
GFR AFRICAN AMERICAN: 55
GFR NON-AFRICAN AMERICAN: 40
GFR NON-AFRICAN AMERICAN: 46
GLOBULIN: 3 G/DL
GLOBULIN: 3.1 G/DL
GLUCOSE BLD-MCNC: 208 MG/DL (ref 70–99)
GLUCOSE BLD-MCNC: 92 MG/DL (ref 70–99)
HCO3 VENOUS: 23.8 MMOL/L (ref 23–29)
HCT VFR BLD CALC: 25.5 % (ref 40.5–52.5)
HEMOGLOBIN: 8.3 G/DL (ref 13.5–17.5)
LYMPHOCYTES ABSOLUTE: 1.6 K/UL (ref 1–5.1)
LYMPHOCYTES RELATIVE PERCENT: 12 %
MCH RBC QN AUTO: 33.2 PG (ref 26–34)
MCHC RBC AUTO-ENTMCNC: 32.4 G/DL (ref 31–36)
MCV RBC AUTO: 102.5 FL (ref 80–100)
METHEMOGLOBIN VENOUS: 0.6 %
MONOCYTES ABSOLUTE: 0.5 K/UL (ref 0–1.3)
MONOCYTES RELATIVE PERCENT: 4 %
MYELOCYTE PERCENT: 2 %
NEUTROPHILS ABSOLUTE: 11 K/UL (ref 1.7–7.7)
NEUTROPHILS RELATIVE PERCENT: 79 %
O2 CONTENT, VEN: 11 VOL %
O2 SAT, VEN: 82 %
O2 THERAPY: ABNORMAL
OCCULT BLOOD SCREENING: ABNORMAL
PCO2, VEN: 41.5 MMHG (ref 40–50)
PDW BLD-RTO: 16.9 % (ref 12.4–15.4)
PH VENOUS: 7.38 (ref 7.35–7.45)
PHOSPHORUS: 4 MG/DL (ref 2.5–4.9)
PLATELET # BLD: 341 K/UL (ref 135–450)
PLATELET SLIDE REVIEW: ADEQUATE
PMV BLD AUTO: 8.2 FL (ref 5–10.5)
PO2, VEN: 48.7 MMHG (ref 25–40)
POTASSIUM REFLEX MAGNESIUM: 3.8 MMOL/L (ref 3.5–5.1)
POTASSIUM REFLEX MAGNESIUM: 4.1 MMOL/L (ref 3.5–5.1)
RBC # BLD: 2.49 M/UL (ref 4.2–5.9)
SLIDE REVIEW: ABNORMAL
SODIUM BLD-SCNC: 142 MMOL/L (ref 136–145)
SODIUM BLD-SCNC: 146 MMOL/L (ref 136–145)
STOMATOCYTES: ABNORMAL
TARGET CELLS: ABNORMAL
TCO2 CALC VENOUS: 25 MMOL/L
TOTAL PROTEIN: 5.6 G/DL (ref 6.4–8.2)
TOTAL PROTEIN: 5.7 G/DL (ref 6.4–8.2)
TOXIC GRANULATION: PRESENT
WBC # BLD: 13.1 K/UL (ref 4–11)

## 2019-08-17 PROCEDURE — 6370000000 HC RX 637 (ALT 250 FOR IP): Performed by: INTERNAL MEDICINE

## 2019-08-17 PROCEDURE — C9113 INJ PANTOPRAZOLE SODIUM, VIA: HCPCS | Performed by: INTERNAL MEDICINE

## 2019-08-17 PROCEDURE — 80053 COMPREHEN METABOLIC PANEL: CPT

## 2019-08-17 PROCEDURE — 99233 SBSQ HOSP IP/OBS HIGH 50: CPT | Performed by: INTERNAL MEDICINE

## 2019-08-17 PROCEDURE — 36592 COLLECT BLOOD FROM PICC: CPT

## 2019-08-17 PROCEDURE — 6360000002 HC RX W HCPCS: Performed by: INTERNAL MEDICINE

## 2019-08-17 PROCEDURE — 94640 AIRWAY INHALATION TREATMENT: CPT

## 2019-08-17 PROCEDURE — 71045 X-RAY EXAM CHEST 1 VIEW: CPT

## 2019-08-17 PROCEDURE — 2700000000 HC OXYGEN THERAPY PER DAY

## 2019-08-17 PROCEDURE — 82803 BLOOD GASES ANY COMBINATION: CPT

## 2019-08-17 PROCEDURE — 85025 COMPLETE CBC W/AUTO DIFF WBC: CPT

## 2019-08-17 PROCEDURE — 94761 N-INVAS EAR/PLS OXIMETRY MLT: CPT

## 2019-08-17 PROCEDURE — G0328 FECAL BLOOD SCRN IMMUNOASSAY: HCPCS

## 2019-08-17 PROCEDURE — 84100 ASSAY OF PHOSPHORUS: CPT

## 2019-08-17 PROCEDURE — 2580000003 HC RX 258: Performed by: INTERNAL MEDICINE

## 2019-08-17 PROCEDURE — 94660 CPAP INITIATION&MGMT: CPT

## 2019-08-17 PROCEDURE — 2060000000 HC ICU INTERMEDIATE R&B

## 2019-08-17 PROCEDURE — 94669 MECHANICAL CHEST WALL OSCILL: CPT

## 2019-08-17 RX ORDER — FUROSEMIDE 10 MG/ML
40 INJECTION INTRAMUSCULAR; INTRAVENOUS 4 TIMES DAILY
Status: DISCONTINUED | OUTPATIENT
Start: 2019-08-17 | End: 2019-08-20

## 2019-08-17 RX ORDER — DEXAMETHASONE 4 MG/1
4 TABLET ORAL EVERY 12 HOURS SCHEDULED
Status: DISCONTINUED | OUTPATIENT
Start: 2019-08-17 | End: 2019-08-21

## 2019-08-17 RX ORDER — METOLAZONE 2.5 MG/1
5 TABLET ORAL ONCE
Status: COMPLETED | OUTPATIENT
Start: 2019-08-17 | End: 2019-08-17

## 2019-08-17 RX ADMIN — Medication 250 MG: at 06:33

## 2019-08-17 RX ADMIN — HEPARIN SODIUM 5000 UNITS: 5000 INJECTION INTRAVENOUS; SUBCUTANEOUS at 14:06

## 2019-08-17 RX ADMIN — HEPARIN SODIUM 5000 UNITS: 5000 INJECTION INTRAVENOUS; SUBCUTANEOUS at 06:33

## 2019-08-17 RX ADMIN — DEXAMETHASONE 4 MG: 4 TABLET ORAL at 20:10

## 2019-08-17 RX ADMIN — FOLIC ACID 1000 MCG: 1 TABLET ORAL at 09:54

## 2019-08-17 RX ADMIN — IPRATROPIUM BROMIDE AND ALBUTEROL SULFATE 1 AMPULE: .5; 3 SOLUTION RESPIRATORY (INHALATION) at 20:24

## 2019-08-17 RX ADMIN — SPIRONOLACTONE 50 MG: 25 TABLET ORAL at 09:52

## 2019-08-17 RX ADMIN — LEVOTHYROXINE SODIUM 125 MCG: 100 TABLET ORAL at 06:33

## 2019-08-17 RX ADMIN — TAMSULOSIN HYDROCHLORIDE 0.4 MG: 0.4 CAPSULE ORAL at 09:52

## 2019-08-17 RX ADMIN — PANTOPRAZOLE SODIUM 40 MG: 40 INJECTION, POWDER, FOR SOLUTION INTRAVENOUS at 10:08

## 2019-08-17 RX ADMIN — DEXAMETHASONE 4 MG: 4 TABLET ORAL at 14:06

## 2019-08-17 RX ADMIN — FUROSEMIDE 40 MG: 10 INJECTION, SOLUTION INTRAMUSCULAR; INTRAVENOUS at 20:10

## 2019-08-17 RX ADMIN — Medication 250 MG: at 20:10

## 2019-08-17 RX ADMIN — Medication 250 MG: at 16:58

## 2019-08-17 RX ADMIN — FUROSEMIDE 40 MG: 10 INJECTION, SOLUTION INTRAMUSCULAR; INTRAVENOUS at 10:08

## 2019-08-17 RX ADMIN — FUROSEMIDE 40 MG: 10 INJECTION, SOLUTION INTRAMUSCULAR; INTRAVENOUS at 16:54

## 2019-08-17 RX ADMIN — IPRATROPIUM BROMIDE AND ALBUTEROL SULFATE 1 AMPULE: .5; 3 SOLUTION RESPIRATORY (INHALATION) at 08:52

## 2019-08-17 RX ADMIN — Medication 250 MG: at 12:17

## 2019-08-17 RX ADMIN — PREDNISONE 60 MG: 20 TABLET ORAL at 09:52

## 2019-08-17 RX ADMIN — HEPARIN SODIUM 5000 UNITS: 5000 INJECTION INTRAVENOUS; SUBCUTANEOUS at 21:39

## 2019-08-17 RX ADMIN — POTASSIUM CHLORIDE 40 MEQ: 20 TABLET, EXTENDED RELEASE ORAL at 09:52

## 2019-08-17 RX ADMIN — METOLAZONE 5 MG: 2.5 TABLET ORAL at 16:54

## 2019-08-17 RX ADMIN — Medication 250 MG: at 09:52

## 2019-08-17 RX ADMIN — Medication 10 ML: at 20:10

## 2019-08-17 RX ADMIN — Medication 10 ML: at 10:09

## 2019-08-17 RX ADMIN — IPRATROPIUM BROMIDE AND ALBUTEROL SULFATE 1 AMPULE: .5; 3 SOLUTION RESPIRATORY (INHALATION) at 12:29

## 2019-08-17 NOTE — PROGRESS NOTES
Hospitalist Progress Note      PCP: Beckie Forrester MD    Date of Admission: 8/11/2019    Chief Complaint: confusion, shortness of breath     Hospital Course: admitted with suspicion for congestive heart failure and pneumonia. Acute renal failure noted. Patient was noted to have very elevated TSH. Started on IV Synthroid and IV steroids and transferred to ICU because of myxedema. Never required pressors  Mental status on blood pressure improved with IV steroids and IV levothyroxine. C. difficile tested positive and confirmation tests. Patient started on oral vancomycin, to which seems to be responding fine. Breathing better after thoracentesis  Now on IV diuretics for suspicion for fluid overload  Nephrology and pulmonology following    Subjective: weak, denies pain, no nausea or vomiting. Still has liquid stool from rectal tube. Shortness of breath is better.       Medications:  Reviewed    Infusion Medications     Scheduled Medications    furosemide  40 mg Intravenous 4x Daily    dexamethasone  4 mg Oral 2 times per day    spironolactone  50 mg Oral Daily    potassium chloride  40 mEq Oral Daily    levothyroxine  125 mcg Oral Daily    vancomycin  250 mg Oral 4 times per day    saccharomyces boulardii  250 mg Oral BID    heparin (porcine)  5,000 Units Subcutaneous 3 times per day    pantoprazole  40 mg Intravenous Daily    folic acid  7,813 mcg Oral Daily    ipratropium-albuterol  1 ampule Inhalation Q4H WA    tamsulosin  0.4 mg Oral Daily    sodium chloride flush  10 mL Intravenous 2 times per day     PRN Meds: perflutren lipid microspheres, sodium chloride flush, acetaminophen      Intake/Output Summary (Last 24 hours) at 8/17/2019 1132  Last data filed at 8/17/2019 0356  Gross per 24 hour   Intake 100 ml   Output 1300 ml   Net -1200 ml       Physical Exam Performed:    BP (!) 150/80   Pulse 104   Temp 98.6 °F (37 °C) (Oral)   Resp 22   Ht 6' 1\" (1.854 m)   Wt 287 lb 7.7 oz (130.4 kg)   SpO2 92%   BMI 37.93 kg/m²     General appearance: No apparent distress, appears stated age. Pleasant and cooperative  HEENT: Pupils equal, round, and reactive to light. Conjunctivae/corneas clear. Neck: Supple, with full range of motion. No jugular venous distention. Trachea midline. Respiratory:  Weak effort, few rhonchi and bibasilar crackles, no wheezes  Cardiovascular: Regular rate and rhythm with normal S1/S2 without murmurs, rubs or gallops. Abdomen: Soft, non-tender, non-distended with normal bowel sounds. Musculoskeletal: No clubbing or cyanosis. 2+ pitting edema bilaterally. Full range of motion without deformity. Skin: Skin color, texture, turgor normal.  No rashes or lesions. Neurologic:  Neurovascularly intact without any focal sensory/motor deficits. Cranial nerves: II-XII intact, grossly non-focal.  Psychiatric: Alert, back to baseline alertness, able to engage in full conversation  Capillary Refill: Brisk,< 3 seconds   Peripheral Pulses: +2 palpable, equal bilaterally     I examined the patient today (08/17/19). Physical exam is similar to yesterday (8/15)    Labs:   Recent Labs     08/15/19  0515 08/16/19  0440 08/16/19  1300 08/17/19  0630   WBC 13.6* 10.3  --  13.1*   HGB 8.5* 6.9* 8.6* 8.3*   HCT 25.9* 20.4* 25.6* 25.5*    283  --  341     Recent Labs     08/15/19  0515 08/16/19  0440 08/17/19  0630    147* 146*   K 3.4* 3.1* 3.8    116* 109   CO2 26 22 28   BUN 26* 24* 31*   CREATININE 1.7* 1.3 1.7*   CALCIUM 8.3 6.5* 8.3   PHOS 4.3 3.6 4.0     Recent Labs     08/15/19  0515 08/16/19  0440 08/17/19  0630   AST 34 33 52*   ALT 17 16 24   BILITOT 0.7 0.5 0.7   ALKPHOS 241* 175* 245*     No results for input(s): INR in the last 72 hours. No results for input(s): [place] in the last 72 hours.     Urinalysis:      Lab Results   Component Value Date    NITRU Negative 08/11/2019    WBCUA 0-2 08/11/2019    BACTERIA 2+ 08/11/2019    RBCUA 3-5 08/11/2019    BLOODU Negative 08/11/2019    SPECGRAV 1.020 08/11/2019    GLUCOSEU Negative 08/11/2019       Radiology:  XR CHEST PORTABLE   Final Result   Worsening of pleural effusions         XR CHEST PORTABLE   Final Result   Bilateral pleural effusions and airspace disease, slightly increased on the   left and decreased on the right as compared to prior. Background pulmonary   edema. XR CHEST PORTABLE   Final Result   1. Study limited, as detailed above. 2. Stable position of a left internal jugular central venous catheter with   tip overlying the brachiocephalic vein. 3. No significant change in appearance of moderate CHF and multifocal   pneumonia. XR CHEST PORTABLE   Final Result   Limited exam.      Persistent CHF with bilateral effusions. XR CHEST PORTABLE   Final Result   A left IJ catheter has its tip at the junction of the innominate vein and   superior vena cava. No pneumothorax. Persistent low lung volumes, pulmonary edema, bilateral pleural effusions and   bibasilar airspace disease. XR CHEST PORTABLE   Final Result   Increased airspace disease on the right. There is persistent   pleuroparenchymal disease bilaterally         CT ABDOMEN PELVIS WO CONTRAST Additional Contrast? None   Final Result   Inflammatory changes surrounding the distal sigmoid colon suggesting a mild   colitis. Consider infectious and inflammatory etiologies. No bowel   obstruction. Moderate bilateral pleural effusions. Associated bibasilar lung opacities   are nonspecific and may represent atelectasis versus pneumonia. XR CHEST PORTABLE   Final Result   Increased large amount of bibasilar airspace disease, with adjacent pleural   effusions, increased on the right over the past 2 weeks, unchanged on the   left.   The basilar airspace disease is due to atelectasis, with suspected   superimposed pulmonary edema         XR CHEST PORTABLE    (Results Pending) Assessment/Plan:    Active Hospital Problems    Diagnosis    Hypotension [I95.9]    Myxedema coma ([facility] [E03.5]    Acute kidney failure, unspecified ([facility] [N17.9]    Acute renal failure (ARF) ([facility] [N17.9]    Benign essential HTN [I10]    Hyperlipidemia [E78.5]     PLAN:    Myxedema coma  Resolved, was present on arrival  On oral Synthroid and steroids now  Off antibiotics as not indicated any more  Patient states he thought he was taking his thyroid supplements. Anemia   Required transfusion on admission  Hemoglobin not low enough to require transfusion again. We will check stool guaiac  Hematology also consulted. Not consistent with blood loss or renal disease anemia. Macrocytic, but B12 and folate are also normal.    Acute renal failure  Nephrology input appreciated. Creatinine stabilized near baseline.     Hyponatremia  Resolved with medication management and fluid supplementation  Continue to monitor daily basic metabolic panel    C. difficile colitis  Continue oral vancomycin    Discussed with patient, questions answered    D/w nursing      DVT Prophylaxis: Heparin  Diet: DIET LOW SODIUM 2 GM; 1800 ml  Dietary Nutrition Supplements: Frozen Oral Supplement  Code Status: Full Code    PT/OT Eval Status: Requested and in process    Dispo - inpatient, unknown length of stay    Krysta Gil MD

## 2019-08-17 NOTE — PROGRESS NOTES
08/15/19  0515 08/16/19  0440 08/16/19  1300 08/17/19  0630   WBC 13.6* 10.3  --  13.1*   HGB 8.5* 6.9* 8.6* 8.3*   HCT 25.9* 20.4* 25.6* 25.5*    283  --  341          Recent Labs     08/15/19  0515 08/16/19  0440 08/17/19  0630    147* 146*   K 3.4* 3.1* 3.8    116* 109   CO2 26 22 28   BUN 26* 24* 31*   CREATININE 1.7* 1.3 1.7*   GLUCOSE 140* 98 92   MG 1.90 1.50*  --    PHOS 4.3 3.6 4.0

## 2019-08-17 NOTE — PROGRESS NOTES
alert. No cranial nerve deficit. CN 2-12 grossly intact   Skin: Skin is warm and dry. No rash noted. He is not diaphoretic. Data Reviewed:   LABS:  CBC:  Recent Labs     08/15/19  0515 08/16/19  0440 08/16/19  1300 08/17/19  0630   WBC 13.6* 10.3  --  13.1*   HGB 8.5* 6.9* 8.6* 8.3*   HCT 25.9* 20.4* 25.6* 25.5*   .1* 102.9*  --  102.5*    283  --  341     BMP:  Recent Labs     08/15/19  0515 08/16/19  0440 08/17/19  0630    147* 146*   K 3.4* 3.1* 3.8    116* 109   CO2 26 22 28   PHOS 4.3 3.6 4.0   BUN 26* 24* 31*   CREATININE 1.7* 1.3 1.7*     LIVER PROFILE:   Recent Labs     08/15/19  0515 08/16/19  0440 08/17/19  0630   AST 34 33 52*   ALT 17 16 24   BILITOT 0.7 0.5 0.7   ALKPHOS 241* 175* 245*     PT/INR:No results for input(s): PROTIME, INR in the last 72 hours. APTT: No results for input(s): APTT in the last 72 hours. UA:No results for input(s): NITRITE, COLORU, PHUR, LABCAST, WBCUA, RBCUA, MUCUS, TRICHOMONAS, YEAST, BACTERIA, CLARITYU, SPECGRAV, LEUKOCYTESUR, UROBILINOGEN, BILIRUBINUR, BLOODU, GLUCOSEU, AMORPHOUS in the last 72 hours. Invalid input(s): KETONESU  No results for input(s): PHART, MAR2MLN, PO2ART in the last 72 hours. Vent Information  FiO2 : 50 %    CXR personally reviewed, pulm edema and bilateral effusions          Assessment:     1. Acute resp failure, hypoxic              -acute pulm edema  2. MCKENNA  3. Sepsis, unclear source  4. Hypothyroidism, severe  5. Cdiff acute colitis infection  6. Anemia    Plan:      -Nephro was adjusting diuresis, does not seem to be responding. I took the liberty of increasing to q6 lasix and q12 labs.  Likely needs a gtt, will repeat VBG to monitor for contraction  -Wean FIO2 as tolerated  -BiPAP qhs  -Recommend changing prednisone to mineralicorticoid to minimize adverse effects; defer to IM  -Cdiff treatment per IM, can likely stop after one week  -CXR in AM      Nelda Kimble MD

## 2019-08-18 ENCOUNTER — APPOINTMENT (OUTPATIENT)
Dept: GENERAL RADIOLOGY | Age: 74
DRG: 080 | End: 2019-08-18
Payer: MEDICARE

## 2019-08-18 LAB
A/G RATIO: 0.9 (ref 1.1–2.2)
A/G RATIO: 0.9 (ref 1.1–2.2)
ALBUMIN SERPL-MCNC: 2.5 G/DL (ref 3.4–5)
ALBUMIN SERPL-MCNC: 2.7 G/DL (ref 3.4–5)
ALP BLD-CCNC: 249 U/L (ref 40–129)
ALP BLD-CCNC: 251 U/L (ref 40–129)
ALT SERPL-CCNC: 30 U/L (ref 10–40)
ALT SERPL-CCNC: 31 U/L (ref 10–40)
ANION GAP SERPL CALCULATED.3IONS-SCNC: 10 MMOL/L (ref 3–16)
ANION GAP SERPL CALCULATED.3IONS-SCNC: 9 MMOL/L (ref 3–16)
ANISOCYTOSIS: ABNORMAL
AST SERPL-CCNC: 61 U/L (ref 15–37)
AST SERPL-CCNC: 63 U/L (ref 15–37)
BANDED NEUTROPHILS RELATIVE PERCENT: 1 % (ref 0–7)
BASOPHILIC STIPPLING: ABNORMAL
BASOPHILS ABSOLUTE: 0 K/UL (ref 0–0.2)
BASOPHILS RELATIVE PERCENT: 0 %
BILIRUB SERPL-MCNC: 0.6 MG/DL (ref 0–1)
BILIRUB SERPL-MCNC: 0.6 MG/DL (ref 0–1)
BUN BLDV-MCNC: 30 MG/DL (ref 7–20)
BUN BLDV-MCNC: 31 MG/DL (ref 7–20)
CALCIUM SERPL-MCNC: 8.1 MG/DL (ref 8.3–10.6)
CALCIUM SERPL-MCNC: 8.2 MG/DL (ref 8.3–10.6)
CHLORIDE BLD-SCNC: 101 MMOL/L (ref 99–110)
CHLORIDE BLD-SCNC: 101 MMOL/L (ref 99–110)
CO2: 29 MMOL/L (ref 21–32)
CO2: 29 MMOL/L (ref 21–32)
CREAT SERPL-MCNC: 1.5 MG/DL (ref 0.8–1.3)
CREAT SERPL-MCNC: 1.5 MG/DL (ref 0.8–1.3)
DAT C3: NORMAL
DAT IGG: NORMAL
DAT POLYSPECIFIC: NORMAL
EOSINOPHILS ABSOLUTE: 0 K/UL (ref 0–0.6)
EOSINOPHILS RELATIVE PERCENT: 0 %
GFR AFRICAN AMERICAN: 55
GFR AFRICAN AMERICAN: 55
GFR NON-AFRICAN AMERICAN: 46
GFR NON-AFRICAN AMERICAN: 46
GLOBULIN: 2.9 G/DL
GLOBULIN: 2.9 G/DL
GLUCOSE BLD-MCNC: 106 MG/DL (ref 70–99)
GLUCOSE BLD-MCNC: 127 MG/DL (ref 70–99)
HCT VFR BLD CALC: 24.8 % (ref 40.5–52.5)
HCT VFR BLD CALC: 26.5 % (ref 40.5–52.5)
HEMOGLOBIN: 8.3 G/DL (ref 13.5–17.5)
HYPOCHROMIA: ABNORMAL
IMMATURE RETIC FRACT: 0.43 (ref 0.21–0.37)
LACTATE DEHYDROGENASE: 173 U/L (ref 100–190)
LYMPHOCYTES ABSOLUTE: 0.7 K/UL (ref 1–5.1)
LYMPHOCYTES RELATIVE PERCENT: 5 %
MCH RBC QN AUTO: 34 PG (ref 26–34)
MCHC RBC AUTO-ENTMCNC: 33.5 G/DL (ref 31–36)
MCV RBC AUTO: 101.6 FL (ref 80–100)
MONOCYTES ABSOLUTE: 0.7 K/UL (ref 0–1.3)
MONOCYTES RELATIVE PERCENT: 5 %
NEUTROPHILS ABSOLUTE: 13.1 K/UL (ref 1.7–7.7)
NEUTROPHILS RELATIVE PERCENT: 89 %
PDW BLD-RTO: 16.7 % (ref 12.4–15.4)
PHOSPHORUS: 3.3 MG/DL (ref 2.5–4.9)
PLATELET # BLD: 330 K/UL (ref 135–450)
PMV BLD AUTO: 8.2 FL (ref 5–10.5)
POTASSIUM REFLEX MAGNESIUM: 4 MMOL/L (ref 3.5–5.1)
POTASSIUM REFLEX MAGNESIUM: 4.2 MMOL/L (ref 3.5–5.1)
RBC # BLD: 2.44 M/UL (ref 4.2–5.9)
RETICULOCYTE ABSOLUTE COUNT: 0.06 M/UL
RETICULOCYTE COUNT PCT: 2.32 % (ref 0.5–2.18)
SODIUM BLD-SCNC: 139 MMOL/L (ref 136–145)
SODIUM BLD-SCNC: 140 MMOL/L (ref 136–145)
STOMATOCYTES: ABNORMAL
TARGET CELLS: ABNORMAL
TOTAL PROTEIN: 5.4 G/DL (ref 6.4–8.2)
TOTAL PROTEIN: 5.6 G/DL (ref 6.4–8.2)
WBC # BLD: 14.6 K/UL (ref 4–11)

## 2019-08-18 PROCEDURE — 2060000000 HC ICU INTERMEDIATE R&B

## 2019-08-18 PROCEDURE — 85045 AUTOMATED RETICULOCYTE COUNT: CPT

## 2019-08-18 PROCEDURE — 81256 HFE GENE: CPT

## 2019-08-18 PROCEDURE — 6360000002 HC RX W HCPCS: Performed by: INTERNAL MEDICINE

## 2019-08-18 PROCEDURE — 94669 MECHANICAL CHEST WALL OSCILL: CPT

## 2019-08-18 PROCEDURE — 86880 COOMBS TEST DIRECT: CPT

## 2019-08-18 PROCEDURE — 83615 LACTATE (LD) (LDH) ENZYME: CPT

## 2019-08-18 PROCEDURE — 94761 N-INVAS EAR/PLS OXIMETRY MLT: CPT

## 2019-08-18 PROCEDURE — 2700000000 HC OXYGEN THERAPY PER DAY

## 2019-08-18 PROCEDURE — 6370000000 HC RX 637 (ALT 250 FOR IP): Performed by: INTERNAL MEDICINE

## 2019-08-18 PROCEDURE — 2580000003 HC RX 258: Performed by: INTERNAL MEDICINE

## 2019-08-18 PROCEDURE — 36592 COLLECT BLOOD FROM PICC: CPT

## 2019-08-18 PROCEDURE — 83010 ASSAY OF HAPTOGLOBIN QUANT: CPT

## 2019-08-18 PROCEDURE — 99233 SBSQ HOSP IP/OBS HIGH 50: CPT | Performed by: INTERNAL MEDICINE

## 2019-08-18 PROCEDURE — 94640 AIRWAY INHALATION TREATMENT: CPT

## 2019-08-18 PROCEDURE — 94660 CPAP INITIATION&MGMT: CPT

## 2019-08-18 PROCEDURE — 85025 COMPLETE CBC W/AUTO DIFF WBC: CPT

## 2019-08-18 PROCEDURE — C9113 INJ PANTOPRAZOLE SODIUM, VIA: HCPCS | Performed by: INTERNAL MEDICINE

## 2019-08-18 PROCEDURE — 71045 X-RAY EXAM CHEST 1 VIEW: CPT

## 2019-08-18 PROCEDURE — 84100 ASSAY OF PHOSPHORUS: CPT

## 2019-08-18 PROCEDURE — 84238 ASSAY NONENDOCRINE RECEPTOR: CPT

## 2019-08-18 PROCEDURE — 80053 COMPREHEN METABOLIC PANEL: CPT

## 2019-08-18 RX ADMIN — HEPARIN SODIUM 5000 UNITS: 5000 INJECTION INTRAVENOUS; SUBCUTANEOUS at 13:11

## 2019-08-18 RX ADMIN — SPIRONOLACTONE 50 MG: 25 TABLET ORAL at 09:55

## 2019-08-18 RX ADMIN — Medication 250 MG: at 09:55

## 2019-08-18 RX ADMIN — FUROSEMIDE 40 MG: 10 INJECTION, SOLUTION INTRAMUSCULAR; INTRAVENOUS at 21:18

## 2019-08-18 RX ADMIN — PANTOPRAZOLE SODIUM 40 MG: 40 INJECTION, POWDER, FOR SOLUTION INTRAVENOUS at 09:54

## 2019-08-18 RX ADMIN — DEXAMETHASONE 4 MG: 4 TABLET ORAL at 21:19

## 2019-08-18 RX ADMIN — Medication 250 MG: at 16:58

## 2019-08-18 RX ADMIN — IPRATROPIUM BROMIDE AND ALBUTEROL SULFATE 1 AMPULE: .5; 3 SOLUTION RESPIRATORY (INHALATION) at 16:23

## 2019-08-18 RX ADMIN — Medication 250 MG: at 21:18

## 2019-08-18 RX ADMIN — POTASSIUM CHLORIDE 40 MEQ: 20 TABLET, EXTENDED RELEASE ORAL at 09:55

## 2019-08-18 RX ADMIN — DEXAMETHASONE 4 MG: 4 TABLET ORAL at 09:55

## 2019-08-18 RX ADMIN — Medication 250 MG: at 13:11

## 2019-08-18 RX ADMIN — IPRATROPIUM BROMIDE AND ALBUTEROL SULFATE 1 AMPULE: .5; 3 SOLUTION RESPIRATORY (INHALATION) at 08:42

## 2019-08-18 RX ADMIN — Medication 10 ML: at 21:19

## 2019-08-18 RX ADMIN — FOLIC ACID 1000 MCG: 1 TABLET ORAL at 09:55

## 2019-08-18 RX ADMIN — IPRATROPIUM BROMIDE AND ALBUTEROL SULFATE 1 AMPULE: .5; 3 SOLUTION RESPIRATORY (INHALATION) at 12:15

## 2019-08-18 RX ADMIN — IPRATROPIUM BROMIDE AND ALBUTEROL SULFATE 1 AMPULE: .5; 3 SOLUTION RESPIRATORY (INHALATION) at 20:24

## 2019-08-18 RX ADMIN — LEVOTHYROXINE SODIUM 125 MCG: 100 TABLET ORAL at 09:54

## 2019-08-18 RX ADMIN — FUROSEMIDE 40 MG: 10 INJECTION, SOLUTION INTRAMUSCULAR; INTRAVENOUS at 13:11

## 2019-08-18 RX ADMIN — Medication 10 ML: at 09:56

## 2019-08-18 RX ADMIN — Medication 250 MG: at 23:29

## 2019-08-18 RX ADMIN — TAMSULOSIN HYDROCHLORIDE 0.4 MG: 0.4 CAPSULE ORAL at 09:55

## 2019-08-18 RX ADMIN — HEPARIN SODIUM 5000 UNITS: 5000 INJECTION INTRAVENOUS; SUBCUTANEOUS at 21:18

## 2019-08-18 RX ADMIN — Medication 250 MG: at 00:07

## 2019-08-18 RX ADMIN — FUROSEMIDE 40 MG: 10 INJECTION, SOLUTION INTRAMUSCULAR; INTRAVENOUS at 09:54

## 2019-08-18 RX ADMIN — FUROSEMIDE 40 MG: 10 INJECTION, SOLUTION INTRAMUSCULAR; INTRAVENOUS at 16:58

## 2019-08-18 NOTE — PROGRESS NOTES
08/18/19 0500   NIV Type   Equipment Type v60   Mode BIPAP   Mask Type Full face mask   Mask Size Medium   Settings/Measurements   Comfort Level Good   Using Accessory Muscles No   IPAP 12 cmH20   EPAP 6 cmH2O   Rate Ordered 6   Resp 19   SpO2 96   FiO2  50 %   Vt Exhaled 562 mL   Mask Leak (lpm) 67 lpm   Alarm Settings   Alarms On Y   Press Low Alarm 6 cmH2O   High Pressure Alarm 30 cmH2O   Delay Alarm 20 sec(s)   Resp Rate Low Alarm 6   High Respiratory Rate 40 br/min

## 2019-08-18 NOTE — PROGRESS NOTES
2-12 grossly intact   Skin: Skin is warm and dry. No rash noted. He is not diaphoretic. Data Reviewed:   LABS:  CBC:  Recent Labs     08/16/19  0440 08/16/19  1300 08/17/19 0630 08/18/19 0458   WBC 10.3  --  13.1* 14.6*   HGB 6.9* 8.6* 8.3* 8.3*   HCT 20.4* 25.6* 25.5* 24.8*   .9*  --  102.5* 101.6*     --  341 330     BMP:  Recent Labs     08/16/19  0440 08/17/19  0630 08/17/19 2014 08/18/19 0458   * 146* 142 139   K 3.1* 3.8 4.1 4.2   * 109 104 101   CO2 22 28 26 29   PHOS 3.6 4.0  --  3.3   BUN 24* 31* 31* 30*   CREATININE 1.3 1.7* 1.5* 1.5*     LIVER PROFILE:   Recent Labs     08/17/19  0630 08/17/19 2014 08/18/19 0458   AST 52* 83* 61*   ALT 24 33 30   BILITOT 0.7 0.6 0.6   ALKPHOS 245* 274* 249*     PT/INR:No results for input(s): PROTIME, INR in the last 72 hours. APTT: No results for input(s): APTT in the last 72 hours. UA:No results for input(s): NITRITE, COLORU, PHUR, LABCAST, WBCUA, RBCUA, MUCUS, TRICHOMONAS, YEAST, BACTERIA, CLARITYU, SPECGRAV, LEUKOCYTESUR, UROBILINOGEN, BILIRUBINUR, BLOODU, GLUCOSEU, AMORPHOUS in the last 72 hours. Invalid input(s): KETONESU  No results for input(s): PHART, GXZ2NWQ, PO2ART in the last 72 hours. Vent Information  FiO2 : 50 %    CXR personally reviewed, pulm edema and bilateral effusions          Assessment:     1. Acute resp failure, hypoxic              -acute pulm edema  2. MCKENNA  3. Sepsis, unclear source  4. Hypothyroidism, severe  5. Cdiff acute colitis infection  6.  Anemia    Plan:      -Diuresis per nephro  -Will bronch tomorrow to assist with possible mucous plugging, discussed with the patient  -Wean FIO2 as tolerated  -BiPAP qhs  -Recommend changing prednisone to mineralicorticoid to minimize adverse effects; defer to IM  -Cdiff treatment per IM, can likely stop after one week  -CXR in AM      Emely Nunez MD

## 2019-08-18 NOTE — PROGRESS NOTES
input(s): Cecillia Drop in the last 72 hours. Urinalysis:      Lab Results   Component Value Date    NITRU Negative 08/11/2019    WBCUA 0-2 08/11/2019    BACTERIA 2+ 08/11/2019    RBCUA 3-5 08/11/2019    BLOODU Negative 08/11/2019    SPECGRAV 1.020 08/11/2019    GLUCOSEU Negative 08/11/2019       Radiology:  XR CHEST PORTABLE   Final Result   No significant change in effusions. XR CHEST PORTABLE   Final Result   Worsening of pleural effusions         XR CHEST PORTABLE   Final Result   Bilateral pleural effusions and airspace disease, slightly increased on the   left and decreased on the right as compared to prior. Background pulmonary   edema. XR CHEST PORTABLE   Final Result   1. Study limited, as detailed above. 2. Stable position of a left internal jugular central venous catheter with   tip overlying the brachiocephalic vein. 3. No significant change in appearance of moderate CHF and multifocal   pneumonia. XR CHEST PORTABLE   Final Result   Limited exam.      Persistent CHF with bilateral effusions. XR CHEST PORTABLE   Final Result   A left IJ catheter has its tip at the junction of the innominate vein and   superior vena cava. No pneumothorax. Persistent low lung volumes, pulmonary edema, bilateral pleural effusions and   bibasilar airspace disease. XR CHEST PORTABLE   Final Result   Increased airspace disease on the right. There is persistent   pleuroparenchymal disease bilaterally         CT ABDOMEN PELVIS WO CONTRAST Additional Contrast? None   Final Result   Inflammatory changes surrounding the distal sigmoid colon suggesting a mild   colitis. Consider infectious and inflammatory etiologies. No bowel   obstruction. Moderate bilateral pleural effusions. Associated bibasilar lung opacities   are nonspecific and may represent atelectasis versus pneumonia.          XR CHEST PORTABLE   Final Result   Increased large amount of bibasilar airspace

## 2019-08-18 NOTE — PROGRESS NOTES
[facility]    [facility]            [phone]        Interval Plan:     Continue aggressive diuresis  Continue on Lasix 4 times a day  And metolazone as needed and also Aldactone  Goal of urine output 4 L on above regimen  Vitals and electrolytes are stable                Assessment :     Acute Kidney Injury  Creatinine peaked to 3.2-now down to 1.5-which is about his baseline  Has recurrent MCKENNA- baseline  likely will be between 1-1.5  Has had recurrent MCKENNA in 2019-2 peak of 4 on 4/19  Renal USG: R- 11.7 cm, L - 13.23 cm, normal echogenicity  UA: [place], no proteinuria    Echo- 2/19- EF 55-60%, Grade I DD, mild LVH,   3/19- RV function is normal in size and function    Generalized Edema  With peak to to 130 kg -now coming down to 27 kg  Will increase the intensity on 8/17/2019  Thoracentesis on 8/15/2019    Hypertension   BP: (127-148)/(73-77)  Pulse:  [84-90]    BP low at 70s on admission  Had myxedema coma on admission  Now high  On lasix    COPD   PE- 2/2019  Liver disease, alcohol abuse    Anemia-hematology consulted      Please call with questions at-  24 hrs Answering service [phone]   7 am-5 pm at Perfect [employer], or cell phone  Dr Paulino Prieto        / Reason for consult:     MCKENNA    HPI:     From consult note-   Patient is a 69 y.o. male  presented to  the hospital on 8/11/2019 with  Fall off the recliner. He was in the floor for 30 minutes. Upon arrival patient's blood pressure was 93/46 and dropped to 74/44. Associated with high lactic acidosis of 5.8. But no rhabdomyolysis with CPK of only 72. He is known to have COPD with 4 L of oxygen at home  He is known to have recurrent acute kidney injury which happened in March April and May 2019.         Interval History:     Creatinine is stable  Made  3 L urine yesterday and already to 1 L today    Seen with - no family    ROS -   SOB- present, still edematous   Edema-present  Nausea/vomiting- none  Poor

## 2019-08-18 NOTE — PROGRESS NOTES
08/18/19 0045   NIV Type   Equipment Type v60   Mode BIPAP   Mask Type Full face mask   Mask Size Medium   Settings/Measurements   Comfort Level Good   Using Accessory Muscles No   IPAP 12 cmH20   EPAP 6 cmH2O   Rate Ordered 6   Resp 14   SpO2 97   FiO2  50 %   Vt Exhaled 566 mL   Mask Leak (lpm) 57 lpm   Alarm Settings   Alarms On Y   Press Low Alarm 6 cmH2O   High Pressure Alarm 30 cmH2O   Delay Alarm 20 sec(s)   Resp Rate Low Alarm 6   High Respiratory Rate 40 br/min

## 2019-08-18 NOTE — CONSULTS
HEMATOLOGY/ONCOLOGY CONSULTATION:     8/18/2019 9:42 AM    REASON FOR CONSULT: Anemia    PROVIDERS:  Barb Cox MD    CHIEF COMPLAINT:     Chief Complaint   Patient presents with    Fall     pt fell out of recliner. Thought the chair collapsed but it didn't. Pt is not on anticoagulation meds, did not hit head, \"ass hit the floor\" denies LOC    Back Pain     broke back a year ago, hurts now       HISTORY OF PRESENT ILLNESS:     HPI:  69 WM with pmh ETOH/cirrhosis, CHF, HTN, COPD. Pt was admitted after a fall at home and now being treated for myxedema and MCKENNA. He also has C. Diff. He has chronic anemia in the 8-10 range with mild macrocytosis. He had some frop this admission and has been transfused. PAST MEDICAL HISTORY:     Past Medical History:   Diagnosis Date    Alcohol abuse     Alcohol abuse     ARF (acute renal failure) ([facility] 03/24/2019    Dr Emperatriz [facility], [phone]    CHF (congestive heart failure) ([facility] [address] 3/24/2019    Clostridium difficile infection 08/11/2019    Hypercholesteremia     Hypertension     Mediastinal adenopathy 2/14/2019    Pneumonia due to organism     Pulmonary emphysema ([facility] [address] 8/29/2018       PAST SURGICAL HISTORY:        Past Surgical History:   Procedure Laterality Date    BACK SURGERY      fusion     SKIN BIOPSY      UPPER GASTROINTESTINAL ENDOSCOPY  1/10/2011    UPPER GASTROINTESTINAL ENDOSCOPY N/A 5/1/2019    EGD W/ANES. performed by Tabitha Marr DO at [address]:     Social History     Socioeconomic History    Marital status:       Spouse name: Not on file    Number of children: Not on file    Years of education: Not on file    Highest education level: Not on file   Occupational History    Not on file   Social Needs    Financial resource strain: Not on file    Food insecurity:     Worry: Not on file     Inability: Not on file    Transportation needs:     Medical: Not on file Results   Component Value Date    ALKPHOS 249 (H) 08/18/2019    ALT 30 08/18/2019    AST 61 (H) 08/18/2019    BILITOT 0.6 08/18/2019    BILIDIR <0.2 04/30/2019    PROT 5.4 (L) 08/18/2019       Lab Results   Component Value Date    PROTIME 16.4 (H) 07/29/2019    INR 1.44 (H) 07/29/2019       Lab Results   Component Value Date    IRON 39 (L) 08/13/2019    TIBC 59 (L) 08/13/2019    FERRITIN 526.8 (H) 08/13/2019     Lab Results   Component Value Date    .60 (H) 08/11/2019     Lab Results   Component Value Date    [id number] 926 (H) 08/16/2019     Lab Results   Component Value Date    FOLATE >20.00 08/16/2019     SFLC 8/14/19:  8/14/2019  2:37 PM - Jarome Kawasaki Incoming Lab Results From Soft (Epic Adt)     Component Value Ref Range & Units Status Collected Lab   KAPPA, FREE LIGHT CHAINS, SERUM 37. 73High   3.30 - 19.40 mg/L Final 08/13/2019  4:24 AM  - [address], FREE LIGHT CHAINS, SERUM 51. 84High   5.71 - 26.30 mg/L Final 08/13/2019  4:24 AM [address] [facility]   K/L RATIO 0.73  0.26 - 1.65 Final 08/13/2019  4:24 AM [facility]   KAPPA/LAMBDA TEST COMMENT see below   Final 08/13/2019  4:24 AM [address] [facility]   Patients with impaired kidney function may have   falsely elevated free Kappa or free Lambda results. SPEP 8/13/19:    8/16/2019  3:26 PM - Jarome Kawasaki Incoming Lab Results From Soft (Epic Adt)     Component Collected Lab   SPE/CINTHYA Interpretation 08/13/2019  4:24 AM [facility]   REVIEWED    Comment:   The albumin is decreased.  Decreased albumin may be seen in liver   disease, protein loss, malnutrition, and hemodilution. No monoclonal   protein is seen on immunofixation. CPT Code: Q619056, [id number]. Electronically signed out by   Francois Marina MD PhD        IMAGING:     Ct Abdomen Pelvis Wo Contrast Additional Contrast? None  Inflammatory changes surrounding the distal sigmoid colon suggesting a mild colitis. Consider infectious and inflammatory etiologies.   No bowel

## 2019-08-19 ENCOUNTER — APPOINTMENT (OUTPATIENT)
Dept: GENERAL RADIOLOGY | Age: 74
DRG: 080 | End: 2019-08-19
Payer: MEDICARE

## 2019-08-19 LAB
A/G RATIO: 1 (ref 1.1–2.2)
A/G RATIO: 1 (ref 1.1–2.2)
ALBUMIN SERPL-MCNC: 2.7 G/DL (ref 3.4–5)
ALBUMIN SERPL-MCNC: 2.7 G/DL (ref 3.4–5)
ALP BLD-CCNC: 240 U/L (ref 40–129)
ALP BLD-CCNC: 246 U/L (ref 40–129)
ALT SERPL-CCNC: 33 U/L (ref 10–40)
ALT SERPL-CCNC: 34 U/L (ref 10–40)
ANION GAP SERPL CALCULATED.3IONS-SCNC: 10 MMOL/L (ref 3–16)
ANION GAP SERPL CALCULATED.3IONS-SCNC: 9 MMOL/L (ref 3–16)
ANISOCYTOSIS: ABNORMAL
AST SERPL-CCNC: 62 U/L (ref 15–37)
AST SERPL-CCNC: 66 U/L (ref 15–37)
BANDED NEUTROPHILS RELATIVE PERCENT: 10 % (ref 0–7)
BASE EXCESS VENOUS: 8.2 MMOL/L (ref -3–3)
BASOPHILS ABSOLUTE: 0 K/UL (ref 0–0.2)
BASOPHILS RELATIVE PERCENT: 0 %
BILIRUB SERPL-MCNC: 0.6 MG/DL (ref 0–1)
BILIRUB SERPL-MCNC: 0.6 MG/DL (ref 0–1)
BODY FLUID CULTURE, STERILE: NORMAL
BUN BLDV-MCNC: 28 MG/DL (ref 7–20)
BUN BLDV-MCNC: 29 MG/DL (ref 7–20)
CALCIUM SERPL-MCNC: 8.3 MG/DL (ref 8.3–10.6)
CALCIUM SERPL-MCNC: 8.4 MG/DL (ref 8.3–10.6)
CARBOXYHEMOGLOBIN: 1.6 % (ref 0–1.5)
CHLORIDE BLD-SCNC: 100 MMOL/L (ref 99–110)
CHLORIDE BLD-SCNC: 97 MMOL/L (ref 99–110)
CO2: 32 MMOL/L (ref 21–32)
CO2: 33 MMOL/L (ref 21–32)
CREAT SERPL-MCNC: 1.2 MG/DL (ref 0.8–1.3)
CREAT SERPL-MCNC: 1.2 MG/DL (ref 0.8–1.3)
EOSINOPHILS ABSOLUTE: 0 K/UL (ref 0–0.6)
EOSINOPHILS RELATIVE PERCENT: 0 %
GFR AFRICAN AMERICAN: >60
GFR AFRICAN AMERICAN: >60
GFR NON-AFRICAN AMERICAN: 59
GFR NON-AFRICAN AMERICAN: 59
GLOBULIN: 2.8 G/DL
GLOBULIN: 2.8 G/DL
GLUCOSE BLD-MCNC: 105 MG/DL (ref 70–99)
GLUCOSE BLD-MCNC: 120 MG/DL (ref 70–99)
GRAM STAIN RESULT: NORMAL
HAPTOGLOBIN: 206 MG/DL (ref 30–200)
HCO3 VENOUS: 32.9 MMOL/L (ref 23–29)
HCT VFR BLD CALC: 25 % (ref 40.5–52.5)
HEMOGLOBIN: 8.3 G/DL (ref 13.5–17.5)
LYMPHOCYTES ABSOLUTE: 1.4 K/UL (ref 1–5.1)
LYMPHOCYTES RELATIVE PERCENT: 9 %
MACROCYTES: ABNORMAL
MCH RBC QN AUTO: 33.8 PG (ref 26–34)
MCHC RBC AUTO-ENTMCNC: 33.1 G/DL (ref 31–36)
MCV RBC AUTO: 102.2 FL (ref 80–100)
METAMYELOCYTES RELATIVE PERCENT: 3 %
METHEMOGLOBIN VENOUS: 0.9 %
MONOCYTES ABSOLUTE: 0.5 K/UL (ref 0–1.3)
MONOCYTES RELATIVE PERCENT: 3 %
NEUTROPHILS ABSOLUTE: 14.1 K/UL (ref 1.7–7.7)
NEUTROPHILS RELATIVE PERCENT: 75 %
O2 CONTENT, VEN: 13 VOL %
O2 SAT, VEN: 97 %
O2 THERAPY: ABNORMAL
PCO2, VEN: 47.1 MMHG (ref 40–50)
PDW BLD-RTO: 16.8 % (ref 12.4–15.4)
PH VENOUS: 7.46 (ref 7.35–7.45)
PHOSPHORUS: 3.7 MG/DL (ref 2.5–4.9)
PLATELET # BLD: 328 K/UL (ref 135–450)
PLATELET SLIDE REVIEW: ADEQUATE
PMV BLD AUTO: 8.3 FL (ref 5–10.5)
PO2, VEN: 106.5 MMHG (ref 25–40)
POLYCHROMASIA: ABNORMAL
POTASSIUM REFLEX MAGNESIUM: 4.1 MMOL/L (ref 3.5–5.1)
POTASSIUM REFLEX MAGNESIUM: 4.2 MMOL/L (ref 3.5–5.1)
RBC # BLD: 2.44 M/UL (ref 4.2–5.9)
SODIUM BLD-SCNC: 140 MMOL/L (ref 136–145)
SODIUM BLD-SCNC: 141 MMOL/L (ref 136–145)
T3 FREE: 0.9 PG/ML (ref 2.3–4.2)
TCO2 CALC VENOUS: 34 MMOL/L
TOTAL PROTEIN: 5.5 G/DL (ref 6.4–8.2)
TOTAL PROTEIN: 5.5 G/DL (ref 6.4–8.2)
TSH SERPL DL<=0.05 MIU/L-ACNC: 46.39 UIU/ML (ref 0.27–4.2)
WBC # BLD: 16 K/UL (ref 4–11)

## 2019-08-19 PROCEDURE — 87070 CULTURE OTHR SPECIMN AEROBIC: CPT

## 2019-08-19 PROCEDURE — 80053 COMPREHEN METABOLIC PANEL: CPT

## 2019-08-19 PROCEDURE — 6370000000 HC RX 637 (ALT 250 FOR IP): Performed by: INTERNAL MEDICINE

## 2019-08-19 PROCEDURE — 84443 ASSAY THYROID STIM HORMONE: CPT

## 2019-08-19 PROCEDURE — 31645 BRNCHSC W/THER ASPIR 1ST: CPT | Performed by: INTERNAL MEDICINE

## 2019-08-19 PROCEDURE — C9113 INJ PANTOPRAZOLE SODIUM, VIA: HCPCS | Performed by: INTERNAL MEDICINE

## 2019-08-19 PROCEDURE — 82803 BLOOD GASES ANY COMBINATION: CPT

## 2019-08-19 PROCEDURE — 88305 TISSUE EXAM BY PATHOLOGIST: CPT

## 2019-08-19 PROCEDURE — 71045 X-RAY EXAM CHEST 1 VIEW: CPT

## 2019-08-19 PROCEDURE — 87205 SMEAR GRAM STAIN: CPT

## 2019-08-19 PROCEDURE — 36592 COLLECT BLOOD FROM PICC: CPT

## 2019-08-19 PROCEDURE — 6360000002 HC RX W HCPCS: Performed by: INTERNAL MEDICINE

## 2019-08-19 PROCEDURE — 87206 SMEAR FLUORESCENT/ACID STAI: CPT

## 2019-08-19 PROCEDURE — 99152 MOD SED SAME PHYS/QHP 5/>YRS: CPT | Performed by: INTERNAL MEDICINE

## 2019-08-19 PROCEDURE — 97530 THERAPEUTIC ACTIVITIES: CPT

## 2019-08-19 PROCEDURE — 87116 MYCOBACTERIA CULTURE: CPT

## 2019-08-19 PROCEDURE — 99233 SBSQ HOSP IP/OBS HIGH 50: CPT | Performed by: INTERNAL MEDICINE

## 2019-08-19 PROCEDURE — 94761 N-INVAS EAR/PLS OXIMETRY MLT: CPT

## 2019-08-19 PROCEDURE — 0BD78ZX EXTRACTION OF LEFT MAIN BRONCHUS, VIA NATURAL OR ARTIFICIAL OPENING ENDOSCOPIC, DIAGNOSTIC: ICD-10-PCS | Performed by: INTERNAL MEDICINE

## 2019-08-19 PROCEDURE — 2580000003 HC RX 258: Performed by: INTERNAL MEDICINE

## 2019-08-19 PROCEDURE — 84100 ASSAY OF PHOSPHORUS: CPT

## 2019-08-19 PROCEDURE — 85025 COMPLETE CBC W/AUTO DIFF WBC: CPT

## 2019-08-19 PROCEDURE — 3609010900 HC BRONCHOSCOPY THERAPUTIC ASPIRATION INITIAL: Performed by: INTERNAL MEDICINE

## 2019-08-19 PROCEDURE — 94669 MECHANICAL CHEST WALL OSCILL: CPT

## 2019-08-19 PROCEDURE — 0BD38ZX EXTRACTION OF RIGHT MAIN BRONCHUS, VIA NATURAL OR ARTIFICIAL OPENING ENDOSCOPIC, DIAGNOSTIC: ICD-10-PCS | Performed by: INTERNAL MEDICINE

## 2019-08-19 PROCEDURE — 82668 ASSAY OF ERYTHROPOIETIN: CPT

## 2019-08-19 PROCEDURE — 94660 CPAP INITIATION&MGMT: CPT

## 2019-08-19 PROCEDURE — 2060000000 HC ICU INTERMEDIATE R&B

## 2019-08-19 PROCEDURE — 97110 THERAPEUTIC EXERCISES: CPT

## 2019-08-19 PROCEDURE — 2580000003 HC RX 258

## 2019-08-19 PROCEDURE — 87015 SPECIMEN INFECT AGNT CONCNTJ: CPT

## 2019-08-19 PROCEDURE — 88112 CYTOPATH CELL ENHANCE TECH: CPT

## 2019-08-19 PROCEDURE — 84481 FREE ASSAY (FT-3): CPT

## 2019-08-19 PROCEDURE — 94640 AIRWAY INHALATION TREATMENT: CPT

## 2019-08-19 PROCEDURE — 2700000000 HC OXYGEN THERAPY PER DAY

## 2019-08-19 PROCEDURE — 2709999900 HC NON-CHARGEABLE SUPPLY: Performed by: INTERNAL MEDICINE

## 2019-08-19 RX ORDER — MIDAZOLAM HYDROCHLORIDE 5 MG/ML
INJECTION INTRAMUSCULAR; INTRAVENOUS PRN
Status: DISCONTINUED | OUTPATIENT
Start: 2019-08-19 | End: 2019-08-19 | Stop reason: ALTCHOICE

## 2019-08-19 RX ORDER — FENTANYL CITRATE 50 UG/ML
INJECTION, SOLUTION INTRAMUSCULAR; INTRAVENOUS PRN
Status: DISCONTINUED | OUTPATIENT
Start: 2019-08-19 | End: 2019-08-19 | Stop reason: ALTCHOICE

## 2019-08-19 RX ORDER — SODIUM CHLORIDE 9 MG/ML
INJECTION, SOLUTION INTRAVENOUS
Status: COMPLETED
Start: 2019-08-19 | End: 2019-08-19

## 2019-08-19 RX ADMIN — Medication 10 ML: at 21:56

## 2019-08-19 RX ADMIN — SODIUM CHLORIDE: 9 INJECTION, SOLUTION INTRAVENOUS at 14:45

## 2019-08-19 RX ADMIN — FUROSEMIDE 40 MG: 10 INJECTION, SOLUTION INTRAMUSCULAR; INTRAVENOUS at 19:21

## 2019-08-19 RX ADMIN — Medication 250 MG: at 17:28

## 2019-08-19 RX ADMIN — FOLIC ACID 1000 MCG: 1 TABLET ORAL at 08:40

## 2019-08-19 RX ADMIN — SPIRONOLACTONE 50 MG: 25 TABLET ORAL at 08:39

## 2019-08-19 RX ADMIN — HEPARIN SODIUM 5000 UNITS: 5000 INJECTION INTRAVENOUS; SUBCUTANEOUS at 15:16

## 2019-08-19 RX ADMIN — FUROSEMIDE 40 MG: 10 INJECTION, SOLUTION INTRAMUSCULAR; INTRAVENOUS at 08:40

## 2019-08-19 RX ADMIN — Medication 250 MG: at 21:55

## 2019-08-19 RX ADMIN — Medication 10 ML: at 08:40

## 2019-08-19 RX ADMIN — IPRATROPIUM BROMIDE AND ALBUTEROL SULFATE 1 AMPULE: .5; 3 SOLUTION RESPIRATORY (INHALATION) at 08:22

## 2019-08-19 RX ADMIN — POTASSIUM CHLORIDE 40 MEQ: 20 TABLET, EXTENDED RELEASE ORAL at 08:40

## 2019-08-19 RX ADMIN — FUROSEMIDE 40 MG: 10 INJECTION, SOLUTION INTRAMUSCULAR; INTRAVENOUS at 15:16

## 2019-08-19 RX ADMIN — PANTOPRAZOLE SODIUM 40 MG: 40 INJECTION, POWDER, FOR SOLUTION INTRAVENOUS at 08:40

## 2019-08-19 RX ADMIN — IPRATROPIUM BROMIDE AND ALBUTEROL SULFATE 1 AMPULE: .5; 3 SOLUTION RESPIRATORY (INHALATION) at 20:34

## 2019-08-19 RX ADMIN — TAMSULOSIN HYDROCHLORIDE 0.4 MG: 0.4 CAPSULE ORAL at 08:40

## 2019-08-19 RX ADMIN — DEXAMETHASONE 4 MG: 4 TABLET ORAL at 08:40

## 2019-08-19 RX ADMIN — Medication 250 MG: at 22:04

## 2019-08-19 RX ADMIN — Medication 250 MG: at 08:41

## 2019-08-19 RX ADMIN — HEPARIN SODIUM 5000 UNITS: 5000 INJECTION INTRAVENOUS; SUBCUTANEOUS at 06:01

## 2019-08-19 RX ADMIN — Medication 250 MG: at 08:39

## 2019-08-19 RX ADMIN — DEXAMETHASONE 4 MG: 4 TABLET ORAL at 21:55

## 2019-08-19 RX ADMIN — HEPARIN SODIUM 5000 UNITS: 5000 INJECTION INTRAVENOUS; SUBCUTANEOUS at 21:55

## 2019-08-19 RX ADMIN — FUROSEMIDE 40 MG: 10 INJECTION, SOLUTION INTRAMUSCULAR; INTRAVENOUS at 21:55

## 2019-08-19 RX ADMIN — IPRATROPIUM BROMIDE AND ALBUTEROL SULFATE 1 AMPULE: .5; 3 SOLUTION RESPIRATORY (INHALATION) at 12:15

## 2019-08-19 RX ADMIN — LEVOTHYROXINE SODIUM 125 MCG: 100 TABLET ORAL at 06:01

## 2019-08-19 ASSESSMENT — PAIN SCALES - GENERAL
PAINLEVEL_OUTOF10: 0

## 2019-08-19 ASSESSMENT — PAIN - FUNCTIONAL ASSESSMENT: PAIN_FUNCTIONAL_ASSESSMENT: 0-10

## 2019-08-19 NOTE — PROGRESS NOTES
25.5* 24.8* 26.5* 25.0*   .5* 101.6*  --  102.2*    330  --  328     BMP:   Recent Labs     08/17/19  0630  08/18/19  0458 08/18/19  1140 08/19/19  0418   *   < > 139 140 141   K 3.8   < > 4.2 4.0 4.2      < > 101 101 100   CO2 28   < > 29 29 32   PHOS 4.0  --  3.3  --  3.7   BUN 31*   < > 30* 31* 29*   CREATININE 1.7*   < > 1.5* 1.5* 1.2    < > = values in this interval not displayed. LIVER PROFILE:   Recent Labs     08/18/19  0458 08/18/19  1140 08/19/19  0418   AST 61* 63* 62*   ALT 30 31 33   BILITOT 0.6 0.6 0.6   ALKPHOS 249* 251* 240*       Imaging:  I have reviewed radiology images personally. XR CHEST PORTABLE   Final Result   No significant change in effusions. XR CHEST PORTABLE   Final Result   Worsening of pleural effusions         XR CHEST PORTABLE   Final Result   Bilateral pleural effusions and airspace disease, slightly increased on the   left and decreased on the right as compared to prior. Background pulmonary   edema. XR CHEST PORTABLE   Final Result   1. Study limited, as detailed above. 2. Stable position of a left internal jugular central venous catheter with   tip overlying the brachiocephalic vein. 3. No significant change in appearance of moderate CHF and multifocal   pneumonia. XR CHEST PORTABLE   Final Result   Limited exam.      Persistent CHF with bilateral effusions. XR CHEST PORTABLE   Final Result   A left IJ catheter has its tip at the junction of the innominate vein and   superior vena cava. No pneumothorax. Persistent low lung volumes, pulmonary edema, bilateral pleural effusions and   bibasilar airspace disease. XR CHEST PORTABLE   Final Result   Increased airspace disease on the right.   There is persistent   pleuroparenchymal disease bilaterally         CT ABDOMEN PELVIS WO CONTRAST Additional Contrast? None   Final Result   Inflammatory changes surrounding the distal sigmoid colon suggesting a mild   colitis. Consider infectious and inflammatory etiologies. No bowel   obstruction. Moderate bilateral pleural effusions. Associated bibasilar lung opacities   are nonspecific and may represent atelectasis versus pneumonia. XR CHEST PORTABLE   Final Result   Increased large amount of bibasilar airspace disease, with adjacent pleural   effusions, increased on the right over the past 2 weeks, unchanged on the   left. The basilar airspace disease is due to atelectasis, with suspected   superimposed pulmonary edema         XR CHEST PORTABLE    (Results Pending)     Ct Abdomen Pelvis Wo Contrast Additional Contrast? None    Result Date: 8/11/2019  EXAMINATION: CT OF THE ABDOMEN AND PELVIS WITHOUT CONTRAST 8/11/2019 2:38 pm TECHNIQUE: CT of the abdomen and pelvis was performed without the administration of intravenous contrast. Multiplanar reformatted images are provided for review. Dose modulation, iterative reconstruction, and/or weight based adjustment of the mA/kV was utilized to reduce the radiation dose to as low as reasonably achievable. COMPARISON: None HISTORY: ORDERING SYSTEM PROVIDED HISTORY: Septic, has multiple wounds on bottom TECHNOLOGIST PROVIDED HISTORY: Additional Contrast?->None Reason for Exam: pt is septic,c/o back pain Acuity: Acute Type of Exam: Initial FINDINGS: Evaluation is limited by artifact from thoracic hardware, patient body habitus, and motion. Lower Chest: Moderate bilateral pleural effusions. Associated bibasilar lung opacities are nonspecific. Organs: Evaluation of the solid organs is limited without intravenous contrast. Hepatic steatosis and hepatomegaly. No focal liver lesion. Cholelithiasis without CT evidence of acute cholecystitis. No pancreatic lesion. No splenomegaly. No adrenal lesion. No hydronephrosis. Calcifications within the renal pelvises bilaterally. GI/Bowel: No bowel obstruction. No appendicitis.   Diverticulosis involving the descending colon and sigmoid colon. Mild inflammatory changes surround a long segment of the distal sigmoid colon. No diverticula are seen within this region. Pelvis:  Penile pump. No bladder calculus. No free fluid within the pelvis. Peritoneum/Retroperitoneum: Atherosclerotic calcification of the abdominal aorta without aneurysmal dilatation. No adenopathy. Bones/Soft Tissues: Mild diffuse subcutaneous edema. Thoracic fusion. Remote rib fractures. Remote left pelvic fractures. Inflammatory changes surrounding the distal sigmoid colon suggesting a mild colitis. Consider infectious and inflammatory etiologies. No bowel obstruction. Moderate bilateral pleural effusions. Associated bibasilar lung opacities are nonspecific and may represent atelectasis versus pneumonia. Xr Chest Portable    Result Date: 8/18/2019  EXAMINATION: ONE XRAY VIEW OF THE CHEST 8/18/2019 6:52 am COMPARISON: August 17, 2019 HISTORY: ORDERING SYSTEM PROVIDED HISTORY: resp failure TECHNOLOGIST PROVIDED HISTORY: Reason for exam:->resp failure Reason for Exam: poss pna FINDINGS: Cardiac silhouette is enlarged. Moderate to large bilateral pleural effusions, similar to previous exam.  No acute bony abnormality. No significant change in effusions. Xr Chest Portable    Result Date: 8/17/2019  EXAMINATION: ONE XRAY VIEW OF THE CHEST 8/17/2019 5:22 am COMPARISON: August 16, 2019 HISTORY: ORDERING SYSTEM PROVIDED HISTORY: resp failure TECHNOLOGIST PROVIDED HISTORY: Reason for exam:->resp failure Reason for Exam: resp failure FINDINGS: Left-sided central venous catheter with tip projecting in the region of the upper SVC. Cardiac silhouette is enlarged. Increase in pleural effusions which are moderate to large in size. No acute bony abnormality.      Worsening of pleural effusions     Xr Chest Portable    Result Date: 8/16/2019  EXAMINATION: ONE XRAY VIEW OF THE CHEST 8/15/2019 5:36 am COMPARISON: 08/14/2019, 08/13/2019 HISTORY: [address] PROVIDED HISTORY: resp failure TECHNOLOGIST PROVIDED HISTORY: Reason for exam:->resp failure Reason for Exam: resp failure FINDINGS: A single frontal view of the chest is limited secondary to patient rotation and the chin overlying the bilateral lung apices. There is no acute skeletal abnormality. There are fusion rods stabilizing the thoracic spine. The heart size remains enlarged, though stable. The mediastinal contours are stable. There is a left internal jugular central venous catheter in place with tip overlying the region of the brachiocephalic vein. This is stable and unchanged. There are persistent small bilateral pleural effusions, not significantly changed. There is stable mild-to-moderate interstitial pulmonary edema. Multifocal hazy opacity throughout both lungs has not significantly changed, likely representing a combination of pulmonary edema and superimposed pneumonia. There is no evidence of a pneumothorax. 1. Study limited, as detailed above. 2. Stable position of a left internal jugular central venous catheter with tip overlying the brachiocephalic vein. 3. No significant change in appearance of moderate CHF and multifocal pneumonia. Xr Chest Portable    Result Date: 8/16/2019  EXAMINATION: ONE XRAY VIEW OF THE CHEST 8/16/2019 5:06 am COMPARISON: 08/16/2019 HISTORY: ORDERING SYSTEM PROVIDED HISTORY: pleural effusion TECHNOLOGIST PROVIDED HISTORY: Reason for exam:->pleural effusion Reason for Exam: pleural effusion FINDINGS: Left internal jugular central venous catheter is a again demonstrated, extending just to the right of midline. Spinal fusion hardware. Increasing left basilar pleuroparenchymal opacity as compared to prior. Right basilar pleuroparenchymal opacity also appears redistributed with suspected fluid tracking into the horizontal fissure. Improved aeration at the right base. Prominence of pulmonary vascularity.   Chin position obscures visualization of the apices

## 2019-08-19 NOTE — PROGRESS NOTES
NITRU Negative 08/11/2019    WBCUA 0-2 08/11/2019    BACTERIA 2+ 08/11/2019    RBCUA 3-5 08/11/2019    BLOODU Negative 08/11/2019    SPECGRAV 1.020 08/11/2019    GLUCOSEU Negative 08/11/2019       Radiology:  XR CHEST PORTABLE   Final Result   No significant change in effusions. XR CHEST PORTABLE   Final Result   Worsening of pleural effusions         XR CHEST PORTABLE   Final Result   Bilateral pleural effusions and airspace disease, slightly increased on the   left and decreased on the right as compared to prior. Background pulmonary   edema. XR CHEST PORTABLE   Final Result   1. Study limited, as detailed above. 2. Stable position of a left internal jugular central venous catheter with   tip overlying the brachiocephalic vein. 3. No significant change in appearance of moderate CHF and multifocal   pneumonia. XR CHEST PORTABLE   Final Result   Limited exam.      Persistent CHF with bilateral effusions. XR CHEST PORTABLE   Final Result   A left IJ catheter has its tip at the junction of the innominate vein and   superior vena cava. No pneumothorax. Persistent low lung volumes, pulmonary edema, bilateral pleural effusions and   bibasilar airspace disease. XR CHEST PORTABLE   Final Result   Increased airspace disease on the right. There is persistent   pleuroparenchymal disease bilaterally         CT ABDOMEN PELVIS WO CONTRAST Additional Contrast? None   Final Result   Inflammatory changes surrounding the distal sigmoid colon suggesting a mild   colitis. Consider infectious and inflammatory etiologies. No bowel   obstruction. Moderate bilateral pleural effusions. Associated bibasilar lung opacities   are nonspecific and may represent atelectasis versus pneumonia.          XR CHEST PORTABLE   Final Result   Increased large amount of bibasilar airspace disease, with adjacent pleural   effusions, increased on the right over the past 2 weeks, unchanged on the left.  The basilar airspace disease is due to atelectasis, with suspected   superimposed pulmonary edema         XR CHEST PORTABLE    (Results Pending)           Assessment/Plan:    Active Hospital Problems    Diagnosis    Hypotension [I95.9]    Myxedema coma ([address] [E03.5]    Acute kidney failure, unspecified ([address] [N17.9]    Acute renal failure (ARF) ([address] [N17.9]    Benign essential HTN [I10]    Hyperlipidemia [E78.5]     Myxedema coma  Resolved, was present on arrival  On oral Synthroid and steroids (Decadron)  Off antibiotics   Patient states he thought he was taking his thyroid supplements. Continued to monitor     Anemia   Required transfusion on admission, then stabilized but not improved  Hematology consult requested     Acute renal failure  Nephrology input appreciated. Creatinine stabilized near baseline.     Hyponatremia  Resolved with medication management and fluid supplementation  Continue to monitor daily basic metabolic panel     C. difficile colitis  Continue oral vancomycin.  stabilized       DVT Prophylaxis: heparin  Diet: Diet NPO Time Specified  Code Status: Full Code    PT/OT Eval Status: requested    Dispo - pending bronch/pulm Juan R Grimaldo MD

## 2019-08-19 NOTE — PROGRESS NOTES
330  --  328   .5* 101.6*  --  102.2*        Recent Labs     08/17/19  0630  08/18/19  0458 08/18/19  1140 08/19/19  0418   *   < > 139 140 141   K 3.8   < > 4.2 4.0 4.2      < > 101 101 100   CO2 28   < > 29 29 32   PHOS 4.0  --  3.3  --  3.7   BUN 31*   < > 30* 31* 29*   CREATININE 1.7*   < > 1.5* 1.5* 1.2    < > = values in this interval not displayed. Recent Labs     08/18/19  0458 08/18/19  1140 08/19/19  0418   AST 61* 63* 62*   ALT 30 31 33   BILITOT 0.6 0.6 0.6   ALKPHOS 249* 251* 240*       Magnesium:    Lab Results   Component Value Date    MG 1.50 08/16/2019    MG 1.90 08/15/2019    MG 1.80 08/02/2019         Problem List  Patient Active Problem List   Diagnosis    DISH (diffuse idiopathic skeletal hyperostosis)    Hyperlipidemia    Benign essential HTN    Closed nondisplaced fracture of pubis (HCC)    Bilateral knee pain    Alcohol abuse    Centrilobular emphysema (HCC)    Other pulmonary embolism without acute cor pulmonale (HCC)    Mediastinal adenopathy    Former smoker    Pulmonary HTN ([address]    Obesity    CHF (congestive heart failure) ([address]    CHF (congestive heart failure), NYHA class I, acute on chronic, combined ([address]    History of pulmonary embolism    Elevated hemoglobin A1c    Alcohol dependence (HCC)    Acute anemia    Chronic respiratory failure with hypoxia (HCC)    Urinary tract infection without hematuria    Pneumonia due to organism    Acute kidney failure, unspecified ([address]    Acute renal failure (ARF) ([address]    Myxedema coma ([address]    Hypotension       ASSESSMENT AND PLAN:      1.  Anemia  - acute on chronic anemia  - multifactorial  - contributing factors include liver disease/cirrhosis, MCKENNA, etoh, hypothyroidism, infection  - no iron def on labs - more consistent with ACD  -  Haptoglobin 206,  (no hemolysis) , Diego POSITIVE, absolute retic not elevated, Epo pending, STR pending   - monitor CBC and transfuse if Hgb < 7; Hgb is 8.3

## 2019-08-19 NOTE — PROGRESS NOTES
08/19/19 0839   BP: (!) 140/68   Pulse: 73   Resp: 18   Temp: 98.7 °F (37.1 °C)   SpO2: 92%         I & O:       Intake/Output Summary (Last 24 hours) at 8/19/2019 1020  Last data filed at 8/19/2019 0845  Gross per 24 hour   Intake 540 ml   Output 5150 ml   Net -4610 ml         Physical Examination:     General appearance: Anxious-yes, distressed- no, in good spirits- yes  Comfortable, communicative  AAO X 3  Sitting up on chair  HEENT: Lips- normal, teeth- ok , oral mucosa- moist  Neck : Mass- no, appears symmetrical, JVD- not visible  Respiratory: Respiratory effort-  normal, wheeze-yes, crackles -  yes ,oxygen- via nasal cannula  Cardiovascular:  Ausculation- No M/R/G, Edema all extremities edematous  Abdomen: visible mass- no, distention- no, scar- no, tenderness- no                            hepatosplenomegaly-  no  Musculoskeletal:  clubbing no,cyanosis- no , digital ischemia- no                           muscle strength- grossly normal , tone - grossly normal  Skin: rashes- no , ulcers- no, induration- no, tightening - no  Psychiatric:  Judgement and insight- normal        Meds:      furosemide  40 mg Intravenous 4x Daily    dexamethasone  4 mg Oral 2 times per day    spironolactone  50 mg Oral Daily    potassium chloride  40 mEq Oral Daily    levothyroxine  125 mcg Oral Daily    vancomycin  250 mg Oral 4 times per day    saccharomyces boulardii  250 mg Oral BID    heparin (porcine)  5,000 Units Subcutaneous 3 times per day    pantoprazole  40 mg Intravenous Daily    folic acid  0,904 mcg Oral Daily    ipratropium-albuterol  1 ampule Inhalation Q4H WA    tamsulosin  0.4 mg Oral Daily    sodium chloride flush  10 mL Intravenous 2 times per day       Labs:     Recent Labs     08/17/19  0630 08/18/19  0458 08/18/19  1140 08/19/19  0418   WBC 13.1* 14.6*  --  16.0*   HGB 8.3* 8.3*  --  8.3*   HCT 25.5* 24.8* 26.5* 25.0*    330  --  328          Recent Labs     08/17/19  0630  08/18/19  0458 08/18/19  1140 08/19/19  0418   *   < > 139 140 141   K 3.8   < > 4.2 4.0 4.2      < > 101 101 100   CO2 28   < > 29 29 32   BUN 31*   < > 30* 31* 29*   CREATININE 1.7*   < > 1.5* 1.5* 1.2   GLUCOSE 92   < > 127* 106* 105*   PHOS 4.0  --  3.3  --  3.7    < > = values in this interval not displayed.

## 2019-08-20 LAB
A/G RATIO: 0.9 (ref 1.1–2.2)
A/G RATIO: 0.9 (ref 1.1–2.2)
ALBUMIN SERPL-MCNC: 2.7 G/DL (ref 3.4–5)
ALBUMIN SERPL-MCNC: 2.7 G/DL (ref 3.4–5)
ALP BLD-CCNC: 239 U/L (ref 40–129)
ALP BLD-CCNC: 241 U/L (ref 40–129)
ALT SERPL-CCNC: 32 U/L (ref 10–40)
ALT SERPL-CCNC: 33 U/L (ref 10–40)
ANION GAP SERPL CALCULATED.3IONS-SCNC: 11 MMOL/L (ref 3–16)
ANION GAP SERPL CALCULATED.3IONS-SCNC: 8 MMOL/L (ref 3–16)
ANISOCYTOSIS: ABNORMAL
AST SERPL-CCNC: 50 U/L (ref 15–37)
AST SERPL-CCNC: 56 U/L (ref 15–37)
BANDED NEUTROPHILS RELATIVE PERCENT: 4 % (ref 0–7)
BASOPHILS ABSOLUTE: 0 K/UL (ref 0–0.2)
BASOPHILS RELATIVE PERCENT: 0 %
BILIRUB SERPL-MCNC: 0.5 MG/DL (ref 0–1)
BILIRUB SERPL-MCNC: 0.6 MG/DL (ref 0–1)
BUN BLDV-MCNC: 23 MG/DL (ref 7–20)
BUN BLDV-MCNC: 26 MG/DL (ref 7–20)
CALCIUM SERPL-MCNC: 8.5 MG/DL (ref 8.3–10.6)
CALCIUM SERPL-MCNC: 8.5 MG/DL (ref 8.3–10.6)
CHLORIDE BLD-SCNC: 96 MMOL/L (ref 99–110)
CHLORIDE BLD-SCNC: 99 MMOL/L (ref 99–110)
CO2: 32 MMOL/L (ref 21–32)
CO2: 35 MMOL/L (ref 21–32)
CREAT SERPL-MCNC: 1 MG/DL (ref 0.8–1.3)
CREAT SERPL-MCNC: 1.2 MG/DL (ref 0.8–1.3)
EOSINOPHILS ABSOLUTE: 0 K/UL (ref 0–0.6)
EOSINOPHILS RELATIVE PERCENT: 0 %
GFR AFRICAN AMERICAN: >60
GFR AFRICAN AMERICAN: >60
GFR NON-AFRICAN AMERICAN: 59
GFR NON-AFRICAN AMERICAN: >60
GLOBULIN: 2.9 G/DL
GLOBULIN: 3.1 G/DL
GLUCOSE BLD-MCNC: 116 MG/DL (ref 70–99)
GLUCOSE BLD-MCNC: 177 MG/DL (ref 70–99)
HCT VFR BLD CALC: 26.5 % (ref 40.5–52.5)
HEMOGLOBIN: 8.9 G/DL (ref 13.5–17.5)
LYMPHOCYTES ABSOLUTE: 0.6 K/UL (ref 1–5.1)
LYMPHOCYTES RELATIVE PERCENT: 4 %
MACROCYTES: ABNORMAL
MAGNESIUM: 1.7 MG/DL (ref 1.8–2.4)
MCH RBC QN AUTO: 34 PG (ref 26–34)
MCHC RBC AUTO-ENTMCNC: 33.4 G/DL (ref 31–36)
MCV RBC AUTO: 101.8 FL (ref 80–100)
MONOCYTES ABSOLUTE: 0.8 K/UL (ref 0–1.3)
MONOCYTES RELATIVE PERCENT: 5 %
NEUTROPHILS ABSOLUTE: 14.7 K/UL (ref 1.7–7.7)
NEUTROPHILS RELATIVE PERCENT: 87 %
PDW BLD-RTO: 16.8 % (ref 12.4–15.4)
PHOSPHORUS: 3.4 MG/DL (ref 2.5–4.9)
PLATELET # BLD: 327 K/UL (ref 135–450)
PMV BLD AUTO: 8.5 FL (ref 5–10.5)
POLYCHROMASIA: ABNORMAL
POTASSIUM REFLEX MAGNESIUM: 4.1 MMOL/L (ref 3.5–5.1)
POTASSIUM REFLEX MAGNESIUM: 4.6 MMOL/L (ref 3.5–5.1)
RBC # BLD: 2.61 M/UL (ref 4.2–5.9)
SODIUM BLD-SCNC: 139 MMOL/L (ref 136–145)
SODIUM BLD-SCNC: 142 MMOL/L (ref 136–145)
SOLUBLE TRANSFERRIN RECEPT: 4.6 MG/L (ref 2.2–5)
TOTAL PROTEIN: 5.6 G/DL (ref 6.4–8.2)
TOTAL PROTEIN: 5.8 G/DL (ref 6.4–8.2)
WBC # BLD: 16.1 K/UL (ref 4–11)

## 2019-08-20 PROCEDURE — 85025 COMPLETE CBC W/AUTO DIFF WBC: CPT

## 2019-08-20 PROCEDURE — 6360000002 HC RX W HCPCS: Performed by: INTERNAL MEDICINE

## 2019-08-20 PROCEDURE — 94761 N-INVAS EAR/PLS OXIMETRY MLT: CPT

## 2019-08-20 PROCEDURE — 86880 COOMBS TEST DIRECT: CPT

## 2019-08-20 PROCEDURE — 86870 RBC ANTIBODY IDENTIFICATION: CPT

## 2019-08-20 PROCEDURE — 94640 AIRWAY INHALATION TREATMENT: CPT

## 2019-08-20 PROCEDURE — 94660 CPAP INITIATION&MGMT: CPT

## 2019-08-20 PROCEDURE — 80053 COMPREHEN METABOLIC PANEL: CPT

## 2019-08-20 PROCEDURE — 6370000000 HC RX 637 (ALT 250 FOR IP): Performed by: INTERNAL MEDICINE

## 2019-08-20 PROCEDURE — 2700000000 HC OXYGEN THERAPY PER DAY

## 2019-08-20 PROCEDURE — 83735 ASSAY OF MAGNESIUM: CPT

## 2019-08-20 PROCEDURE — 2060000000 HC ICU INTERMEDIATE R&B

## 2019-08-20 PROCEDURE — 97530 THERAPEUTIC ACTIVITIES: CPT

## 2019-08-20 PROCEDURE — C9113 INJ PANTOPRAZOLE SODIUM, VIA: HCPCS | Performed by: INTERNAL MEDICINE

## 2019-08-20 PROCEDURE — 97116 GAIT TRAINING THERAPY: CPT

## 2019-08-20 PROCEDURE — 94669 MECHANICAL CHEST WALL OSCILL: CPT

## 2019-08-20 PROCEDURE — 2580000003 HC RX 258: Performed by: INTERNAL MEDICINE

## 2019-08-20 PROCEDURE — 86860 RBC ANTIBODY ELUTION: CPT

## 2019-08-20 PROCEDURE — 84100 ASSAY OF PHOSPHORUS: CPT

## 2019-08-20 PROCEDURE — 99232 SBSQ HOSP IP/OBS MODERATE 35: CPT | Performed by: INTERNAL MEDICINE

## 2019-08-20 RX ORDER — FUROSEMIDE 10 MG/ML
40 INJECTION INTRAMUSCULAR; INTRAVENOUS 2 TIMES DAILY
Status: DISCONTINUED | OUTPATIENT
Start: 2019-08-20 | End: 2019-08-22

## 2019-08-20 RX ORDER — MAGNESIUM SULFATE IN WATER 40 MG/ML
2 INJECTION, SOLUTION INTRAVENOUS ONCE
Status: COMPLETED | OUTPATIENT
Start: 2019-08-20 | End: 2019-08-20

## 2019-08-20 RX ADMIN — MAGNESIUM SULFATE HEPTAHYDRATE 2 G: 40 INJECTION, SOLUTION INTRAVENOUS at 10:10

## 2019-08-20 RX ADMIN — FUROSEMIDE 40 MG: 10 INJECTION, SOLUTION INTRAMUSCULAR; INTRAVENOUS at 18:03

## 2019-08-20 RX ADMIN — TAMSULOSIN HYDROCHLORIDE 0.4 MG: 0.4 CAPSULE ORAL at 08:43

## 2019-08-20 RX ADMIN — Medication 250 MG: at 23:04

## 2019-08-20 RX ADMIN — DEXAMETHASONE 4 MG: 4 TABLET ORAL at 20:31

## 2019-08-20 RX ADMIN — SPIRONOLACTONE 50 MG: 25 TABLET ORAL at 08:43

## 2019-08-20 RX ADMIN — IPRATROPIUM BROMIDE AND ALBUTEROL SULFATE 1 AMPULE: .5; 3 SOLUTION RESPIRATORY (INHALATION) at 11:59

## 2019-08-20 RX ADMIN — POTASSIUM CHLORIDE 40 MEQ: 20 TABLET, EXTENDED RELEASE ORAL at 08:43

## 2019-08-20 RX ADMIN — IPRATROPIUM BROMIDE AND ALBUTEROL SULFATE 1 AMPULE: .5; 3 SOLUTION RESPIRATORY (INHALATION) at 20:50

## 2019-08-20 RX ADMIN — Medication 10 ML: at 05:10

## 2019-08-20 RX ADMIN — Medication 10 ML: at 20:32

## 2019-08-20 RX ADMIN — Medication 250 MG: at 20:31

## 2019-08-20 RX ADMIN — FOLIC ACID 1000 MCG: 1 TABLET ORAL at 08:44

## 2019-08-20 RX ADMIN — Medication 250 MG: at 18:03

## 2019-08-20 RX ADMIN — FUROSEMIDE 40 MG: 10 INJECTION, SOLUTION INTRAMUSCULAR; INTRAVENOUS at 08:43

## 2019-08-20 RX ADMIN — LEVOTHYROXINE SODIUM 125 MCG: 100 TABLET ORAL at 05:09

## 2019-08-20 RX ADMIN — Medication 250 MG: at 12:23

## 2019-08-20 RX ADMIN — Medication 10 ML: at 08:44

## 2019-08-20 RX ADMIN — Medication 250 MG: at 08:43

## 2019-08-20 RX ADMIN — DEXAMETHASONE 4 MG: 4 TABLET ORAL at 08:44

## 2019-08-20 RX ADMIN — HEPARIN SODIUM 5000 UNITS: 5000 INJECTION INTRAVENOUS; SUBCUTANEOUS at 15:18

## 2019-08-20 RX ADMIN — HEPARIN SODIUM 5000 UNITS: 5000 INJECTION INTRAVENOUS; SUBCUTANEOUS at 20:31

## 2019-08-20 RX ADMIN — IPRATROPIUM BROMIDE AND ALBUTEROL SULFATE 1 AMPULE: .5; 3 SOLUTION RESPIRATORY (INHALATION) at 16:30

## 2019-08-20 RX ADMIN — PANTOPRAZOLE SODIUM 40 MG: 40 INJECTION, POWDER, FOR SOLUTION INTRAVENOUS at 08:43

## 2019-08-20 RX ADMIN — Medication 250 MG: at 05:10

## 2019-08-20 RX ADMIN — HEPARIN SODIUM 5000 UNITS: 5000 INJECTION INTRAVENOUS; SUBCUTANEOUS at 05:11

## 2019-08-20 RX ADMIN — Medication 250 MG: at 00:37

## 2019-08-20 ASSESSMENT — PAIN SCALES - GENERAL
PAINLEVEL_OUTOF10: 0
PAINLEVEL_OUTOF10: 0

## 2019-08-20 NOTE — PROGRESS NOTES
Improved aeration at the right base. Prominence of pulmonary vascularity. Chin position obscures visualization of the apices though no large pneumothorax is seen. Cardiomegaly. Bilateral pleural effusions and airspace disease, slightly increased on the left and decreased on the right as compared to prior. Background pulmonary edema. Xr Chest Portable    Result Date: 8/14/2019  EXAMINATION: ONE XRAY VIEW OF THE CHEST 8/13/2019 11:13 am COMPARISON: 08/13/2019 HISTORY: ORDERING SYSTEM PROVIDED HISTORY: central line placement TECHNOLOGIST PROVIDED HISTORY: Reason for exam:->central line placement FINDINGS: A left IJ central venous catheter has been placed. The tip is near the junction of the innominate vein and superior vena cava. There is no pneumothorax. The lung volumes remain low with bibasilar airspace disease and pleural effusions. The heart size is unchanged. Spinal fusion hardware is noted. A left IJ catheter has its tip at the junction of the innominate vein and superior vena cava. No pneumothorax. Persistent low lung volumes, pulmonary edema, bilateral pleural effusions and bibasilar airspace disease. Xr Chest Portable    Result Date: 8/14/2019  EXAMINATION: ONE XRAY VIEW OF THE CHEST 8/14/2019 5:08 am COMPARISON: 08/13/2019 HISTORY: ORDERING SYSTEM PROVIDED HISTORY: pulm edema TECHNOLOGIST PROVIDED HISTORY: Reason for exam:->pulm edema Reason for Exam: pulm edema Acuity: Unknown Type of Exam: Unknown FINDINGS: The study is limited by motion artifact. The patient's chin also obscures the lung apices. A left-sided central line is present and terminates in the distal left innominate vein/SVC junction. There is cardiomegaly. There is also pulmonary vascular congestion with bilateral pleural effusions. Limited exam. Persistent CHF with bilateral effusions.      Xr Chest Portable    Result Date: 8/13/2019  EXAMINATION: ONE XRAY VIEW OF THE CHEST 8/13/2019 7:13 am COMPARISON: 08/11/2019 HISTORY: ORDERING SYSTEM PROVIDED HISTORY: increased oxygen demands TECHNOLOGIST PROVIDED HISTORY: Reason for exam:->increased oxygen demands Reason for Exam: increased oxygen demands Acuity: Unknown Type of Exam: Unknown FINDINGS: Lung volumes are low accentuating heart size and bronchovascular markings at the lung bases. Patient body habitus limits evaluation of fine pulmonary parenchymal changes. Heart size is enlarged. There are persistent bilateral pleural effusions and bilateral lung consolidation. When compared to prior, there increasing opacity on the right. Spinal fusion hardware is seen     Increased airspace disease on the right. There is persistent pleuroparenchymal disease bilaterally     Xr Chest Portable    Result Date: 8/11/2019  EXAMINATION: ONE XRAY VIEW OF THE CHEST 8/11/2019 12:38 pm COMPARISON: July 30, 2019 HISTORY: ORDERING SYSTEM PROVIDED HISTORY: sob TECHNOLOGIST PROVIDED HISTORY: Reason for exam:->sob Reason for Exam: sob Acuity: Acute Type of Exam: Initial FINDINGS: There is a large amount of bibasilar airspace disease, increased on the right, unchanged on the left. Adjacent pleural effusion present bilaterally, appears slightly increased on the right, unchanged on the left. Increased large amount of bibasilar airspace disease, with adjacent pleural effusions, increased on the right over the past 2 weeks, unchanged on the left. The basilar airspace disease is due to atelectasis, with suspected superimposed pulmonary edema     Xr Chest Portable    Result Date: 7/30/2019  EXAMINATION: ONE XRAY VIEW OF THE CHEST 7/30/2019 3:14 pm COMPARISON: 07/29/2019 HISTORY: ORDERING SYSTEM PROVIDED HISTORY: sepsis TECHNOLOGIST PROVIDED HISTORY: Reason for exam:->sepsis Reason for Exam: sepsis Acuity: Acute Type of Exam: Initial FINDINGS: Stable cardiomegaly. Bibasilar opacification unchanged. No new airspace disease. No pulmonary vascular congestion. No pneumothorax.      Stable

## 2019-08-20 NOTE — PROGRESS NOTES
rehabilitation services?: Yes  Response to previous treatment: Patient with no complaints from previous session  Family / Caregiver Present: No  Referring Practitioner: Mary Rothman  Diagnosis: acute renal failure  Subjective  Subjective: \"I am not walking, I want to go back to bed\"  General Comment  Comments: RN cleared pt for therapy; pt up in chair, refused ambulation with Physical Therapy      Orientation     Objective    ADL  Toileting: Dependent/Total(cardoza catheter & rectal tube)        Balance  Sitting Balance: Supervision  Standing Balance: Minimal assistance(of 1 & assist of 2nd for managing multiple lines & IV pole)  Standing Balance  Time: 1-2 minutes  Activity: chair to bed  Comment: pt continues with loose, water stool leaking around rectal tube at rest in bed & standing  Bed mobility  Supine to Sit: Unable to assess(already up in chair)  Sit to Supine:  Moderate assistance(with HOB elevated , mod assist with jono LE)  Scooting: Contact guard assistance  Transfers  Stand Pivot Transfers: Minimal assistance(of 1 & assist of 2nd to manage multiple lines & IV pole)  Sit to stand: Minimal assistance  Stand to sit: Contact guard assistance              Plan   Plan  Times per week: 3-5x/week   Current Treatment Recommendations: Strengthening, Balance Training, Functional Mobility Training, Safety Education & Training, Self-Care / ADL, Endurance Training           AM-PAC Score        AM-[facility] Inpatient Daily Activity Raw Score: 11 (08/20/19 1336)  AM-PAC Inpatient ADL T-Scale Score : 29.04 (08/20/19 1336)  ADL Inpatient CMS 0-100% Score: 70.42 (08/20/19 1336)  ADL Inpatient CMS G-Code Modifier : CL (08/20/19 1336)    Goals  Short term goals  Time Frame for Short term goals: 1 week   Short term goal 1: Pt will complete LE dressing with Lupe  Short term goal 2: Pt will complete toilet transfers with Lupe by 8/27  Short term goal 3: NEW GOAL: Pt will perform 2-3 minutes dynamic standing activity with SBA   Patient Goals   Patient goals : \"to go home\"       Therapy Time   Individual Concurrent Group Co-treatment   Time In [address] [facility]         Time Out [address]         Minutes [address] Katiana Bueno, Reyna Gomez

## 2019-08-20 NOTE — PROGRESS NOTES
Clostridium difficile infection, Hypercholesteremia, Hypertension, Mediastinal adenopathy, Pneumonia due to organism, and Pulmonary emphysema ([facility] [address] has a past surgical history that includes Upper gastrointestinal endoscopy (1/10/2011); skin biopsy; back surgery; and Upper gastrointestinal endoscopy (N/A, 5/1/2019). Restrictions  Restrictions/Precautions  Restrictions/Precautions: General Precautions, Fall Risk, Contact Precautions  Position Activity Restriction  Other position/activity restrictions: High fall risk per nursing assessment, up with assistance, contact(+) precautions 2* C. diff, 5 L O2, Bermudez, rectal tube  Subjective   General  Chart Reviewed: Yes  Response To Previous Treatment: Patient reporting fatigue but able to participate. Family / Caregiver Present: No  Referring Practitioner: Dr. Diana Edward  Subjective  Subjective: Pt agreed to PT treatment with encouragement. General Comment  Comments: RN cleared pt to participate. Patient in chair at beginning of PT treatment session. Pain Screening  Patient Currently in Pain: Denies  Vital Signs  Patient Currently in Pain: Denies       Orientation  Orientation  Overall Orientation Status: Within Normal Limits  Cognition   Cognition  Overall Cognitive Status: Exceptions  Arousal/Alertness: Appropriate responses to stimuli  Following Commands: Follows one step commands consistently  Attention Span: Appears intact  Memory: Decreased recall of precautions  Safety Judgement: Decreased awareness of need for assistance;Decreased awareness of need for safety  Insights: Decreased awareness of deficits  Initiation: Requires cues for some  Sequencing: Does not require cues  Cognition Comment: Pt agreeable to tasks with encouragement throughout session  Objective   Bed mobility  Rolling to Left: Minimal assistance  Rolling to Right: Minimal assistance  Sit to Supine:  Moderate assistance(to lift bilateral lower extremities)  Scooting: Contact guard

## 2019-08-20 NOTE — PROGRESS NOTES
WBC 14.6*  --  16.0* 16.1*   HGB 8.3*  --  8.3* 8.9*   HCT 24.8* 26.5* 25.0* 26.5*     --  328 327          Recent Labs     08/18/19  0458  08/19/19  0418 08/19/19  2300 08/20/19  0500      < > 141 140 142   K 4.2   < > 4.2 4.1 4.1      < > 100 97* 99   CO2 29   < > 32 33* 35*   BUN 30*   < > 29* 28* 26*   CREATININE 1.5*   < > 1.2 1.2 1.2   GLUCOSE 127*   < > 105* 120* 177*   MG  --   --   --   --  1.70*   PHOS 3.3  --  3.7  --  3.4    < > = values in this interval not displayed.

## 2019-08-21 LAB
A/G RATIO: 0.9 (ref 1.1–2.2)
ALBUMIN SERPL-MCNC: 2.8 G/DL (ref 3.4–5)
ALP BLD-CCNC: 218 U/L (ref 40–129)
ALT SERPL-CCNC: 30 U/L (ref 10–40)
ANION GAP SERPL CALCULATED.3IONS-SCNC: 9 MMOL/L (ref 3–16)
ANISOCYTOSIS: ABNORMAL
AST SERPL-CCNC: 41 U/L (ref 15–37)
BANDED NEUTROPHILS RELATIVE PERCENT: 1 % (ref 0–7)
BASOPHILIC STIPPLING: ABNORMAL
BASOPHILS ABSOLUTE: 0 K/UL (ref 0–0.2)
BASOPHILS RELATIVE PERCENT: 0 %
BILIRUB SERPL-MCNC: 0.6 MG/DL (ref 0–1)
BUN BLDV-MCNC: 21 MG/DL (ref 7–20)
BURR CELLS: ABNORMAL
CALCIUM SERPL-MCNC: 8.8 MG/DL (ref 8.3–10.6)
CHLORIDE BLD-SCNC: 98 MMOL/L (ref 99–110)
CO2: 35 MMOL/L (ref 21–32)
CREAT SERPL-MCNC: 0.9 MG/DL (ref 0.8–1.3)
CULTURE, RESPIRATORY: NORMAL
EOSINOPHILS ABSOLUTE: 0 K/UL (ref 0–0.6)
EOSINOPHILS RELATIVE PERCENT: 0 %
ERYTHROPOIETIN: 12 MU/ML (ref 4–27)
GFR AFRICAN AMERICAN: >60
GFR NON-AFRICAN AMERICAN: >60
GLOBULIN: 3.1 G/DL
GLUCOSE BLD-MCNC: 115 MG/DL (ref 70–99)
GRAM STAIN RESULT: NORMAL
HCT VFR BLD CALC: 26.1 % (ref 40.5–52.5)
HEMOGLOBIN: 9 G/DL (ref 13.5–17.5)
HYPOCHROMIA: ABNORMAL
LYMPHOCYTES ABSOLUTE: 1.7 K/UL (ref 1–5.1)
LYMPHOCYTES RELATIVE PERCENT: 11 %
MACROCYTES: ABNORMAL
MCH RBC QN AUTO: 34.9 PG (ref 26–34)
MCHC RBC AUTO-ENTMCNC: 34.4 G/DL (ref 31–36)
MCV RBC AUTO: 101.7 FL (ref 80–100)
MONOCYTES ABSOLUTE: 0.2 K/UL (ref 0–1.3)
MONOCYTES RELATIVE PERCENT: 1 %
NEUTROPHILS ABSOLUTE: 14 K/UL (ref 1.7–7.7)
NEUTROPHILS RELATIVE PERCENT: 87 %
PDW BLD-RTO: 16.9 % (ref 12.4–15.4)
PHOSPHORUS: 3.9 MG/DL (ref 2.5–4.9)
PLATELET # BLD: 371 K/UL (ref 135–450)
PLATELET SLIDE REVIEW: ADEQUATE
PMV BLD AUTO: 8.4 FL (ref 5–10.5)
POLYCHROMASIA: ABNORMAL
POTASSIUM REFLEX MAGNESIUM: 4.6 MMOL/L (ref 3.5–5.1)
RBC # BLD: 2.57 M/UL (ref 4.2–5.9)
SLIDE REVIEW: ABNORMAL
SODIUM BLD-SCNC: 142 MMOL/L (ref 136–145)
STOMATOCYTES: ABNORMAL
TARGET CELLS: ABNORMAL
TEAR DROP CELLS: ABNORMAL
TOTAL PROTEIN: 5.9 G/DL (ref 6.4–8.2)
TOXIC GRANULATION: PRESENT
WBC # BLD: 15.9 K/UL (ref 4–11)

## 2019-08-21 PROCEDURE — 99233 SBSQ HOSP IP/OBS HIGH 50: CPT | Performed by: INTERNAL MEDICINE

## 2019-08-21 PROCEDURE — 84100 ASSAY OF PHOSPHORUS: CPT

## 2019-08-21 PROCEDURE — 6370000000 HC RX 637 (ALT 250 FOR IP): Performed by: INTERNAL MEDICINE

## 2019-08-21 PROCEDURE — 2580000003 HC RX 258: Performed by: INTERNAL MEDICINE

## 2019-08-21 PROCEDURE — 80053 COMPREHEN METABOLIC PANEL: CPT

## 2019-08-21 PROCEDURE — 6360000002 HC RX W HCPCS: Performed by: INTERNAL MEDICINE

## 2019-08-21 PROCEDURE — 94669 MECHANICAL CHEST WALL OSCILL: CPT

## 2019-08-21 PROCEDURE — 2060000000 HC ICU INTERMEDIATE R&B

## 2019-08-21 PROCEDURE — 85025 COMPLETE CBC W/AUTO DIFF WBC: CPT

## 2019-08-21 PROCEDURE — C9113 INJ PANTOPRAZOLE SODIUM, VIA: HCPCS | Performed by: INTERNAL MEDICINE

## 2019-08-21 PROCEDURE — 94761 N-INVAS EAR/PLS OXIMETRY MLT: CPT

## 2019-08-21 PROCEDURE — 94640 AIRWAY INHALATION TREATMENT: CPT

## 2019-08-21 PROCEDURE — 2700000000 HC OXYGEN THERAPY PER DAY

## 2019-08-21 PROCEDURE — 36415 COLL VENOUS BLD VENIPUNCTURE: CPT

## 2019-08-21 RX ORDER — DEXAMETHASONE 4 MG/1
4 TABLET ORAL DAILY
Status: DISCONTINUED | OUTPATIENT
Start: 2019-08-22 | End: 2019-08-23

## 2019-08-21 RX ADMIN — HEPARIN SODIUM 5000 UNITS: 5000 INJECTION INTRAVENOUS; SUBCUTANEOUS at 21:21

## 2019-08-21 RX ADMIN — IPRATROPIUM BROMIDE AND ALBUTEROL SULFATE 1 AMPULE: .5; 3 SOLUTION RESPIRATORY (INHALATION) at 16:56

## 2019-08-21 RX ADMIN — FOLIC ACID 1000 MCG: 1 TABLET ORAL at 08:51

## 2019-08-21 RX ADMIN — Medication 10 ML: at 08:51

## 2019-08-21 RX ADMIN — IPRATROPIUM BROMIDE AND ALBUTEROL SULFATE 1 AMPULE: .5; 3 SOLUTION RESPIRATORY (INHALATION) at 19:49

## 2019-08-21 RX ADMIN — Medication 250 MG: at 17:07

## 2019-08-21 RX ADMIN — DEXAMETHASONE 4 MG: 4 TABLET ORAL at 08:51

## 2019-08-21 RX ADMIN — Medication 250 MG: at 11:59

## 2019-08-21 RX ADMIN — SPIRONOLACTONE 50 MG: 25 TABLET ORAL at 08:51

## 2019-08-21 RX ADMIN — HEPARIN SODIUM 5000 UNITS: 5000 INJECTION INTRAVENOUS; SUBCUTANEOUS at 14:00

## 2019-08-21 RX ADMIN — Medication 10 ML: at 21:21

## 2019-08-21 RX ADMIN — Medication 250 MG: at 05:38

## 2019-08-21 RX ADMIN — FUROSEMIDE 40 MG: 10 INJECTION, SOLUTION INTRAMUSCULAR; INTRAVENOUS at 17:07

## 2019-08-21 RX ADMIN — TAMSULOSIN HYDROCHLORIDE 0.4 MG: 0.4 CAPSULE ORAL at 08:51

## 2019-08-21 RX ADMIN — FUROSEMIDE 40 MG: 10 INJECTION, SOLUTION INTRAMUSCULAR; INTRAVENOUS at 08:51

## 2019-08-21 RX ADMIN — POTASSIUM CHLORIDE 40 MEQ: 20 TABLET, EXTENDED RELEASE ORAL at 08:51

## 2019-08-21 RX ADMIN — IPRATROPIUM BROMIDE AND ALBUTEROL SULFATE 1 AMPULE: .5; 3 SOLUTION RESPIRATORY (INHALATION) at 09:05

## 2019-08-21 RX ADMIN — Medication 250 MG: at 23:09

## 2019-08-21 RX ADMIN — Medication 250 MG: at 08:50

## 2019-08-21 RX ADMIN — HEPARIN SODIUM 5000 UNITS: 5000 INJECTION INTRAVENOUS; SUBCUTANEOUS at 05:38

## 2019-08-21 RX ADMIN — LEVOTHYROXINE SODIUM 125 MCG: 100 TABLET ORAL at 05:38

## 2019-08-21 RX ADMIN — IPRATROPIUM BROMIDE AND ALBUTEROL SULFATE 1 AMPULE: .5; 3 SOLUTION RESPIRATORY (INHALATION) at 12:41

## 2019-08-21 RX ADMIN — PANTOPRAZOLE SODIUM 40 MG: 40 INJECTION, POWDER, FOR SOLUTION INTRAVENOUS at 08:51

## 2019-08-21 RX ADMIN — Medication 250 MG: at 21:21

## 2019-08-21 ASSESSMENT — PAIN SCALES - GENERAL
PAINLEVEL_OUTOF10: 0

## 2019-08-21 NOTE — PROGRESS NOTES
08/20/19 2310   NIV Type   Equipment Type V60   Mode Bilevel   Mask Type Full face mask   Mask Size Medium   Settings/Measurements   IPAP 12 cmH20   CPAP/EPAP 6 cmH2O   Rate Ordered 6   Resp 17   FiO2  50 %   Vt Exhaled 752 mL   Mask Leak (lpm) 19 lpm   Comfort Level Good   Using Accessory Muscles No   SpO2 97   Breath Sounds   Right Upper Lobe Diminished   Right Middle Lobe Diminished   Right Lower Lobe Diminished   Left Upper Lobe Diminished   Left Lower Lobe Diminished   Patient Observation   Observations Mepilex in place   Alarm Settings   Alarms On Y   Press Low Alarm 6 cmH2O   High Pressure Alarm 25 cmH2O   Delay Alarm 20 sec(s)   Resp Rate Low Alarm 6   High Respiratory Rate 40 br/min

## 2019-08-21 NOTE — PROGRESS NOTES
08/19/19  0418  08/20/19  0500 08/20/19  2050 08/21/19  0600      < > 142 139 142   K 4.2   < > 4.1 4.6 4.6      < > 99 96* 98*   CO2 32   < > 35* 32 35*   BUN 29*   < > 26* 23* 21*   CREATININE 1.2   < > 1.2 1.0 0.9   GLUCOSE 105*   < > 177* 116* 115*   MG  --   --  1.70*  --   --    PHOS 3.7  --  3.4  --  3.9    < > = values in this interval not displayed.

## 2019-08-21 NOTE — PROGRESS NOTES
Date    NITRU Negative 08/11/2019    WBCUA 0-2 08/11/2019    BACTERIA 2+ 08/11/2019    RBCUA 3-5 08/11/2019    BLOODU Negative 08/11/2019    SPECGRAV 1.020 08/11/2019    GLUCOSEU Negative 08/11/2019       Radiology:  XR CHEST PORTABLE   Final Result   Continued increased central and basilar opacity, with bilateral pleural   effusions, unchanged when compared to the previous exam, most compatible with   pulmonary edema. Nonetheless, correlate with any clinical evidence of   superimposed pneumonia. XR CHEST PORTABLE   Final Result   No significant change in effusions. XR CHEST PORTABLE   Final Result   Worsening of pleural effusions         XR CHEST PORTABLE   Final Result   Bilateral pleural effusions and airspace disease, slightly increased on the   left and decreased on the right as compared to prior. Background pulmonary   edema. XR CHEST PORTABLE   Final Result   1. Study limited, as detailed above. 2. Stable position of a left internal jugular central venous catheter with   tip overlying the brachiocephalic vein. 3. No significant change in appearance of moderate CHF and multifocal   pneumonia. XR CHEST PORTABLE   Final Result   Limited exam.      Persistent CHF with bilateral effusions. XR CHEST PORTABLE   Final Result   A left IJ catheter has its tip at the junction of the innominate vein and   superior vena cava. No pneumothorax. Persistent low lung volumes, pulmonary edema, bilateral pleural effusions and   bibasilar airspace disease. XR CHEST PORTABLE   Final Result   Increased airspace disease on the right. There is persistent   pleuroparenchymal disease bilaterally         CT ABDOMEN PELVIS WO CONTRAST Additional Contrast? None   Final Result   Inflammatory changes surrounding the distal sigmoid colon suggesting a mild   colitis. Consider infectious and inflammatory etiologies. No bowel   obstruction. Moderate bilateral pleural effusions. Associated bibasilar lung opacities   are nonspecific and may represent atelectasis versus pneumonia. XR CHEST PORTABLE   Final Result   Increased large amount of bibasilar airspace disease, with adjacent pleural   effusions, increased on the right over the past 2 weeks, unchanged on the   left. 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 Continued to monitor     Acute resp failure with hypoxia-   conerns for mucus plugging  -pulm consulted, apprec recs, bronch done 8/19     Anemia   Required transfusion on admission, then stabilized but not improved  Hematology consult requested, felt multifactorial(liver dz/cirrhosis, mckenna, etoh, infection) suspect ACD     Acute renal failure  Nephrology input appreciated. Creatinine stabilized near baseline.   -elevated light chains, likely from [facility], repeat labs in 3-6 months     Hyponatremia  Resolved with medication management and fluid supplementation  Continue to monitor daily basic metabolic panel     C. difficile colitis  Continue oral vancomycin.  stabilized  -had rectal tube in place    DVT Prophylaxis: heparin  Diet: DIET LOW SODIUM 2 GM; 1800 ml  Code Status: Full Code    PT/OT Eval Status: rec snf    Dispo - pending further pulm recs, hopefully by 8/23, rectal tube

## 2019-08-22 LAB
A/G RATIO: 0.9 (ref 1.1–2.2)
A/G RATIO: 0.9 (ref 1.1–2.2)
A/G RATIO: 1 (ref 1.1–2.2)
ALBUMIN SERPL-MCNC: 2.8 G/DL (ref 3.4–5)
ALBUMIN SERPL-MCNC: 2.8 G/DL (ref 3.4–5)
ALBUMIN SERPL-MCNC: 2.9 G/DL (ref 3.4–5)
ALP BLD-CCNC: 201 U/L (ref 40–129)
ALP BLD-CCNC: 201 U/L (ref 40–129)
ALP BLD-CCNC: 213 U/L (ref 40–129)
ALT SERPL-CCNC: 30 U/L (ref 10–40)
ALT SERPL-CCNC: 32 U/L (ref 10–40)
ALT SERPL-CCNC: 35 U/L (ref 10–40)
ANION GAP SERPL CALCULATED.3IONS-SCNC: 10 MMOL/L (ref 3–16)
ANION GAP SERPL CALCULATED.3IONS-SCNC: 8 MMOL/L (ref 3–16)
ANION GAP SERPL CALCULATED.3IONS-SCNC: 8 MMOL/L (ref 3–16)
AST SERPL-CCNC: 48 U/L (ref 15–37)
AST SERPL-CCNC: 51 U/L (ref 15–37)
AST SERPL-CCNC: 54 U/L (ref 15–37)
BASOPHILS ABSOLUTE: 0 K/UL (ref 0–0.2)
BASOPHILS RELATIVE PERCENT: 0.3 %
BILIRUB SERPL-MCNC: 0.6 MG/DL (ref 0–1)
BUN BLDV-MCNC: 19 MG/DL (ref 7–20)
BUN BLDV-MCNC: 20 MG/DL (ref 7–20)
BUN BLDV-MCNC: 20 MG/DL (ref 7–20)
C282Y HEMOCHROMATOSIS MUT: NORMAL
CALCIUM SERPL-MCNC: 8.7 MG/DL (ref 8.3–10.6)
CALCIUM SERPL-MCNC: 8.8 MG/DL (ref 8.3–10.6)
CALCIUM SERPL-MCNC: 9 MG/DL (ref 8.3–10.6)
CHLORIDE BLD-SCNC: 94 MMOL/L (ref 99–110)
CHLORIDE BLD-SCNC: 96 MMOL/L (ref 99–110)
CHLORIDE BLD-SCNC: 98 MMOL/L (ref 99–110)
CO2: 35 MMOL/L (ref 21–32)
CO2: 37 MMOL/L (ref 21–32)
CO2: 37 MMOL/L (ref 21–32)
CREAT SERPL-MCNC: 1 MG/DL (ref 0.8–1.3)
CREAT SERPL-MCNC: 1.1 MG/DL (ref 0.8–1.3)
CREAT SERPL-MCNC: 1.2 MG/DL (ref 0.8–1.3)
EOSINOPHILS ABSOLUTE: 0.1 K/UL (ref 0–0.6)
EOSINOPHILS RELATIVE PERCENT: 0.6 %
GFR AFRICAN AMERICAN: >60
GFR NON-AFRICAN AMERICAN: 59
GFR NON-AFRICAN AMERICAN: >60
GFR NON-AFRICAN AMERICAN: >60
GLOBULIN: 3 G/DL
GLOBULIN: 3 G/DL
GLOBULIN: 3.1 G/DL
GLUCOSE BLD-MCNC: 132 MG/DL (ref 70–99)
GLUCOSE BLD-MCNC: 97 MG/DL (ref 70–99)
GLUCOSE BLD-MCNC: 99 MG/DL (ref 70–99)
H63D HEMOCHROMATOSIS MUT: NORMAL
HCT VFR BLD CALC: 26.8 % (ref 40.5–52.5)
HEMOCHROMATOSIS GENE ANALYSIS: NORMAL
HEMOGLOBIN: 8.8 G/DL (ref 13.5–17.5)
HFE PCR SPECIMEN: NORMAL
LYMPHOCYTES ABSOLUTE: 1.9 K/UL (ref 1–5.1)
LYMPHOCYTES RELATIVE PERCENT: 13.6 %
MAGNESIUM: 1.8 MG/DL (ref 1.8–2.4)
MCH RBC QN AUTO: 33.7 PG (ref 26–34)
MCHC RBC AUTO-ENTMCNC: 32.8 G/DL (ref 31–36)
MCV RBC AUTO: 102.6 FL (ref 80–100)
MONOCYTES ABSOLUTE: 0.8 K/UL (ref 0–1.3)
MONOCYTES RELATIVE PERCENT: 5.5 %
NEUTROPHILS ABSOLUTE: 11.4 K/UL (ref 1.7–7.7)
NEUTROPHILS RELATIVE PERCENT: 80 %
PDW BLD-RTO: 17 % (ref 12.4–15.4)
PHOSPHORUS: 3.8 MG/DL (ref 2.5–4.9)
PLATELET # BLD: 369 K/UL (ref 135–450)
PMV BLD AUTO: 8.2 FL (ref 5–10.5)
POTASSIUM REFLEX MAGNESIUM: 4.8 MMOL/L (ref 3.5–5.1)
POTASSIUM REFLEX MAGNESIUM: 4.9 MMOL/L (ref 3.5–5.1)
POTASSIUM REFLEX MAGNESIUM: 5 MMOL/L (ref 3.5–5.1)
RBC # BLD: 2.62 M/UL (ref 4.2–5.9)
S65C HEMOCHROMATOSIS MUT: NEGATIVE
SODIUM BLD-SCNC: 139 MMOL/L (ref 136–145)
SODIUM BLD-SCNC: 141 MMOL/L (ref 136–145)
SODIUM BLD-SCNC: 143 MMOL/L (ref 136–145)
T3 TOTAL: 0.51 NG/ML (ref 0.8–2)
T4 FREE: 0.9 NG/DL (ref 0.9–1.8)
TOTAL PROTEIN: 5.8 G/DL (ref 6.4–8.2)
TOTAL PROTEIN: 5.9 G/DL (ref 6.4–8.2)
TOTAL PROTEIN: 5.9 G/DL (ref 6.4–8.2)
TSH SERPL DL<=0.05 MIU/L-ACNC: 72.85 UIU/ML (ref 0.27–4.2)
WBC # BLD: 14.2 K/UL (ref 4–11)

## 2019-08-22 PROCEDURE — 6370000000 HC RX 637 (ALT 250 FOR IP): Performed by: INTERNAL MEDICINE

## 2019-08-22 PROCEDURE — 2580000003 HC RX 258: Performed by: INTERNAL MEDICINE

## 2019-08-22 PROCEDURE — 84480 ASSAY TRIIODOTHYRONINE (T3): CPT

## 2019-08-22 PROCEDURE — 97110 THERAPEUTIC EXERCISES: CPT

## 2019-08-22 PROCEDURE — 94669 MECHANICAL CHEST WALL OSCILL: CPT

## 2019-08-22 PROCEDURE — 99232 SBSQ HOSP IP/OBS MODERATE 35: CPT | Performed by: INTERNAL MEDICINE

## 2019-08-22 PROCEDURE — 84439 ASSAY OF FREE THYROXINE: CPT

## 2019-08-22 PROCEDURE — 6360000002 HC RX W HCPCS: Performed by: INTERNAL MEDICINE

## 2019-08-22 PROCEDURE — 80053 COMPREHEN METABOLIC PANEL: CPT

## 2019-08-22 PROCEDURE — 2060000000 HC ICU INTERMEDIATE R&B

## 2019-08-22 PROCEDURE — 83735 ASSAY OF MAGNESIUM: CPT

## 2019-08-22 PROCEDURE — 84443 ASSAY THYROID STIM HORMONE: CPT

## 2019-08-22 PROCEDURE — 84100 ASSAY OF PHOSPHORUS: CPT

## 2019-08-22 PROCEDURE — 94640 AIRWAY INHALATION TREATMENT: CPT

## 2019-08-22 PROCEDURE — 85025 COMPLETE CBC W/AUTO DIFF WBC: CPT

## 2019-08-22 PROCEDURE — 82024 ASSAY OF ACTH: CPT

## 2019-08-22 PROCEDURE — C9113 INJ PANTOPRAZOLE SODIUM, VIA: HCPCS | Performed by: INTERNAL MEDICINE

## 2019-08-22 PROCEDURE — 2700000000 HC OXYGEN THERAPY PER DAY

## 2019-08-22 PROCEDURE — 94761 N-INVAS EAR/PLS OXIMETRY MLT: CPT

## 2019-08-22 PROCEDURE — 36415 COLL VENOUS BLD VENIPUNCTURE: CPT

## 2019-08-22 RX ORDER — TORSEMIDE 20 MG/1
40 TABLET ORAL DAILY
Status: DISCONTINUED | OUTPATIENT
Start: 2019-08-22 | End: 2019-08-25 | Stop reason: HOSPADM

## 2019-08-22 RX ADMIN — HEPARIN SODIUM 5000 UNITS: 5000 INJECTION INTRAVENOUS; SUBCUTANEOUS at 21:50

## 2019-08-22 RX ADMIN — IPRATROPIUM BROMIDE AND ALBUTEROL SULFATE 1 AMPULE: .5; 3 SOLUTION RESPIRATORY (INHALATION) at 08:35

## 2019-08-22 RX ADMIN — Medication 250 MG: at 08:52

## 2019-08-22 RX ADMIN — IPRATROPIUM BROMIDE AND ALBUTEROL SULFATE 1 AMPULE: .5; 3 SOLUTION RESPIRATORY (INHALATION) at 20:26

## 2019-08-22 RX ADMIN — DEXAMETHASONE 4 MG: 4 TABLET ORAL at 08:52

## 2019-08-22 RX ADMIN — Medication 250 MG: at 23:40

## 2019-08-22 RX ADMIN — Medication 250 MG: at 21:50

## 2019-08-22 RX ADMIN — HEPARIN SODIUM 5000 UNITS: 5000 INJECTION INTRAVENOUS; SUBCUTANEOUS at 05:16

## 2019-08-22 RX ADMIN — POTASSIUM CHLORIDE 40 MEQ: 20 TABLET, EXTENDED RELEASE ORAL at 08:52

## 2019-08-22 RX ADMIN — Medication 250 MG: at 18:11

## 2019-08-22 RX ADMIN — Medication 250 MG: at 05:36

## 2019-08-22 RX ADMIN — TORSEMIDE 40 MG: 20 TABLET ORAL at 13:21

## 2019-08-22 RX ADMIN — LEVOTHYROXINE SODIUM 125 MCG: 100 TABLET ORAL at 05:16

## 2019-08-22 RX ADMIN — SPIRONOLACTONE 50 MG: 25 TABLET ORAL at 08:52

## 2019-08-22 RX ADMIN — HEPARIN SODIUM 5000 UNITS: 5000 INJECTION INTRAVENOUS; SUBCUTANEOUS at 13:20

## 2019-08-22 RX ADMIN — FUROSEMIDE 40 MG: 10 INJECTION, SOLUTION INTRAMUSCULAR; INTRAVENOUS at 08:53

## 2019-08-22 RX ADMIN — PANTOPRAZOLE SODIUM 40 MG: 40 INJECTION, POWDER, FOR SOLUTION INTRAVENOUS at 08:53

## 2019-08-22 RX ADMIN — Medication 250 MG: at 13:21

## 2019-08-22 RX ADMIN — ACETAMINOPHEN 650 MG: 325 TABLET ORAL at 10:27

## 2019-08-22 RX ADMIN — Medication 10 ML: at 08:52

## 2019-08-22 RX ADMIN — Medication 10 ML: at 21:50

## 2019-08-22 RX ADMIN — IPRATROPIUM BROMIDE AND ALBUTEROL SULFATE 1 AMPULE: .5; 3 SOLUTION RESPIRATORY (INHALATION) at 12:03

## 2019-08-22 RX ADMIN — TAMSULOSIN HYDROCHLORIDE 0.4 MG: 0.4 CAPSULE ORAL at 08:52

## 2019-08-22 RX ADMIN — FOLIC ACID 1000 MCG: 1 TABLET ORAL at 08:52

## 2019-08-22 RX ADMIN — Medication 10 ML: at 08:53

## 2019-08-22 RX ADMIN — ACETAMINOPHEN 650 MG: 325 TABLET ORAL at 06:12

## 2019-08-22 RX ADMIN — IPRATROPIUM BROMIDE AND ALBUTEROL SULFATE 1 AMPULE: .5; 3 SOLUTION RESPIRATORY (INHALATION) at 16:57

## 2019-08-22 ASSESSMENT — PAIN SCALES - GENERAL
PAINLEVEL_OUTOF10: 8
PAINLEVEL_OUTOF10: 0
PAINLEVEL_OUTOF10: 9
PAINLEVEL_OUTOF10: 0
PAINLEVEL_OUTOF10: 8
PAINLEVEL_OUTOF10: 8
PAINLEVEL_OUTOF10: 0
PAINLEVEL_OUTOF10: 0

## 2019-08-22 ASSESSMENT — PAIN DESCRIPTION - PAIN TYPE: TYPE: CHRONIC PAIN

## 2019-08-22 ASSESSMENT — PAIN DESCRIPTION - DESCRIPTORS
DESCRIPTORS: HEADACHE
DESCRIPTORS: HEADACHE

## 2019-08-22 ASSESSMENT — PAIN DESCRIPTION - LOCATION
LOCATION: HEAD
LOCATION: HEAD

## 2019-08-22 NOTE — PROGRESS NOTES
Occupational Therapy  Facility/Department: [place] [address] [facility]U  Daily Treatment Note/Discharge Summary  NAME: Sallie Woodard  : 1945  MRN: [id number]    Date of Service: 2019    Discharge Recommendations:  24 hour supervision or assist     Assessment   Assessment: Pt limited his participation to performing 2 UE exercises in bed. He became agitated during session though attempts were made to modify activity to pt's tolerance. Pt states that he does not want anymore therapy services. RN and case management notified. Per case mgmt, pt and his daughter plan for home discharge. Pt will need 24/ care for ADLs at d/c. OT will d/c pt today per pt request.   Prognosis: Guarded  OT Education: OT Role  REQUIRES OT FOLLOW UP: No  Activity Tolerance  Activity Tolerance: Patient limited by fatigue;Treatment limited secondary to agitation  Safety Devices  Type of devices: Bed alarm in place;Call light within reach; Left in bed;Nurse notified       Patient Diagnosis(es): The primary encounter diagnosis was Hypotension, unspecified hypotension type. Diagnoses of Acute kidney injury ([address] Fall, initial encounter, Wound of left buttock, initial encounter, and Leukocytosis, unspecified type were also pertinent to this visit. has a past medical history of Alcohol abuse, Alcohol abuse, ARF (acute renal failure) ([address] CHF (congestive heart failure) ([address] CKD (chronic kidney disease), Clostridium difficile infection, Hypercholesteremia, Hypertension, Mediastinal adenopathy, Pneumonia due to organism, and Pulmonary emphysema ([address] has a past surgical history that includes Upper gastrointestinal endoscopy (1/10/2011); skin biopsy; back surgery; and Upper gastrointestinal endoscopy (N/A, 2019).     Restrictions  Restrictions/Precautions  Restrictions/Precautions: General Precautions, Fall Risk, Contact Precautions  Position Activity Restriction  Other position/activity restrictions: High fall risk per nursing assessment, up with assistance, contact(+) precautions 2* C. diff, 3L O2, Bermudez, rectal tube  Subjective   General  Chart Reviewed: Yes  Patient assessed for rehabilitation services?: Yes  Response to previous treatment: Patient with no complaints from previous session  Family / Caregiver Present: No  Referring Practitioner: Alma Daigle  Diagnosis: acute renal failure  Subjective  Subjective: Pt resting in bed at OT arrival.   General Comment  Comments: RN cleared pt for therapy  Pain Assessment  Pain Level: 8  Pain Location: Head  Pain Descriptors: Headache  Pre Treatment Pain Screening  Intervention List: Patient declined any intervention;Patient unable to be redirected to participate in therapy   Orientation  Orientation  Overall Orientation Status: Within Functional Limits  Objective    ADL  Additional Comments: declines any ADLs     Standing Balance  Activity: Pt refuses to sit EOB or transfer to chair. Pt states that he has a headache and did not feel like getting out of bed. Pt encouraged to participate as much as he could tolerate. He agreed to bedlevel exercises only. Comment: OT offered to modify activity to pt's tolerance. Pt continued to refuse. Bed mobility  Comment: Pt refused repositioning in bed. Cognition  Following Commands: Follows one step commands consistently  Cognition Comment: Pt needed encouragement to participate. Type of ROM/Therapeutic Exercise  Type of ROM/Therapeutic Exercise: AROM  Comment: Pt performed 2 UE exercises in semi-reclined position in bed. Pt became upset, \"You guys are a pain in my ass. \"  Exercises  Shoulder Flexion: x10  Elbow Flexion: x 10 reps  Elbow Extension: x 10 reps      Plan   Plan  Times per week: no further OT  Current Treatment Recommendations: Strengthening, Balance Training, Functional Mobility Training, Safety Education & Training, Self-Care / ADL, Endurance Training    AM-PAC Score   AM-PAC Inpatient Daily Activity Raw Score: 11 (08/22/19 7216)  AM-PAC

## 2019-08-22 NOTE — PROGRESS NOTES
Associated bibasilar lung opacities   are nonspecific and may represent atelectasis versus pneumonia. XR CHEST PORTABLE   Final Result   Increased large amount of bibasilar airspace disease, with adjacent pleural   effusions, increased on the right over the past 2 weeks, unchanged on the   left. 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 Continued to monitor   was on iv diuretics for edema, switched to po lasix on 8/22    Acute resp failure with hypoxia-   conerns for mucus plugging  -pulm consulted, apprec recs, bronch done 8/19     Anemia   Required transfusion on admission, then stabilized but not improved  Hematology consult requested, felt multifactorial(liver dz/cirrhosis, mckenna, etoh, infection) suspect ACD     Acute renal failure  Nephrology input appreciated. Creatinine stabilized near baseline.   -elevated light chains, likely from [facility], repeat labs in 3-6 months     Hyponatremia  Resolved with medication management and fluid supplementation  Continue to monitor daily basic metabolic panel     C. difficile colitis  Continue oral vancomycin.  stabilized  -had rectal tube in place       DVT Prophylaxis: heparin  Diet: DIET LOW SODIUM 2 GM; 1800 ml  Dietary Nutrition Supplements: Frozen Oral

## 2019-08-22 NOTE — PROGRESS NOTES
INPATIENT PULMONARY CRITICAL CARE PROGRESS NOTE      Reason for visit: Acute respiratory failure, acute pulmonary edema, MCKENNA, sepsis, severe hypothyroidism, C. difficile colitis, anemia. Previous smoker, alcoholic. Bronchoscopy performed 8/19/2019 for mucous plugging. SUBJECTIVE: Remains afebrile and hemodynamically stable, on oxygen at 3 LPM with SaO2 93%. He is off BiPAP. In no respiratory distress, cough is a moist sounding, he is not bringing up any phlegm. He gives history of heavy alcohol use. He denies any abdominal pain. He is not short of breath at rest.  Good appetite. Still having increased frequency of bowel movements. Physical Exam:  Blood pressure 121/63, pulse 86, temperature 98.7 °F (37.1 °C), resp. rate 18, height 6' 1\" (1.854 m), weight 253 lb 1.6 oz (114.8 kg), SpO2 93 %.'   Constitutional: Large build, overweight. No acute distress. HENT:  Oropharynx is clear and moist.  Class IV airway. Eyes:  Conjunctivae are normal. No scleral icterus. Neck: Short and large neck, no JVD. Cardiovascular: Normal rate, regular rhythm, normal heart sounds. No right ventricular heave. No lower extremity edema. Pulmonary/Chest: No wheezes, no rales anteriorly. No accessory muscle usage or stridor. Abdominal: Deferred  Musculoskeletal: No cyanosis. No clubbing. No obvious joint deformity. Lymphadenopathy: Deferred. Skin: Skin is warm and dry. No rash or nodules on the exposed extremities, nonspecific pigmentation. Psychiatric: Normal mood and affect. Behavior is normal.  No anxiety. Neurologic: Alert, awake and oriented. PERRL. Speech fluent.   No obvious focal deficit, detailed exam not performed      Results:  CBC:   Recent Labs     08/20/19  0500 08/21/19  0600 08/22/19  0444   WBC 16.1* 15.9* 14.2*   HGB 8.9* 9.0* 8.8*   HCT 26.5* 26.1* 26.8*   .8* 101.7* 102.6*    371 369     BMP:   Recent Labs     08/20/19  0500  08/21/19  0600 08/21/19  2328 08/22/19  0444 08/22/19  0445      < > 142 141  --  143   K 4.1   < > 4.6 5.0  --  4.8   CL 99   < > 98* 96*  --  98*   CO2 35*   < > 35* 37*  --  37*   PHOS 3.4  --  3.9  --  3.8  --    BUN 26*   < > 21* 20  --  20   CREATININE 1.2   < > 0.9 1.1  --  1.0    < > = values in this interval not displayed. LIVER PROFILE:   Recent Labs     08/21/19  0600 08/21/19  2328 08/22/19  0445   AST 41* 51* 48*   ALT 30 32 30   BILITOT 0.6 0.6 0.6   ALKPHOS 218* 213* 201*       Imaging:  I have reviewed radiology images personally. XR CHEST PORTABLE   Final Result   Continued increased central and basilar opacity, with bilateral pleural   effusions, unchanged when compared to the previous exam, most compatible with   pulmonary edema. Nonetheless, correlate with any clinical evidence of   superimposed pneumonia. XR CHEST PORTABLE   Final Result   No significant change in effusions. XR CHEST PORTABLE   Final Result   Worsening of pleural effusions         XR CHEST PORTABLE   Final Result   Bilateral pleural effusions and airspace disease, slightly increased on the   left and decreased on the right as compared to prior. Background pulmonary   edema. XR CHEST PORTABLE   Final Result   1. Study limited, as detailed above. 2. Stable position of a left internal jugular central venous catheter with   tip overlying the brachiocephalic vein. 3. No significant change in appearance of moderate CHF and multifocal   pneumonia. XR CHEST PORTABLE   Final Result   Limited exam.      Persistent CHF with bilateral effusions. XR CHEST PORTABLE   Final Result   A left IJ catheter has its tip at the junction of the innominate vein and   superior vena cava. No pneumothorax. Persistent low lung volumes, pulmonary edema, bilateral pleural effusions and   bibasilar airspace disease. XR CHEST PORTABLE   Final Result   Increased airspace disease on the right.   There is persistent   pleuroparenchymal Date: 8/16/2019  EXAMINATION: ONE XRAY VIEW OF THE CHEST 8/15/2019 5:36 am COMPARISON: 08/14/2019, 08/13/2019 HISTORY: ORDERING SYSTEM PROVIDED HISTORY: resp failure TECHNOLOGIST PROVIDED HISTORY: Reason for exam:->resp failure Reason for Exam: resp failure FINDINGS: A single frontal view of the chest is limited secondary to patient rotation and the chin overlying the bilateral lung apices. There is no acute skeletal abnormality. There are fusion rods stabilizing the thoracic spine. The heart size remains enlarged, though stable. The mediastinal contours are stable. There is a left internal jugular central venous catheter in place with tip overlying the region of the brachiocephalic vein. This is stable and unchanged. There are persistent small bilateral pleural effusions, not significantly changed. There is stable mild-to-moderate interstitial pulmonary edema. Multifocal hazy opacity throughout both lungs has not significantly changed, likely representing a combination of pulmonary edema and superimposed pneumonia. There is no evidence of a pneumothorax. 1. Study limited, as detailed above. 2. Stable position of a left internal jugular central venous catheter with tip overlying the brachiocephalic vein. 3. No significant change in appearance of moderate CHF and multifocal pneumonia. Xr Chest Portable    Result Date: 8/16/2019  EXAMINATION: ONE XRAY VIEW OF THE CHEST 8/16/2019 5:06 am COMPARISON: 08/16/2019 HISTORY: ORDERING SYSTEM PROVIDED HISTORY: pleural effusion TECHNOLOGIST PROVIDED HISTORY: Reason for exam:->pleural effusion Reason for Exam: pleural effusion FINDINGS: Left internal jugular central venous catheter is a again demonstrated, extending just to the right of midline. Spinal fusion hardware. Increasing left basilar pleuroparenchymal opacity as compared to prior.   Right basilar pleuroparenchymal opacity also appears redistributed with suspected fluid tracking into the horizontal fissure. Improved aeration at the right base. Prominence of pulmonary vascularity. Chin position obscures visualization of the apices though no large pneumothorax is seen. Cardiomegaly. Bilateral pleural effusions and airspace disease, slightly increased on the left and decreased on the right as compared to prior. Background pulmonary edema. Xr Chest Portable    Result Date: 8/14/2019  EXAMINATION: ONE XRAY VIEW OF THE CHEST 8/13/2019 11:13 am COMPARISON: 08/13/2019 HISTORY: ORDERING SYSTEM PROVIDED HISTORY: central line placement TECHNOLOGIST PROVIDED HISTORY: Reason for exam:->central line placement FINDINGS: A left IJ central venous catheter has been placed. The tip is near the junction of the innominate vein and superior vena cava. There is no pneumothorax. The lung volumes remain low with bibasilar airspace disease and pleural effusions. The heart size is unchanged. Spinal fusion hardware is noted. A left IJ catheter has its tip at the junction of the innominate vein and superior vena cava. No pneumothorax. Persistent low lung volumes, pulmonary edema, bilateral pleural effusions and bibasilar airspace disease. Xr Chest Portable    Result Date: 8/14/2019  EXAMINATION: ONE XRAY VIEW OF THE CHEST 8/14/2019 5:08 am COMPARISON: 08/13/2019 HISTORY: ORDERING SYSTEM PROVIDED HISTORY: pulm edema TECHNOLOGIST PROVIDED HISTORY: Reason for exam:->pulm edema Reason for Exam: pulm edema Acuity: Unknown Type of Exam: Unknown FINDINGS: The study is limited by motion artifact. The patient's chin also obscures the lung apices. A left-sided central line is present and terminates in the distal left innominate vein/SVC junction. There is cardiomegaly. There is also pulmonary vascular congestion with bilateral pleural effusions. Limited exam. Persistent CHF with bilateral effusions.      Xr Chest Portable    Result Date: 8/13/2019  EXAMINATION: ONE XRAY VIEW OF THE CHEST 8/13/2019 7:13 am Stable bibasilar opacification possibly representing pleural fluid combined with atelectasis or pneumonia     Xr Chest Portable    Result Date: 7/29/2019  EXAMINATION: ONE XRAY VIEW OF THE CHEST 7/29/2019 3:23 pm COMPARISON: 04/08/2019 HISTORY: ORDERING SYSTEM PROVIDED HISTORY: other TECHNOLOGIST PROVIDED HISTORY: Reason for exam:->other Reason for Exam: sob Acuity: Acute Type of Exam: Initial FINDINGS: Posterior spinal fusion rods remain in place. The heart size is stable. Persistent left basilar opacification. New opacification seen at the right lung base. New opacification at the right lung base could represent atelectasis or pneumonia combined with pleural fluid. Stable opacification at the left lung base most likely representing scarring. Assessment and plan:  Acute respiratory failure, hypoxemic. Remains on oxygen at 3 LPM.  Wean as tolerated, keep SaO2 >90%. Acute pulmonary edema. On Demadex and spironolactone  Mucus plugging. Bronchoscopy 8/19/2019, 2+ WBCs, cultures negative. Pleural effusion. Status post thoracentesis 8/15 with 1400 mL drained. Fluid transudative. Leukocytosis. White count improving  Macrocytic anemia. Transfuse if hemoglobin <7 g/dL. Light chains and serum considered to be possibly from MCKENNA. MCKENNA/CKD. Renal function stable   Sepsis, hypotension, C. difficile colitis. Per IM. Severe hypothyroidism, myxedema coma, elevated alkaline phosphatase and AST, HTN, HLD. Per IM. Does not need steroids from the pulmonary standpoint, preferable to wean off since he has C. difficile colitis. Obesity. Should make attempts to lose weight. DVT prophylaxis. Heparin    We will sign off as the patient is stable from the pulmonary standpoint, his home oxygen flow rate. Please call with questions, thank you for the consultation.       Electronically signed by:  Betzaida Montiel MD    8/22/2019    2:19 PM.

## 2019-08-22 NOTE — PROGRESS NOTES
[facility] Nurse  Follow-up Progress Note       NAME:  Peggy Baird  MEDICAL RECORD NUMBER:  [id number]  AGE:  69 y.o. GENDER:  male  :  1945  TODAY'S DATE:  2019    Subjective: Just when I get comfortable you guys come in mess me up. Wound Identification:  Wound Type: pressure and skin tear, MASD  Contributing Factors: edema, chronic pressure, decreased mobility, shear force, obesity, decreased tissue oxygenation and incontinence of stool        Patient Goal of Care:  [x] Wound Healing  [] Odor Control  [] Palliative Care  [] Pain Control   [] Other:     Objective: Pt A/O; lying in bed; 3L O2; F/C; rectal tube; C diff isolation    /72   Pulse 91   Temp 98.1 °F (36.7 °C)   Resp 18   Ht 6' 1\" (1.854 m)   Wt 253 lb 1.6 oz (114.8 kg)   SpO2 95%   BMI 33.39 kg/m²   Bridger Risk Score: Bridger Scale Score: 15  Assessment: L buttock - wound bed red; scant drainage; pooja wound blanchable erythema; appears smaller than previous assessment  R buttock - wound bed red; pooja wound blanchable erythema; appears smaller than previous assessment. Measurements:  Wound 19 Arm Anterior;Proximal;Right;Upper Open skin tear (Active)   Number of days: 113       Wound 19 Buttocks Left (Active)   Wound Image   2019 12:26 PM   Wound Pressure Stage  2 2019 12:26 PM   Dressing Status Clean;Dry; Intact 2019 12:26 PM   Dressing Changed Dressing reinforced 2019 12:26 PM   Dressing/Treatment Foam 2019 12:26 PM   Wound Cleansed Wound cleanser 2019  1:41 AM   Dressing Change Due 19 12:00 PM   Wound Length (cm) 2 cm 2019 12:26 PM   Wound Width (cm) 1.5 cm 2019 12:26 PM   Wound Depth (cm) 0.2 cm 2019 12:26 PM   Wound Surface Area (cm^2) 3 cm^2 2019 12:26 PM   Change in Wound Size % (l*w) 92.9 2019 12:26 PM   Wound Volume (cm^3) 0.6 cm^3 2019 12:26 PM   Wound Healing % 95 2019 12:26 PM   Distance Tunneling (cm) 0 cm 8/22/2019 12:26 PM   Tunneling Position ___ O'Clock 0 8/22/2019 12:26 PM   Undermining Starts ___ O'Clock 0 8/22/2019 12:26 PM   Undermining Ends___ O'Clock 0 8/22/2019 12:26 PM   Undermining Maxium Distance (cm) 0 8/22/2019 12:26 PM   Wound Assessment Intact; Red 8/22/2019 12:26 PM   Drainage Amount Scant 8/22/2019 12:26 PM   Drainage Description Serosanguinous 8/22/2019 12:26 PM   Odor None 8/22/2019 12:26 PM   Margins Attached edges; Defined edges 8/22/2019 12:26 PM   Kat-wound Assessment Blanchable erythema;Dry 8/22/2019 12:26 PM   [place]%Wound Bed 20 8/19/2019  8:59 AM   Red%Wound Bed 100 8/22/2019 12:26 PM   Culture Taken No 8/22/2019 12:26 PM   Number of days: 23    L buttock -       Wound 07/29/19 Buttocks Right (Active)   Wound Image   8/22/2019 12:26 PM   Wound Pressure Stage  2 8/22/2019 12:26 PM   Dressing Status Clean;Dry; Intact 8/22/2019 12:26 PM   Dressing Changed Dressing reinforced 8/22/2019 12:26 PM   Dressing/Treatment Foam 8/22/2019 12:26 PM   Wound Cleansed Wound cleanser 8/19/2019  1:41 AM   Dressing Change Due 08/17/19 8/19/2019  8:59 AM   Wound Length (cm) 0.5 cm 8/22/2019 12:26 PM   Wound Width (cm) 0.4 cm 8/22/2019 12:26 PM   Wound Depth (cm) 0.2 cm 8/22/2019 12:26 PM   Wound Surface Area (cm^2) 0.2 cm^2 8/22/2019 12:26 PM   Change in Wound Size % (l*w) 90.74 8/22/2019 12:26 PM   Wound Volume (cm^3) 0.04 cm^3 8/22/2019 12:26 PM   Distance Tunneling (cm) 0 cm 8/22/2019 12:26 PM   Tunneling Position ___ O'Clock 0 8/22/2019 12:26 PM   Undermining Starts ___ O'Clock 0 8/22/2019 12:26 PM   Undermining Ends___ O'Clock 0 8/22/2019 12:26 PM   Undermining Maxium Distance (cm) 0 8/22/2019 12:26 PM   Wound Assessment Intact; Red 8/22/2019 12:26 PM   Drainage Amount Scant 8/22/2019 12:26 PM   Drainage Description Serosanguinous 8/22/2019 12:26 PM   Odor None 8/22/2019 12:26 PM   Margins Attached edges; Defined edges 8/22/2019 12:26 PM   Kat-wound Assessment

## 2019-08-22 NOTE — PROGRESS NOTES
initial encounter, and Leukocytosis, unspecified type were also pertinent to this visit. has a past medical history of Alcohol abuse, Alcohol abuse, ARF (acute renal failure) ([facility] [address] CHF (congestive heart failure) ([facility] [address] CKD (chronic kidney disease), Clostridium difficile infection, Hypercholesteremia, Hypertension, Mediastinal adenopathy, Pneumonia due to organism, and Pulmonary emphysema ([facility] [address] has a past surgical history that includes Upper gastrointestinal endoscopy (1/10/2011); skin biopsy; back surgery; and Upper gastrointestinal endoscopy (N/A, 5/1/2019). Restrictions  Restrictions/Precautions  Restrictions/Precautions: General Precautions, Fall Risk, Contact Precautions  Position Activity Restriction  Other position/activity restrictions: High fall risk per nursing assessment, up with assistance, contact(+) precautions 2* C. diff, 3L O2, B[place]udez, rectal tube  Subjective   General  Chart Reviewed: Yes  Response To Previous Treatment: Patient reporting fatigue but able to participate. Family / Caregiver Present: No  Referring Practitioner: Dr. Cricket Darnell  Subjective  Subjective: Pt initially agreed to PT treatment with encouragement. However patient then refused to sit at side of bed or attempt to get out of bed. \"You guys are a pain in the ass! \"I do not want you to ever come back! \" \"I do not need therapy while I am in the hospital.\"  General Comment  Comments: RN cleared pt to participate. Patient in bed at beginning of PT treatment session. Pain Screening  Patient Currently in Pain: Yes  Pain Assessment  Pain Level: 8  Pain Type: Chronic pain  Pain Location: Head  Pain Descriptors: Headache  Non-Pharmaceutical Pain Intervention(s): Repositioned; Ambulation/Increased Activity; Therapeutic presence; Rest  Vital Signs  Patient Currently in Pain: Yes       Orientation  Orientation  Overall Orientation Status: Within Normal Limits  Cognition      Objective   Bed mobility  Rolling to Left: Unable to

## 2019-08-22 NOTE — PROGRESS NOTES
%  · Anthropometric Measures:  · Ht: 6' 1\" (185.4 cm)   · Current Body Wt: 283 lb (128.4 kg)  · Admission Body Wt: 275 lb (124.7 kg)  · Usual Body Wt: (unable to obtain)  · % Weight Change:  ,  3% wt gain since admission  · Ideal Body Wt: 184 lb (83.5 kg), % Ideal Body 154%  · BMI Classification: BMI 35.0 - 39.9 Obese Class II    Nutrition Interventions:   Continue current diet(restart supplement)  Continued Inpatient Monitoring    Nutrition Evaluation:   · Evaluation: Progressing toward goals   · Goals:  Tolerate diet with ONS and consume greater than 50% of nutrition needs to promote wound healing     · Monitoring: Supplement Intake, Pertinent Labs, Meal Intake, Nutrition Progression      Electronically signed by Lui Gaines RD, LD on 8/22/19 at 11:07 AM    Contact Number: Office: [phone]; [address]: [id number]

## 2019-08-22 NOTE — PROGRESS NOTES
08/21/19  0600 08/21/19 2328 08/22/19 0444 08/22/19  0445      < > 142 141  --  143   K 4.1   < > 4.6 5.0  --  4.8   CL 99   < > 98* 96*  --  98*   CO2 35*   < > 35* 37*  --  37*   PHOS 3.4  --  3.9  --  3.8  --    BUN 26*   < > 21* 20  --  20   CREATININE 1.2   < > 0.9 1.1  --  1.0    < > = values in this interval not displayed. Recent Labs     08/21/19  0600 08/21/19 2328 08/22/19 0445   AST 41* 51* 48*   ALT 30 32 30   BILITOT 0.6 0.6 0.6   ALKPHOS 218* 213* 201*       Magnesium:    Lab Results   Component Value Date    MG 1.70 08/20/2019    MG 1.50 08/16/2019    MG 1.90 08/15/2019         Problem List  Patient Active Problem List   Diagnosis    DISH (diffuse idiopathic skeletal hyperostosis)    Hyperlipidemia    Benign essential HTN    Closed nondisplaced fracture of pubis ([address]    Bilateral knee pain    Alcohol abuse    Centrilobular emphysema (HCC)    Other pulmonary embolism without acute cor pulmonale (HCC)    Mediastinal adenopathy    Former smoker    Pulmonary HTN ([address]    Obesity    CHF (congestive heart failure) ([address]    CHF (congestive heart failure), NYHA class I, acute on chronic, combined ([address]    History of pulmonary embolism    Elevated hemoglobin A1c    Acute respiratory failure with hypoxia and hypercapnia (HCC)    Chronic obstructive pulmonary disease (HCC)    Alcohol dependence (HCC)    Acute anemia    Chronic respiratory failure with hypoxia ([address]    Urinary tract infection without hematuria    Pneumonia due to organism    Acute kidney failure, unspecified ([address]    Acute renal failure (ARF) ([address]    Myxedema coma ([address]    Hypotension    Mucus plugging of bronchi       ASSESSMENT AND PLAN:      1.  Anemia  - acute on chronic anemia, improving and now 9   - multifactorial  - contributing factors include liver disease/cirrhosis, MCKENNA, etoh, hypothyroidism, infection  - no iron def on labs - more consistent with ACD  -  Haptoglobin 206,  (no hemolysis) , Diego POSITIVE, absolute retic not elevated, Epo 12, STR 4.6    - monitor CBC and transfuse if Hgb < 7; Hgb is 9   - Consider GI work up outpatient for upper and lower scopes. Rule out esophageal varices.      2. Leukocytosis  - suspect reactive in setting of C diff- improving      3. Elevated Light Chains  - kappa and lambda elevation w/ normal ratio   - no M spike on SPEP  - likely from MCKENNA and inflammation  - should be repeated in 3-6 months     4. 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 Unlikely to develop iron overload from this.       ONCOLOGIC DISPOSITION: Doubt a bone marrow biopsy will yield a primary hematologic diagnosis. This can pursued as an outpatient if at all.       Patric Palomo MD   [facility]   Please contact through [address]

## 2019-08-22 NOTE — PROGRESS NOTES
complaints- feeling tired  All other ROS are reviewed and are Negative    Vitals:     Vitals:    08/22/19 0844   BP: 137/72   Pulse: 91   Resp: 16   Temp: 98.1 °F (36.7 °C)   SpO2: 93%         I & O:       Intake/Output Summary (Last 24 hours) at 8/22/2019 1023  Last data filed at 8/22/2019 5308  Gross per 24 hour   Intake 970 ml   Output 3375 ml   Net -2405 ml         Physical Examination:     General appearance: Anxious-yes, distressed- no, in good spirits- yes  Comfortable, communicative  AAO X 3  Sitting up on chair  HEENT: Lips- normal, teeth- ok , oral mucosa- moist  Neck : Mass- no, appears symmetrical, JVD- not visible  Respiratory: Respiratory effort-  normal, wheeze-yes, crackles -  yes ,oxygen- via nasal cannula  Cardiovascular:  Ausculation- No M/R/G, Edema all extremities edematous  Abdomen: visible mass- no, distention- no, scar- no, tenderness- no                            hepatosplenomegaly-  no  Musculoskeletal:  clubbing no,cyanosis- no , digital ischemia- no                           muscle strength- grossly normal , tone - grossly normal  Skin: rashes- no , ulcers- no, induration- no, tightening - no  Psychiatric:  Judgement and insight- normal        Meds:      torsemide  40 mg Oral Daily    dexamethasone  4 mg Oral Daily    spironolactone  50 mg Oral Daily    potassium chloride  40 mEq Oral Daily    levothyroxine  125 mcg Oral Daily    vancomycin  250 mg Oral 4 times per day    saccharomyces boulardii  250 mg Oral BID    heparin (porcine)  5,000 Units Subcutaneous 3 times per day    pantoprazole  40 mg Intravenous Daily    folic acid  2,939 mcg Oral Daily    ipratropium-albuterol  1 ampule Inhalation Q4H WA    tamsulosin  0.4 mg Oral Daily    sodium chloride flush  10 mL Intravenous 2 times per day       Labs:     Recent Labs     08/20/19  0500 08/21/19  0600 08/22/19  0444   WBC 16.1* 15.9* 14.2*   HGB 8.9* 9.0* 8.8*   HCT 26.5* 26.1* 26.8*    371 369          Recent Labs

## 2019-08-23 ENCOUNTER — APPOINTMENT (OUTPATIENT)
Dept: GENERAL RADIOLOGY | Age: 74
DRG: 080 | End: 2019-08-23
Payer: MEDICARE

## 2019-08-23 ENCOUNTER — TELEPHONE (OUTPATIENT)
Dept: INTERNAL MEDICINE CLINIC | Age: 74
End: 2019-08-23

## 2019-08-23 LAB
A/G RATIO: 1 (ref 1.1–2.2)
A/G RATIO: 1.1 (ref 1.1–2.2)
ALBUMIN SERPL-MCNC: 2.9 G/DL (ref 3.4–5)
ALBUMIN SERPL-MCNC: 3.1 G/DL (ref 3.4–5)
ALP BLD-CCNC: 185 U/L (ref 40–129)
ALP BLD-CCNC: 199 U/L (ref 40–129)
ALT SERPL-CCNC: 30 U/L (ref 10–40)
ALT SERPL-CCNC: 31 U/L (ref 10–40)
ANION GAP SERPL CALCULATED.3IONS-SCNC: 10 MMOL/L (ref 3–16)
ANION GAP SERPL CALCULATED.3IONS-SCNC: 9 MMOL/L (ref 3–16)
AST SERPL-CCNC: 41 U/L (ref 15–37)
AST SERPL-CCNC: 44 U/L (ref 15–37)
BASOPHILS ABSOLUTE: 0.1 K/UL (ref 0–0.2)
BASOPHILS RELATIVE PERCENT: 0.6 %
BILIRUB SERPL-MCNC: 0.5 MG/DL (ref 0–1)
BILIRUB SERPL-MCNC: 0.6 MG/DL (ref 0–1)
BLOOD BANK REF CASE: NORMAL
BUN BLDV-MCNC: 17 MG/DL (ref 7–20)
BUN BLDV-MCNC: 18 MG/DL (ref 7–20)
CALCIUM SERPL-MCNC: 8.8 MG/DL (ref 8.3–10.6)
CALCIUM SERPL-MCNC: 9 MG/DL (ref 8.3–10.6)
CHLORIDE BLD-SCNC: 93 MMOL/L (ref 99–110)
CHLORIDE BLD-SCNC: 96 MMOL/L (ref 99–110)
CO2: 33 MMOL/L (ref 21–32)
CO2: 35 MMOL/L (ref 21–32)
CREAT SERPL-MCNC: 1.2 MG/DL (ref 0.8–1.3)
CREAT SERPL-MCNC: 1.2 MG/DL (ref 0.8–1.3)
EOSINOPHILS ABSOLUTE: 0.1 K/UL (ref 0–0.6)
EOSINOPHILS RELATIVE PERCENT: 0.6 %
GFR AFRICAN AMERICAN: >60
GFR AFRICAN AMERICAN: >60
GFR NON-AFRICAN AMERICAN: 59
GFR NON-AFRICAN AMERICAN: 59
GLOBULIN: 2.9 G/DL
GLOBULIN: 3 G/DL
GLUCOSE BLD-MCNC: 138 MG/DL (ref 70–99)
GLUCOSE BLD-MCNC: 86 MG/DL (ref 70–99)
HCT VFR BLD CALC: 26.4 % (ref 40.5–52.5)
HEMOGLOBIN: 8.8 G/DL (ref 13.5–17.5)
LYMPHOCYTES ABSOLUTE: 2.1 K/UL (ref 1–5.1)
LYMPHOCYTES RELATIVE PERCENT: 13.2 %
MCH RBC QN AUTO: 34.2 PG (ref 26–34)
MCHC RBC AUTO-ENTMCNC: 33.5 G/DL (ref 31–36)
MCV RBC AUTO: 102.1 FL (ref 80–100)
MONOCYTES ABSOLUTE: 0.8 K/UL (ref 0–1.3)
MONOCYTES RELATIVE PERCENT: 5 %
NEUTROPHILS ABSOLUTE: 12.8 K/UL (ref 1.7–7.7)
NEUTROPHILS RELATIVE PERCENT: 80.6 %
PDW BLD-RTO: 16.6 % (ref 12.4–15.4)
PHOSPHORUS: 4.5 MG/DL (ref 2.5–4.9)
PLATELET # BLD: 367 K/UL (ref 135–450)
PMV BLD AUTO: 7.9 FL (ref 5–10.5)
POTASSIUM REFLEX MAGNESIUM: 4.7 MMOL/L (ref 3.5–5.1)
POTASSIUM REFLEX MAGNESIUM: 5.1 MMOL/L (ref 3.5–5.1)
RBC # BLD: 2.58 M/UL (ref 4.2–5.9)
SODIUM BLD-SCNC: 136 MMOL/L (ref 136–145)
SODIUM BLD-SCNC: 140 MMOL/L (ref 136–145)
TOTAL PROTEIN: 5.9 G/DL (ref 6.4–8.2)
TOTAL PROTEIN: 6 G/DL (ref 6.4–8.2)
WBC # BLD: 15.9 K/UL (ref 4–11)

## 2019-08-23 PROCEDURE — C9113 INJ PANTOPRAZOLE SODIUM, VIA: HCPCS | Performed by: INTERNAL MEDICINE

## 2019-08-23 PROCEDURE — 94669 MECHANICAL CHEST WALL OSCILL: CPT

## 2019-08-23 PROCEDURE — 94761 N-INVAS EAR/PLS OXIMETRY MLT: CPT

## 2019-08-23 PROCEDURE — 84100 ASSAY OF PHOSPHORUS: CPT

## 2019-08-23 PROCEDURE — 80053 COMPREHEN METABOLIC PANEL: CPT

## 2019-08-23 PROCEDURE — 2700000000 HC OXYGEN THERAPY PER DAY

## 2019-08-23 PROCEDURE — 94640 AIRWAY INHALATION TREATMENT: CPT

## 2019-08-23 PROCEDURE — 6370000000 HC RX 637 (ALT 250 FOR IP): Performed by: INTERNAL MEDICINE

## 2019-08-23 PROCEDURE — 2580000003 HC RX 258: Performed by: INTERNAL MEDICINE

## 2019-08-23 PROCEDURE — 6360000002 HC RX W HCPCS: Performed by: INTERNAL MEDICINE

## 2019-08-23 PROCEDURE — 2060000000 HC ICU INTERMEDIATE R&B

## 2019-08-23 PROCEDURE — 71046 X-RAY EXAM CHEST 2 VIEWS: CPT

## 2019-08-23 PROCEDURE — 85025 COMPLETE CBC W/AUTO DIFF WBC: CPT

## 2019-08-23 PROCEDURE — 36415 COLL VENOUS BLD VENIPUNCTURE: CPT

## 2019-08-23 RX ORDER — DEXAMETHASONE 1 MG
1 TABLET ORAL DAILY
Status: COMPLETED | OUTPATIENT
Start: 2019-08-25 | End: 2019-08-25

## 2019-08-23 RX ORDER — DEXAMETHASONE 1 MG
0.5 TABLET ORAL DAILY
Status: DISCONTINUED | OUTPATIENT
Start: 2019-08-26 | End: 2019-08-25 | Stop reason: HOSPADM

## 2019-08-23 RX ORDER — DEXAMETHASONE 4 MG/1
2 TABLET ORAL DAILY
Status: DISCONTINUED | OUTPATIENT
Start: 2019-08-24 | End: 2019-08-23

## 2019-08-23 RX ORDER — DEXAMETHASONE 4 MG/1
2 TABLET ORAL DAILY
Status: COMPLETED | OUTPATIENT
Start: 2019-08-24 | End: 2019-08-24

## 2019-08-23 RX ADMIN — Medication 10 ML: at 21:42

## 2019-08-23 RX ADMIN — IPRATROPIUM BROMIDE AND ALBUTEROL SULFATE 1 AMPULE: .5; 3 SOLUTION RESPIRATORY (INHALATION) at 19:17

## 2019-08-23 RX ADMIN — Medication 250 MG: at 21:40

## 2019-08-23 RX ADMIN — Medication 250 MG: at 05:09

## 2019-08-23 RX ADMIN — TAMSULOSIN HYDROCHLORIDE 0.4 MG: 0.4 CAPSULE ORAL at 10:23

## 2019-08-23 RX ADMIN — DEXAMETHASONE 4 MG: 4 TABLET ORAL at 10:24

## 2019-08-23 RX ADMIN — IPRATROPIUM BROMIDE AND ALBUTEROL SULFATE 1 AMPULE: .5; 3 SOLUTION RESPIRATORY (INHALATION) at 12:31

## 2019-08-23 RX ADMIN — TORSEMIDE 40 MG: 20 TABLET ORAL at 10:24

## 2019-08-23 RX ADMIN — Medication 250 MG: at 13:55

## 2019-08-23 RX ADMIN — POTASSIUM CHLORIDE 40 MEQ: 20 TABLET, EXTENDED RELEASE ORAL at 10:23

## 2019-08-23 RX ADMIN — FOLIC ACID 1000 MCG: 1 TABLET ORAL at 10:24

## 2019-08-23 RX ADMIN — PANTOPRAZOLE SODIUM 40 MG: 40 INJECTION, POWDER, FOR SOLUTION INTRAVENOUS at 10:24

## 2019-08-23 RX ADMIN — Medication 250 MG: at 10:23

## 2019-08-23 RX ADMIN — LEVOTHYROXINE SODIUM 125 MCG: 100 TABLET ORAL at 05:09

## 2019-08-23 RX ADMIN — HEPARIN SODIUM 5000 UNITS: 5000 INJECTION INTRAVENOUS; SUBCUTANEOUS at 21:41

## 2019-08-23 RX ADMIN — HEPARIN SODIUM 5000 UNITS: 5000 INJECTION INTRAVENOUS; SUBCUTANEOUS at 13:52

## 2019-08-23 RX ADMIN — HEPARIN SODIUM 5000 UNITS: 5000 INJECTION INTRAVENOUS; SUBCUTANEOUS at 05:10

## 2019-08-23 RX ADMIN — Medication 10 ML: at 10:24

## 2019-08-23 RX ADMIN — IPRATROPIUM BROMIDE AND ALBUTEROL SULFATE 1 AMPULE: .5; 3 SOLUTION RESPIRATORY (INHALATION) at 09:13

## 2019-08-23 RX ADMIN — SPIRONOLACTONE 50 MG: 25 TABLET ORAL at 10:24

## 2019-08-23 RX ADMIN — Medication 250 MG: at 18:38

## 2019-08-23 RX ADMIN — IPRATROPIUM BROMIDE AND ALBUTEROL SULFATE 1 AMPULE: .5; 3 SOLUTION RESPIRATORY (INHALATION) at 16:41

## 2019-08-23 ASSESSMENT — PAIN SCALES - GENERAL
PAINLEVEL_OUTOF10: 0
PAINLEVEL_OUTOF10: 0

## 2019-08-23 NOTE — PROGRESS NOTES
Hospitalist Progress Note      PCP: Debra Salgado MD    Date of Admission: 8/11/2019         Chief Complaint: confusion, shortness of breath      Hospital Course: admitted with suspicion for congestive heart failure and pneumonia. Acute renal failure noted. Patient was noted to have very elevated TSH.  Started on IV Synthroid and IV steroids and transferred to ICU because of myxedema. Never required pressors  Mental status on blood pressure improved with IV steroids and IV levothyroxine. Baseline today   C. difficile tested positive and confirmation tests.  Patient started on oral vancomycin, to which seems to be responding fine. Now on diuretics for suspicion for fluid overload  Nephrology and pulmonology following  Hematology consulted for anemia.  Workup in process     Subjective: weak, denies pain, no nausea or vomiting.  rectal tube removed,  Shortness of breath is better and at baseline oxygen of 3-4L    Medications:  Reviewed    Infusion Medications   Scheduled Medications    torsemide  40 mg Oral Daily    dexamethasone  4 mg Oral Daily    spironolactone  50 mg Oral Daily    potassium chloride  40 mEq Oral Daily    levothyroxine  125 mcg Oral Daily    vancomycin  250 mg Oral 4 times per day    saccharomyces boulardii  250 mg Oral BID    heparin (porcine)  5,000 Units Subcutaneous 3 times per day    pantoprazole  40 mg Intravenous Daily    folic acid  8,123 mcg Oral Daily    ipratropium-albuterol  1 ampule Inhalation Q4H WA    tamsulosin  0.4 mg Oral Daily    sodium chloride flush  10 mL Intravenous 2 times per day     PRN Meds: perflutren lipid microspheres, sodium chloride flush, acetaminophen      Intake/Output Summary (Last 24 hours) at 8/23/2019 0945  Last data filed at 8/23/2019 0904  Gross per 24 hour   Intake 1320 ml   Output 3370 ml   Net -2050 ml       Physical Exam Performed:    BP (!) 142/80   Pulse 71   Temp 98.3 °F (36.8 °C) (Oral)   Resp 16   Ht 6' 1\" (1.854 m) lung opacities   are nonspecific and may represent atelectasis versus pneumonia. XR CHEST PORTABLE   Final Result   Increased large amount of bibasilar airspace disease, with adjacent pleural   effusions, increased on the right over the past 2 weeks, unchanged on the   left. 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 Continued to monitor   was on iv diuretics for edema, switched to po lasix on 8/22   -unfortunately pt was not tested for adrenal insufficency on admission, will need to retest cortisol/acth/TFTs after off steroids for 2 weeks, consider giving prn hydrocortisone tabs on dc and arrange close f/u with endocrine as outpt    Acute resp failure with hypoxia-   conerns for mucus plugging  -pulm consulted, apprec recs, bronch done 8/19   -check cxr 8/23    Anemia   Required transfusion on admission, then stabilized but not improved  Hematology consult requested, felt multifactorial(liver dz/cirrhosis, mckenna, etoh, infection) suspect ACD     Acute renal failure  Nephrology input appreciated.    Creatinine stabilized near baseline.   -elevated light chains, likely from MCKENNA, repeat labs in 3-6 months     Hyponatremia  Resolved with medication management and

## 2019-08-23 NOTE — PROGRESS NOTES
[facility]    [facility]            [phone]        Interval Plan:     Renal function is stable  On po diuretics  Oxygen requirement stable  Has sacral wound              Assessment :     Acute Kidney Injury  Creatinine peaked to 3.2-now down to  1.2  Has recurrent MCKENNA- baseline  likely will be between 1-1.5  Has had recurrent MCKENNA in 2019-2 peak of 4 on 4/19  Renal USG: R- 11.7 cm, L - 13.23 cm, normal echogenicity  UA: [place], no proteinuria    Echo- 2/19- EF 55-60%, Grade I DD, mild LVH,   3/19- RV function is normal in size and function    Generalized Edema  With peak to to 130 kg -now coming down to 112.5  kg  Thoracentesis on 8/15/2019  On high dose of lasix and also on aldactone  Now on torsemide and aldactone    On 3 L oxygen now- requiring intermittent bipap- off now  -ve 15 L this admission  No pedal edema now    Hypertension   BP: (130-142)/(69-80)  Pulse:  [71-86]    BP low at 70s on admission  Had myxedema coma on admission  Now high  On diuretics    COPD   PE- 2/2019  Liver disease, alcohol abuse    Anemia-hematology consulted      Please call with questions at-  24 hrs Answering service [phone]   7 am-5 pm at Perfect [facility], or cell phone  Dr Singleton Shown        CC/ Reason for consult:     MCKENNA    HPI:     From consult note-   Patient is a 69 y.o. male  presented to  the hospital on 8/11/2019 with  Fall off the recliner. He was in the floor for 30 minutes. Upon arrival patient's blood pressure was 93/46 and dropped to 74/44. Associated with high lactic acidosis of 5.8. But no rhabdomyolysis with CPK of only 72. He is known to have COPD with 4 L of oxygen at home  He is known to have recurrent acute kidney injury which happened in March April and May 2019.         Interval History:     Oxygen requirement stable      Seen with - no family    ROS -   SOB- present, but better  Edema- negative  Nausea/vomiting- none  Poor appetite-yes  Confusion- no  Urinary complaints-

## 2019-08-23 NOTE — TELEPHONE ENCOUNTER
----- Message from Jose Ramon Angelo MD sent at 8/23/2019  4:27 PM EDT -----  Contact: [facility]- [phone]  Brandy Jordan to do PT  ----- Message -----  From: Merlin Coil Ranson  Sent: 8/23/2019   1:11 PM EDT  To: MD Shaniqua Tian with [facility] called- requesting orders for his home care-PT and OT- please let shaniqua know at [phone] appt- 9-13-19-lr

## 2019-08-24 LAB
A/G RATIO: 1 (ref 1.1–2.2)
A/G RATIO: 1 (ref 1.1–2.2)
ALBUMIN SERPL-MCNC: 2.9 G/DL (ref 3.4–5)
ALBUMIN SERPL-MCNC: 3.1 G/DL (ref 3.4–5)
ALP BLD-CCNC: 171 U/L (ref 40–129)
ALP BLD-CCNC: 188 U/L (ref 40–129)
ALT SERPL-CCNC: 28 U/L (ref 10–40)
ALT SERPL-CCNC: 32 U/L (ref 10–40)
ANION GAP SERPL CALCULATED.3IONS-SCNC: 11 MMOL/L (ref 3–16)
ANION GAP SERPL CALCULATED.3IONS-SCNC: 9 MMOL/L (ref 3–16)
AST SERPL-CCNC: 37 U/L (ref 15–37)
AST SERPL-CCNC: 46 U/L (ref 15–37)
BASOPHILS ABSOLUTE: 0.1 K/UL (ref 0–0.2)
BASOPHILS RELATIVE PERCENT: 0.5 %
BILIRUB SERPL-MCNC: 0.5 MG/DL (ref 0–1)
BILIRUB SERPL-MCNC: 0.5 MG/DL (ref 0–1)
BUN BLDV-MCNC: 17 MG/DL (ref 7–20)
BUN BLDV-MCNC: 17 MG/DL (ref 7–20)
CALCIUM SERPL-MCNC: 9 MG/DL (ref 8.3–10.6)
CALCIUM SERPL-MCNC: 9.1 MG/DL (ref 8.3–10.6)
CHLORIDE BLD-SCNC: 95 MMOL/L (ref 99–110)
CHLORIDE BLD-SCNC: 96 MMOL/L (ref 99–110)
CO2: 30 MMOL/L (ref 21–32)
CO2: 35 MMOL/L (ref 21–32)
CREAT SERPL-MCNC: 1.1 MG/DL (ref 0.8–1.3)
CREAT SERPL-MCNC: 1.2 MG/DL (ref 0.8–1.3)
EOSINOPHILS ABSOLUTE: 0.1 K/UL (ref 0–0.6)
EOSINOPHILS RELATIVE PERCENT: 0.8 %
GFR AFRICAN AMERICAN: >60
GFR AFRICAN AMERICAN: >60
GFR NON-AFRICAN AMERICAN: 59
GFR NON-AFRICAN AMERICAN: >60
GLOBULIN: 3 G/DL
GLOBULIN: 3.1 G/DL
GLUCOSE BLD-MCNC: 136 MG/DL (ref 70–99)
GLUCOSE BLD-MCNC: 86 MG/DL (ref 70–99)
HCT VFR BLD CALC: 27.1 % (ref 40.5–52.5)
HEMOGLOBIN: 9.1 G/DL (ref 13.5–17.5)
LYMPHOCYTES ABSOLUTE: 1.9 K/UL (ref 1–5.1)
LYMPHOCYTES RELATIVE PERCENT: 11.8 %
MCH RBC QN AUTO: 34 PG (ref 26–34)
MCHC RBC AUTO-ENTMCNC: 33.5 G/DL (ref 31–36)
MCV RBC AUTO: 101.7 FL (ref 80–100)
MONOCYTES ABSOLUTE: 0.8 K/UL (ref 0–1.3)
MONOCYTES RELATIVE PERCENT: 5 %
NEUTROPHILS ABSOLUTE: 13 K/UL (ref 1.7–7.7)
NEUTROPHILS RELATIVE PERCENT: 81.9 %
PDW BLD-RTO: 16.8 % (ref 12.4–15.4)
PHOSPHORUS: 4.4 MG/DL (ref 2.5–4.9)
PLATELET # BLD: 365 K/UL (ref 135–450)
PMV BLD AUTO: 7.8 FL (ref 5–10.5)
POTASSIUM REFLEX MAGNESIUM: 4.8 MMOL/L (ref 3.5–5.1)
POTASSIUM REFLEX MAGNESIUM: 5.3 MMOL/L (ref 3.5–5.1)
RBC # BLD: 2.66 M/UL (ref 4.2–5.9)
SODIUM BLD-SCNC: 137 MMOL/L (ref 136–145)
SODIUM BLD-SCNC: 139 MMOL/L (ref 136–145)
T3 FREE: 1.5 PG/ML (ref 2.3–4.2)
T4 FREE: 1.1 NG/DL (ref 0.9–1.8)
TOTAL PROTEIN: 5.9 G/DL (ref 6.4–8.2)
TOTAL PROTEIN: 6.2 G/DL (ref 6.4–8.2)
TSH SERPL DL<=0.05 MIU/L-ACNC: 62.4 UIU/ML (ref 0.27–4.2)
WBC # BLD: 15.8 K/UL (ref 4–11)

## 2019-08-24 PROCEDURE — 6370000000 HC RX 637 (ALT 250 FOR IP): Performed by: INTERNAL MEDICINE

## 2019-08-24 PROCEDURE — 2700000000 HC OXYGEN THERAPY PER DAY

## 2019-08-24 PROCEDURE — 84439 ASSAY OF FREE THYROXINE: CPT

## 2019-08-24 PROCEDURE — 85025 COMPLETE CBC W/AUTO DIFF WBC: CPT

## 2019-08-24 PROCEDURE — 6360000002 HC RX W HCPCS: Performed by: INTERNAL MEDICINE

## 2019-08-24 PROCEDURE — 84100 ASSAY OF PHOSPHORUS: CPT

## 2019-08-24 PROCEDURE — 94669 MECHANICAL CHEST WALL OSCILL: CPT

## 2019-08-24 PROCEDURE — 2580000003 HC RX 258: Performed by: INTERNAL MEDICINE

## 2019-08-24 PROCEDURE — 36415 COLL VENOUS BLD VENIPUNCTURE: CPT

## 2019-08-24 PROCEDURE — 84443 ASSAY THYROID STIM HORMONE: CPT

## 2019-08-24 PROCEDURE — 84481 FREE ASSAY (FT-3): CPT

## 2019-08-24 PROCEDURE — 94640 AIRWAY INHALATION TREATMENT: CPT

## 2019-08-24 PROCEDURE — 2060000000 HC ICU INTERMEDIATE R&B

## 2019-08-24 PROCEDURE — C9113 INJ PANTOPRAZOLE SODIUM, VIA: HCPCS | Performed by: INTERNAL MEDICINE

## 2019-08-24 PROCEDURE — 94761 N-INVAS EAR/PLS OXIMETRY MLT: CPT

## 2019-08-24 PROCEDURE — 80053 COMPREHEN METABOLIC PANEL: CPT

## 2019-08-24 RX ADMIN — Medication 250 MG: at 06:35

## 2019-08-24 RX ADMIN — IPRATROPIUM BROMIDE AND ALBUTEROL SULFATE 1 AMPULE: .5; 3 SOLUTION RESPIRATORY (INHALATION) at 20:14

## 2019-08-24 RX ADMIN — HEPARIN SODIUM 5000 UNITS: 5000 INJECTION INTRAVENOUS; SUBCUTANEOUS at 14:52

## 2019-08-24 RX ADMIN — SPIRONOLACTONE 50 MG: 25 TABLET ORAL at 09:16

## 2019-08-24 RX ADMIN — POTASSIUM CHLORIDE 40 MEQ: 20 TABLET, EXTENDED RELEASE ORAL at 09:16

## 2019-08-24 RX ADMIN — IPRATROPIUM BROMIDE AND ALBUTEROL SULFATE 1 AMPULE: .5; 3 SOLUTION RESPIRATORY (INHALATION) at 16:22

## 2019-08-24 RX ADMIN — Medication 250 MG: at 12:43

## 2019-08-24 RX ADMIN — HEPARIN SODIUM 5000 UNITS: 5000 INJECTION INTRAVENOUS; SUBCUTANEOUS at 06:31

## 2019-08-24 RX ADMIN — TAMSULOSIN HYDROCHLORIDE 0.4 MG: 0.4 CAPSULE ORAL at 09:16

## 2019-08-24 RX ADMIN — IPRATROPIUM BROMIDE AND ALBUTEROL SULFATE 1 AMPULE: .5; 3 SOLUTION RESPIRATORY (INHALATION) at 11:45

## 2019-08-24 RX ADMIN — Medication 250 MG: at 00:59

## 2019-08-24 RX ADMIN — Medication 10 ML: at 09:17

## 2019-08-24 RX ADMIN — Medication 250 MG: at 20:10

## 2019-08-24 RX ADMIN — FOLIC ACID 1000 MCG: 1 TABLET ORAL at 09:16

## 2019-08-24 RX ADMIN — TORSEMIDE 40 MG: 20 TABLET ORAL at 09:16

## 2019-08-24 RX ADMIN — Medication 250 MG: at 18:52

## 2019-08-24 RX ADMIN — Medication 250 MG: at 09:16

## 2019-08-24 RX ADMIN — LEVOTHYROXINE SODIUM 125 MCG: 100 TABLET ORAL at 06:32

## 2019-08-24 RX ADMIN — DEXAMETHASONE 2 MG: 4 TABLET ORAL at 09:17

## 2019-08-24 RX ADMIN — IPRATROPIUM BROMIDE AND ALBUTEROL SULFATE 1 AMPULE: .5; 3 SOLUTION RESPIRATORY (INHALATION) at 07:45

## 2019-08-24 RX ADMIN — PANTOPRAZOLE SODIUM 40 MG: 40 INJECTION, POWDER, FOR SOLUTION INTRAVENOUS at 09:16

## 2019-08-24 RX ADMIN — Medication 10 ML: at 20:10

## 2019-08-24 NOTE — PROGRESS NOTES
[facility]    [facility]            [phone]        Interval Plan:     Renal function is stable  On po diuretics  Oxygen requirement stable  Has sacral wound              Assessment :     Acute Kidney Injury  Creatinine peaked to 3.2-now down to  1.1  Has recurrent MCKENNA- baseline  likely will be between 1-1.5  Has had recurrent MCKENNA in 2019-2 peak of 4 on 4/19  Renal USG: R- 11.7 cm, L - 13.23 cm, normal echogenicity  UA: [place], no proteinuria    Echo- 2/19- EF 55-60%, Grade I DD, mild LVH,   3/19- RV function is normal in size and function    Generalized Edema  With peak to to 130 kg -now coming down to 111.5  kg  Thoracentesis on 8/15/2019  On high dose of lasix and also on aldactone  Now on torsemide and aldactone    On 3 L oxygen now- requiring intermittent bipap- off now  -ve 15 L this admission  No pedal edema now    Hypertension   BP: (118-129)/(65-68)  Pulse:  [77-89]    BP low at 70s on admission  Had myxedema coma on admission  Now high  On diuretics    COPD   PE- 2/2019  Liver disease, alcohol abuse    Anemia-hematology consulted      Please call with questions at-  24 hrs Answering service [phone]   7 am-5 pm at [facility], or cell phone  Dr Segundo Palomo        CC/ Reason for consult:     MCKENNA    HPI:     From consult note-   Patient is a 69 y.o. male  presented to  the hospital on 8/11/2019 with  Fall off the recliner. He was in the floor for 30 minutes. Upon arrival patient's blood pressure was 93/46 and dropped to 74/44. Associated with high lactic acidosis of 5.8. But no rhabdomyolysis with CPK of only 72. He is known to have COPD with 4 L of oxygen at home  He is known to have recurrent acute kidney injury which happened in March April and May 2019.         Interval History:     Oxygen requirement stable  Urine output is 2.3 L      Seen with - no family    ROS -   SOB- present, but better  Edema- negative  Nausea/vomiting- none  Poor appetite-yes  Confusion- no  Urinary complaints- no  Any other complaints- feeling tired  All other ROS are reviewed and are Negative    Vitals:     Vitals:    08/24/19 1242   BP: 118/65   Pulse: 89   Resp: 18   Temp: 97.8 °F (36.6 °C)   SpO2: 93%         I & O:       Intake/Output Summary (Last 24 hours) at 8/24/2019 1439  Last data filed at 8/24/2019 1432  Gross per 24 hour   Intake 240 ml   Output 1800 ml   Net -1560 ml         Physical Examination:     General appearance: Anxious-yes, distressed- no, in good spirits- yes  Comfortable, communicative  AAO X 3  Sitting up on chair  HEENT: Lips- normal, teeth- ok , oral mucosa- moist  Neck : Mass- no, appears symmetrical, JVD- not visible  Respiratory: Respiratory effort-  normal, wheeze-yes, crackles -  yes ,oxygen- via nasal cannula  Cardiovascular:  Ausculation- No M/R/G, Edema all extremities edematous  Abdomen: visible mass- no, distention- no, scar- no, tenderness- no                            hepatosplenomegaly-  no  Musculoskeletal:  clubbing no,cyanosis- no , digital ischemia- no                           muscle strength- grossly normal , tone - grossly normal  Skin: rashes- no , ulcers- no, induration- no, tightening - no  Psychiatric:  Judgement and insight- normal        Meds:      [START ON 8/25/2019] dexamethasone  1 mg Oral Daily    [START ON 8/26/2019] dexamethasone  0.5 mg Oral Daily    torsemide  40 mg Oral Daily    spironolactone  50 mg Oral Daily    potassium chloride  40 mEq Oral Daily    levothyroxine  125 mcg Oral Daily    vancomycin  250 mg Oral 4 times per day    saccharomyces boulardii  250 mg Oral BID    heparin (porcine)  5,000 Units Subcutaneous 3 times per day    pantoprazole  40 mg Intravenous Daily    folic acid  3,347 mcg Oral Daily    ipratropium-albuterol  1 ampule Inhalation Q4H WA    tamsulosin  0.4 mg Oral Daily    sodium chloride flush  10 mL Intravenous 2 times per day       Labs:     Recent Labs

## 2019-08-25 VITALS
WEIGHT: 244.27 LBS | OXYGEN SATURATION: 96 % | HEART RATE: 72 BPM | TEMPERATURE: 98.1 F | BODY MASS INDEX: 32.37 KG/M2 | HEIGHT: 73 IN | DIASTOLIC BLOOD PRESSURE: 68 MMHG | SYSTOLIC BLOOD PRESSURE: 133 MMHG | RESPIRATION RATE: 18 BRPM

## 2019-08-25 LAB
A/G RATIO: 0.9 (ref 1.1–2.2)
ALBUMIN SERPL-MCNC: 3 G/DL (ref 3.4–5)
ALP BLD-CCNC: 168 U/L (ref 40–129)
ALT SERPL-CCNC: 28 U/L (ref 10–40)
ANION GAP SERPL CALCULATED.3IONS-SCNC: 8 MMOL/L (ref 3–16)
AST SERPL-CCNC: 36 U/L (ref 15–37)
BASOPHILS ABSOLUTE: 0.1 K/UL (ref 0–0.2)
BASOPHILS RELATIVE PERCENT: 0.5 %
BILIRUB SERPL-MCNC: 0.6 MG/DL (ref 0–1)
BUN BLDV-MCNC: 16 MG/DL (ref 7–20)
CALCIUM SERPL-MCNC: 9.2 MG/DL (ref 8.3–10.6)
CHLORIDE BLD-SCNC: 99 MMOL/L (ref 99–110)
CO2: 33 MMOL/L (ref 21–32)
CREAT SERPL-MCNC: 1.3 MG/DL (ref 0.8–1.3)
EOSINOPHILS ABSOLUTE: 0.1 K/UL (ref 0–0.6)
EOSINOPHILS RELATIVE PERCENT: 0.7 %
GFR AFRICAN AMERICAN: >60
GFR NON-AFRICAN AMERICAN: 54
GLOBULIN: 3.2 G/DL
GLUCOSE BLD-MCNC: 87 MG/DL (ref 70–99)
HCT VFR BLD CALC: 27.9 % (ref 40.5–52.5)
HEMOGLOBIN: 9.4 G/DL (ref 13.5–17.5)
LYMPHOCYTES ABSOLUTE: 1.8 K/UL (ref 1–5.1)
LYMPHOCYTES RELATIVE PERCENT: 10.5 %
MCH RBC QN AUTO: 34.5 PG (ref 26–34)
MCHC RBC AUTO-ENTMCNC: 33.7 G/DL (ref 31–36)
MCV RBC AUTO: 102.6 FL (ref 80–100)
MONOCYTES ABSOLUTE: 0.9 K/UL (ref 0–1.3)
MONOCYTES RELATIVE PERCENT: 5 %
NEUTROPHILS ABSOLUTE: 14.3 K/UL (ref 1.7–7.7)
NEUTROPHILS RELATIVE PERCENT: 83.3 %
PDW BLD-RTO: 16.5 % (ref 12.4–15.4)
PHOSPHORUS: 4.8 MG/DL (ref 2.5–4.9)
PLATELET # BLD: 351 K/UL (ref 135–450)
PMV BLD AUTO: 8.2 FL (ref 5–10.5)
POTASSIUM REFLEX MAGNESIUM: 5.5 MMOL/L (ref 3.5–5.1)
POTASSIUM SERPL-SCNC: 4.8 MMOL/L (ref 3.5–5.1)
RBC # BLD: 2.72 M/UL (ref 4.2–5.9)
SODIUM BLD-SCNC: 140 MMOL/L (ref 136–145)
TOTAL PROTEIN: 6.2 G/DL (ref 6.4–8.2)
WBC # BLD: 17.2 K/UL (ref 4–11)

## 2019-08-25 PROCEDURE — 6360000002 HC RX W HCPCS: Performed by: INTERNAL MEDICINE

## 2019-08-25 PROCEDURE — 84100 ASSAY OF PHOSPHORUS: CPT

## 2019-08-25 PROCEDURE — 94761 N-INVAS EAR/PLS OXIMETRY MLT: CPT

## 2019-08-25 PROCEDURE — 6370000000 HC RX 637 (ALT 250 FOR IP): Performed by: INTERNAL MEDICINE

## 2019-08-25 PROCEDURE — 84132 ASSAY OF SERUM POTASSIUM: CPT

## 2019-08-25 PROCEDURE — 2700000000 HC OXYGEN THERAPY PER DAY

## 2019-08-25 PROCEDURE — 94669 MECHANICAL CHEST WALL OSCILL: CPT

## 2019-08-25 PROCEDURE — 94640 AIRWAY INHALATION TREATMENT: CPT

## 2019-08-25 PROCEDURE — C9113 INJ PANTOPRAZOLE SODIUM, VIA: HCPCS | Performed by: INTERNAL MEDICINE

## 2019-08-25 PROCEDURE — 2580000003 HC RX 258: Performed by: INTERNAL MEDICINE

## 2019-08-25 PROCEDURE — 80053 COMPREHEN METABOLIC PANEL: CPT

## 2019-08-25 PROCEDURE — 85025 COMPLETE CBC W/AUTO DIFF WBC: CPT

## 2019-08-25 PROCEDURE — 36415 COLL VENOUS BLD VENIPUNCTURE: CPT

## 2019-08-25 RX ORDER — FOLIC ACID 1 MG/1
1000 TABLET ORAL DAILY
Qty: 30 TABLET | Refills: 3 | Status: SHIPPED | OUTPATIENT
Start: 2019-08-26 | End: 2019-12-28

## 2019-08-25 RX ORDER — LEVOTHYROXINE SODIUM 0.12 MG/1
125 TABLET ORAL DAILY
Qty: 30 TABLET | Refills: 3 | Status: ON HOLD | OUTPATIENT
Start: 2019-08-26 | End: 2019-10-08 | Stop reason: HOSPADM

## 2019-08-25 RX ORDER — TORSEMIDE 20 MG/1
40 TABLET ORAL DAILY
Qty: 30 TABLET | Refills: 3 | Status: ON HOLD | OUTPATIENT
Start: 2019-08-26 | End: 2019-09-21 | Stop reason: SDUPTHER

## 2019-08-25 RX ORDER — SACCHAROMYCES BOULARDII 250 MG
250 CAPSULE ORAL 2 TIMES DAILY
Qty: 14 CAPSULE | Refills: 0 | Status: ON HOLD | OUTPATIENT
Start: 2019-08-25 | End: 2019-09-21 | Stop reason: HOSPADM

## 2019-08-25 RX ORDER — DEXAMETHASONE 0.5 MG/1
0.5 TABLET ORAL DAILY
Qty: 3 TABLET | Refills: 0 | Status: ON HOLD | OUTPATIENT
Start: 2019-08-26 | End: 2019-09-21 | Stop reason: HOSPADM

## 2019-08-25 RX ADMIN — Medication 250 MG: at 08:50

## 2019-08-25 RX ADMIN — Medication 250 MG: at 00:01

## 2019-08-25 RX ADMIN — LEVOTHYROXINE SODIUM 125 MCG: 100 TABLET ORAL at 06:43

## 2019-08-25 RX ADMIN — DEXAMETHASONE 1 MG: 1 TABLET ORAL at 09:34

## 2019-08-25 RX ADMIN — SPIRONOLACTONE 50 MG: 25 TABLET ORAL at 09:33

## 2019-08-25 RX ADMIN — IPRATROPIUM BROMIDE AND ALBUTEROL SULFATE 1 AMPULE: .5; 3 SOLUTION RESPIRATORY (INHALATION) at 07:53

## 2019-08-25 RX ADMIN — TAMSULOSIN HYDROCHLORIDE 0.4 MG: 0.4 CAPSULE ORAL at 09:36

## 2019-08-25 RX ADMIN — HEPARIN SODIUM 5000 UNITS: 5000 INJECTION INTRAVENOUS; SUBCUTANEOUS at 06:43

## 2019-08-25 RX ADMIN — Medication 250 MG: at 12:14

## 2019-08-25 RX ADMIN — FOLIC ACID 1000 MCG: 1 TABLET ORAL at 09:35

## 2019-08-25 RX ADMIN — IPRATROPIUM BROMIDE AND ALBUTEROL SULFATE 1 AMPULE: .5; 3 SOLUTION RESPIRATORY (INHALATION) at 15:32

## 2019-08-25 RX ADMIN — Medication 10 ML: at 09:37

## 2019-08-25 RX ADMIN — HEPARIN SODIUM 5000 UNITS: 5000 INJECTION INTRAVENOUS; SUBCUTANEOUS at 13:53

## 2019-08-25 RX ADMIN — HEPARIN SODIUM 5000 UNITS: 5000 INJECTION INTRAVENOUS; SUBCUTANEOUS at 00:01

## 2019-08-25 RX ADMIN — TORSEMIDE 40 MG: 20 TABLET ORAL at 09:35

## 2019-08-25 RX ADMIN — PANTOPRAZOLE SODIUM 40 MG: 40 INJECTION, POWDER, FOR SOLUTION INTRAVENOUS at 09:36

## 2019-08-25 RX ADMIN — IPRATROPIUM BROMIDE AND ALBUTEROL SULFATE 1 AMPULE: .5; 3 SOLUTION RESPIRATORY (INHALATION) at 11:34

## 2019-08-25 RX ADMIN — Medication 250 MG: at 17:40

## 2019-08-25 RX ADMIN — Medication 250 MG: at 06:43

## 2019-08-25 ASSESSMENT — PAIN SCALES - GENERAL: PAINLEVEL_OUTOF10: 0

## 2019-08-25 NOTE — FLOWSHEET NOTE
Verified with Dr. Kristin Espinosa, Nephrology, ok to discharge today, to follow-up with Dr. Sandrine Umana in 2 weeks.

## 2019-08-25 NOTE — DISCHARGE SUMMARY
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 Hospital Course:   Possible Myxedema coma  tsh  was 72 on 22 August  Resolved, was present on arrival  On oral Synthroid and steroids (Decadron, weaned down rapidly), was getting iv synthroid initially  Off antibiotics   Patient states he thought he was taking his thyroid supplements. Continued to monitor   was on iv diuretics for edema, switched to po lasix on 8/22   -unfortunately pt was not tested for adrenal insufficency on admission, will need to retest cortisol/acth/TFTs after off steroids for 2 weeks, consider giving prn hydrocortisone tabs on dc and arrange close f/u with endocrine as outpt     Acute resp failure with hypoxia-improved back to baseline    conerns for mucus plugging  -pulm consulted, apprec recs, bronch done 8/19   -check cxr 8/23-bilateral effusion improved     Anemia   Required transfusion on admission, then stabilized but not improved  Hematology consult appreciated, felt multifactorial(liver dz/cirrhosis, alba, etoh, infection) suspect ACD     Acute renal failure  Nephrology input appreciated.    Creatinine stabilized near baseline.   -elevated light chains, likely from MCKENNA, repeat labs in 3-6 months     Hyponatremia  Resolved with medication management and fluid supplementation  Continue to monitor daily basic metabolic panel     C. difficile colitis  Continue oral vancomycin. stabilized  -had rectal tube in place and  was DC'd        Physical Exam Performed:     /68   Pulse 72   Temp 98.1 °F (36.7 °C) (Oral)   Resp 18   Ht 6' 1\" (1.854 m)   Wt 244 lb 4.3 oz (110.8 kg)   SpO2 96%   BMI 32.23 kg/m²       General appearance: No apparent distress, appears stated age. [place] and cooperative  HEENT: Pupils equal, round, and reactive to light. Conjunctivae/corneas clear. Neck: Supple, with full range of motion. No jugular venous distention. Trachea midline. Respiratory:  Weak effort, no rhonchi and few  bibasilar crackles, no wheezes  Cardiovascular: Regular rate and rhythm with normal S1/S2 without murmurs, rubs or gallops. Abdomen: Soft, non-tender, non-distended with normal bowel sounds. Musculoskeletal: No clubbing or cyanosis.  trace edema bilaterally.  Full range of motion without deformity. Skin: Skin color, texture, turgor normal.  No rashes or lesions. Neurologic:  Neurovascularly intact without any focal sensory/motor deficits. Cranial nerves: II-XII intact, grossly non-focal.  Psychiatric: Alert, back to baseline alertness, able to engage in full conversation  Capillary Refill: Brisk,< 3 seconds   Peripheral Pulses: +2 palpable, equal bilaterally     Labs:  For convenience and continuity at follow-up the following most recent labs are provided:      CBC:    Lab Results   Component Value Date    WBC 17.2 08/25/2019    HGB 9.4 08/25/2019    HCT 27.9 08/25/2019     08/25/2019       Renal:    Lab Results   Component Value Date     08/25/2019    K 4.8 08/25/2019    K 5.5 08/25/2019    CL 99 08/25/2019    CO2 33 08/25/2019    BUN 16 08/25/2019    CREATININE 1.3 08/25/2019    CALCIUM 9.2 08/25/2019

## 2019-08-25 NOTE — PROGRESS NOTES
Negative 08/11/2019    WBCUA 0-2 08/11/2019    BACTERIA 2+ 08/11/2019    RBCUA 3-5 08/11/2019    BLOODU Negative 08/11/2019    SPECGRAV 1.020 08/11/2019    GLUCOSEU Negative 08/11/2019       Radiology:  XR CHEST STANDARD (2 VW)   Final Result   Small bilateral pleural effusions, decreased slightly. XR CHEST PORTABLE   Final Result   Continued increased central and basilar opacity, with bilateral pleural   effusions, unchanged when compared to the previous exam, most compatible with   pulmonary edema. Nonetheless, correlate with any clinical evidence of   superimposed pneumonia. XR CHEST PORTABLE   Final Result   No significant change in effusions. XR CHEST PORTABLE   Final Result   Worsening of pleural effusions         XR CHEST PORTABLE   Final Result   Bilateral pleural effusions and airspace disease, slightly increased on the   left and decreased on the right as compared to prior. Background pulmonary   edema. XR CHEST PORTABLE   Final Result   1. Study limited, as detailed above. 2. Stable position of a left internal jugular central venous catheter with   tip overlying the brachiocephalic vein. 3. No significant change in appearance of moderate CHF and multifocal   pneumonia. XR CHEST PORTABLE   Final Result   Limited exam.      Persistent CHF with bilateral effusions. XR CHEST PORTABLE   Final Result   A left IJ catheter has its tip at the junction of the innominate vein and   superior vena cava. No pneumothorax. Persistent low lung volumes, pulmonary edema, bilateral pleural effusions and   bibasilar airspace disease. XR CHEST PORTABLE   Final Result   Increased airspace disease on the right. There is persistent   pleuroparenchymal disease bilaterally         CT ABDOMEN PELVIS WO CONTRAST Additional Contrast? None   Final Result   Inflammatory changes surrounding the distal sigmoid colon suggesting a mild   colitis.   Consider infectious and

## 2019-08-25 NOTE — CARE COORDINATION
CASE MANAGEMENT DISCHARGE SUMMARY      Discharge to: Home with [address] ordered/agency: na    Transportation:    Family/car: private    Notified:   Facility/Agency: [facility] [id number]      Note: Discharging nurse to complete [facility], reconcile [facility], and place final copy with patient's discharge packet. RN to ensure that written prescriptions for  Level II medications are sent with patient to the facility as per protocol.

## 2019-08-25 NOTE — PLAN OF CARE
Patient's EF (Ejection Fraction) is greater than 40%    Patient's weights and intake/output reviewed:    Patient's Last Weight: 283 lbs obtained by bed scale. Difference of 3 lbs less than last documented weight. Intake/Output Summary (Last 24 hours) at 8/16/2019 1706  Last data filed at 8/16/2019 1330  Gross per 24 hour   Intake 520 ml   Output 975 ml   Net -455 ml         Patient stated Daily Functional Goal:   Up to chair daily. Ongoing Functional Capacity Assessment:  Patient  unable to ambulate distance of 15 feet. Patient noted to be on 5L supplemental O2 with SpO2 of 93%. Pt resting in bed at this time on CPAP. Pt with complaints of shortness of breath. Pt with nonpitting lower extremity edema. Patient and/or Family's stated Goal of Care this Admission: reduce shortness of breath, increase activity tolerance, better understand heart failure and disease management, be more comfortable and reduce lower extremity edema prior to discharge      Comorbidities Reviewed Yes  Patient has a past medical history of Alcohol abuse, Alcohol abuse, ARF (acute renal failure) ([address] CHF (congestive heart failure) ([address] Clostridium difficile infection, Hypercholesteremia, Hypertension, Mediastinal adenopathy, Pneumonia due to organism, and Pulmonary emphysema ([address]          >>For CHF and Comorbidity documentation on Education Time and Topics, please see Education Tab      Problem: OXYGENATION/RESPIRATORY FUNCTION  Goal: Patient will maintain patent airway  Outcome: Met This Shift     Problem: OXYGENATION/RESPIRATORY FUNCTION  Goal: Patient will achieve/maintain normal respiratory rate/effort  Description  Respiratory rate and effort will be within normal limits for the patient  Outcome: Met This Shift     Problem: HEMODYNAMIC STATUS  Goal: Patient has stable vital signs and fluid balance  Outcome: Ongoing     Problem: FLUID AND ELECTROLYTE IMBALANCE  Goal: Fluid and electrolyte balance are
Plan of care reviewed with patient. Encouraged to relay needs to staff.
Problem: Falls - Risk of:  Goal: Will remain free from falls  Description  Will remain free from falls  8/21/2019 0000 by Autumn Camacho RN  Outcome: Ongoing     Problem: Risk for Impaired Skin Integrity  Goal: Tissue integrity - skin and mucous membranes  Description  Structural intactness and normal physiological function of skin and  mucous membranes.   8/21/2019 0000 by Autumn Camacho RN  Outcome: Ongoing
Problem: Falls - Risk of:  Goal: Will remain free from falls  Description  Will remain free from falls  8/22/2019 2143 by Glen Irwin RN  Outcome: Ongoing    Patient instructed to call if assistance is needed. Call light within reach and patient understands how to use. Bed alarm is in place.
Problem: Falls - Risk of:  Goal: Will remain free from falls  Description  Will remain free from falls  Outcome: Ongoing  Note:   Pt high fall risk. Instructed to use call light before getting out of bed. Call light within reach. Bed in low position. Bed alarm on. Will continue to monitor. Problem: Risk for Impaired Skin Integrity  Goal: Tissue integrity - skin and mucous membranes  Description  Structural intactness and normal physiological function of skin and  mucous membranes. Outcome: Ongoing  Note:   Pt is a Q2H, educated pt on preventing skin breakdown. See flowsheet for assessment      Problem: Pain:  Goal: Control of acute pain  Description  Control of acute pain  Outcome: Ongoing  Note:   Pt states he undestands the 0-10 numeric pain scale. Pt also states that his pain is being adequately treated and will reach out to report changes. Problem: Falls - Risk of:  Goal: Will remain free from falls  Description  Will remain free from falls  Outcome: Ongoing  Note:   Pt high fall risk. Instructed to use call light before getting out of bed. Call light within reach. Bed in low position. Bed alarm on. Will continue to monitor. Problem: Risk for Impaired Skin Integrity  Goal: Tissue integrity - skin and mucous membranes  Description  Structural intactness and normal physiological function of skin and  mucous membranes. Outcome: Ongoing  Note:   Pt is a Q2H, educated pt on preventing skin breakdown. See flowsheet for assessment      Problem: Pain:  Goal: Control of acute pain  Description  Control of acute pain  Outcome: Ongoing  Note:   Pt states he undestands the 0-10 numeric pain scale. Pt also states that his pain is being adequately treated and will reach out to report changes.
Problem: HEMODYNAMIC STATUS  Goal: Patient has stable vital signs and fluid balance  Outcome: Ongoing     Patient's EF (Ejection Fraction) is greater than 40%    Patient's weights and intake/output reviewed:    Patient's Last Weight: 130.4 kg    obtained by bed scale. Intake/Output Summary (Last 24 hours) at 8/17/2019 1641  Last data filed at 8/17/2019 1156  Gross per 24 hour   Intake 460 ml   Output 1200 ml   Net -740 ml         Patient stated Daily Functional Goal: to get more rest and be more comfortable. Ongoing Functional Capacity Assessment:  Patient  unable to ambulate distance of 15 feet. Patient noted to be on 5 L supplemental O2 with SpO2 of 95 %. Pt resting in bed at this time on 5 L O2. Pt denies shortness of breath. Pt with pitting lower extremity edema. Patient and/or Family's stated Goal of Care this Admission: reduce shortness of breath prior to discharge      Comorbidities Reviewed Yes  Patient has a past medical history of Alcohol abuse, Alcohol abuse, ARF (acute renal failure) ([address] CHF (congestive heart failure) ([address] Clostridium difficile infection, Hypercholesteremia, Hypertension, Mediastinal adenopathy, Pneumonia due to organism, and Pulmonary emphysema ([address]          >>For CHF and Comorbidity documentation on Education Time and Topics, please see Education Tab
pain  Description  Control of acute pain  Outcome: Ongoing  Goal: Control of chronic pain  Description  Control of chronic pain  Outcome: Ongoing

## 2019-08-26 ENCOUNTER — CARE COORDINATION (OUTPATIENT)
Dept: CASE MANAGEMENT | Age: 74
End: 2019-08-26

## 2019-08-26 LAB — ADRENOCORTICOTROPIC HORMONE: 107 PG/ML (ref 7–69)

## 2019-08-26 NOTE — CARE COORDINATION
[facility] Transitions Initial Follow Up Call    Call within 2 business days of discharge: Yes    Patient: Jamel Franklin Patient : 1945   MRN: [id number]  Reason for Admission: Acute renal Failure  Discharge Date: 19 RARS: Readmission Risk Score: 30      Last Discharge [facility]       Complaint Diagnosis Description Type Department Provider    19 Fall; Back Pain Hypotension, unspecified hypotension type . .. ED to Hosp-Admission (Discharged) (ADMITTED) Isaac Franklin MD; Kb Morris. .. Facility: [facility]    Spoke with Lisbeth at [facility]. Stated they did not receive orders. Faxed [facility] to fax # she provided [phone], asked her to call me if she did not receive them and provided her with my contact information. Attempted to reach patient via phone for initial post hospital transition call. HIPAA compliant VM left stating purpose of call along with my contact information requesting a return call.     Follow Up  Future Appointments   Date Time Provider Basil Lubin   2019  1:00 PM MD [facility]di[facility] Int None       Berenice Angeles, [place]

## 2019-08-27 ENCOUNTER — HOSPITAL ENCOUNTER (INPATIENT)
Age: 74
LOS: 25 days | Discharge: HOSPICE | DRG: 871 | End: 2019-09-21
Attending: EMERGENCY MEDICINE | Admitting: INTERNAL MEDICINE
Payer: MEDICARE

## 2019-08-27 ENCOUNTER — CARE COORDINATION (OUTPATIENT)
Dept: CARE COORDINATION | Age: 74
End: 2019-08-27

## 2019-08-27 ENCOUNTER — TELEPHONE (OUTPATIENT)
Dept: OTHER | Facility: CLINIC | Age: 74
End: 2019-08-27

## 2019-08-27 ENCOUNTER — TELEPHONE (OUTPATIENT)
Dept: PHARMACY | Facility: CLINIC | Age: 74
End: 2019-08-27

## 2019-08-27 ENCOUNTER — APPOINTMENT (OUTPATIENT)
Dept: GENERAL RADIOLOGY | Age: 74
DRG: 871 | End: 2019-08-27
Payer: MEDICARE

## 2019-08-27 ENCOUNTER — CARE COORDINATION (OUTPATIENT)
Dept: CASE MANAGEMENT | Age: 74
End: 2019-08-27

## 2019-08-27 ENCOUNTER — APPOINTMENT (OUTPATIENT)
Dept: CT IMAGING | Age: 74
DRG: 871 | End: 2019-08-27
Payer: MEDICARE

## 2019-08-27 DIAGNOSIS — A41.9 SEPTICEMIA (HCC): Primary | ICD-10-CM

## 2019-08-27 DIAGNOSIS — N17.9 AKI (ACUTE KIDNEY INJURY) (HCC): ICD-10-CM

## 2019-08-27 LAB
A/G RATIO: 0.9 (ref 1.1–2.2)
ABO/RH: NORMAL
ALBUMIN SERPL-MCNC: 3.4 G/DL (ref 3.4–5)
ALP BLD-CCNC: 205 U/L (ref 40–129)
ALT SERPL-CCNC: 39 U/L (ref 10–40)
ANION GAP SERPL CALCULATED.3IONS-SCNC: 15 MMOL/L (ref 3–16)
ANTIBODY SCREEN: NORMAL
AST SERPL-CCNC: 46 U/L (ref 15–37)
BASOPHILS ABSOLUTE: 0.1 K/UL (ref 0–0.2)
BASOPHILS RELATIVE PERCENT: 0.5 %
BILIRUB SERPL-MCNC: 0.7 MG/DL (ref 0–1)
BUN BLDV-MCNC: 25 MG/DL (ref 7–20)
CALCIUM SERPL-MCNC: 9.4 MG/DL (ref 8.3–10.6)
CHLORIDE BLD-SCNC: 97 MMOL/L (ref 99–110)
CO2: 29 MMOL/L (ref 21–32)
CREAT SERPL-MCNC: 2.6 MG/DL (ref 0.8–1.3)
EOSINOPHILS ABSOLUTE: 0.3 K/UL (ref 0–0.6)
EOSINOPHILS RELATIVE PERCENT: 1.2 %
GFR AFRICAN AMERICAN: 29
GFR NON-AFRICAN AMERICAN: 24
GLOBULIN: 3.6 G/DL
GLUCOSE BLD-MCNC: 140 MG/DL (ref 70–99)
HCT VFR BLD CALC: 31.9 % (ref 40.5–52.5)
HEMOGLOBIN: 10.1 G/DL (ref 13.5–17.5)
INR BLD: 1.47 (ref 0.86–1.14)
LACTIC ACID, SEPSIS: 2.3 MMOL/L (ref 0.4–1.9)
LACTIC ACID, SEPSIS: 3.1 MMOL/L (ref 0.4–1.9)
LIPASE: 43 U/L (ref 13–60)
LYMPHOCYTES ABSOLUTE: 1 K/UL (ref 1–5.1)
LYMPHOCYTES RELATIVE PERCENT: 4.7 %
MCH RBC QN AUTO: 33.3 PG (ref 26–34)
MCHC RBC AUTO-ENTMCNC: 31.8 G/DL (ref 31–36)
MCV RBC AUTO: 104.8 FL (ref 80–100)
MONOCYTES ABSOLUTE: 0.7 K/UL (ref 0–1.3)
MONOCYTES RELATIVE PERCENT: 3.2 %
NEUTROPHILS ABSOLUTE: 19.4 K/UL (ref 1.7–7.7)
NEUTROPHILS RELATIVE PERCENT: 90.4 %
OCCULT BLOOD SCREENING: ABNORMAL
PDW BLD-RTO: 16.4 % (ref 12.4–15.4)
PLATELET # BLD: 375 K/UL (ref 135–450)
PMV BLD AUTO: 8.6 FL (ref 5–10.5)
POTASSIUM REFLEX MAGNESIUM: 5.2 MMOL/L (ref 3.5–5.1)
PROTHROMBIN TIME: 16.8 SEC (ref 9.8–13)
RBC # BLD: 3.05 M/UL (ref 4.2–5.9)
SODIUM BLD-SCNC: 141 MMOL/L (ref 136–145)
TOTAL PROTEIN: 7 G/DL (ref 6.4–8.2)
TROPONIN: 0.02 NG/ML
WBC # BLD: 21.5 K/UL (ref 4–11)

## 2019-08-27 PROCEDURE — 85025 COMPLETE CBC W/AUTO DIFF WBC: CPT

## 2019-08-27 PROCEDURE — 2060000000 HC ICU INTERMEDIATE R&B

## 2019-08-27 PROCEDURE — C9113 INJ PANTOPRAZOLE SODIUM, VIA: HCPCS | Performed by: EMERGENCY MEDICINE

## 2019-08-27 PROCEDURE — 87040 BLOOD CULTURE FOR BACTERIA: CPT

## 2019-08-27 PROCEDURE — 6360000002 HC RX W HCPCS: Performed by: INTERNAL MEDICINE

## 2019-08-27 PROCEDURE — 86900 BLOOD TYPING SEROLOGIC ABO: CPT

## 2019-08-27 PROCEDURE — 6370000000 HC RX 637 (ALT 250 FOR IP): Performed by: INTERNAL MEDICINE

## 2019-08-27 PROCEDURE — 80053 COMPREHEN METABOLIC PANEL: CPT

## 2019-08-27 PROCEDURE — 2580000003 HC RX 258: Performed by: EMERGENCY MEDICINE

## 2019-08-27 PROCEDURE — 6360000002 HC RX W HCPCS: Performed by: EMERGENCY MEDICINE

## 2019-08-27 PROCEDURE — 96366 THER/PROPH/DIAG IV INF ADDON: CPT

## 2019-08-27 PROCEDURE — 84484 ASSAY OF TROPONIN QUANT: CPT

## 2019-08-27 PROCEDURE — 83605 ASSAY OF LACTIC ACID: CPT

## 2019-08-27 PROCEDURE — 74176 CT ABD & PELVIS W/O CONTRAST: CPT

## 2019-08-27 PROCEDURE — 94640 AIRWAY INHALATION TREATMENT: CPT

## 2019-08-27 PROCEDURE — 94761 N-INVAS EAR/PLS OXIMETRY MLT: CPT

## 2019-08-27 PROCEDURE — 83690 ASSAY OF LIPASE: CPT

## 2019-08-27 PROCEDURE — 96375 TX/PRO/DX INJ NEW DRUG ADDON: CPT

## 2019-08-27 PROCEDURE — 99285 EMERGENCY DEPT VISIT HI MDM: CPT

## 2019-08-27 PROCEDURE — 93005 ELECTROCARDIOGRAM TRACING: CPT | Performed by: EMERGENCY MEDICINE

## 2019-08-27 PROCEDURE — 96365 THER/PROPH/DIAG IV INF INIT: CPT

## 2019-08-27 PROCEDURE — G0328 FECAL BLOOD SCRN IMMUNOASSAY: HCPCS

## 2019-08-27 PROCEDURE — 94150 VITAL CAPACITY TEST: CPT

## 2019-08-27 PROCEDURE — 2700000000 HC OXYGEN THERAPY PER DAY

## 2019-08-27 PROCEDURE — 2580000003 HC RX 258: Performed by: INTERNAL MEDICINE

## 2019-08-27 PROCEDURE — 86850 RBC ANTIBODY SCREEN: CPT

## 2019-08-27 PROCEDURE — 85610 PROTHROMBIN TIME: CPT

## 2019-08-27 PROCEDURE — 71045 X-RAY EXAM CHEST 1 VIEW: CPT

## 2019-08-27 PROCEDURE — 86901 BLOOD TYPING SEROLOGIC RH(D): CPT

## 2019-08-27 RX ORDER — SODIUM CHLORIDE 0.9 % (FLUSH) 0.9 %
10 SYRINGE (ML) INJECTION PRN
Status: DISCONTINUED | OUTPATIENT
Start: 2019-08-27 | End: 2019-09-21 | Stop reason: HOSPADM

## 2019-08-27 RX ORDER — PANTOPRAZOLE SODIUM 40 MG/1
40 TABLET, DELAYED RELEASE ORAL
Status: DISCONTINUED | OUTPATIENT
Start: 2019-08-28 | End: 2019-09-21 | Stop reason: HOSPADM

## 2019-08-27 RX ORDER — 0.9 % SODIUM CHLORIDE 0.9 %
1000 INTRAVENOUS SOLUTION INTRAVENOUS ONCE
Status: COMPLETED | OUTPATIENT
Start: 2019-08-27 | End: 2019-08-27

## 2019-08-27 RX ORDER — LEVOTHYROXINE SODIUM 0.12 MG/1
125 TABLET ORAL DAILY
Status: DISCONTINUED | OUTPATIENT
Start: 2019-08-28 | End: 2019-09-03

## 2019-08-27 RX ORDER — ALBUTEROL SULFATE 90 UG/1
2 AEROSOL, METERED RESPIRATORY (INHALATION) EVERY 6 HOURS PRN
Status: DISCONTINUED | OUTPATIENT
Start: 2019-08-27 | End: 2019-09-21 | Stop reason: HOSPADM

## 2019-08-27 RX ORDER — PANTOPRAZOLE SODIUM 40 MG/10ML
40 INJECTION, POWDER, LYOPHILIZED, FOR SOLUTION INTRAVENOUS ONCE
Status: COMPLETED | OUTPATIENT
Start: 2019-08-27 | End: 2019-08-27

## 2019-08-27 RX ORDER — HEPARIN SODIUM 5000 [USP'U]/ML
5000 INJECTION, SOLUTION INTRAVENOUS; SUBCUTANEOUS EVERY 8 HOURS SCHEDULED
Status: DISCONTINUED | OUTPATIENT
Start: 2019-08-27 | End: 2019-08-29

## 2019-08-27 RX ORDER — SODIUM CHLORIDE 9 MG/ML
INJECTION, SOLUTION INTRAVENOUS
Status: COMPLETED
Start: 2019-08-27 | End: 2019-08-28

## 2019-08-27 RX ORDER — SODIUM CHLORIDE 9 MG/ML
INJECTION, SOLUTION INTRAVENOUS CONTINUOUS
Status: DISCONTINUED | OUTPATIENT
Start: 2019-08-28 | End: 2019-08-29

## 2019-08-27 RX ORDER — ACETAMINOPHEN 325 MG/1
650 TABLET ORAL EVERY 4 HOURS PRN
Status: DISCONTINUED | OUTPATIENT
Start: 2019-08-27 | End: 2019-09-21 | Stop reason: HOSPADM

## 2019-08-27 RX ORDER — TAMSULOSIN HYDROCHLORIDE 0.4 MG/1
0.4 CAPSULE ORAL DAILY
Status: DISCONTINUED | OUTPATIENT
Start: 2019-08-27 | End: 2019-09-21 | Stop reason: HOSPADM

## 2019-08-27 RX ORDER — SODIUM CHLORIDE 0.9 % (FLUSH) 0.9 %
10 SYRINGE (ML) INJECTION EVERY 12 HOURS SCHEDULED
Status: DISCONTINUED | OUTPATIENT
Start: 2019-08-27 | End: 2019-09-21 | Stop reason: HOSPADM

## 2019-08-27 RX ORDER — 0.9 % SODIUM CHLORIDE 0.9 %
30 INTRAVENOUS SOLUTION INTRAVENOUS ONCE
Status: COMPLETED | OUTPATIENT
Start: 2019-08-27 | End: 2019-08-27

## 2019-08-27 RX ADMIN — CEFTRIAXONE SODIUM 1 G: 1 INJECTION, POWDER, FOR SOLUTION INTRAMUSCULAR; INTRAVENOUS at 16:24

## 2019-08-27 RX ADMIN — SODIUM CHLORIDE 1000 ML: 9 INJECTION, SOLUTION INTRAVENOUS at 16:24

## 2019-08-27 RX ADMIN — HYDROCORTISONE SODIUM SUCCINATE 50 MG: 100 INJECTION, POWDER, FOR SOLUTION INTRAMUSCULAR; INTRAVENOUS at 22:32

## 2019-08-27 RX ADMIN — Medication 2 PUFF: at 21:28

## 2019-08-27 RX ADMIN — SODIUM CHLORIDE 1000 ML: 9 INJECTION, SOLUTION INTRAVENOUS at 18:04

## 2019-08-27 RX ADMIN — VANCOMYCIN HYDROCHLORIDE 1000 MG: 1 INJECTION, POWDER, LYOPHILIZED, FOR SOLUTION INTRAVENOUS at 18:05

## 2019-08-27 RX ADMIN — TAMSULOSIN HYDROCHLORIDE 0.4 MG: 0.4 CAPSULE ORAL at 23:01

## 2019-08-27 RX ADMIN — HYDROCORTISONE SODIUM SUCCINATE 100 MG: 100 INJECTION, POWDER, FOR SOLUTION INTRAMUSCULAR; INTRAVENOUS at 16:24

## 2019-08-27 RX ADMIN — PANTOPRAZOLE SODIUM 40 MG: 40 INJECTION, POWDER, FOR SOLUTION INTRAVENOUS at 16:24

## 2019-08-27 RX ADMIN — HEPARIN SODIUM 5000 UNITS: 5000 INJECTION INTRAVENOUS; SUBCUTANEOUS at 22:31

## 2019-08-27 RX ADMIN — CEFEPIME HYDROCHLORIDE 2 G: 2 INJECTION, POWDER, FOR SOLUTION INTRAVENOUS at 22:32

## 2019-08-27 ASSESSMENT — PAIN SCALES - GENERAL: PAINLEVEL_OUTOF10: 0

## 2019-08-27 NOTE — CARE COORDINATION
[facility] Transitions Initial Follow Up Call    Call within 2 business days of discharge: Yes    Patient: Krissy Schultz Patient : 1945   MRN: [id number]  Reason for Admission: Myxedema coma   Discharge Date: 19 RARS: Readmission Risk Score: 30      Last Discharge [facility]       Complaint Diagnosis Description Type Department Provider    19 Fall; Back Pain Hypotension, unspecified hypotension type . .. ED to Hosp-Admission (Discharged) (ADMITTED) Jaimie Boucher MD; Kb Morris. .. Second attempt made to reach patient for initial post hospital transition call. VM left stating purpose of call along with my contact information requesting a return call.               Follow Up  Future Appointments   Date Time Provider Basil Lubin   2019  1:00 PM Keith Mendoza MD [facility] Int None       Kandice HA, RN, [facility]  Care Transition Nurse   [phone]

## 2019-08-28 LAB
ANION GAP SERPL CALCULATED.3IONS-SCNC: 12 MMOL/L (ref 3–16)
BACTERIA: ABNORMAL /HPF
BILIRUBIN URINE: NEGATIVE
BLOOD, URINE: ABNORMAL
BUN BLDV-MCNC: 25 MG/DL (ref 7–20)
CALCIUM SERPL-MCNC: 8 MG/DL (ref 8.3–10.6)
CHLORIDE BLD-SCNC: 101 MMOL/L (ref 99–110)
CLARITY: CLEAR
CO2: 25 MMOL/L (ref 21–32)
COLOR: YELLOW
CREAT SERPL-MCNC: 1.9 MG/DL (ref 0.8–1.3)
GFR AFRICAN AMERICAN: 42
GFR NON-AFRICAN AMERICAN: 35
GLUCOSE BLD-MCNC: 178 MG/DL (ref 70–99)
GLUCOSE URINE: 250 MG/DL
HCT VFR BLD CALC: 24.4 % (ref 40.5–52.5)
HEMOGLOBIN: 8 G/DL (ref 13.5–17.5)
KETONES, URINE: NEGATIVE MG/DL
LEUKOCYTE ESTERASE, URINE: ABNORMAL
MCH RBC QN AUTO: 34 PG (ref 26–34)
MCHC RBC AUTO-ENTMCNC: 33 G/DL (ref 31–36)
MCV RBC AUTO: 103.2 FL (ref 80–100)
MICROSCOPIC EXAMINATION: YES
NITRITE, URINE: NEGATIVE
PDW BLD-RTO: 16 % (ref 12.4–15.4)
PH UA: 5.5 (ref 5–8)
PLATELET # BLD: 267 K/UL (ref 135–450)
PMV BLD AUTO: 8.7 FL (ref 5–10.5)
POTASSIUM REFLEX MAGNESIUM: 4.6 MMOL/L (ref 3.5–5.1)
PROTEIN UA: NEGATIVE MG/DL
RBC # BLD: 2.36 M/UL (ref 4.2–5.9)
RBC UA: ABNORMAL /HPF (ref 0–2)
SODIUM BLD-SCNC: 138 MMOL/L (ref 136–145)
SPECIFIC GRAVITY UA: 1.02 (ref 1–1.03)
URINE REFLEX TO CULTURE: YES
URINE TYPE: ABNORMAL
UROBILINOGEN, URINE: 0.2 E.U./DL
VANCOMYCIN RANDOM: 8.4 UG/ML
WBC # BLD: 19.2 K/UL (ref 4–11)
WBC UA: ABNORMAL /HPF (ref 0–5)
YEAST: PRESENT /HPF

## 2019-08-28 PROCEDURE — 6360000002 HC RX W HCPCS: Performed by: INTERNAL MEDICINE

## 2019-08-28 PROCEDURE — 81001 URINALYSIS AUTO W/SCOPE: CPT

## 2019-08-28 PROCEDURE — 2580000003 HC RX 258

## 2019-08-28 PROCEDURE — 6370000000 HC RX 637 (ALT 250 FOR IP): Performed by: INTERNAL MEDICINE

## 2019-08-28 PROCEDURE — 80202 ASSAY OF VANCOMYCIN: CPT

## 2019-08-28 PROCEDURE — 2580000003 HC RX 258: Performed by: INTERNAL MEDICINE

## 2019-08-28 PROCEDURE — 36415 COLL VENOUS BLD VENIPUNCTURE: CPT

## 2019-08-28 PROCEDURE — 80048 BASIC METABOLIC PNL TOTAL CA: CPT

## 2019-08-28 PROCEDURE — 94761 N-INVAS EAR/PLS OXIMETRY MLT: CPT

## 2019-08-28 PROCEDURE — 87086 URINE CULTURE/COLONY COUNT: CPT

## 2019-08-28 PROCEDURE — 2700000000 HC OXYGEN THERAPY PER DAY

## 2019-08-28 PROCEDURE — 85027 COMPLETE CBC AUTOMATED: CPT

## 2019-08-28 PROCEDURE — 2580000003 HC RX 258: Performed by: NURSE PRACTITIONER

## 2019-08-28 PROCEDURE — 2060000000 HC ICU INTERMEDIATE R&B

## 2019-08-28 PROCEDURE — 94640 AIRWAY INHALATION TREATMENT: CPT

## 2019-08-28 RX ORDER — 0.9 % SODIUM CHLORIDE 0.9 %
500 INTRAVENOUS SOLUTION INTRAVENOUS ONCE
Status: COMPLETED | OUTPATIENT
Start: 2019-08-28 | End: 2019-08-28

## 2019-08-28 RX ADMIN — Medication 10 ML: at 09:34

## 2019-08-28 RX ADMIN — SODIUM CHLORIDE: 9 INJECTION, SOLUTION INTRAVENOUS at 00:03

## 2019-08-28 RX ADMIN — SODIUM CHLORIDE 500 ML: 9 INJECTION, SOLUTION INTRAVENOUS at 05:05

## 2019-08-28 RX ADMIN — HYDROCORTISONE SODIUM SUCCINATE 50 MG: 100 INJECTION, POWDER, FOR SOLUTION INTRAMUSCULAR; INTRAVENOUS at 20:35

## 2019-08-28 RX ADMIN — Medication 250 MG: at 09:33

## 2019-08-28 RX ADMIN — HYDROCORTISONE SODIUM SUCCINATE 50 MG: 100 INJECTION, POWDER, FOR SOLUTION INTRAMUSCULAR; INTRAVENOUS at 14:55

## 2019-08-28 RX ADMIN — Medication 250 MG: at 02:16

## 2019-08-28 RX ADMIN — Medication 250 MG: at 11:59

## 2019-08-28 RX ADMIN — PANTOPRAZOLE SODIUM 40 MG: 40 TABLET, DELAYED RELEASE ORAL at 09:48

## 2019-08-28 RX ADMIN — CEFEPIME HYDROCHLORIDE 2 G: 2 INJECTION, POWDER, FOR SOLUTION INTRAVENOUS at 09:37

## 2019-08-28 RX ADMIN — TAMSULOSIN HYDROCHLORIDE 0.4 MG: 0.4 CAPSULE ORAL at 09:48

## 2019-08-28 RX ADMIN — HEPARIN SODIUM 5000 UNITS: 5000 INJECTION INTRAVENOUS; SUBCUTANEOUS at 14:54

## 2019-08-28 RX ADMIN — HYDROCORTISONE SODIUM SUCCINATE 50 MG: 100 INJECTION, POWDER, FOR SOLUTION INTRAMUSCULAR; INTRAVENOUS at 03:38

## 2019-08-28 RX ADMIN — VANCOMYCIN HYDROCHLORIDE 1250 MG: 10 INJECTION, POWDER, LYOPHILIZED, FOR SOLUTION INTRAVENOUS at 10:27

## 2019-08-28 RX ADMIN — Medication 250 MG: at 16:45

## 2019-08-28 RX ADMIN — Medication 2 PUFF: at 19:56

## 2019-08-28 RX ADMIN — HYDROCORTISONE SODIUM SUCCINATE 50 MG: 100 INJECTION, POWDER, FOR SOLUTION INTRAMUSCULAR; INTRAVENOUS at 09:45

## 2019-08-28 RX ADMIN — SODIUM CHLORIDE: 9 INJECTION, SOLUTION INTRAVENOUS at 14:55

## 2019-08-28 RX ADMIN — Medication 2 PUFF: at 12:12

## 2019-08-28 RX ADMIN — Medication 10 ML: at 20:38

## 2019-08-28 RX ADMIN — LEVOTHYROXINE SODIUM 125 MCG: 125 TABLET ORAL at 09:45

## 2019-08-28 RX ADMIN — CEFEPIME HYDROCHLORIDE 2 G: 2 INJECTION, POWDER, FOR SOLUTION INTRAVENOUS at 20:32

## 2019-08-28 RX ADMIN — HEPARIN SODIUM 5000 UNITS: 5000 INJECTION INTRAVENOUS; SUBCUTANEOUS at 22:06

## 2019-08-28 RX ADMIN — Medication 250 MG: at 20:37

## 2019-08-28 RX ADMIN — HEPARIN SODIUM 5000 UNITS: 5000 INJECTION INTRAVENOUS; SUBCUTANEOUS at 05:34

## 2019-08-28 ASSESSMENT — PAIN SCALES - GENERAL: PAINLEVEL_OUTOF10: 0

## 2019-08-28 NOTE — PROGRESS NOTES
will be administered via Metered Dose Inhaler (MDI) with spacer when the following criteria are met:  a. Alert and cooperative     b. HR < 140 bpm  c. RR < 30 bpm                d. Can demonstrate a 2-3 second inspiratory hold  4. Bronchodilators will be administered via Hand Held Nebulizer [facility]) to patients when ANY of the following criteria are met  a. Incognizant or uncooperative          b. Patients treated with HHN at Home        c. Unable to demonstrate proper use of MDI with spacer     d. RR > 30 bpm   5. Bronchodilators will be delivered via Metered Dose Inhaler (MDI), HHN, Aerogen to intubated patients on mechanical ventilation. 6. Inhalation medication orders will be delivered and/or substituted as outlined below. Aerosolized Medications Ordering and Administration Guidelines:    1. All Medications will be ordered by a physician, and their frequency and/or modality will be adjusted as defined by the patients Respiratory Severity Index (RSI) score. 2. If the patient does not have documented COPD, consider discontinuing anticholinergics when RSI is less than 9.  3. If the bronchospasm worsens (increased RSI), then the bronchodilator frequency can be increased to a maximum of every 4 hours. If greater than every 4 hours is required, the physician will be contacted. 4. If the bronchospasm improves, the frequency of the bronchodilator can be decreased, based on the patient's RSI, but not less than home treatment regimen frequency. 5. Bronchodilator(s) will be discontinued if patient has a RSI less than 9 and has received no scheduled or as needed treatment for 72  Hrs. Patients Ordered on a Mucolytic Agent:    1. Must always be administered with a bronchodilator. 2. Discontinue if patient experiences worsened bronchospasm, or secretions have lessened to the point that the patient is able to clear them with a cough. Anti-inflammatory and Combination Medications:    1.  If the patient lacks prior

## 2019-08-28 NOTE — CONSULTS
[facility]       FAMILY HISTORY    Family History   Problem Relation Age of Onset    Other Other         no early lung disease       SOCIAL HISTORY    Social History     Tobacco Use    Smoking status: Former Smoker     Packs/day: 3.00     Years: 50.00     Pack years: 150.00     Last attempt to quit: 3/10/2002     Years since quittin.4    Smokeless tobacco: Never Used   Substance Use Topics    Alcohol use: Yes     Comment: 1-2 medium size glasses crown royal    Drug use: No       ALLERGIES    No Known Allergies    MEDICATIONS    No current facility-administered medications on file prior to encounter.       Current Outpatient Medications on File Prior to Encounter   Medication Sig Dispense Refill    dexamethasone (DECADRON) 0.5 MG tablet Take 1 tablet by mouth daily for 3 days 3 tablet 0    torsemide (DEMADEX) 20 MG tablet Take 2 tablets by mouth daily 30 tablet 3    folic acid (FOLVITE) 1 MG tablet Take 1 tablet by mouth daily 30 tablet 3    saccharomyces boulardii (FLORASTOR) 250 MG capsule Take 1 capsule by mouth 2 times daily for 7 days 14 capsule 0    vancomycin (VANCOCIN) 50 mg/mL oral solution Take 5 mLs by mouth 4 times daily for 14 days 280 mL 0    levothyroxine (SYNTHROID) 125 MCG tablet Take 1 tablet by mouth Daily 30 tablet 3    Multiple Vitamins-Minerals ([employer] SPECTRAVITE SENIOR) TABS TAKE 1 TABLET BY MOUTH EVERY DAY 90 tablet 1    omeprazole (PRILOSEC) 20 MG delayed release capsule TAKE 1 CAPSULE BY MOUTH EVERY DAY 90 capsule 0    folic acid (FOLVITE) 1 MG tablet TAKE 1 TABLET BY MOUTH EVERY DAY 90 tablet 0    ferrous sulfate 325 (65 Fe) MG tablet TAKE 1 TABLET BY MOUTH EVERY DAY WITH BREAKFAST 90 tablet 0    ipratropium-albuterol (DUONEB) 0.5-2.5 (3) MG/3ML SOLN nebulizer solution INHALE THE CONTENTS OF 1 VIAL INTO THE LUNGS EVERY 4 HOURS AS NEEDED FOR SHORTNESS OF BREATH 360 mL 1    tamsulosin (FLOMAX) 0.4 MG capsule Take 1 capsule by mouth daily 30 capsule 1    metoprolol 07/29/19 Buttocks Right (Active)   Wound Image   8/22/2019 12:26 PM   Wound Pressure Stage  2 8/28/2019 11:26 AM   Dressing Status Changed 8/28/2019 11:26 AM   Dressing Changed Changed/New 8/25/2019  4:00 AM   Dressing/Treatment Moisture barrier 8/28/2019 11:26 AM   Wound Cleansed Wound cleanser 8/28/2019 11:26 AM   Dressing Change Due 08/17/19 8/19/2019  8:59 AM   Wound Length (cm) 1 cm 8/28/2019 11:26 AM   Wound Width (cm) 1 cm 8/28/2019 11:26 AM   Wound Depth (cm) 0.1 cm 8/28/2019 11:26 AM   Wound Surface Area (cm^2) 1 cm^2 8/28/2019 11:26 AM   Change in Wound Size % (l*w) 53.7 8/28/2019 11:26 AM   Wound Volume (cm^3) 0.1 cm^3 8/28/2019 11:26 AM   Distance Tunneling (cm) 0 cm 8/22/2019 12:26 PM   Tunneling Position ___ O'Clock 0 8/28/2019 11:26 AM   Undermining Starts ___ O'Clock 0 8/28/2019 11:26 AM   Undermining Ends___ O'Clock 0 8/28/2019 11:26 AM   Undermining Maxium Distance (cm) 0 8/28/2019 11:26 AM   Wound Assessment Red 8/28/2019 11:26 AM   Drainage Amount MIRANDA 8/28/2019 11:26 AM   Drainage Description Linda Necessary 8/22/2019  6:37 PM   Odor None 8/28/2019 11:26 AM   Margins Attached edges; Defined edges 8/28/2019 11:26 AM   Kat-wound Assessment Denuded;Red 8/28/2019 11:26 AM   Non-staged Wound Description Partial thickness 8/28/2019 11:26 AM   [place]%Wound Bed 90 8/22/2019  6:37 PM   Red%Wound Bed 100 8/28/2019 11:26 AM   Culture Taken No 8/28/2019 11:26 AM   Number of days: 29      Right and left buttocks:           Wound 07/29/19 Coccyx split at top of buttocks crack (Active)   Wound Image   8/28/2019 11:26 AM   Wound Skin Tear 8/28/2019 11:26 AM   Dressing Status Other (Comment) 8/28/2019 11:26 AM   Dressing Changed Changed/New 8/22/2019  6:37 PM   Dressing/Treatment Moisture barrier 8/28/2019 11:26 AM   Wound Cleansed Wound cleanser 8/28/2019 11:26 AM   Wound Length (cm) 0.4 cm 8/28/2019 11:26 AM   Wound Width (cm) 0.4 cm 8/28/2019 11:26 AM   Wound Depth (cm) 0.1 cm 8/28/2019 11:26 AM   Wound Surface Area

## 2019-08-28 NOTE — PROGRESS NOTES
Hospitalist Progress Note      PCP: Vishal Jiang MD    Date of Admission: 8/27/2019    Chief Complaint: gi bleeding     Hospital Course: no over night events     Subjective: no new c/o        Medications:  Reviewed    Infusion Medications    sodium chloride 125 mL/hr at 08/28/19 0003     Scheduled Medications    vancomycin  1,250 mg Intravenous Q18H    levothyroxine  125 mcg Oral Daily    pantoprazole  40 mg Oral QAM AC    mometasone-formoterol  2 puff Inhalation BID    tamsulosin  0.4 mg Oral Daily    vancomycin  250 mg Oral 4x daily    sodium chloride flush  10 mL Intravenous 2 times per day    hydrocortisone sodium succinate PF  50 mg Intravenous Q6H    heparin (porcine)  5,000 Units Subcutaneous 3 times per day    cefepime  2 g Intravenous Q12H    vancomycin (VANCOCIN) intermittent dosing (placeholder)   Other RX Placeholder     PRN Meds: albuterol sulfate HFA, sodium chloride flush, acetaminophen      Intake/Output Summary (Last 24 hours) at 8/28/2019 1240  Last data filed at 8/28/2019 0934  Gross per 24 hour   Intake 250 ml   Output 760 ml   Net -510 ml       Physical Exam Performed:    BP (!) 99/56   Pulse 93   Temp 97.9 °F (36.6 °C) (Oral)   Resp 16   Ht 6' 1\" (1.854 m)   Wt 240 lb 14.4 oz (109.3 kg)   SpO2 98%   BMI 31.78 kg/m²     General appearance: No apparent distress, appears stated age and cooperative. HEENT: Pupils equal, round, and reactive to light. Conjunctivae/corneas clear. Neck: Supple, with full range of motion. No jugular venous distention. Trachea midline. Respiratory:  Normal respiratory effort. Clear to auscultation, bilaterally without Rales/Wheezes/Rhonchi. Cardiovascular: Regular rate and rhythm with normal S1/S2 without murmurs, rubs or gallops. Abdomen: Soft, non-tender, non-distended with normal bowel sounds. Musculoskeletal: No clubbing, cyanosis or edema bilaterally. Full range of motion without deformity.   Skin: Skin color, texture, turgor normal.  No rashes or lesions. Neurologic:  Neurovascularly intact without any focal sensory/motor deficits. Cranial nerves: II-XII intact, grossly non-focal.  Psychiatric: Alert and oriented, thought content appropriate, normal insight  Capillary Refill: Brisk,< 3 seconds   Peripheral Pulses: +2 palpable, equal bilaterally       Labs:   Recent Labs     08/27/19  1605 08/28/19  0556   WBC 21.5* 19.2*   HGB 10.1* 8.0*   HCT 31.9* 24.4*    267     Recent Labs     08/25/19  1311 08/27/19  1531 08/28/19  0556   NA  --  141 138   K 4.8 5.2* 4.6   CL  --  97* 101   CO2  --  29 25   BUN  --  25* 25*   CREATININE  --  2.6* 1.9*   CALCIUM  --  9.4 8.0*     Recent Labs     08/27/19  1531   AST 46*   ALT 39   BILITOT 0.7   ALKPHOS 205*     Recent Labs     08/27/19  1531   INR 1.47*     Recent Labs     08/27/19  1531   TROPONINI 0.02*       Urinalysis:      Lab Results   Component Value Date    NITRU Negative 08/28/2019    WBCUA 6-10 08/28/2019    BACTERIA Rare 08/28/2019    RBCUA None seen 08/28/2019    BLOODU TRACE-LYSED 08/28/2019    SPECGRAV 1.020 08/28/2019    GLUCOSEU 250 08/28/2019       Radiology:  CT ABDOMEN PELVIS WO CONTRAST Additional Contrast? None   Final Result   Subtle asymmetric dermal thickening involving the soft tissues overlying the   right hip, along with mild asymmetric subcutaneous edema, compatible with   cellulitis. No soft tissue gas or abscess is identified. Moderate to severe diverticulosis of the sigmoid colon, but without CT   evidence of diverticulitis on the current examination. At least moderate bilateral pleural effusions with adjacent airspace disease,   similar to increased when compared to the previous exam.         XR CHEST PORTABLE   Final Result   Stable small left pleural effusion, with bibasilar atelectasis and/or   consolidation.                  Assessment/Plan:    Active Hospital Problems    Diagnosis    Sepsis ([facility] [A41.9]     Severe sepsis 2/2 possible

## 2019-08-28 NOTE — CARE COORDINATION
Spoke with , Rinku Mishra, [facility], regarding patient's readmission. Informed her that [facility] would not be following patient d/t active/chronic substance abuser and requested that she provide patient with treatment information.     Garrett Gómez RN  Care Transitions Nurse

## 2019-08-28 NOTE — PROGRESS NOTES
Secured message to Jamaal Quintero NP  8/28/19 4:43 AM   [facility] or Facility: [facility] From: Alycia Serrano RE: Erin Cerda RM: [id number] Patient blood pressure 91/49 map 63. Patient complains of mild lightheadedness. Has fluids at 125ml/hr.  Thank you Need Callback: NO CALLBACK REQ C4 PROGRESSIVE CARE ROUTINE  Unread

## 2019-08-28 NOTE — CONSULTS
Thank you to requesting provider: Dr. Chon Newton, for asking us to see Praneeth Calderon  Reason for consultation:   Acute kidney injury  Chief Complaint:  GI bleed    History of Presenting Illness      68 y/o with recent prolonged stay with c.diff colitis presents with on going diarrhea that is now bloody. We have been asked to see him for acute kidney injury. He had MCKENNA during the last hospital stay but did improve to 1.1 - 1.3. Upon admission his Scr was 2.6. His BP is on the low side. He has been on IV fluids overnight and Scr is 1.9. Renal failure was associated with mild hyperkalemia but this has improved.         Past Medical/Surgical History      Active Ambulatory Problems     Diagnosis Date Noted    DISH (diffuse idiopathic skeletal hyperostosis) 03/18/2015    Hyperlipidemia 03/18/2015    Benign essential HTN 03/18/2015    Closed nondisplaced fracture of pubis ([address] 11/29/2015    Bilateral knee pain 09/28/2017    Alcohol abuse 09/28/2017    Centrilobular emphysema ([address] 08/29/2018    Other pulmonary embolism without acute cor pulmonale ([address] 02/14/2019    Mediastinal adenopathy 02/14/2019    Former smoker 02/14/2019    Pulmonary HTN ([address] 02/14/2019    Obesity 03/24/2019    CHF (congestive heart failure) ([address] 03/24/2019    CHF (congestive heart failure), NYHA class I, acute on chronic, combined ([address] 03/24/2019    History of pulmonary embolism     Elevated hemoglobin A1c     Acute respiratory failure with hypoxia and hypercapnia (HCC)     Chronic obstructive pulmonary disease (HCC)     Alcohol dependence ([address] 04/30/2019    Acute anemia 04/30/2019    Chronic respiratory failure with hypoxia ([address] 04/30/2019    Urinary tract infection without hematuria     Pneumonia due to organism     Acute kidney failure, unspecified ([address] 08/11/2019    Acute renal failure (ARF) ([address] 08/11/2019    Myxedema coma ([address] 08/12/2019    Hypotension     Mucus plugging of bronchi      Resolved Highest education level: None   Occupational History    None   Social Needs    Financial resource strain: None    Food insecurity:     Worry: None     Inability: None    Transportation needs:     Medical: None     Non-medical: None   Tobacco Use    Smoking status: Former Smoker     Packs/day: 3.00     Years: 50.00     Pack years: 150.00     Last attempt to quit: 3/10/2002     Years since quittin.4    Smokeless tobacco: Never Used   Substance and Sexual Activity    Alcohol use: Yes     Comment: 1-2 medium size glasses crown royal    Drug use: No    Sexual activity: Not Currently   Lifestyle    Physical activity:     Days per week: None     Minutes per session: None    Stress: None   Relationships    Social connections:     Talks on phone: None     Gets together: None     Attends Christianity service: None     Active member of club or organization: None     Attends meetings of clubs or organizations: None     Relationship status: None    Intimate partner violence:     Fear of current or ex partner: None     Emotionally abused: None     Physically abused: None     Forced sexual activity: None   Other Topics Concern    None   Social History Narrative    None       Physical Exam     Blood pressure (!) 99/56, pulse 93, temperature 97.9 °F (36.6 °C), temperature source Oral, resp. rate 16, height 6' 1\" (1.854 m), weight 240 lb 14.4 oz (109.3 kg), SpO2 98 %.     General:  NAD, A+Ox3  HEENT:  PERRL, EOMI  Neck:  Supple, normal range of movement  Chest:  CTAB, good respiratory effort, good air movement  CV:  Regular, no rub   Abdomen:  NTND, soft, +BS, no hepatosplenomegaly  Extremities:  No peripheral edema  Neurological:  Moving all four extremities, CN II-XII grossly intact  Lymphatics:  No palpable lymph nodes  Skin:  No rash, no jaundice  Psychiatric:  Awake, flat affect     Data     Recent Labs     19  1605 19  0556   WBC 21.5* 19.2*   HGB 10.1* 8.0*   HCT 31.9* 24.4*   .8* 103.2*

## 2019-08-28 NOTE — DISCHARGE INSTR - COC
· Name: Fabrizio Souza  · Address:  · Phone: [phone]  · Fax:    Dialysis Facility (if applicable)   · Name:  · Address:  · Dialysis Schedule:  · Phone:  · Fax:    / signature: Electronically signed by Oliva Araujo RN on 9/21/19 at 2:26 PM    PHYSICIAN SECTION    Prognosis: Fair    Condition at Discharge: Stable    Rehab Potential (if transferring to Rehab): Fair    Recommended Labs or Other Treatments After Discharge: BMP in 3-5 days  Recommended Follow-up, Labs or Other Treatments After Discharge:    PT-OT             Physician Certification: I certify the above information and transfer of Mar Gallagher  is necessary for the continuing treatment of the diagnosis listed and that he requires [address] for less 30 days.      Update Admission H&P: No change in H&P    PHYSICIAN SIGNATURE:  Electronically signed by Sherwin Bowles MD on 9/21/19 at 12:43 PM

## 2019-08-28 NOTE — TELEPHONE ENCOUNTER
Noted the patient was readmitted on 8/27/19. Will sign off at this time.       Royal Randle, PharmD, [address] [facility]  Direct: [phone]  Department, toll free [phone], option 7          For Pharmacy Admin Tracking Only    TCM Call Made?: Yes  [facility] ([facility] Select Patient?: Yes  Total # of Interventions Recommended: 0  Total # Interventions Accepted: 0  Intervention Severity:   - Level 1 Intervention Present?: No   - Level 2 #: 0   - Level 3 #: 0  Outreach Status: Not Completed - SNF/LTC/Hospice  Care Coordinator Outreach to Patient?: No  Provider Contacted?: No  Time Spent (min): 5

## 2019-08-28 NOTE — PROGRESS NOTES
Nutrition Assessment    Type and Reason for Visit: Initial, Positive Nutrition Screen(Wounds )    Nutrition Recommendations:   Continue general diet   Add Proteinex ONS daily  Encourage protein intakes at meals  Monitor po intakes, nutrition adequacy, weights, pertinent labs, BMs    Nutrition Assessment: Pt at risk for nutritional compromise on admission AEB increased needs from presence of wounds. Pt reports that his appetite is good and that he generally eats 50% of his meals or more. Pt endorses some weight loss over the past several months - not a significant amount. Encouraged protein intakes at meals. Pt favorable to adding ONS, will order and continue current diet. Malnutrition Assessment:  · Malnutrition Status: No malnutrition    Nutrition Risk Level: Moderate    Nutrient Needs:  · Estimated Daily Total Kcal: 6823-1737  · Estimated Daily Protein (g): 100-117  · Estimated Daily Total Fluid (ml/day): 1 ml/kcal    Nutrition Diagnosis:   · Problem: Increased protein loss  · Etiology: related to Increased demand for energy/nutrients     Signs and symptoms:  as evidenced by Presence of wounds    Objective Information:  · Nutrition-Focused Physical Findings: Pt reports of having ongoing diarrhea. Active BS.  Trace generalized edema  · Wound Type: Pressure Ulcer, Stage II, Stage III  · Current Nutrition Therapies:  · Oral Diet Orders: General   · Oral Diet intake: Unable to assess  · Oral Nutrition Supplement (ONS) Orders: None  · ONS intake: Unable to assess  · Anthropometric Measures:  · Ht: 6' 1\" (185.4 cm)   · Current Body Wt: 240 lb 14.4 oz (109.3 kg)  · Ideal Body Wt: 184 lb (83.5 kg),  · BMI Classification: BMI 30.0 - 34.9 Obese Class I    Nutrition Interventions:   Continue current diet, Start ONS  Continued Inpatient Monitoring    Nutrition Evaluation:   · Evaluation: Goals set   · Goals: Pt will have po intakes 50% or greater and include a protein source at every meal this admission

## 2019-08-29 LAB
ALBUMIN SERPL-MCNC: 2.6 G/DL (ref 3.4–5)
ANION GAP SERPL CALCULATED.3IONS-SCNC: 7 MMOL/L (ref 3–16)
BUN BLDV-MCNC: 25 MG/DL (ref 7–20)
CALCIUM SERPL-MCNC: 8 MG/DL (ref 8.3–10.6)
CHLORIDE BLD-SCNC: 106 MMOL/L (ref 99–110)
CO2: 26 MMOL/L (ref 21–32)
CREAT SERPL-MCNC: 1.6 MG/DL (ref 0.8–1.3)
EKG ATRIAL RATE: 100 BPM
EKG DIAGNOSIS: NORMAL
EKG P AXIS: 90 DEGREES
EKG P-R INTERVAL: 172 MS
EKG Q-T INTERVAL: 378 MS
EKG QRS DURATION: 84 MS
EKG QTC CALCULATION (BAZETT): 487 MS
EKG R AXIS: 25 DEGREES
EKG T AXIS: 65 DEGREES
EKG VENTRICULAR RATE: 100 BPM
GFR AFRICAN AMERICAN: 52
GFR NON-AFRICAN AMERICAN: 43
GLUCOSE BLD-MCNC: 140 MG/DL (ref 70–99)
HCT VFR BLD CALC: 22.4 % (ref 40.5–52.5)
HCT VFR BLD CALC: 23.2 % (ref 40.5–52.5)
HEMOGLOBIN: 7.5 G/DL (ref 13.5–17.5)
HEMOGLOBIN: 7.7 G/DL (ref 13.5–17.5)
MCH RBC QN AUTO: 34.6 PG (ref 26–34)
MCHC RBC AUTO-ENTMCNC: 33.4 G/DL (ref 31–36)
MCV RBC AUTO: 103.6 FL (ref 80–100)
ORGANISM: ABNORMAL
PDW BLD-RTO: 16.2 % (ref 12.4–15.4)
PHOSPHORUS: 4.1 MG/DL (ref 2.5–4.9)
PLATELET # BLD: 266 K/UL (ref 135–450)
PMV BLD AUTO: 8.7 FL (ref 5–10.5)
POTASSIUM REFLEX MAGNESIUM: 4.1 MMOL/L (ref 3.5–5.1)
POTASSIUM SERPL-SCNC: 4.1 MMOL/L (ref 3.5–5.1)
RBC # BLD: 2.16 M/UL (ref 4.2–5.9)
SODIUM BLD-SCNC: 139 MMOL/L (ref 136–145)
URIC ACID, SERUM: 8.1 MG/DL (ref 3.5–7.2)
URINE CULTURE, ROUTINE: ABNORMAL
VANCOMYCIN TROUGH: 18.8 UG/ML (ref 10–20)
WBC # BLD: 12.4 K/UL (ref 4–11)

## 2019-08-29 PROCEDURE — 85018 HEMOGLOBIN: CPT

## 2019-08-29 PROCEDURE — 2060000000 HC ICU INTERMEDIATE R&B

## 2019-08-29 PROCEDURE — 6370000000 HC RX 637 (ALT 250 FOR IP): Performed by: INTERNAL MEDICINE

## 2019-08-29 PROCEDURE — 84550 ASSAY OF BLOOD/URIC ACID: CPT

## 2019-08-29 PROCEDURE — 97167 OT EVAL HIGH COMPLEX 60 MIN: CPT

## 2019-08-29 PROCEDURE — 80202 ASSAY OF VANCOMYCIN: CPT

## 2019-08-29 PROCEDURE — 2700000000 HC OXYGEN THERAPY PER DAY

## 2019-08-29 PROCEDURE — 2580000003 HC RX 258: Performed by: INTERNAL MEDICINE

## 2019-08-29 PROCEDURE — 97535 SELF CARE MNGMENT TRAINING: CPT

## 2019-08-29 PROCEDURE — 85027 COMPLETE CBC AUTOMATED: CPT

## 2019-08-29 PROCEDURE — 85014 HEMATOCRIT: CPT

## 2019-08-29 PROCEDURE — 93010 ELECTROCARDIOGRAM REPORT: CPT | Performed by: INTERNAL MEDICINE

## 2019-08-29 PROCEDURE — 97530 THERAPEUTIC ACTIVITIES: CPT

## 2019-08-29 PROCEDURE — 94640 AIRWAY INHALATION TREATMENT: CPT

## 2019-08-29 PROCEDURE — 6360000002 HC RX W HCPCS: Performed by: INTERNAL MEDICINE

## 2019-08-29 PROCEDURE — 80069 RENAL FUNCTION PANEL: CPT

## 2019-08-29 PROCEDURE — 97116 GAIT TRAINING THERAPY: CPT

## 2019-08-29 PROCEDURE — 36415 COLL VENOUS BLD VENIPUNCTURE: CPT

## 2019-08-29 PROCEDURE — 97162 PT EVAL MOD COMPLEX 30 MIN: CPT

## 2019-08-29 PROCEDURE — 94761 N-INVAS EAR/PLS OXIMETRY MLT: CPT

## 2019-08-29 RX ADMIN — CEFEPIME HYDROCHLORIDE 1 G: 1 INJECTION, POWDER, FOR SOLUTION INTRAMUSCULAR; INTRAVENOUS at 22:53

## 2019-08-29 RX ADMIN — HYDROCORTISONE SODIUM SUCCINATE 50 MG: 100 INJECTION, POWDER, FOR SOLUTION INTRAMUSCULAR; INTRAVENOUS at 04:24

## 2019-08-29 RX ADMIN — Medication 250 MG: at 20:20

## 2019-08-29 RX ADMIN — Medication 10 ML: at 20:20

## 2019-08-29 RX ADMIN — LEVOTHYROXINE SODIUM 125 MCG: 125 TABLET ORAL at 09:09

## 2019-08-29 RX ADMIN — HYDROCORTISONE SODIUM SUCCINATE 50 MG: 100 INJECTION, POWDER, FOR SOLUTION INTRAMUSCULAR; INTRAVENOUS at 14:34

## 2019-08-29 RX ADMIN — Medication 250 MG: at 09:10

## 2019-08-29 RX ADMIN — Medication 250 MG: at 14:34

## 2019-08-29 RX ADMIN — Medication 2 PUFF: at 07:30

## 2019-08-29 RX ADMIN — Medication 10 ML: at 09:00

## 2019-08-29 RX ADMIN — Medication 250 MG: at 17:05

## 2019-08-29 RX ADMIN — TAMSULOSIN HYDROCHLORIDE 0.4 MG: 0.4 CAPSULE ORAL at 09:00

## 2019-08-29 RX ADMIN — HYDROCORTISONE SODIUM SUCCINATE 50 MG: 100 INJECTION, POWDER, FOR SOLUTION INTRAMUSCULAR; INTRAVENOUS at 20:20

## 2019-08-29 RX ADMIN — Medication 2 PUFF: at 20:05

## 2019-08-29 RX ADMIN — VANCOMYCIN HYDROCHLORIDE 1250 MG: 10 INJECTION, POWDER, LYOPHILIZED, FOR SOLUTION INTRAVENOUS at 04:25

## 2019-08-29 RX ADMIN — HEPARIN SODIUM 5000 UNITS: 5000 INJECTION INTRAVENOUS; SUBCUTANEOUS at 05:41

## 2019-08-29 RX ADMIN — PANTOPRAZOLE SODIUM 40 MG: 40 TABLET, DELAYED RELEASE ORAL at 09:00

## 2019-08-29 ASSESSMENT — PAIN SCALES - GENERAL
PAINLEVEL_OUTOF10: 0
PAINLEVEL_OUTOF10: 0

## 2019-08-29 ASSESSMENT — ENCOUNTER SYMPTOMS
COUGH: 0
PHOTOPHOBIA: 0
SHORTNESS OF BREATH: 0
RHINORRHEA: 0
NAUSEA: 0
VOMITING: 0
ABDOMINAL PAIN: 0
BLOOD IN STOOL: 1
DIARRHEA: 0
WHEEZING: 0
BACK PAIN: 0

## 2019-08-29 NOTE — PROGRESS NOTES
Progress Note    HISTORY     CC:   GI bleeding            We are following for acute kidney injury       Subjective/   HPI:  No new complaints. Somnolent. IV fluids on hold. Scr better at 1.6    ROS:  Constitutional:  No fevers, No Chills, + weakness  Cardiovascular:  No palpations, no edema  Respiratory:  No wheezing, no cough  Skin:  No rash, no itching  :  No hematuria, no dysuria     Social Hx:  No family at bedside     Past Medical and Surgical History:  - Reviewed, no changes     EXAM       Objective/     Vitals:    08/29/19 0514 08/29/19 0731 08/29/19 0856 08/29/19 1152   BP:   137/66 117/67   Pulse:   88 82   Resp:   16 16   Temp:   97.4 °F (36.3 °C) 97.5 °F (36.4 °C)   TempSrc:   Oral Oral   SpO2:  98% 98% 97%   Weight: 248 lb 4.8 oz (112.6 kg)      Height:         24HR INTAKE/OUTPUT:      Intake/Output Summary (Last 24 hours) at 8/29/2019 1340  Last data filed at 8/29/2019 1152  Gross per 24 hour   Intake 480 ml   Output 1215 ml   Net -735 ml     Constitutional:  NAD  Eyes:  Pupils reactive, sclera clear   Neck:  Normal thyroid, no masses   Cardiovascular:  Regular, no rub  Respiratory:  No distress, no wheezing  Psychiatry:  Somnolent, flat affect   Abdomen: +bs, soft, nt, no masses   Musculoskeletal: No LE edema, no clubbing   Lymphatics:  No LAD in neck, no supraclavicular nodes       MEDICAL DECISION MAKING       Data/  Recent Labs     08/27/19  1605 08/28/19  0556 08/29/19  0537   WBC 21.5* 19.2* 12.4*   HGB 10.1* 8.0* 7.5*   HCT 31.9* 24.4* 22.4*   .8* 103.2* 103.6*    267 266     Recent Labs     08/27/19  1531 08/28/19  0556 08/29/19  0537    138 139   K 5.2* 4.6 4.1  4.1   CL 97* 101 106   CO2 29 25 26   GLUCOSE 140* 178* 140*   PHOS  --   --  4.1   BUN 25* 25* 25*   CREATININE 2.6* 1.9* 1.6*   LABGLOM 24* 35* 43*   GFRAA 29* 42* 52*       Assessment/     Acute Kidney Injury:  KDIGO stage 2  - Etiology:  Pre-renal.  No hematuria.     - Clinical:  Improving with

## 2019-08-29 NOTE — PROGRESS NOTES
Occupational Therapy   Occupational Therapy Initial Assessment and Treatment Note  Date: 2019   Patient Name: Betty Garcia  MRN: [id number]     : 1945    Date of Service: 2019    Discharge Recommendations:  24 hour supervision or assist, Home with Home health OT, S Level 3     Assessment   Performance deficits / Impairments: Decreased functional mobility ; Decreased ADL status; Decreased safe awareness;Decreased cognition;Decreased balance;Decreased endurance  Assessment: Pt admitted for sepsis and recent fall at home. During OT eval, pt demonstrates impaired self care skills, standing balance and endurance. He requires max/total assist for LE dressing and toileting. Pt adamantly refuses SNF. \"I'm not going to a nursing home. \" He will require 24/7 support at home for ADLs and transfers. Recommend HHOT. Cont OT. Prognosis: Good  Decision Making: High Complexity  OT Education: OT Role;Plan of Care;Transfer Training;ADL Adaptive Strategies  REQUIRES OT FOLLOW UP: Yes  Activity Tolerance  Activity Tolerance: Patient Tolerated treatment well  Activity Tolerance: 3LO2  Safety Devices  Safety Devices in place: Yes  Type of devices: Left in chair;Nurse notified;Gait belt;Call light within reach; Chair alarm in place         Patient Diagnosis(es): The primary encounter diagnosis was Septicemia ([facility] A diagnosis of MCKENNA (acute kidney injury) ([address] was also pertinent to this visit. has a past medical history of Alcohol abuse, Alcohol abuse, ARF (acute renal failure) ([address] CHF (congestive heart failure) ([facility] [address] CKD (chronic kidney disease), Clostridium difficile infection, Hypercholesteremia, Hypertension, Mediastinal adenopathy, Pneumonia due to organism, and Pulmonary emphysema ([address] has a past surgical history that includes Upper gastrointestinal endoscopy (1/10/2011); skin biopsy; back surgery; Upper gastrointestinal endoscopy (N/A, 2019); and bronchoscopy (N/A, 2019). Restrictions  Restrictions/Precautions  Restrictions/Precautions: Contact Precautions, General Precautions, Up as Tolerated, Fall Risk(Contact Plus - C. Diff)  Position Activity Restriction  Other position/activity restrictions: 3LO2    Subjective   General  Chart Reviewed: Yes  Patient assessed for rehabilitation services?: Yes  Additional Pertinent Hx: Recent fall at home which pt attributes to neuropathy in B feet  Family / Caregiver Present: No  Referring Practitioner: MARISSA Gilliland  Diagnosis: sepsis  Subjective  Subjective: Pt agreeable to OT. General Comment  Comments: RN approved therapy  Patient Currently in Pain: Denies  Vital Signs  Patient Currently in Pain: Denies  Social/Functional History  Social/Functional History  Lives With: Family(Daughter, sons come over when daughter not home)  Type of Home: House  Home Layout: Two level  Home Access: Stairs to enter with rails  Entrance Stairs - Number of Steps: 6-7  Entrance Stairs - Rails: Both  Bathroom Shower/Tub: Tub only(Walk-in tub)  Bathroom Toilet: Standard  Home Equipment: Rolling walker, Cane, Oxygen(3.5L O2, continuous pulse ox)  Receives Help From: Family  ADL Assistance: Independent  Homemaking Responsibilities: No  Ambulation Assistance: Independent(No AD)  Transfer Assistance: Independent(No AD)  Active : No       Objective   Vision: Impaired  Vision Exceptions: Wears glasses for reading  Hearing: Exceptions to [facility]  Hearing Exceptions: Hard of hearing/hearing concerns    Orientation  Overall Orientation Status: Within Functional Limits  Observation/Palpation  Posture: Fair(Forward head, rounded shoulders, forward flexed trunk)  Balance  Sitting Balance: Supervision  Standing Balance:  Moderate assistance (during LE ADLs)  Standing Balance  Time: x2 min for pericare (pt c/o fatigue when standing)  Functional Mobility  Functional - Mobility Device: Rolling Walker  Activity: To/from bathroom  Assist Level: Dependent/Total(min x2 (vc's for

## 2019-08-29 NOTE — PROGRESS NOTES
nephrology consulted  - continue to monitor closely     C diff  - continue PO vanc for continued treatment of C diff     Rectal bleeding with anemia  - anemia likely multifactorial.   - occult stool positive  - GI consulted  Monitor h&h  Hold heparin sc     Hypothyroidism  - continue synthroid     Chronic diastolic heart failure  - no evidence of volume overload  - last echo 2/19 with EF 64-72%, grade I diastolic dysfunction  - holding home lasix 2/2 above     HTN  - hypotensive on admission  - holding home BP meds     COPD  - no evidence of exacerbation  - continue home regimen    DVT Prophylaxis: lovenox   Diet: DIET GENERAL;  Dietary Nutrition Supplements: Protein Modular  Code Status: Full Code        Dispo - inpt.     Lilliana Hurd MD

## 2019-08-30 PROBLEM — A04.72 C. DIFFICILE COLITIS: Status: ACTIVE | Noted: 2019-08-30

## 2019-08-30 PROBLEM — E03.9 ACQUIRED HYPOTHYROIDISM: Status: ACTIVE | Noted: 2019-08-30

## 2019-08-30 LAB
ALBUMIN SERPL-MCNC: 3 G/DL (ref 3.4–5)
ANION GAP SERPL CALCULATED.3IONS-SCNC: 8 MMOL/L (ref 3–16)
BUN BLDV-MCNC: 24 MG/DL (ref 7–20)
CALCIUM SERPL-MCNC: 8.3 MG/DL (ref 8.3–10.6)
CHLORIDE BLD-SCNC: 105 MMOL/L (ref 99–110)
CO2: 27 MMOL/L (ref 21–32)
CREAT SERPL-MCNC: 1.2 MG/DL (ref 0.8–1.3)
GFR AFRICAN AMERICAN: >60
GFR NON-AFRICAN AMERICAN: 59
GLUCOSE BLD-MCNC: 122 MG/DL (ref 70–99)
HCT VFR BLD CALC: 24.9 % (ref 40.5–52.5)
HCT VFR BLD CALC: 25.4 % (ref 40.5–52.5)
HEMOGLOBIN: 8.5 G/DL (ref 13.5–17.5)
HEMOGLOBIN: 8.6 G/DL (ref 13.5–17.5)
MCH RBC QN AUTO: 35.3 PG (ref 26–34)
MCHC RBC AUTO-ENTMCNC: 34 G/DL (ref 31–36)
MCV RBC AUTO: 103.7 FL (ref 80–100)
PDW BLD-RTO: 16.2 % (ref 12.4–15.4)
PHOSPHORUS: 3.4 MG/DL (ref 2.5–4.9)
PLATELET # BLD: 289 K/UL (ref 135–450)
PMV BLD AUTO: 8.6 FL (ref 5–10.5)
POTASSIUM REFLEX MAGNESIUM: 4.2 MMOL/L (ref 3.5–5.1)
POTASSIUM SERPL-SCNC: 4.2 MMOL/L (ref 3.5–5.1)
RBC # BLD: 2.4 M/UL (ref 4.2–5.9)
SODIUM BLD-SCNC: 140 MMOL/L (ref 136–145)
WBC # BLD: 10.3 K/UL (ref 4–11)
WHITE BLOOD CELLS (WBC), STOOL: ABNORMAL

## 2019-08-30 PROCEDURE — 94761 N-INVAS EAR/PLS OXIMETRY MLT: CPT

## 2019-08-30 PROCEDURE — 2700000000 HC OXYGEN THERAPY PER DAY

## 2019-08-30 PROCEDURE — 36415 COLL VENOUS BLD VENIPUNCTURE: CPT

## 2019-08-30 PROCEDURE — 6370000000 HC RX 637 (ALT 250 FOR IP): Performed by: INTERNAL MEDICINE

## 2019-08-30 PROCEDURE — 83630 LACTOFERRIN FECAL (QUAL): CPT

## 2019-08-30 PROCEDURE — 80069 RENAL FUNCTION PANEL: CPT

## 2019-08-30 PROCEDURE — 87046 STOOL CULTR AEROBIC BACT EA: CPT

## 2019-08-30 PROCEDURE — 85018 HEMOGLOBIN: CPT

## 2019-08-30 PROCEDURE — 6360000002 HC RX W HCPCS: Performed by: INTERNAL MEDICINE

## 2019-08-30 PROCEDURE — 87505 NFCT AGENT DETECTION GI: CPT

## 2019-08-30 PROCEDURE — 94640 AIRWAY INHALATION TREATMENT: CPT

## 2019-08-30 PROCEDURE — 85027 COMPLETE CBC AUTOMATED: CPT

## 2019-08-30 PROCEDURE — 2580000003 HC RX 258: Performed by: INTERNAL MEDICINE

## 2019-08-30 PROCEDURE — 85014 HEMATOCRIT: CPT

## 2019-08-30 PROCEDURE — 2060000000 HC ICU INTERMEDIATE R&B

## 2019-08-30 RX ADMIN — CEFEPIME HYDROCHLORIDE 1 G: 1 INJECTION, POWDER, FOR SOLUTION INTRAMUSCULAR; INTRAVENOUS at 09:25

## 2019-08-30 RX ADMIN — Medication 2 PUFF: at 08:24

## 2019-08-30 RX ADMIN — Medication 10 ML: at 20:10

## 2019-08-30 RX ADMIN — Medication 250 MG: at 19:11

## 2019-08-30 RX ADMIN — HYDROCORTISONE SODIUM SUCCINATE 50 MG: 100 INJECTION, POWDER, FOR SOLUTION INTRAMUSCULAR; INTRAVENOUS at 22:58

## 2019-08-30 RX ADMIN — PANTOPRAZOLE SODIUM 40 MG: 40 TABLET, DELAYED RELEASE ORAL at 05:33

## 2019-08-30 RX ADMIN — Medication 250 MG: at 15:03

## 2019-08-30 RX ADMIN — TAMSULOSIN HYDROCHLORIDE 0.4 MG: 0.4 CAPSULE ORAL at 08:52

## 2019-08-30 RX ADMIN — Medication 10 ML: at 05:26

## 2019-08-30 RX ADMIN — Medication 250 MG: at 10:26

## 2019-08-30 RX ADMIN — Medication 10 ML: at 08:52

## 2019-08-30 RX ADMIN — Medication 2 PUFF: at 21:08

## 2019-08-30 RX ADMIN — Medication 10 ML: at 23:00

## 2019-08-30 RX ADMIN — HYDROCORTISONE SODIUM SUCCINATE 50 MG: 100 INJECTION, POWDER, FOR SOLUTION INTRAMUSCULAR; INTRAVENOUS at 05:25

## 2019-08-30 RX ADMIN — LEVOTHYROXINE SODIUM 125 MCG: 125 TABLET ORAL at 05:33

## 2019-08-30 RX ADMIN — HYDROCORTISONE SODIUM SUCCINATE 50 MG: 100 INJECTION, POWDER, FOR SOLUTION INTRAMUSCULAR; INTRAVENOUS at 20:09

## 2019-08-30 RX ADMIN — HYDROCORTISONE SODIUM SUCCINATE 50 MG: 100 INJECTION, POWDER, FOR SOLUTION INTRAMUSCULAR; INTRAVENOUS at 11:52

## 2019-08-30 ASSESSMENT — PAIN SCALES - GENERAL
PAINLEVEL_OUTOF10: 0

## 2019-08-30 NOTE — PROGRESS NOTES
Hospitalist Progress Note      PCP: Rolly Smart MD    Date of Admission: 8/27/2019    Chief Complaint: weakness, confusion    Hospital Course: Readmitted from home with MCKENNA, hypotension, dehydration. Improving slowly. Still has diarrhea      Subjective: no chest pain, no shortness of breath, no nausea or vomiting       Medications:  Reviewed    Infusion Medications   Scheduled Medications    hydrocortisone sodium succinate PF  50 mg Intravenous Q8H    cefepime  1 g Intravenous Q12H    levothyroxine  125 mcg Oral Daily    pantoprazole  40 mg Oral QAM AC    mometasone-formoterol  2 puff Inhalation BID    tamsulosin  0.4 mg Oral Daily    vancomycin  250 mg Oral 4x daily    sodium chloride flush  10 mL Intravenous 2 times per day     PRN Meds: albuterol sulfate HFA, sodium chloride flush, acetaminophen      Intake/Output Summary (Last 24 hours) at 8/30/2019 1256  Last data filed at 8/30/2019 0926  Gross per 24 hour   Intake 1300 ml   Output 600 ml   Net 700 ml       Physical Exam Performed:    BP (!) 142/76   Pulse 81   Temp 97.5 °F (36.4 °C) (Oral)   Resp 18   Ht 6' 1\" (1.854 m)   Wt 252 lb 6.8 oz (114.5 kg)   SpO2 95%   BMI 33.30 kg/m²     General appearance: No apparent distress, appears stated age and cooperative. HEENT: Pupils equal, round, and reactive to light. Conjunctivae/corneas clear. Neck: Supple, with full range of motion. No jugular venous distention. Trachea midline. Respiratory:  Normal respiratory effort. Clear to auscultation, bilaterally without Rales/Wheezes/Rhonchi. Cardiovascular: Regular rate and rhythm with normal S1/S2 without murmurs, rubs or gallops. Abdomen: Soft, non-tender, non-distended with normal bowel sounds. Musculoskeletal: No clubbing, cyanosis or edema bilaterally. Full range of motion without deformity. Skin: Skin color, texture, turgor normal. Decubitus ulcers noted  Neurologic:  Neurovascularly intact without any focal sensory/motor deficits. Cranial nerves: II-XII intact, grossly non-focal.  Psychiatric: Alert and oriented, thought content appropriate, normal insight  Capillary Refill: Brisk,< 3 seconds   Peripheral Pulses: +2 palpable, equal bilaterally       Labs:   Recent Labs     08/28/19  0556 08/29/19  0537 08/29/19  1608 08/30/19  0002 08/30/19  0546   WBC 19.2* 12.4*  --   --  10.3   HGB 8.0* 7.5* 7.7* 8.6* 8.5*   HCT 24.4* 22.4* 23.2* 25.4* 24.9*    266  --   --  289     Recent Labs     08/28/19  0556 08/29/19  0537 08/30/19  0546    139 140   K 4.6 4.1  4.1 4.2  4.2    106 105   CO2 25 26 27   BUN 25* 25* 24*   CREATININE 1.9* 1.6* 1.2   CALCIUM 8.0* 8.0* 8.3   PHOS  --  4.1 3.4     Recent Labs     08/27/19  1531   AST 46*   ALT 39   BILITOT 0.7   ALKPHOS 205*     Recent Labs     08/27/19  1531   INR 1.47*     Recent Labs     08/27/19  1531   TROPONINI 0.02*       Urinalysis:      Lab Results   Component Value Date    NITRU Negative 08/28/2019    WBCUA 6-10 08/28/2019    BACTERIA Rare 08/28/2019    RBCUA None seen 08/28/2019    BLOODU TRACE-LYSED 08/28/2019    SPECGRAV 1.020 08/28/2019    GLUCOSEU 250 08/28/2019       Radiology:  CT ABDOMEN PELVIS WO CONTRAST Additional Contrast? None   Final Result   Subtle asymmetric dermal thickening involving the soft tissues overlying the   right hip, along with mild asymmetric subcutaneous edema, compatible with   cellulitis. No soft tissue gas or abscess is identified. Moderate to severe diverticulosis of the sigmoid colon, but without CT   evidence of diverticulitis on the current examination. At least moderate bilateral pleural effusions with adjacent airspace disease,   similar to increased when compared to the previous exam.         XR CHEST PORTABLE   Final Result   Stable small left pleural effusion, with bibasilar atelectasis and/or   consolidation.                  Assessment/Plan:    Active Hospital Problems    Diagnosis    C. difficile colitis [A04.72]   

## 2019-08-31 ENCOUNTER — APPOINTMENT (OUTPATIENT)
Dept: CT IMAGING | Age: 74
DRG: 871 | End: 2019-08-31
Payer: MEDICARE

## 2019-08-31 ENCOUNTER — APPOINTMENT (OUTPATIENT)
Dept: GENERAL RADIOLOGY | Age: 74
DRG: 871 | End: 2019-08-31
Payer: MEDICARE

## 2019-08-31 LAB
ALBUMIN SERPL-MCNC: 3.1 G/DL (ref 3.4–5)
ANION GAP SERPL CALCULATED.3IONS-SCNC: 8 MMOL/L (ref 3–16)
BUN BLDV-MCNC: 27 MG/DL (ref 7–20)
CALCIUM SERPL-MCNC: 8.9 MG/DL (ref 8.3–10.6)
CHLORIDE BLD-SCNC: 106 MMOL/L (ref 99–110)
CO2: 28 MMOL/L (ref 21–32)
CREAT SERPL-MCNC: 0.9 MG/DL (ref 0.8–1.3)
GFR AFRICAN AMERICAN: >60
GFR NON-AFRICAN AMERICAN: >60
GI BACTERIAL PATHOGENS BY PCR: NORMAL
GLUCOSE BLD-MCNC: 122 MG/DL (ref 70–99)
HCT VFR BLD CALC: 26.6 % (ref 40.5–52.5)
HEMOGLOBIN: 8.8 G/DL (ref 13.5–17.5)
MCH RBC QN AUTO: 34.4 PG (ref 26–34)
MCHC RBC AUTO-ENTMCNC: 33.1 G/DL (ref 31–36)
MCV RBC AUTO: 103.9 FL (ref 80–100)
PDW BLD-RTO: 15.9 % (ref 12.4–15.4)
PHOSPHORUS: 3.7 MG/DL (ref 2.5–4.9)
PLATELET # BLD: 286 K/UL (ref 135–450)
PMV BLD AUTO: 8.6 FL (ref 5–10.5)
POTASSIUM REFLEX MAGNESIUM: 4.6 MMOL/L (ref 3.5–5.1)
POTASSIUM SERPL-SCNC: 4.6 MMOL/L (ref 3.5–5.1)
RBC # BLD: 2.55 M/UL (ref 4.2–5.9)
SODIUM BLD-SCNC: 142 MMOL/L (ref 136–145)
WBC # BLD: 9.9 K/UL (ref 4–11)

## 2019-08-31 PROCEDURE — 2060000000 HC ICU INTERMEDIATE R&B

## 2019-08-31 PROCEDURE — 94640 AIRWAY INHALATION TREATMENT: CPT

## 2019-08-31 PROCEDURE — 99223 1ST HOSP IP/OBS HIGH 75: CPT | Performed by: INTERNAL MEDICINE

## 2019-08-31 PROCEDURE — 36415 COLL VENOUS BLD VENIPUNCTURE: CPT

## 2019-08-31 PROCEDURE — 71045 X-RAY EXAM CHEST 1 VIEW: CPT

## 2019-08-31 PROCEDURE — 6360000002 HC RX W HCPCS: Performed by: INTERNAL MEDICINE

## 2019-08-31 PROCEDURE — 85027 COMPLETE CBC AUTOMATED: CPT

## 2019-08-31 PROCEDURE — 94660 CPAP INITIATION&MGMT: CPT

## 2019-08-31 PROCEDURE — 2580000003 HC RX 258: Performed by: INTERNAL MEDICINE

## 2019-08-31 PROCEDURE — 71250 CT THORAX DX C-: CPT

## 2019-08-31 PROCEDURE — 2700000000 HC OXYGEN THERAPY PER DAY

## 2019-08-31 PROCEDURE — 6370000000 HC RX 637 (ALT 250 FOR IP): Performed by: INTERNAL MEDICINE

## 2019-08-31 PROCEDURE — 80069 RENAL FUNCTION PANEL: CPT

## 2019-08-31 PROCEDURE — 94761 N-INVAS EAR/PLS OXIMETRY MLT: CPT

## 2019-08-31 RX ADMIN — Medication 250 MG: at 22:56

## 2019-08-31 RX ADMIN — Medication 250 MG: at 00:13

## 2019-08-31 RX ADMIN — HYDROCORTISONE SODIUM SUCCINATE 50 MG: 100 INJECTION, POWDER, FOR SOLUTION INTRAMUSCULAR; INTRAVENOUS at 21:16

## 2019-08-31 RX ADMIN — Medication 10 ML: at 08:59

## 2019-08-31 RX ADMIN — METOPROLOL TARTRATE 25 MG: 25 TABLET, FILM COATED ORAL at 21:03

## 2019-08-31 RX ADMIN — METOPROLOL TARTRATE 25 MG: 25 TABLET, FILM COATED ORAL at 09:36

## 2019-08-31 RX ADMIN — LEVOTHYROXINE SODIUM 125 MCG: 125 TABLET ORAL at 05:53

## 2019-08-31 RX ADMIN — HYDROCORTISONE SODIUM SUCCINATE 50 MG: 100 INJECTION, POWDER, FOR SOLUTION INTRAMUSCULAR; INTRAVENOUS at 13:05

## 2019-08-31 RX ADMIN — HYDROCORTISONE SODIUM SUCCINATE 50 MG: 100 INJECTION, POWDER, FOR SOLUTION INTRAMUSCULAR; INTRAVENOUS at 05:53

## 2019-08-31 RX ADMIN — Medication 10 ML: at 05:54

## 2019-08-31 RX ADMIN — TAMSULOSIN HYDROCHLORIDE 0.4 MG: 0.4 CAPSULE ORAL at 08:58

## 2019-08-31 RX ADMIN — PANTOPRAZOLE SODIUM 40 MG: 40 TABLET, DELAYED RELEASE ORAL at 05:53

## 2019-08-31 RX ADMIN — Medication 2 PUFF: at 08:17

## 2019-08-31 RX ADMIN — Medication 10 ML: at 21:17

## 2019-08-31 RX ADMIN — Medication 250 MG: at 08:58

## 2019-08-31 RX ADMIN — Medication 250 MG: at 15:50

## 2019-08-31 RX ADMIN — Medication 250 MG: at 18:46

## 2019-08-31 ASSESSMENT — ENCOUNTER SYMPTOMS
EYE ITCHING: 0
SORE THROAT: 0
SHORTNESS OF BREATH: 1
STRIDOR: 0
DIARRHEA: 0
CHEST TIGHTNESS: 0
CONSTIPATION: 0
ABDOMINAL PAIN: 0
VOICE CHANGE: 0
CHOKING: 0
EYE PAIN: 0
EYE DISCHARGE: 0

## 2019-08-31 ASSESSMENT — PAIN SCALES - GENERAL
PAINLEVEL_OUTOF10: 0

## 2019-08-31 NOTE — CONSULTS
Pulmonary & Critical Care Consultation Note   Brinda Pereyra MD    REASONFOR CONSULTATION/CC: pleural effusions, resp failure    Consult at request of  Corby Britt MD  PCP: Jonn Branch MD    HISTORYOF PRESENT ILLNESS:    69y.o. year old male with a prolonged hospital course earlier this month due to decompensated CHF, pulm edema, and cdiff. At that time he was determined to have symptomatic hypothyroidism as well and placed on treatment for this. He was discharged and returned 2 days later with bloody diarrhea, was admitted for a GI bleed. GI was involved. His hospital course was complicated by MCKENNA relating to dehydration and nephrology was involved as well. Despite addressing these issues he has had persistent hypoxia and dyspnea complaints at rest. When seen he states that feels he cannot get air. Has a nonproductive cough, no congestion or sputum production. Denies any chest discomfort. Past Medical History:   Diagnosis Date    Acquired hypothyroidism 8/30/2019    Alcohol abuse     Alcohol abuse     ARF (acute renal failure) ([facility] [address] 03/24/2019    Dr Canelo Tamayo Nephrology, [phone]    CHF (congestive heart failure) ([facility] [address] 3/24/2019    CKD (chronic kidney disease)     Clostridium difficile infection 08/11/2019    Hypercholesteremia     Hypertension     Mediastinal adenopathy 2/14/2019    Pneumonia due to organism     Pulmonary emphysema ([address] 8/29/2018       Past Surgical History:   Procedure Laterality Date    BACK SURGERY      fusion     BRONCHOSCOPY N/A 8/19/2019    BRONCHOSCOPY THERAPUTIC ASPIRATION INITIAL performed by Valerio Cash MD at [address] [place] [address] ENDOSCOPY  1/10/2011    UPPER GASTROINTESTINAL ENDOSCOPY N/A 5/1/2019    EGD W/ANES. performed by Anali Meza DO at [id number] [place] [address] Real       family history includes Other in an other family member.     Social History     Tobacco Use    Smoking status: Former Smoker     Packs/day: 3.00     Years: 50.00     Pack years: 150.00     Last attempt to quit: 3/10/2002     Years since quittin.4    Smokeless tobacco: Never Used   Substance Use Topics    Alcohol use: Yes     Comment: 1-2 medium size glasses crown royal        Scheduled Meds:   metoprolol tartrate  25 mg Oral BID    hydrocortisone sodium succinate PF  50 mg Intravenous Q8H    levothyroxine  125 mcg Oral Daily    pantoprazole  40 mg Oral QAM AC    mometasone-formoterol  2 puff Inhalation BID    tamsulosin  0.4 mg Oral Daily    vancomycin  250 mg Oral 4x daily    sodium chloride flush  10 mL Intravenous 2 times per day       Continuous Infusions:      PRN Meds:   albuterol sulfate HFA, sodium chloride flush, acetaminophen   Inpatient consult to Pulmonology  Consult performed by: Kiarra Alvarez MD  Consult ordered by: Cedrick Osuna MD        ALLERGIES:  Patient has No Known Allergies. REVIEW OF SYSTEMS:  Review of Systems   Constitutional: Negative for chills, fever and unexpected weight change. HENT: Negative for mouth sores, sore throat and voice change. Eyes: Negative for pain, discharge and itching. Respiratory: Positive for shortness of breath. Negative for choking, chest tightness and stridor. Cardiovascular: Negative for chest pain, palpitations and leg swelling. Gastrointestinal: Negative for abdominal pain, constipation and diarrhea. Endocrine: Negative for cold intolerance, heat intolerance and polydipsia. Genitourinary: Negative for dysuria, frequency and hematuria. Musculoskeletal: Negative for gait problem, joint swelling and neck stiffness. Neurological: Negative for dizziness, numbness and headaches. Psychiatric/Behavioral: Negative for agitation, confusion and hallucinations.        Objective:   PHYSICAL EXAM:  BP (!) 158/98   Pulse 83   Temp 97.6 °F (36.4 °C) (Oral)   Resp 22   Ht 6' 1\" (1.854 m)   Wt 251 lb 12.8 oz (114.2 kg)   SpO2 98%   BMI

## 2019-09-01 ENCOUNTER — APPOINTMENT (OUTPATIENT)
Dept: GENERAL RADIOLOGY | Age: 74
DRG: 871 | End: 2019-09-01
Payer: MEDICARE

## 2019-09-01 LAB
ANION GAP SERPL CALCULATED.3IONS-SCNC: 10 MMOL/L (ref 3–16)
BLOOD CULTURE, ROUTINE: NORMAL
BUN BLDV-MCNC: 23 MG/DL (ref 7–20)
CALCIUM SERPL-MCNC: 9.4 MG/DL (ref 8.3–10.6)
CHLORIDE BLD-SCNC: 109 MMOL/L (ref 99–110)
CO2: 26 MMOL/L (ref 21–32)
CREAT SERPL-MCNC: 0.8 MG/DL (ref 0.8–1.3)
CULTURE, BLOOD 2: NORMAL
GFR AFRICAN AMERICAN: >60
GFR NON-AFRICAN AMERICAN: >60
GLUCOSE BLD-MCNC: 98 MG/DL (ref 70–99)
HCT VFR BLD CALC: 28.9 % (ref 40.5–52.5)
HEMOGLOBIN: 9.7 G/DL (ref 13.5–17.5)
MCH RBC QN AUTO: 34.5 PG (ref 26–34)
MCHC RBC AUTO-ENTMCNC: 33.5 G/DL (ref 31–36)
MCV RBC AUTO: 103 FL (ref 80–100)
PDW BLD-RTO: 15.4 % (ref 12.4–15.4)
PLATELET # BLD: 307 K/UL (ref 135–450)
PMV BLD AUTO: 8.4 FL (ref 5–10.5)
POTASSIUM REFLEX MAGNESIUM: 4.2 MMOL/L (ref 3.5–5.1)
RBC # BLD: 2.81 M/UL (ref 4.2–5.9)
SODIUM BLD-SCNC: 145 MMOL/L (ref 136–145)
WBC # BLD: 11.6 K/UL (ref 4–11)

## 2019-09-01 PROCEDURE — 6370000000 HC RX 637 (ALT 250 FOR IP): Performed by: INTERNAL MEDICINE

## 2019-09-01 PROCEDURE — 36415 COLL VENOUS BLD VENIPUNCTURE: CPT

## 2019-09-01 PROCEDURE — 85027 COMPLETE CBC AUTOMATED: CPT

## 2019-09-01 PROCEDURE — 2060000000 HC ICU INTERMEDIATE R&B

## 2019-09-01 PROCEDURE — 80048 BASIC METABOLIC PNL TOTAL CA: CPT

## 2019-09-01 PROCEDURE — 94640 AIRWAY INHALATION TREATMENT: CPT

## 2019-09-01 PROCEDURE — 94660 CPAP INITIATION&MGMT: CPT

## 2019-09-01 PROCEDURE — 6360000002 HC RX W HCPCS: Performed by: NURSE PRACTITIONER

## 2019-09-01 PROCEDURE — 99233 SBSQ HOSP IP/OBS HIGH 50: CPT | Performed by: INTERNAL MEDICINE

## 2019-09-01 PROCEDURE — 71045 X-RAY EXAM CHEST 1 VIEW: CPT

## 2019-09-01 PROCEDURE — 6360000002 HC RX W HCPCS: Performed by: INTERNAL MEDICINE

## 2019-09-01 PROCEDURE — 2580000003 HC RX 258: Performed by: INTERNAL MEDICINE

## 2019-09-01 RX ORDER — HYDRALAZINE HYDROCHLORIDE 20 MG/ML
5 INJECTION INTRAMUSCULAR; INTRAVENOUS ONCE
Status: COMPLETED | OUTPATIENT
Start: 2019-09-01 | End: 2019-09-01

## 2019-09-01 RX ORDER — FUROSEMIDE 10 MG/ML
20 INJECTION INTRAMUSCULAR; INTRAVENOUS ONCE
Status: COMPLETED | OUTPATIENT
Start: 2019-09-01 | End: 2019-09-01

## 2019-09-01 RX ADMIN — HYDRALAZINE HYDROCHLORIDE 5 MG: 20 INJECTION INTRAMUSCULAR; INTRAVENOUS at 05:17

## 2019-09-01 RX ADMIN — METOPROLOL TARTRATE 25 MG: 25 TABLET, FILM COATED ORAL at 20:21

## 2019-09-01 RX ADMIN — HYDROCORTISONE SODIUM SUCCINATE 50 MG: 100 INJECTION, POWDER, FOR SOLUTION INTRAMUSCULAR; INTRAVENOUS at 04:39

## 2019-09-01 RX ADMIN — Medication 10 ML: at 12:37

## 2019-09-01 RX ADMIN — METOPROLOL TARTRATE 25 MG: 25 TABLET, FILM COATED ORAL at 10:18

## 2019-09-01 RX ADMIN — Medication 250 MG: at 14:24

## 2019-09-01 RX ADMIN — Medication 10 ML: at 05:20

## 2019-09-01 RX ADMIN — HYDROCORTISONE SODIUM SUCCINATE 50 MG: 100 INJECTION, POWDER, FOR SOLUTION INTRAMUSCULAR; INTRAVENOUS at 16:27

## 2019-09-01 RX ADMIN — Medication 2 PUFF: at 20:03

## 2019-09-01 RX ADMIN — TAMSULOSIN HYDROCHLORIDE 0.4 MG: 0.4 CAPSULE ORAL at 10:18

## 2019-09-01 RX ADMIN — FUROSEMIDE 20 MG: 10 INJECTION, SOLUTION INTRAMUSCULAR; INTRAVENOUS at 12:37

## 2019-09-01 RX ADMIN — PANTOPRAZOLE SODIUM 40 MG: 40 TABLET, DELAYED RELEASE ORAL at 05:17

## 2019-09-01 RX ADMIN — Medication 10 ML: at 10:19

## 2019-09-01 RX ADMIN — Medication 10 ML: at 04:40

## 2019-09-01 RX ADMIN — Medication 250 MG: at 10:18

## 2019-09-01 RX ADMIN — Medication 10 ML: at 20:22

## 2019-09-01 RX ADMIN — LEVOTHYROXINE SODIUM 125 MCG: 125 TABLET ORAL at 05:16

## 2019-09-01 RX ADMIN — Medication 10 ML: at 16:27

## 2019-09-01 RX ADMIN — Medication 250 MG: at 23:00

## 2019-09-01 RX ADMIN — Medication 250 MG: at 18:27

## 2019-09-01 ASSESSMENT — PAIN SCALES - GENERAL
PAINLEVEL_OUTOF10: 0

## 2019-09-02 ENCOUNTER — APPOINTMENT (OUTPATIENT)
Dept: GENERAL RADIOLOGY | Age: 74
DRG: 871 | End: 2019-09-02
Payer: MEDICARE

## 2019-09-02 LAB
ANION GAP SERPL CALCULATED.3IONS-SCNC: 8 MMOL/L (ref 3–16)
BUN BLDV-MCNC: 25 MG/DL (ref 7–20)
CALCIUM SERPL-MCNC: 9.3 MG/DL (ref 8.3–10.6)
CHLORIDE BLD-SCNC: 111 MMOL/L (ref 99–110)
CO2: 30 MMOL/L (ref 21–32)
CREAT SERPL-MCNC: 0.8 MG/DL (ref 0.8–1.3)
GFR AFRICAN AMERICAN: >60
GFR NON-AFRICAN AMERICAN: >60
GLUCOSE BLD-MCNC: 86 MG/DL (ref 70–99)
HCT VFR BLD CALC: 28.6 % (ref 40.5–52.5)
HEMOGLOBIN: 9.7 G/DL (ref 13.5–17.5)
MCH RBC QN AUTO: 35.1 PG (ref 26–34)
MCHC RBC AUTO-ENTMCNC: 34 G/DL (ref 31–36)
MCV RBC AUTO: 103.5 FL (ref 80–100)
PDW BLD-RTO: 15.6 % (ref 12.4–15.4)
PLATELET # BLD: 288 K/UL (ref 135–450)
PMV BLD AUTO: 8.2 FL (ref 5–10.5)
POTASSIUM REFLEX MAGNESIUM: 4.1 MMOL/L (ref 3.5–5.1)
RBC # BLD: 2.77 M/UL (ref 4.2–5.9)
SODIUM BLD-SCNC: 149 MMOL/L (ref 136–145)
WBC # BLD: 10.8 K/UL (ref 4–11)

## 2019-09-02 PROCEDURE — 6370000000 HC RX 637 (ALT 250 FOR IP): Performed by: INTERNAL MEDICINE

## 2019-09-02 PROCEDURE — 36415 COLL VENOUS BLD VENIPUNCTURE: CPT

## 2019-09-02 PROCEDURE — 2700000000 HC OXYGEN THERAPY PER DAY

## 2019-09-02 PROCEDURE — 6360000002 HC RX W HCPCS: Performed by: INTERNAL MEDICINE

## 2019-09-02 PROCEDURE — 2060000000 HC ICU INTERMEDIATE R&B

## 2019-09-02 PROCEDURE — 71045 X-RAY EXAM CHEST 1 VIEW: CPT

## 2019-09-02 PROCEDURE — 0W9B30Z DRAINAGE OF LEFT PLEURAL CAVITY WITH DRAINAGE DEVICE, PERCUTANEOUS APPROACH: ICD-10-PCS | Performed by: INTERNAL MEDICINE

## 2019-09-02 PROCEDURE — 2580000003 HC RX 258: Performed by: INTERNAL MEDICINE

## 2019-09-02 PROCEDURE — 85027 COMPLETE CBC AUTOMATED: CPT

## 2019-09-02 PROCEDURE — 94660 CPAP INITIATION&MGMT: CPT

## 2019-09-02 PROCEDURE — 94640 AIRWAY INHALATION TREATMENT: CPT

## 2019-09-02 PROCEDURE — 32551 INSERTION OF CHEST TUBE: CPT | Performed by: INTERNAL MEDICINE

## 2019-09-02 PROCEDURE — 32551 INSERTION OF CHEST TUBE: CPT

## 2019-09-02 PROCEDURE — 94761 N-INVAS EAR/PLS OXIMETRY MLT: CPT

## 2019-09-02 PROCEDURE — 80048 BASIC METABOLIC PNL TOTAL CA: CPT

## 2019-09-02 PROCEDURE — 99233 SBSQ HOSP IP/OBS HIGH 50: CPT | Performed by: INTERNAL MEDICINE

## 2019-09-02 RX ADMIN — METOPROLOL TARTRATE 25 MG: 25 TABLET, FILM COATED ORAL at 08:57

## 2019-09-02 RX ADMIN — Medication 2 PUFF: at 20:22

## 2019-09-02 RX ADMIN — Medication 250 MG: at 15:28

## 2019-09-02 RX ADMIN — METOPROLOL TARTRATE 25 MG: 25 TABLET, FILM COATED ORAL at 20:06

## 2019-09-02 RX ADMIN — Medication 10 ML: at 20:06

## 2019-09-02 RX ADMIN — LEVOTHYROXINE SODIUM 125 MCG: 125 TABLET ORAL at 08:11

## 2019-09-02 RX ADMIN — Medication 250 MG: at 10:49

## 2019-09-02 RX ADMIN — PANTOPRAZOLE SODIUM 40 MG: 40 TABLET, DELAYED RELEASE ORAL at 08:12

## 2019-09-02 RX ADMIN — Medication 250 MG: at 20:46

## 2019-09-02 RX ADMIN — HYDROCORTISONE SODIUM SUCCINATE 25 MG: 100 INJECTION, POWDER, FOR SOLUTION INTRAMUSCULAR; INTRAVENOUS at 16:42

## 2019-09-02 RX ADMIN — HYDROCORTISONE SODIUM SUCCINATE 50 MG: 100 INJECTION, POWDER, FOR SOLUTION INTRAMUSCULAR; INTRAVENOUS at 04:57

## 2019-09-02 RX ADMIN — TAMSULOSIN HYDROCHLORIDE 0.4 MG: 0.4 CAPSULE ORAL at 08:58

## 2019-09-02 RX ADMIN — Medication 10 ML: at 08:58

## 2019-09-02 ASSESSMENT — PAIN SCALES - GENERAL
PAINLEVEL_OUTOF10: 0
PAINLEVEL_OUTOF10: 0

## 2019-09-02 NOTE — PROGRESS NOTES
Hospitalist Progress Note      PCP: Jennifer Hanson MD    Date of Admission: 8/27/2019    Chief Complaint: weakness, confusion. Complains of shortness of breath and diarrhea    Hospital Course: Readmitted from home with MCKENNA, hypotension, dehydration. Subjective:  He feels better in general.  No BM yet today. Breathing more easily after 1.5 L drained from chest tube. Medications:  Reviewed    Infusion Medications   Scheduled Medications    hydrocortisone sodium succinate PF  50 mg Intravenous Q12H    metoprolol tartrate  25 mg Oral BID    levothyroxine  125 mcg Oral Daily    pantoprazole  40 mg Oral QAM AC    mometasone-formoterol  2 puff Inhalation BID    tamsulosin  0.4 mg Oral Daily    vancomycin  250 mg Oral 4x daily    sodium chloride flush  10 mL Intravenous 2 times per day     PRN Meds: albuterol sulfate HFA, sodium chloride flush, acetaminophen      Intake/Output Summary (Last 24 hours) at 9/2/2019 1619  Last data filed at 9/2/2019 1509  Gross per 24 hour   Intake 250 ml   Output 2775 ml   Net -2525 ml       Physical Exam Performed:    BP (!) 168/82   Pulse 82   Temp 98 °F (36.7 °C) (Oral)   Resp 29   Ht 6' 1\" (1.854 m)   Wt 247 lb 5.7 oz (112.2 kg)   SpO2 97%   BMI 32.63 kg/m²     General appearance: No apparent distress, appears stated age and cooperative. HEENT: Pupils equal, round, and reactive to light. Conjunctivae/corneas clear. Neck: Supple, with full range of motion. No jugular venous distention. Trachea midline. Respiratory:  Normal respiratory effort. Right anterior upper chest with expiratory wheezes and inspiratory crackles  Cardiovascular: Regular rate and rhythm with normal S1/S2 without murmurs, rubs or gallops. Abdomen: Soft, non-tender, non-distended with normal bowel sounds. Musculoskeletal: No clubbing, cyanosis or edema bilaterally. Full range of motion without deformity. Skin: Skin color, texture, turgor normal. Decubitus ulcers noted.  No diarrhea are controlled.       Antoni Mederos MD

## 2019-09-02 NOTE — FLOWSHEET NOTE
for ethnicity   Temperature Warm   R Pedal Pulse +1   LLE Neurovascular Assessment   Capillary Refill Less than/equal to 3 seconds   Color Appropriate for ethnicity   Temperature Warm   L Pedal Pulse +1   Skin Color/Condition   Skin Color/Condition (WDL) X   Skin Integrity   Skin Integrity (WDL) X   Musculoskeletal   Musculoskeletal (WDL) X   RUE Full movement   LUE Full movement   RL Extremity Weakness   LL Extremity Weakness   Genitourinary   Genitourinary (WDL) X  (incontinence)   Flank Tenderness No   Suprapubic Tenderness No   Dysuria No   Urine Assessment   Incontinence No   Urine Color Lorna   Urine Appearance Clear   Urine Odor No odor   Genitalia   Male Genitalia Enlarged scrotum   Anus/Rectum   Anus/Rectum (WDL) X   Evaluation Hemorrhoids   Wound 07/29/19 Buttocks Left   Date First Assessed/Time First Assessed: 07/29/19 1954   Present on Hospital Admission: Yes  Primary Wound Type: Pressure Injury  Location: Buttocks  Wound Location Orientation: Left   Dressing Status Clean;Dry; Intact   Dressing/Treatment Foam   Dressing Change Due 09/01/19   Wound Assessment Red   Drainage Amount Small   Drainage Description Sanguinous   Wound 07/29/19 Buttocks Right   Date First Assessed/Time First Assessed: 07/29/19 1954   Present on Hospital Admission: Yes  Primary Wound Type: Pressure Injury  Location: Buttocks  Wound Location Orientation: Right   Dressing Status Clean;Dry; Intact   Wound Assessment Red   Wound 07/29/19 Coccyx split at top of buttocks crack   Date First Assessed/Time First Assessed: 07/29/19 1955   Present on Hospital Admission: Yes  Location: Coccyx  Wound Description (Comments): split at top of buttocks crack   Dressing Status Clean;Dry; Intact   Wound Assessment Red   Wound 08/30/19 Coccyx   Date First Assessed/Time First Assessed: 08/30/19 0530   Primary Wound Type: (c) Other (comment)  Location: Coccyx   Dressing Changed Other (Comment)  (zinc barrier cream )   Dressing/Treatment Open to air

## 2019-09-02 NOTE — PROGRESS NOTES
09/02/19 0045   NIV Type   $NIV $Daily Charge   NIV Started/Stopped On   Equipment Type v60   Mode CPAP   Mask Type Full face mask   Mask Size Large   Settings/Measurements   CPAP/EPAP 10 cmH2O   Resp 21   FiO2  40 %   Vt Exhaled 673 mL   Minute Volume 11.3 Liters   Mask Leak (lpm) 115 lpm  (pt has beard)   Comfort Level Good   Using Accessory Muscles No   Breath Sounds   Right Upper Lobe Diminished   Right Middle Lobe Diminished   Right Lower Lobe Diminished   Left Upper Lobe Diminished   Left Lower Lobe Diminished   Alarm Settings   Alarms On Y   Press Low Alarm 6 cmH2O   High Pressure Alarm 20 cmH2O   Delay Alarm 20 sec(s)   Resp Rate Low Alarm 6   High Respiratory Rate 45 br/min

## 2019-09-02 NOTE — PROGRESS NOTES
PROGRESS NOTE  S:74 yrs Patient  admitted on 8/27/2019 with Sepsis ([facility] [address] [A41.9]  [facility] [A[facility] . Complains of persistent diarrhea. Current Hospital Schedued Meds   hydrocortisone sodium succinate PF  50 mg Intravenous Q12H    metoprolol tartrate  25 mg Oral BID    levothyroxine  125 mcg Oral Daily    pantoprazole  40 mg Oral QAM AC    mometasone-formoterol  2 puff Inhalation BID    tamsulosin  0.4 mg Oral Daily    vancomycin  250 mg Oral 4x daily    sodium chloride flush  10 mL Intravenous 2 times per day     [address] PRN Meds  albuterol sulfate HFA, sodium chloride flush, acetaminophen    Exam:   Vitals:    09/02/19 1231   BP:    Pulse:    Resp: 29   Temp:    SpO2:      I/O last 3 completed shifts: In: 750 [P.O.:720; I.V.:30]  Out: 1601 [Urine:1600; Stool:1]   General appearance: cooperative  HEENT: PERRLA  Neck: no adenopathy, no carotid bruit, no JVD, supple, symmetrical, trachea midline and thyroid not enlarged, symmetric, no tenderness/mass/nodules  Lungs: clear to auscultation bilaterally  Heart: regular rate and rhythm, S1, S2 normal, no murmur, click, rub or gallop  Abdomen: soft, non-tender; bowel sounds normal; no masses,  no organomegaly  Extremities: extremities normal, atraumatic, no cyanosis or edema     Labs:  CBC:   Recent Labs     08/31/19 0537 09/01/19 0618 09/02/19  0520   WBC 9.9 11.6* 10.8   HGB 8.8* 9.7* 9.7*   HCT 26.6* 28.9* 28.6*   .9* 103.0* 103.5*    307 288     BMP:   Recent Labs     08/31/19 0537 09/01/19  0618 09/02/19  0520    145 149*   K 4.6  4.6 4.2 4.1    109 111*   CO2 28 26 30   PHOS 3.7  --   --    BUN 27* 23* 25*   CREATININE 0.9 0.8 0.8     LIVER PROFILE: No results for input(s): AST, ALT, LIPASE, PROT, BILIDIR, BILITOT, ALKPHOS in the last 72 hours. Invalid input(s):   AMYLASE,  ALB  PT/INR: No results for input(s): INR in the last 72

## 2019-09-03 ENCOUNTER — APPOINTMENT (OUTPATIENT)
Dept: GENERAL RADIOLOGY | Age: 74
DRG: 871 | End: 2019-09-03
Payer: MEDICARE

## 2019-09-03 PROBLEM — D53.9 MACROCYTIC ANEMIA: Status: ACTIVE | Noted: 2019-09-03

## 2019-09-03 PROBLEM — R09.89 PULMONARY VENOUS CONGESTION: Status: ACTIVE | Noted: 2019-09-03

## 2019-09-03 LAB
ANION GAP SERPL CALCULATED.3IONS-SCNC: 6 MMOL/L (ref 3–16)
BUN BLDV-MCNC: 23 MG/DL (ref 7–20)
CALCIUM SERPL-MCNC: 9.1 MG/DL (ref 8.3–10.6)
CHLORIDE BLD-SCNC: 108 MMOL/L (ref 99–110)
CO2: 32 MMOL/L (ref 21–32)
CREAT SERPL-MCNC: 0.8 MG/DL (ref 0.8–1.3)
GFR AFRICAN AMERICAN: >60
GFR NON-AFRICAN AMERICAN: >60
GLUCOSE BLD-MCNC: 86 MG/DL (ref 70–99)
HCT VFR BLD CALC: 27.9 % (ref 40.5–52.5)
HEMOGLOBIN: 9.4 G/DL (ref 13.5–17.5)
MCH RBC QN AUTO: 34.8 PG (ref 26–34)
MCHC RBC AUTO-ENTMCNC: 33.7 G/DL (ref 31–36)
MCV RBC AUTO: 103.2 FL (ref 80–100)
PDW BLD-RTO: 15.6 % (ref 12.4–15.4)
PLATELET # BLD: 273 K/UL (ref 135–450)
PMV BLD AUTO: 7.9 FL (ref 5–10.5)
POTASSIUM REFLEX MAGNESIUM: 3.9 MMOL/L (ref 3.5–5.1)
RBC # BLD: 2.7 M/UL (ref 4.2–5.9)
SODIUM BLD-SCNC: 146 MMOL/L (ref 136–145)
WBC # BLD: 9.7 K/UL (ref 4–11)

## 2019-09-03 PROCEDURE — 85027 COMPLETE CBC AUTOMATED: CPT

## 2019-09-03 PROCEDURE — 2580000003 HC RX 258: Performed by: INTERNAL MEDICINE

## 2019-09-03 PROCEDURE — 71045 X-RAY EXAM CHEST 1 VIEW: CPT

## 2019-09-03 PROCEDURE — 6360000002 HC RX W HCPCS: Performed by: INTERNAL MEDICINE

## 2019-09-03 PROCEDURE — 36415 COLL VENOUS BLD VENIPUNCTURE: CPT

## 2019-09-03 PROCEDURE — 6370000000 HC RX 637 (ALT 250 FOR IP): Performed by: INTERNAL MEDICINE

## 2019-09-03 PROCEDURE — 2700000000 HC OXYGEN THERAPY PER DAY

## 2019-09-03 PROCEDURE — 94660 CPAP INITIATION&MGMT: CPT

## 2019-09-03 PROCEDURE — 94640 AIRWAY INHALATION TREATMENT: CPT

## 2019-09-03 PROCEDURE — 94761 N-INVAS EAR/PLS OXIMETRY MLT: CPT

## 2019-09-03 PROCEDURE — 99233 SBSQ HOSP IP/OBS HIGH 50: CPT | Performed by: INTERNAL MEDICINE

## 2019-09-03 PROCEDURE — 80048 BASIC METABOLIC PNL TOTAL CA: CPT

## 2019-09-03 PROCEDURE — 2060000000 HC ICU INTERMEDIATE R&B

## 2019-09-03 RX ORDER — HYDROCORTISONE 10 MG/1
5 TABLET ORAL ONCE
Status: COMPLETED | OUTPATIENT
Start: 2019-09-04 | End: 2019-09-04

## 2019-09-03 RX ORDER — FUROSEMIDE 10 MG/ML
40 INJECTION INTRAMUSCULAR; INTRAVENOUS ONCE
Status: COMPLETED | OUTPATIENT
Start: 2019-09-03 | End: 2019-09-03

## 2019-09-03 RX ORDER — LEVOTHYROXINE SODIUM 0.15 MG/1
150 TABLET ORAL DAILY
Status: DISCONTINUED | OUTPATIENT
Start: 2019-09-04 | End: 2019-09-21 | Stop reason: HOSPADM

## 2019-09-03 RX ADMIN — METOPROLOL TARTRATE 25 MG: 25 TABLET, FILM COATED ORAL at 09:31

## 2019-09-03 RX ADMIN — Medication 250 MG: at 14:11

## 2019-09-03 RX ADMIN — Medication 250 MG: at 18:30

## 2019-09-03 RX ADMIN — Medication 2 PUFF: at 08:09

## 2019-09-03 RX ADMIN — FUROSEMIDE 40 MG: 10 INJECTION, SOLUTION INTRAMUSCULAR; INTRAVENOUS at 14:13

## 2019-09-03 RX ADMIN — Medication 10 ML: at 09:31

## 2019-09-03 RX ADMIN — Medication 10 ML: at 21:41

## 2019-09-03 RX ADMIN — HYDROCORTISONE SODIUM SUCCINATE 25 MG: 100 INJECTION, POWDER, FOR SOLUTION INTRAMUSCULAR; INTRAVENOUS at 03:44

## 2019-09-03 RX ADMIN — LEVOTHYROXINE SODIUM 125 MCG: 125 TABLET ORAL at 06:35

## 2019-09-03 RX ADMIN — PANTOPRAZOLE SODIUM 40 MG: 40 TABLET, DELAYED RELEASE ORAL at 06:35

## 2019-09-03 RX ADMIN — TAMSULOSIN HYDROCHLORIDE 0.4 MG: 0.4 CAPSULE ORAL at 09:31

## 2019-09-03 RX ADMIN — Medication 250 MG: at 21:41

## 2019-09-03 RX ADMIN — Medication 250 MG: at 10:30

## 2019-09-03 RX ADMIN — METOPROLOL TARTRATE 25 MG: 25 TABLET, FILM COATED ORAL at 21:40

## 2019-09-03 ASSESSMENT — PAIN SCALES - GENERAL: PAINLEVEL_OUTOF10: 0

## 2019-09-03 NOTE — PROGRESS NOTES
09/03/19 0055   NIV Type   Skin Protection for O2 Device Yes   Equipment Type v60   Mode CPAP   Mask Type Full face mask   Mask Size Large   Settings/Measurements   CPAP/EPAP 15 cmH2O   Resp 16   FiO2  45 %   Vt Exhaled 399 mL   Mask Leak (lpm) 105 lpm   Comfort Level Good   Using Accessory Muscles No   Alarm Settings   Alarms On Y   Press Low Alarm 6 cmH2O   High Pressure Alarm 25 cmH2O   Apnea (secs) 20 secs   Resp Rate Low Alarm 6   High Respiratory Rate 45 br/min

## 2019-09-03 NOTE — PROGRESS NOTES
PROGRESS NOTE  S:74 yrs Patient  admitted on 8/27/2019 with Sepsis ([facility] [A41.9]  Sepsis ([facility] [address] [A41.9] . Feels better after pulmonary procedure. Patient does not know if diarrhea improved    Current Hospital Schedued Meds   hydrocortisone sodium succinate PF  25 mg Intravenous Q12H    metoprolol tartrate  25 mg Oral BID    levothyroxine  125 mcg Oral Daily    pantoprazole  40 mg Oral QAM AC    mometasone-formoterol  2 puff Inhalation BID    tamsulosin  0.4 mg Oral Daily    vancomycin  250 mg Oral 4x daily    sodium chloride flush  10 mL Intravenous 2 times per day     [address] PRN Meds  albuterol sulfate HFA, sodium chloride flush, acetaminophen    Exam:   Vitals:    09/03/19 1134   BP: (!) 160/78   Pulse: 111   Resp: 18   Temp: 97.6 °F (36.4 °C)   SpO2: 100%     I/O last 3 completed shifts: In: 480 [P.O.:480]  Out: 2515 [Urine:525; Chest Tube:1990]   General appearance: alert, appears stated age and cooperative  HEENT: PERRLA  Neck: no adenopathy, no carotid bruit, no JVD, supple, symmetrical, trachea midline and thyroid not enlarged, symmetric, no tenderness/mass/nodules  Lungs: clear to auscultation bilaterally  Heart: regular rate and rhythm, S1, S2 normal, no murmur, click, rub or gallop  Abdomen: soft, non-tender; bowel sounds normal; no masses,  no organomegaly  Extremities: extremities normal, atraumatic, no cyanosis or edema     Labs:  CBC:   Recent Labs     09/01/19 0618 09/02/19 0520 09/03/19 0515   WBC 11.6* 10.8 9.7   HGB 9.7* 9.7* 9.4*   HCT 28.9* 28.6* 27.9*   .0* 103.5* 103.2*    288 273     BMP:   Recent Labs     09/01/19 0618 09/02/19 0520 09/03/19  0515    149* 146*   K 4.2 4.1 3.9    111* 108   CO2 26 30 32   BUN 23* 25* 23*   CREATININE 0.8 0.8 0.8     LIVER PROFILE: No results for input(s): AST, ALT, LIPASE, PROT, BILIDIR, BILITOT, ALKPHOS in the last 72 hours.     Invalid

## 2019-09-03 NOTE — PROGRESS NOTES
09/03/19 1638   NIV Type   Equipment Type V60   Mode CPAP   Mask Type Full face mask   Mask Size Large   Bonnet size Large   Settings/Measurements   CPAP/EPAP 15 cmH2O   Resp 18   FiO2  45 %   Vt Exhaled 783 mL   Mask Leak (lpm) 115 lpm   Comfort Level Good   Using Accessory Muscles No   SpO2 100   Alarm Settings   Alarms On Y   Press Low Alarm 6 cmH2O   High Pressure Alarm 25 cmH2O   Delay Alarm 20 sec(s)   Apnea (secs) 20 secs   Resp Rate Low Alarm 6   High Respiratory Rate 45 br/min   Oxygen Therapy/Pulse Ox   O2 Therapy Oxygen   O2 Device PAP (positive airway pressure)   SpO2 100 %

## 2019-09-03 NOTE — PROGRESS NOTES
adjacent airspace disease,   similar to increased when compared to the previous exam.         XR CHEST PORTABLE   Final Result   Stable small left pleural effusion, with bibasilar atelectasis and/or   consolidation. Ct Abdomen Pelvis Wo Contrast Additional Contrast? None    Result Date: 8/27/2019  EXAMINATION: CT OF THE ABDOMEN AND PELVIS WITHOUT CONTRAST 8/27/2019 1:55 pm TECHNIQUE: CT of the abdomen and pelvis was performed without the administration of intravenous contrast. Multiplanar reformatted images are provided for review. Dose modulation, iterative reconstruction, and/or weight based adjustment of the mA/kV was utilized to reduce the radiation dose to as low as reasonably achievable. COMPARISON: 08/11/2019 HISTORY: ORDERING SYSTEM PROVIDED HISTORY: sepsis, groin/butt cellulitis TECHNOLOGIST PROVIDED HISTORY: Additional Contrast?->None Reason for Exam: sepsis, groin/butt cellulitis Acuity: Acute Type of Exam: Initial FINDINGS: Lower Chest: There are at least moderate-sized bilateral pleural effusions, greater on the left, stable mildly increased when compared to the previous exam.  Adjacent airspace disease is noted. Gynecomastia noted on the right. The left chest wall is unremarkable. Base of the heart unremarkable. Organs: Evaluation of the solid abdominal viscera is limited without intravenous contrast.  Having said that, no acute abnormality detected within the liver and spleen. Gallstones redemonstrated. Normal noncontrast imaging the adrenals and pancreas. Nonobstructing nephrolithiasis is noted bilaterally. No hydronephrosis or hydroureter. No ureteral calculi are found. GI/Bowel: There is mild residual perirectal fat stranding. The fat stranding seen previously has for the most part resolved. There continues to be moderate to severe diverticulosis in the region of the sigmoid colon. Convincing CT evidence diverticulitis is not detected. Large bowel otherwise unremarkable. Appendix is unremarkable. Distal esophagus, stomach, duodenal sweep, and the remainder of the small bowel are unremarkable. Pelvis: Penile prosthesis again noted. No free pelvic fluid. Peritoneum/Retroperitoneum: Moderate vascular calcifications within the abdominal aorta. Abdominal aorta normal in caliber. No retroperitoneal lymphadenopathy. Bones/Soft Tissues: There is slight asymmetric dermal thickening involving the soft tissues overlying the right hip, with mild asymmetric subcutaneous edema. No soft tissue gas or focal fluid collection is identified. There is diffuse muscular atrophy. Chronic left hip fracture is are noted. No acute bony abnormality. Subtle asymmetric dermal thickening involving the soft tissues overlying the right hip, along with mild asymmetric subcutaneous edema, compatible with cellulitis. No soft tissue gas or abscess is identified. Moderate to severe diverticulosis of the sigmoid colon, but without CT evidence of diverticulitis on the current examination. At least moderate bilateral pleural effusions with adjacent airspace disease, similar to increased when compared to the previous exam.     Ct Abdomen Pelvis Wo Contrast Additional Contrast? None    Result Date: 8/11/2019  EXAMINATION: CT OF THE ABDOMEN AND PELVIS WITHOUT CONTRAST 8/11/2019 2:38 pm TECHNIQUE: CT of the abdomen and pelvis was performed without the administration of intravenous contrast. Multiplanar reformatted images are provided for review. Dose modulation, iterative reconstruction, and/or weight based adjustment of the mA/kV was utilized to reduce the radiation dose to as low as reasonably achievable.  COMPARISON: None HISTORY: ORDERING SYSTEM PROVIDED HISTORY: Septic, has multiple wounds on bottom TECHNOLOGIST PROVIDED HISTORY: Additional Contrast?->None Reason for Exam: pt is septic,c/o back pain Acuity: Acute Type of Exam: Initial FINDINGS: Evaluation is limited by artifact from thoracic hardware, patient significant change in bilateral pleural effusions and bilateral lung consolidation     Results for Bethany Power (MRN [id number]) as of 9/3/2019 12:44   Ref. Range 9/2/2019 05:20 9/2/2019 15:10 9/3/2019 05:15   Sodium Latest Ref Range: 136 - 145 mmol/L 149 (H)  146 (H)   Potassium Latest Ref Range: 3.5 - 5.1 mmol/L 4.1  3.9   Chloride Latest Ref Range: 99 - 110 mmol/L 111 (H)  108   CO2 Latest Ref Range: 21 - 32 mmol/L 30  32   BUN Latest Ref Range: 7 - 20 mg/dL 25 (H)  23 (H)   Creatinine Latest Ref Range: 0.8 - 1.3 mg/dL 0.8  0.8   Anion Gap Latest Ref Range: 3 - 16  8  6   GFR Non- Latest Ref Range: >60  >60  >60   GFR  Latest Ref Range: >60  >60  >60   Glucose Latest Ref Range: 70 - 99 mg/dL 86  86   Calcium Latest Ref Range: 8.3 - 10.6 mg/dL 9.3  9.1   WBC Latest Ref Range: 4.0 - 11.0 K/uL 10.8  9.7   RBC Latest Ref Range: 4.20 - 5.90 M/uL 2.77 (L)  2.70 (L)   Hemoglobin Quant Latest Ref Range: 13.5 - 17.5 g/dL 9.7 (L)  9.4 (L)   Hematocrit Latest Ref Range: 40.5 - 52.5 % 28.6 (L)  27.9 (L)   MCV Latest Ref Range: 80.0 - 100.0 fL 103.5 (H)  103.2 (H)   MCH Latest Ref Range: 26.0 - 34.0 pg 35.1 (H)  34.8 (H)   MCHC Latest Ref Range: 31.0 - 36.0 g/dL 34.0  33.7   MPV Latest Ref Range: 5.0 - 10.5 fL 8.2  7.9   RDW Latest Ref Range: 12.4 - 15.4 % 15.6 (H)  15.6 (H)   Platelet Count Latest Ref Range: 135 - 450 K/uL 288  273   XR CHEST PORTABLE Unknown  Rpt      ONE XRAY VIEW OF THE CHEST       9/3/2019 6:22 am       COMPARISON:   September 2, 2019       HISTORY:   ORDERING SYSTEM PROVIDED HISTORY: COPD   TECHNOLOGIST PROVIDED HISTORY:   Reason for exam:->COPD   Reason for Exam: COPD       FINDINGS:   Cardiac silhouette is enlarged.  Moderate right and small to moderate left   pleural effusions, similar to previous exam on the left.  Slight worsening of   right pleural effusion.  Pulmonary vascular congestion interstitial   prominence throughout the lungs, stable slightly increased.

## 2019-09-03 NOTE — PROGRESS NOTES
Physical/Occupational Therapy Note    Attempted PT/OT tx today. Pt refused despite therapist education regarding the importance of movement and getting out of bed to improve strength, endurance and to prevent skin breakdown. Nurse was notified.     Kristine Boyd [address] 88, OTR/L  #[id number]  Lori Peng

## 2019-09-04 LAB
ANION GAP SERPL CALCULATED.3IONS-SCNC: 8 MMOL/L (ref 3–16)
BUN BLDV-MCNC: 16 MG/DL (ref 7–20)
CALCIUM SERPL-MCNC: 9 MG/DL (ref 8.3–10.6)
CHLORIDE BLD-SCNC: 105 MMOL/L (ref 99–110)
CO2: 31 MMOL/L (ref 21–32)
CREAT SERPL-MCNC: 0.8 MG/DL (ref 0.8–1.3)
GFR AFRICAN AMERICAN: >60
GFR NON-AFRICAN AMERICAN: >60
GLUCOSE BLD-MCNC: 82 MG/DL (ref 70–99)
HCT VFR BLD CALC: 26.6 % (ref 40.5–52.5)
HEMOGLOBIN: 8.8 G/DL (ref 13.5–17.5)
MAGNESIUM: 1.7 MG/DL (ref 1.8–2.4)
MCH RBC QN AUTO: 34.3 PG (ref 26–34)
MCHC RBC AUTO-ENTMCNC: 33.2 G/DL (ref 31–36)
MCV RBC AUTO: 103.2 FL (ref 80–100)
PDW BLD-RTO: 15.7 % (ref 12.4–15.4)
PLATELET # BLD: 256 K/UL (ref 135–450)
PMV BLD AUTO: 8.3 FL (ref 5–10.5)
POTASSIUM REFLEX MAGNESIUM: 3.4 MMOL/L (ref 3.5–5.1)
RBC # BLD: 2.58 M/UL (ref 4.2–5.9)
SODIUM BLD-SCNC: 144 MMOL/L (ref 136–145)
WBC # BLD: 8.4 K/UL (ref 4–11)

## 2019-09-04 PROCEDURE — 6360000002 HC RX W HCPCS: Performed by: INTERNAL MEDICINE

## 2019-09-04 PROCEDURE — 6370000000 HC RX 637 (ALT 250 FOR IP): Performed by: INTERNAL MEDICINE

## 2019-09-04 PROCEDURE — 85027 COMPLETE CBC AUTOMATED: CPT

## 2019-09-04 PROCEDURE — 2580000003 HC RX 258: Performed by: INTERNAL MEDICINE

## 2019-09-04 PROCEDURE — 99233 SBSQ HOSP IP/OBS HIGH 50: CPT | Performed by: INTERNAL MEDICINE

## 2019-09-04 PROCEDURE — 36415 COLL VENOUS BLD VENIPUNCTURE: CPT

## 2019-09-04 PROCEDURE — 94660 CPAP INITIATION&MGMT: CPT

## 2019-09-04 PROCEDURE — 83735 ASSAY OF MAGNESIUM: CPT

## 2019-09-04 PROCEDURE — 80048 BASIC METABOLIC PNL TOTAL CA: CPT

## 2019-09-04 PROCEDURE — 2060000000 HC ICU INTERMEDIATE R&B

## 2019-09-04 RX ORDER — SODIUM CHLORIDE 9 MG/ML
INJECTION, SOLUTION INTRAVENOUS CONTINUOUS
Status: DISCONTINUED | OUTPATIENT
Start: 2019-09-04 | End: 2019-09-07

## 2019-09-04 RX ORDER — MAGNESIUM SULFATE 1 G/100ML
1 INJECTION INTRAVENOUS ONCE
Status: COMPLETED | OUTPATIENT
Start: 2019-09-04 | End: 2019-09-04

## 2019-09-04 RX ORDER — POTASSIUM CHLORIDE 20 MEQ/1
40 TABLET, EXTENDED RELEASE ORAL ONCE
Status: COMPLETED | OUTPATIENT
Start: 2019-09-04 | End: 2019-09-04

## 2019-09-04 RX ORDER — AMLODIPINE BESYLATE 5 MG/1
5 TABLET ORAL DAILY
Status: DISCONTINUED | OUTPATIENT
Start: 2019-09-04 | End: 2019-09-21 | Stop reason: HOSPADM

## 2019-09-04 RX ADMIN — Medication 10 ML: at 21:11

## 2019-09-04 RX ADMIN — POTASSIUM CHLORIDE 40 MEQ: 20 TABLET, EXTENDED RELEASE ORAL at 17:41

## 2019-09-04 RX ADMIN — SODIUM CHLORIDE: 9 INJECTION, SOLUTION INTRAVENOUS at 21:15

## 2019-09-04 RX ADMIN — MAGNESIUM SULFATE HEPTAHYDRATE 1 G: 1 INJECTION, SOLUTION INTRAVENOUS at 17:41

## 2019-09-04 RX ADMIN — Medication 250 MG: at 18:50

## 2019-09-04 RX ADMIN — TAMSULOSIN HYDROCHLORIDE 0.4 MG: 0.4 CAPSULE ORAL at 09:33

## 2019-09-04 RX ADMIN — HYDROCORTISONE 5 MG: 10 TABLET ORAL at 09:33

## 2019-09-04 RX ADMIN — LEVOTHYROXINE SODIUM 150 MCG: 150 TABLET ORAL at 09:33

## 2019-09-04 RX ADMIN — METOPROLOL TARTRATE 25 MG: 25 TABLET, FILM COATED ORAL at 21:09

## 2019-09-04 RX ADMIN — Medication 250 MG: at 09:34

## 2019-09-04 RX ADMIN — PANTOPRAZOLE SODIUM 40 MG: 40 TABLET, DELAYED RELEASE ORAL at 09:35

## 2019-09-04 RX ADMIN — Medication 10 ML: at 09:33

## 2019-09-04 RX ADMIN — Medication 250 MG: at 21:12

## 2019-09-04 RX ADMIN — AMLODIPINE BESYLATE 5 MG: 5 TABLET ORAL at 17:41

## 2019-09-04 RX ADMIN — Medication 250 MG: at 14:06

## 2019-09-04 RX ADMIN — METOPROLOL TARTRATE 25 MG: 25 TABLET, FILM COATED ORAL at 09:33

## 2019-09-04 ASSESSMENT — PAIN SCALES - GENERAL
PAINLEVEL_OUTOF10: 0

## 2019-09-04 NOTE — PROGRESS NOTES
Vitals:    09/04/19 0824   BP: (!) 170/78   Pulse: 83   Resp: 18   Temp: 97.6 °F (36.4 °C)   SpO2: 99%     Pt resting quietly in bed alert and oriented. Pt had no acute events overnight. Pt denies having pain at this time and denies having further needs. Chest Tube in place and to suction at this time. Chest tube air leak noted (MD Aware). No new output at this time. Bed locked in lowest position, call light within reach, bedside table within reach.  Will continue to monitor

## 2019-09-04 NOTE — PROGRESS NOTES
Wound Healing % 82 8/28/2019 11:26 AM   Distance Tunneling (cm) 0 cm 8/28/2019 11:26 AM   Tunneling Position ___ O'Clock 0 8/28/2019 11:26 AM   Undermining Starts ___ O'Clock 0 8/28/2019 11:26 AM   Undermining Ends___ O'Clock 0 8/28/2019 11:26 AM   Undermining Maxium Distance (cm) 0 8/28/2019 11:26 AM   Wound Assessment Red 9/4/2019  4:33 PM   Drainage Amount None 9/4/2019  4:33 PM   Drainage Description Sanguinous 9/2/2019  8:07 AM   Odor None 9/4/2019  4:33 PM   Margins Attached edges; Defined edges 9/4/2019  4:33 PM   Kat-wound Assessment Blanchable erythema 9/4/2019  4:33 PM   Non-staged Wound Description Partial thickness 8/28/2019 11:26 AM   [place]%Wound Bed 80 8/22/2019  6:37 PM   Red%Wound Bed 100 9/4/2019  4:33 PM   Culture Taken No 9/4/2019  4:33 PM   Number of days: 36       Wound 07/29/19 Buttocks Right (Active)   Wound Image   8/22/2019 12:26 PM   Wound Pressure Stage  2 9/4/2019  4:33 PM   Dressing Status Clean;Dry; Intact 9/4/2019  4:33 PM   Dressing Changed Changed/New 9/1/2019 10:00 AM   Dressing/Treatment Open to air 9/4/2019  4:33 PM   Wound Cleansed Wound cleanser 9/1/2019 10:00 AM   Dressing Change Due 08/17/19 8/19/2019  8:59 AM   Wound Length (cm) 1 cm 8/28/2019 11:26 AM   Wound Width (cm) 1 cm 8/28/2019 11:26 AM   Wound Depth (cm) 0.1 cm 8/28/2019 11:26 AM   Wound Surface Area (cm^2) 1 cm^2 8/28/2019 11:26 AM   Change in Wound Size % (l*w) 53.7 8/28/2019 11:26 AM   Wound Volume (cm^3) 0.1 cm^3 8/28/2019 11:26 AM   Distance Tunneling (cm) 0 cm 8/22/2019 12:26 PM   Tunneling Position ___ O'Clock 0 8/28/2019 11:26 AM   Undermining Starts ___ O'Clock 0 8/28/2019 11:26 AM   Undermining Ends___ O'Clock 0 8/28/2019 11:26 AM   Undermining Maxium Distance (cm) 0 8/28/2019 11:26 AM   Wound Assessment Dry; Intact 9/4/2019  4:33 PM   Drainage Amount None 9/4/2019  4:33 PM   Drainage Description Donato Kang 8/22/2019  6:37 PM   Odor None 9/4/2019  4:33 PM   Margins Attached edges; Defined edges 9/4/2019  4:33 PM Assessment Dry; Intact; Red;Pink 9/4/2019  4:33 PM   Drainage Amount None 9/4/2019  4:33 PM   Odor None 9/4/2019  4:33 PM   Margins Attached edges; Defined edges 9/4/2019  4:33 PM   Kat-wound Assessment Blanchable erythema 9/4/2019  4:33 PM   [place]%Wound Bed 75 9/4/2019  4:33 PM   Red%Wound Bed 25 9/4/2019  4:33 PM   Culture Taken No 9/4/2019  4:33 PM   Number of days: 5       Response to treatment:  Well tolerated by patient. Pain Assessment:  Severity:  0 / 10  Quality of pain: N/A  Wound Pain Timing/Severity: none  Premedicated: No  Plan:   Plan of Care: Wound 08/30/19 Coccyx-Dressing/Treatment: Open to air  Wound 07/29/19 Buttocks Left-Dressing/Treatment: Foam  Wound 07/29/19 Buttocks Right-Dressing/Treatment: Open to air  Wound 07/29/19 Coccyx split at top of buttocks crack-Dressing/Treatment: Open to air  Recommend:Clean Left and Right buttocks with foamy cleanser bid and after soiling. Pat dry. Shake bottle or touchless spray. Spray affected area BID on bilateral buttocks, scrotum, perineum with moisture barrier protectant touchless care spray (sample product from wound care). Hold spray @ 4-6 inches from the skin. Allow for area to dry in @ 30-60 seconds. Only need to apply BID. One spay needed per area. Clean skin in between incontinent episodes. Product will remain on the skin and no need to reapply with every incontinent episode.  Call Wound Care for deterioration [phone]    Specialty Bed Required : Yes   [x] Low Air Loss   [x] Pressure Redistribution  [] Fluid Immersion  [] Bariatric  [] Total Pressure Relief  [] Other:     Current Diet: DIET GENERAL;  Dietary Nutrition Supplements: Protein Modular, Standard High Calorie Oral Supplement  Dietician consult:  Yes    Discharge Plan:  Placement for patient upon discharge: home with support   Patient appropriate for Outpatient [address]: No    Referrals:  [x]  following  [] [address] [place] [facility]  [] Supplies  []

## 2019-09-04 NOTE — PROGRESS NOTES
Hospitalist Progress Note      PCP: Debra Salgado MD    Date of Admission: 8/27/2019    Chief Complaint: weakness, confusion. Complains of shortness of breath and diarrhea    Hospital Course: Readmitted from home with MCKENNA, hypotension, dehydration. Subjective:  He denies dyspnea. Doing well on his baseline oxygen requirement. Still refusing to get OOB or work with PT/OT. I suspect he is quite weak and going home may be difficult. He says he has had two watery BM's so far today. Medications:  Reviewed    Infusion Medications   Scheduled Medications    potassium chloride  40 mEq Oral Once    magnesium sulfate  1 g Intravenous Once    amLODIPine  5 mg Oral Daily    levothyroxine  150 mcg Oral Daily    metoprolol tartrate  25 mg Oral BID    pantoprazole  40 mg Oral QAM AC    mometasone-formoterol  2 puff Inhalation BID    tamsulosin  0.4 mg Oral Daily    vancomycin  250 mg Oral 4x daily    sodium chloride flush  10 mL Intravenous 2 times per day     PRN Meds: albuterol sulfate HFA, sodium chloride flush, acetaminophen      Intake/Output Summary (Last 24 hours) at 9/4/2019 1446  Last data filed at 9/4/2019 1248  Gross per 24 hour   Intake 600 ml   Output 1595 ml   Net -995 ml       Physical Exam Performed:    BP (!) 170/78   Pulse 88   Temp 97.6 °F (36.4 °C) (Oral)   Resp 18   Ht 6' 1\" (1.854 m)   Wt 250 lb 3.6 oz (113.5 kg)   SpO2 99%   BMI 33.01 kg/m²     General appearance: No apparent distress, appears stated age and cooperative. HEENT: Pupils equal, round, and reactive to light. Conjunctivae/corneas clear. Neck: Supple, with full range of motion. No jugular venous distention. Trachea midline. Respiratory:  Normal respiratory effort. Right anterior upper chest with expiratory wheezes and inspiratory crackles  Cardiovascular: Regular rate and rhythm with normal S1/S2 without murmurs, rubs or gallops.   Abdomen: Soft, non-tender, non-distended with normal bowel sounds. Musculoskeletal: No clubbing, cyanosis or edema bilaterally. Full range of motion without deformity. Skin: Skin color, texture, turgor normal. Decubitus ulcers noted. No surrounding cellulitis seen  Neurologic:  Neurovascularly intact without any focal sensory/motor deficits. Cranial nerves: II-XII intact, grossly non-focal.  Psychiatric: Alert and oriented, thought content appropriate, normal insight  Capillary Refill: Brisk,< 3 seconds   Peripheral Pulses: +2 palpable, equal bilaterally       Labs:   Recent Labs     09/02/19 0520 09/03/19 0515 09/04/19 0513   WBC 10.8 9.7 8.4   HGB 9.7* 9.4* 8.8*   HCT 28.6* 27.9* 26.6*    273 256     Recent Labs     09/02/19 0520 09/03/19 0515 09/04/19 0513   * 146* 144   K 4.1 3.9 3.4*   * 108 105   CO2 30 32 31   BUN 25* 23* 16   CREATININE 0.8 0.8 0.8   CALCIUM 9.3 9.1 9.0     No results for input(s): AST, ALT, BILIDIR, BILITOT, ALKPHOS in the last 72 hours. No results for input(s): INR in the last 72 hours. No results for input(s): [place] in the last 72 hours. Urinalysis:      Lab Results   Component Value Date    NITRU Negative 08/28/2019    WBCUA 6-10 08/28/2019    BACTERIA Rare 08/28/2019    RBCUA None seen 08/28/2019    BLOODU TRACE-LYSED 08/28/2019    SPECGRAV 1.020 08/28/2019    GLUCOSEU 250 08/28/2019       Radiology:  XR CHEST PORTABLE   Final Result   Slight worsening of right pleural effusion. Pulmonary vascular congestion   interstitial prominence throughout the lungs is stable to slightly progressed. XR CHEST PORTABLE   Final Result   Left pleural catheter in place. Persistent bilateral pleural effusions, greater on the right, with bibasilar   airspace disease. Question tiny left pneumothorax.          XR CHEST PORTABLE   Final Result   No significant change in bilateral pleural effusions and bilateral lung   consolidation         CT CHEST WO CONTRAST   Final Result   Moderate bilateral pleural

## 2019-09-05 ENCOUNTER — ANESTHESIA (OUTPATIENT)
Dept: ENDOSCOPY | Age: 74
DRG: 871 | End: 2019-09-05
Payer: MEDICARE

## 2019-09-05 ENCOUNTER — ANESTHESIA EVENT (OUTPATIENT)
Dept: ENDOSCOPY | Age: 74
DRG: 871 | End: 2019-09-05
Payer: MEDICARE

## 2019-09-05 VITALS — SYSTOLIC BLOOD PRESSURE: 102 MMHG | OXYGEN SATURATION: 92 % | DIASTOLIC BLOOD PRESSURE: 53 MMHG

## 2019-09-05 LAB
ANION GAP SERPL CALCULATED.3IONS-SCNC: 7 MMOL/L (ref 3–16)
BUN BLDV-MCNC: 20 MG/DL (ref 7–20)
CALCIUM SERPL-MCNC: 9.1 MG/DL (ref 8.3–10.6)
CHLORIDE BLD-SCNC: 104 MMOL/L (ref 99–110)
CO2: 31 MMOL/L (ref 21–32)
CREAT SERPL-MCNC: 0.7 MG/DL (ref 0.8–1.3)
GFR AFRICAN AMERICAN: >60
GFR NON-AFRICAN AMERICAN: >60
GLUCOSE BLD-MCNC: 88 MG/DL (ref 70–99)
POTASSIUM REFLEX MAGNESIUM: 4 MMOL/L (ref 3.5–5.1)
SODIUM BLD-SCNC: 142 MMOL/L (ref 136–145)

## 2019-09-05 PROCEDURE — 3609012400 HC EGD TRANSORAL BIOPSY SINGLE/MULTIPLE: Performed by: INTERNAL MEDICINE

## 2019-09-05 PROCEDURE — 0DB98ZZ EXCISION OF DUODENUM, VIA NATURAL OR ARTIFICIAL OPENING ENDOSCOPIC: ICD-10-PCS | Performed by: INTERNAL MEDICINE

## 2019-09-05 PROCEDURE — 2720000010 HC SURG SUPPLY STERILE: Performed by: INTERNAL MEDICINE

## 2019-09-05 PROCEDURE — 7100000010 HC PHASE II RECOVERY - FIRST 15 MIN: Performed by: INTERNAL MEDICINE

## 2019-09-05 PROCEDURE — 0DB78ZX EXCISION OF STOMACH, PYLORUS, VIA NATURAL OR ARTIFICIAL OPENING ENDOSCOPIC, DIAGNOSTIC: ICD-10-PCS | Performed by: INTERNAL MEDICINE

## 2019-09-05 PROCEDURE — 6370000000 HC RX 637 (ALT 250 FOR IP): Performed by: INTERNAL MEDICINE

## 2019-09-05 PROCEDURE — 36415 COLL VENOUS BLD VENIPUNCTURE: CPT

## 2019-09-05 PROCEDURE — 88305 TISSUE EXAM BY PATHOLOGIST: CPT

## 2019-09-05 PROCEDURE — 3609013500 HC EGD REMOVAL TUMOR POLYP/OTHER LESION SNARE TECH: Performed by: INTERNAL MEDICINE

## 2019-09-05 PROCEDURE — 80048 BASIC METABOLIC PNL TOTAL CA: CPT

## 2019-09-05 PROCEDURE — 94660 CPAP INITIATION&MGMT: CPT

## 2019-09-05 PROCEDURE — 6360000002 HC RX W HCPCS: Performed by: NURSE ANESTHETIST, CERTIFIED REGISTERED

## 2019-09-05 PROCEDURE — 99232 SBSQ HOSP IP/OBS MODERATE 35: CPT | Performed by: INTERNAL MEDICINE

## 2019-09-05 PROCEDURE — 3609013000 HC EGD TRANSORAL CONTROL BLEEDING ANY METHOD: Performed by: INTERNAL MEDICINE

## 2019-09-05 PROCEDURE — 97530 THERAPEUTIC ACTIVITIES: CPT

## 2019-09-05 PROCEDURE — 0DB98ZX EXCISION OF DUODENUM, VIA NATURAL OR ARTIFICIAL OPENING ENDOSCOPIC, DIAGNOSTIC: ICD-10-PCS | Performed by: INTERNAL MEDICINE

## 2019-09-05 PROCEDURE — 3609017100 HC EGD: Performed by: INTERNAL MEDICINE

## 2019-09-05 PROCEDURE — 2700000000 HC OXYGEN THERAPY PER DAY

## 2019-09-05 PROCEDURE — 7100000011 HC PHASE II RECOVERY - ADDTL 15 MIN: Performed by: INTERNAL MEDICINE

## 2019-09-05 PROCEDURE — 97535 SELF CARE MNGMENT TRAINING: CPT

## 2019-09-05 PROCEDURE — 94761 N-INVAS EAR/PLS OXIMETRY MLT: CPT

## 2019-09-05 PROCEDURE — 2500000003 HC RX 250 WO HCPCS: Performed by: NURSE ANESTHETIST, CERTIFIED REGISTERED

## 2019-09-05 PROCEDURE — 2060000000 HC ICU INTERMEDIATE R&B

## 2019-09-05 PROCEDURE — 2709999900 HC NON-CHARGEABLE SUPPLY: Performed by: INTERNAL MEDICINE

## 2019-09-05 PROCEDURE — 3609013800 HC EGD SUBMUCOSAL/BOTOX INJECTION: Performed by: INTERNAL MEDICINE

## 2019-09-05 PROCEDURE — 3700000001 HC ADD 15 MINUTES (ANESTHESIA): Performed by: INTERNAL MEDICINE

## 2019-09-05 PROCEDURE — 2580000003 HC RX 258: Performed by: INTERNAL MEDICINE

## 2019-09-05 PROCEDURE — 94640 AIRWAY INHALATION TREATMENT: CPT

## 2019-09-05 PROCEDURE — 3700000000 HC ANESTHESIA ATTENDED CARE: Performed by: INTERNAL MEDICINE

## 2019-09-05 RX ORDER — LABETALOL HYDROCHLORIDE 5 MG/ML
5 INJECTION, SOLUTION INTRAVENOUS EVERY 10 MIN PRN
Status: DISCONTINUED | OUTPATIENT
Start: 2019-09-05 | End: 2019-09-05 | Stop reason: HOSPADM

## 2019-09-05 RX ORDER — DIPHENHYDRAMINE HYDROCHLORIDE 50 MG/ML
12.5 INJECTION INTRAMUSCULAR; INTRAVENOUS
Status: DISCONTINUED | OUTPATIENT
Start: 2019-09-05 | End: 2019-09-05 | Stop reason: HOSPADM

## 2019-09-05 RX ORDER — PROPOFOL 10 MG/ML
INJECTION, EMULSION INTRAVENOUS PRN
Status: DISCONTINUED | OUTPATIENT
Start: 2019-09-05 | End: 2019-09-05 | Stop reason: SDUPTHER

## 2019-09-05 RX ORDER — MORPHINE SULFATE 2 MG/ML
1 INJECTION, SOLUTION INTRAMUSCULAR; INTRAVENOUS EVERY 5 MIN PRN
Status: DISCONTINUED | OUTPATIENT
Start: 2019-09-05 | End: 2019-09-05 | Stop reason: HOSPADM

## 2019-09-05 RX ORDER — MEPERIDINE HYDROCHLORIDE 50 MG/ML
12.5 INJECTION INTRAMUSCULAR; INTRAVENOUS; SUBCUTANEOUS EVERY 5 MIN PRN
Status: DISCONTINUED | OUTPATIENT
Start: 2019-09-05 | End: 2019-09-05 | Stop reason: HOSPADM

## 2019-09-05 RX ORDER — ONDANSETRON 2 MG/ML
4 INJECTION INTRAMUSCULAR; INTRAVENOUS
Status: DISCONTINUED | OUTPATIENT
Start: 2019-09-05 | End: 2019-09-05 | Stop reason: HOSPADM

## 2019-09-05 RX ORDER — LIDOCAINE HYDROCHLORIDE 20 MG/ML
INJECTION, SOLUTION INFILTRATION; PERINEURAL PRN
Status: DISCONTINUED | OUTPATIENT
Start: 2019-09-05 | End: 2019-09-05 | Stop reason: SDUPTHER

## 2019-09-05 RX ORDER — MORPHINE SULFATE 2 MG/ML
2 INJECTION, SOLUTION INTRAMUSCULAR; INTRAVENOUS EVERY 5 MIN PRN
Status: DISCONTINUED | OUTPATIENT
Start: 2019-09-05 | End: 2019-09-05 | Stop reason: HOSPADM

## 2019-09-05 RX ORDER — PROMETHAZINE HYDROCHLORIDE 25 MG/ML
6.25 INJECTION, SOLUTION INTRAMUSCULAR; INTRAVENOUS
Status: DISCONTINUED | OUTPATIENT
Start: 2019-09-05 | End: 2019-09-05 | Stop reason: HOSPADM

## 2019-09-05 RX ORDER — OXYCODONE HYDROCHLORIDE AND ACETAMINOPHEN 5; 325 MG/1; MG/1
2 TABLET ORAL PRN
Status: DISCONTINUED | OUTPATIENT
Start: 2019-09-05 | End: 2019-09-05 | Stop reason: HOSPADM

## 2019-09-05 RX ORDER — HYDRALAZINE HYDROCHLORIDE 20 MG/ML
5 INJECTION INTRAMUSCULAR; INTRAVENOUS EVERY 10 MIN PRN
Status: DISCONTINUED | OUTPATIENT
Start: 2019-09-05 | End: 2019-09-05 | Stop reason: HOSPADM

## 2019-09-05 RX ORDER — OXYCODONE HYDROCHLORIDE AND ACETAMINOPHEN 5; 325 MG/1; MG/1
1 TABLET ORAL PRN
Status: DISCONTINUED | OUTPATIENT
Start: 2019-09-05 | End: 2019-09-05 | Stop reason: HOSPADM

## 2019-09-05 RX ADMIN — PROPOFOL 20 MG: 10 INJECTION, EMULSION INTRAVENOUS at 15:52

## 2019-09-05 RX ADMIN — PROPOFOL 40 MG: 10 INJECTION, EMULSION INTRAVENOUS at 16:04

## 2019-09-05 RX ADMIN — LEVOTHYROXINE SODIUM 150 MCG: 150 TABLET ORAL at 09:51

## 2019-09-05 RX ADMIN — PROPOFOL 20 MG: 10 INJECTION, EMULSION INTRAVENOUS at 15:50

## 2019-09-05 RX ADMIN — Medication 10 ML: at 21:36

## 2019-09-05 RX ADMIN — PROPOFOL 50 MG: 10 INJECTION, EMULSION INTRAVENOUS at 15:48

## 2019-09-05 RX ADMIN — LIDOCAINE HYDROCHLORIDE 60 MG: 20 INJECTION, SOLUTION INFILTRATION; PERINEURAL at 15:45

## 2019-09-05 RX ADMIN — Medication 250 MG: at 21:30

## 2019-09-05 RX ADMIN — PROPOFOL 30 MG: 10 INJECTION, EMULSION INTRAVENOUS at 15:54

## 2019-09-05 RX ADMIN — PROPOFOL 20 MG: 10 INJECTION, EMULSION INTRAVENOUS at 15:49

## 2019-09-05 RX ADMIN — PROPOFOL 20 MG: 10 INJECTION, EMULSION INTRAVENOUS at 15:51

## 2019-09-05 RX ADMIN — Medication 250 MG: at 18:50

## 2019-09-05 RX ADMIN — METOPROLOL TARTRATE 25 MG: 25 TABLET, FILM COATED ORAL at 21:36

## 2019-09-05 RX ADMIN — PROPOFOL 30 MG: 10 INJECTION, EMULSION INTRAVENOUS at 15:58

## 2019-09-05 RX ADMIN — PROPOFOL 20 MG: 10 INJECTION, EMULSION INTRAVENOUS at 16:09

## 2019-09-05 RX ADMIN — GLUCAGON HYDROCHLORIDE 0.5 MG: KIT at 15:58

## 2019-09-05 RX ADMIN — METOPROLOL TARTRATE 25 MG: 25 TABLET, FILM COATED ORAL at 09:50

## 2019-09-05 RX ADMIN — Medication 10 ML: at 09:40

## 2019-09-05 RX ADMIN — PROPOFOL 20 MG: 10 INJECTION, EMULSION INTRAVENOUS at 16:10

## 2019-09-05 RX ADMIN — Medication 2 PUFF: at 20:14

## 2019-09-05 RX ADMIN — Medication 2 PUFF: at 08:13

## 2019-09-05 RX ADMIN — AMLODIPINE BESYLATE 5 MG: 5 TABLET ORAL at 09:51

## 2019-09-05 RX ADMIN — PANTOPRAZOLE SODIUM 40 MG: 40 TABLET, DELAYED RELEASE ORAL at 09:51

## 2019-09-05 RX ADMIN — PROPOFOL 30 MG: 10 INJECTION, EMULSION INTRAVENOUS at 16:07

## 2019-09-05 RX ADMIN — PROPOFOL 10 MG: 10 INJECTION, EMULSION INTRAVENOUS at 15:57

## 2019-09-05 RX ADMIN — PROPOFOL 30 MG: 10 INJECTION, EMULSION INTRAVENOUS at 15:56

## 2019-09-05 RX ADMIN — TAMSULOSIN HYDROCHLORIDE 0.4 MG: 0.4 CAPSULE ORAL at 09:51

## 2019-09-05 ASSESSMENT — PAIN SCALES - GENERAL
PAINLEVEL_OUTOF10: 0

## 2019-09-05 NOTE — ANESTHESIA PRE PROCEDURE
BIOPSY      UPPER GASTROINTESTINAL ENDOSCOPY  1/10/2011    UPPER GASTROINTESTINAL ENDOSCOPY N/A 2019    EGD W/ANES. performed by Nicole Florez DO at [address] History:    Social History     Tobacco Use    Smoking status: Former Smoker     Packs/day: 3.00     Years: 50.00     Pack years: 150.00     Last attempt to quit: 3/10/2002     Years since quittin.5    Smokeless tobacco: Never Used   Substance Use Topics    Alcohol use: Yes     Comment: 1-2 medium size glasses [employer] royal                                Counseling given: Not Answered      Vital Signs (Current):   Vitals:    19 0530 19 0816 19 0930 19 1121   BP: (!) 150/75  (!) 159/83 (!) 152/83   Pulse: 82  77 66   Resp: 16  16 16   Temp: 98.4 °F (36.9 °C)  98.4 °F (36.9 °C) 98.1 °F (36.7 °C)   TempSrc: Oral  Oral Oral   SpO2: 98% 97% 98% 98%   Weight:       Height:                                                  BP Readings from Last 3 Encounters:   19 (!) 152/83   19 133/68   19 112/64       NPO Status: Time of last liquid consumption:                         Time of last solid consumption:                         Date of last liquid consumption: 19                        Date of last solid food consumption: 19    BMI:   Wt Readings from Last 3 Encounters:   19 250 lb 3.6 oz (113.5 kg)   19 244 lb 4.3 oz (110.8 kg)   19 275 lb (124.7 kg)     Body mass index is 33.01 kg/m².     CBC:   Lab Results   Component Value Date    WBC 8.4 2019    RBC 2.58 2019    HGB 8.8 2019    HCT 26.6 2019    .2 2019    RDW 15.7 2019     2019       CMP:   Lab Results   Component Value Date     2019    K 4.0 2019     2019    CO2 31 2019    BUN 20 2019    CREATININE 0.7 2019    GFRAA >60 2019    GFRAA >60 2013    AGRATIO 0.9 2019    LABGLOM >60

## 2019-09-05 NOTE — PROGRESS NOTES
Report called     Transport notified       Denies pain    Intermittent  Productive cough clear sputum

## 2019-09-05 NOTE — PROGRESS NOTES
Occupational Therapy  Facility/Department: [facility] C4 PCU  Daily Treatment Note  NAME: Tai Penny  : 1945  MRN: [id number]    Date of Service: 2019    Discharge Recommendations:  24 hour supervision or assist, Home with Home health OT, S Level 3     Assessment   Performance deficits / Impairments: Decreased functional mobility ; Decreased ADL status; Decreased safe awareness;Decreased cognition;Decreased balance;Decreased endurance  Assessment: Pt required some encouragement but did participate in OT session at bedside. He stood x1 min with CGA with RW. Min A needed for sit <>stand. He reports that his family will assist him at home. Pt appears debilitated and would benefit from continued skilled therapy to improve level of function. He is adamant about returning home at d/c. Cont OT. Prognosis: Fair  OT Education: OT Role;Plan of Care;Transfer Training;ADL Adaptive Strategies  REQUIRES OT FOLLOW UP: Yes  Activity Tolerance  Activity Tolerance: Patient Tolerated treatment well  Activity Tolerance: 4LO2  Safety Devices  Type of devices: Nurse notified;Gait belt;Bed alarm in place;Call light within reach; Left in bed       Patient Diagnosis(es): The primary encounter diagnosis was Septicemia ([address] A diagnosis of MCKENNA (acute kidney injury) ([address] was also pertinent to this visit. has a past medical history of Acquired hypothyroidism, Alcohol abuse, Alcohol abuse, ARF (acute renal failure) ([address] CHF (congestive heart failure) ([address] CKD (chronic kidney disease), Clostridium difficile infection, Clostridium difficile infection, Hypercholesteremia, Hypertension, Mediastinal adenopathy, Pneumonia due to organism, and Pulmonary emphysema ([address] has a past surgical history that includes Upper gastrointestinal endoscopy (1/10/2011); skin biopsy; back surgery;  Upper gastrointestinal endoscopy (N/A, 2019); and bronchoscopy (N/A,

## 2019-09-05 NOTE — PROGRESS NOTES
.5* 103.2* 103.2*    273 256     BMP:   Recent Labs     09/02/19  0520 09/03/19  0515 09/04/19  0513   * 146* 144   K 4.1 3.9 3.4*   * 108 105   CO2 30 32 31   BUN 25* 23* 16   CREATININE 0.8 0.8 0.8       Imaging:  I have reviewed radiology images personally. XR CHEST PORTABLE   Final Result   Slight worsening of right pleural effusion. Pulmonary vascular congestion   interstitial prominence throughout the lungs is stable to slightly progressed. XR CHEST PORTABLE   Final Result   Left pleural catheter in place. Persistent bilateral pleural effusions, greater on the right, with bibasilar   airspace disease. Question tiny left pneumothorax. XR CHEST PORTABLE   Final Result   No significant change in bilateral pleural effusions and bilateral lung   consolidation         CT CHEST WO CONTRAST   Final Result   Moderate bilateral pleural effusions, with adjacent consolidation in the   lungs, likely atelectasis in the absence of clinical signs of pneumonia. Small mediastinal nodes are seen, likely reactive      Mild septal thickening, suggesting a component of fluid overload         XR CHEST PORTABLE   Final Result   Increased bilateral pleural effusions and bilateral lung consolidation         CT ABDOMEN PELVIS WO CONTRAST Additional Contrast? None   Final Result   Subtle asymmetric dermal thickening involving the soft tissues overlying the   right hip, along with mild asymmetric subcutaneous edema, compatible with   cellulitis. No soft tissue gas or abscess is identified. Moderate to severe diverticulosis of the sigmoid colon, but without CT   evidence of diverticulitis on the current examination.       At least moderate bilateral pleural effusions with adjacent airspace disease,   similar to increased when compared to the previous exam.         XR CHEST PORTABLE   Final Result   Stable small left pleural effusion, with bibasilar atelectasis and/or consolidation. Results for Tatiana Celestin (MRN [id number]) as of 9/4/2019 22:17   Ref. Range 9/2/2019 05:20 9/2/2019 15:10 9/3/2019 05:15 9/3/2019 07:15 9/4/2019 05:13   Sodium Latest Ref Range: 136 - 145 mmol/L 149 (H)  146 (H)  144   Potassium Latest Ref Range: 3.5 - 5.1 mmol/L 4.1  3.9  3.4 (L)   Chloride Latest Ref Range: 99 - 110 mmol/L 111 (H)  108  105   CO2 Latest Ref Range: 21 - 32 mmol/L 30  32  31   BUN Latest Ref Range: 7 - 20 mg/dL 25 (H)  23 (H)  16   Creatinine Latest Ref Range: 0.8 - 1.3 mg/dL 0.8  0.8  0.8   Anion Gap Latest Ref Range: 3 - 16  8  6  8   GFR Non- Latest Ref Range: >60  >60  >60  >60   GFR  Latest Ref Range: >60  >60  >60  >60   Magnesium Latest Ref Range: 1.80 - 2.40 mg/dL     1.70 (L)   Glucose Latest Ref Range: 70 - 99 mg/dL 86  86  82   Calcium Latest Ref Range: 8.3 - 10.6 mg/dL 9.3  9.1  9.0   WBC Latest Ref Range: 4.0 - 11.0 K/uL 10.8  9.7  8.4   RBC Latest Ref Range: 4.20 - 5.90 M/uL 2.77 (L)  2.70 (L)  2.58 (L)   Hemoglobin Quant Latest Ref Range: 13.5 - 17.5 g/dL 9.7 (L)  9.4 (L)  8.8 (L)   Hematocrit Latest Ref Range: 40.5 - 52.5 % 28.6 (L)  27.9 (L)  26.6 (L)   MCV Latest Ref Range: 80.0 - 100.0 fL 103.5 (H)  103.2 (H)  103.2 (H)   MCH Latest Ref Range: 26.0 - 34.0 pg 35.1 (H)  34.8 (H)  34.3 (H)   MCHC Latest Ref Range: 31.0 - 36.0 g/dL 34.0  33.7  33.2   MPV Latest Ref Range: 5.0 - 10.5 fL 8.2  7.9  8.3   RDW Latest Ref Range: 12.4 - 15.4 % 15.6 (H)  15.6 (H)  15.7 (H)   Platelet Count Latest Ref Range: 135 - 450 K/uL 288  273  256   XR CHEST PORTABLE Unknown  Rpt  Rpt        Results for Tatiana Celestin (MRN [id number]) as of 9/4/2019 22:17   Ref.  Range 8/31/2019 05:37 9/1/2019 06:18 9/2/2019 05:20 9/3/2019 05:15 9/4/2019 05:13   WBC Latest Ref Range: 4.0 - 11.0 K/uL 9.9 11.6 (H) 10.8 9.7 8.4   RBC Latest Ref Range: 4.20 - 5.90 M/uL 2.55 (L) 2.81 (L) 2.77 (L) 2.70 (L) 2.58 (L)   Hemoglobin Quant Latest Ref Range: 13.5 - 17.5 g/dL 8.8 (L) 9.7 (L) 9.7 (L) 9.4 (L) 8.8 (L)   Hematocrit Latest Ref Range: 40.5 - 52.5 % 26.6 (L) 28.9 (L) 28.6 (L) 27.9 (L) 26.6 (L)   MCV Latest Ref Range: 80.0 - 100.0 fL 103.9 (H) 103.0 (H) 103.5 (H) 103.2 (H) 103.2 (H)   MCH Latest Ref Range: 26.0 - 34.0 pg 34.4 (H) 34.5 (H) 35.1 (H) 34.8 (H) 34.3 (H)   MCHC Latest Ref Range: 31.0 - 36.0 g/dL 33.1 33.5 34.0 33.7 33.2   MPV Latest Ref Range: 5.0 - 10.5 fL 8.6 8.4 8.2 7.9 8.3   RDW Latest Ref Range: 12.4 - 15.4 % 15.9 (H) 15.4 15.6 (H) 15.6 (H) 15.7 (H)   Platelet Count Latest Ref Range: 135 - 450 K/uL 286 307 288 273 256       Assessment:  Principal Problem:    Acute renal failure (ARF) (HCC)  Active Problems:    Hyperlipidemia    Former smoker    Pulmonary HTN (HCC)    Sepsis (HCC)    C. difficile colitis    Acquired hypothyroidism    Pulmonary venous congestion    Macrocytic anemia  Resolved Problems:    * No resolved hospital problems.  *          Plan:   · Oxygen supplementation to keep saturation between 90 to 94% only  · Pulmonary toilet  · CPAP at nighttime and when he is taking a nap in the daytime or on PRN basis  · Patient has a worsening pulmonary venous congestion and the renal function is maintained will give 1 dose of IV Lasix and reassess-IM to decide upon continuation of diuretics ,if deemed appropriate   · Monitor input output and BMP  · Correct electrolytes on whenever necessary basis  · Chest tube care to continue  · IM to evaluate and manage macrocytic anemia  · Patient was diagnosed as having C. difficile colitis on 8/11/19-patient is still on p.o. vancomycin -duration of management as per IM   · Patient's synthroid was increased to 150 micrograms daily by IM   · Contact isolation precautions  · Bronchodilators  · Patient is on IV hydrocortisone which was changed to PO cortef and only one dose was given -consider prolonging the duration,if deemed appropriate -IM to decide   · PUD and DVT prophylaxis as per IM          Electronically signed by: Farideh Hartman MD    9/4/2019    10:09 PM.

## 2019-09-05 NOTE — OP NOTE
Soft coagulation was used to cauterize the edges. No perforation was seen post procedure. Two endoclips were placed to close the resection site. Recommendations:   Await pathology results  Advance diet as tolerated.         [place],

## 2019-09-05 NOTE — H&P
vancomycin  250 mg Oral 4x daily    sodium chloride flush  10 mL Intravenous 2 times per day       Current Medications:    Prior to Admission medications    Medication Sig Start Date End Date Taking?  Authorizing Provider   torsemide (DEMADEX) 20 MG tablet Take 2 tablets by mouth daily 8/26/19  Yes Zacarias Mckay MD   folic acid (FOLVITE) 1 MG tablet Take 1 tablet by mouth daily 8/26/19  Yes Zacarias Mckay MD   vancomycin (VANCOCIN) 50 mg/mL oral solution Take 5 mLs by mouth 4 times daily for 14 days 8/25/19 9/8/19 Yes Zacarias Mckay MD   levothyroxine (SYNTHROID) 125 MCG tablet Take 1 tablet by mouth Daily 8/26/19  Yes Zacarias Mckay MD   Multiple Vitamins-Minerals ([facility] SPECTRAVITE SENIOR) TABS TAKE 1 TABLET BY MOUTH EVERY DAY 7/5/19  Yes Sulaiman Partida MD   omeprazole (PRILOSEC) 20 MG delayed release capsule TAKE 1 CAPSULE BY MOUTH EVERY DAY 6/24/19  Yes Sulaiman Partida MD   folic acid (FOLVITE) 1 MG tablet TAKE 1 TABLET BY MOUTH EVERY DAY 6/6/19  Yes Sulaiman Partida MD   ferrous sulfate 325 (65 Fe) MG tablet TAKE 1 TABLET BY MOUTH EVERY DAY WITH BREAKFAST 6/6/19  Yes Sulaiman Partida MD   ipratropium-albuterol (DUONEB) 0.5-2.5 (3) MG/3ML SOLN nebulizer solution INHALE THE CONTENTS OF 1 VIAL INTO THE LUNGS EVERY 4 HOURS AS NEEDED FOR SHORTNESS OF BREATH 6/4/19  Yes Sulaiman Partida MD   tamsulosin [facility]) 0.4 MG capsule Take 1 capsule by mouth daily 5/3/19  Yes Sol Guardado MD   metoprolol succinate (TOPROL XL) 50 MG extended release tablet Take 1 tablet by mouth daily 3/30/19  Yes Zacarias Mckay MD   SYMBICORT 80-4.5 MCG/ACT AERO INHALE 1 PUFF INTO LUNGS BY MOUTH TWICE A DAY 10/1/18  Yes Sulaiman Partida MD   vitamin D ([facility] VITAMIN D3) 1000 units CAPS Take 1 capsule by mouth daily 5/8/18  Yes Sulaiman Partida MD   albuterol sulfate HFA (PROVENTIL HFA) 108 (90 Base) MCG/ACT inhaler Inhale 2 puffs into the lungs every 6 hours as needed for Wheezing 4/13/18  Yes

## 2019-09-06 ENCOUNTER — APPOINTMENT (OUTPATIENT)
Dept: GENERAL RADIOLOGY | Age: 74
DRG: 871 | End: 2019-09-06
Payer: MEDICARE

## 2019-09-06 LAB
ANION GAP SERPL CALCULATED.3IONS-SCNC: 9 MMOL/L (ref 3–16)
BUN BLDV-MCNC: 19 MG/DL (ref 7–20)
CALCIUM SERPL-MCNC: 8.6 MG/DL (ref 8.3–10.6)
CHLORIDE BLD-SCNC: 105 MMOL/L (ref 99–110)
CO2: 29 MMOL/L (ref 21–32)
CREAT SERPL-MCNC: 1 MG/DL (ref 0.8–1.3)
GFR AFRICAN AMERICAN: >60
GFR NON-AFRICAN AMERICAN: >60
GLUCOSE BLD-MCNC: 114 MG/DL (ref 70–99)
POTASSIUM REFLEX MAGNESIUM: 4.1 MMOL/L (ref 3.5–5.1)
SODIUM BLD-SCNC: 143 MMOL/L (ref 136–145)

## 2019-09-06 PROCEDURE — 71045 X-RAY EXAM CHEST 1 VIEW: CPT

## 2019-09-06 PROCEDURE — 6360000002 HC RX W HCPCS: Performed by: INTERNAL MEDICINE

## 2019-09-06 PROCEDURE — 2580000003 HC RX 258: Performed by: INTERNAL MEDICINE

## 2019-09-06 PROCEDURE — 6370000000 HC RX 637 (ALT 250 FOR IP): Performed by: INTERNAL MEDICINE

## 2019-09-06 PROCEDURE — 94640 AIRWAY INHALATION TREATMENT: CPT

## 2019-09-06 PROCEDURE — 99232 SBSQ HOSP IP/OBS MODERATE 35: CPT | Performed by: INTERNAL MEDICINE

## 2019-09-06 PROCEDURE — 2700000000 HC OXYGEN THERAPY PER DAY

## 2019-09-06 PROCEDURE — 32561 LYSE CHEST FIBRIN INIT DAY: CPT | Performed by: INTERNAL MEDICINE

## 2019-09-06 PROCEDURE — 2060000000 HC ICU INTERMEDIATE R&B

## 2019-09-06 PROCEDURE — 36415 COLL VENOUS BLD VENIPUNCTURE: CPT

## 2019-09-06 PROCEDURE — 80048 BASIC METABOLIC PNL TOTAL CA: CPT

## 2019-09-06 PROCEDURE — 94761 N-INVAS EAR/PLS OXIMETRY MLT: CPT

## 2019-09-06 RX ORDER — TORSEMIDE 20 MG/1
40 TABLET ORAL DAILY
Status: DISCONTINUED | OUTPATIENT
Start: 2019-09-06 | End: 2019-09-06

## 2019-09-06 RX ORDER — TORSEMIDE 20 MG/1
20 TABLET ORAL DAILY
Status: DISCONTINUED | OUTPATIENT
Start: 2019-09-06 | End: 2019-09-07

## 2019-09-06 RX ADMIN — METOPROLOL TARTRATE 25 MG: 25 TABLET, FILM COATED ORAL at 19:41

## 2019-09-06 RX ADMIN — LEVOTHYROXINE SODIUM 150 MCG: 150 TABLET ORAL at 09:17

## 2019-09-06 RX ADMIN — Medication 10 ML: at 09:18

## 2019-09-06 RX ADMIN — Medication 250 MG: at 15:10

## 2019-09-06 RX ADMIN — AMLODIPINE BESYLATE 5 MG: 5 TABLET ORAL at 09:18

## 2019-09-06 RX ADMIN — TAMSULOSIN HYDROCHLORIDE 0.4 MG: 0.4 CAPSULE ORAL at 09:18

## 2019-09-06 RX ADMIN — ALTEPLASE 5 MG: 2.2 INJECTION, POWDER, LYOPHILIZED, FOR SOLUTION INTRAVENOUS at 12:18

## 2019-09-06 RX ADMIN — Medication 250 MG: at 09:18

## 2019-09-06 RX ADMIN — Medication 250 MG: at 22:05

## 2019-09-06 RX ADMIN — PANTOPRAZOLE SODIUM 40 MG: 40 TABLET, DELAYED RELEASE ORAL at 05:43

## 2019-09-06 RX ADMIN — METOPROLOL TARTRATE 25 MG: 25 TABLET, FILM COATED ORAL at 09:18

## 2019-09-06 RX ADMIN — TORSEMIDE 20 MG: 20 TABLET ORAL at 19:42

## 2019-09-06 RX ADMIN — Medication 250 MG: at 18:45

## 2019-09-06 RX ADMIN — Medication 10 ML: at 19:42

## 2019-09-06 RX ADMIN — Medication 2 PUFF: at 19:51

## 2019-09-06 RX ADMIN — Medication 2 PUFF: at 08:36

## 2019-09-06 ASSESSMENT — PAIN DESCRIPTION - PAIN TYPE: TYPE: CHRONIC PAIN

## 2019-09-06 ASSESSMENT — PAIN SCALES - GENERAL
PAINLEVEL_OUTOF10: 0

## 2019-09-06 NOTE — PROGRESS NOTES
Vitals:    09/06/19 0910   BP: (!) 148/70   Pulse: 89   Resp: 16   Temp: 98.1 °F (36.7 °C)   SpO2: 96%     Pt resting quietly in bed alert and oriented. Pt had no acute events overnight. Pt denies having pain at this time and denies having further needs. Chest tube in place with no new output. Bed locked in lowest position, call light within reach, bedside table within reach.  Will continue to monitor

## 2019-09-06 NOTE — PROGRESS NOTES
HGB 9.4* 8.8*   HCT 27.9* 26.6*   .2* 103.2*    256     BMP:   Recent Labs     09/03/19  0515 09/04/19  0513 09/05/19  0541   * 144 142   K 3.9 3.4* 4.0    105 104   CO2 32 31 31   BUN 23* 16 20   CREATININE 0.8 0.8 0.7*       Imaging:  I have reviewed radiology images personally. XR CHEST PORTABLE   Final Result   Slight worsening of right pleural effusion. Pulmonary vascular congestion   interstitial prominence throughout the lungs is stable to slightly progressed. XR CHEST PORTABLE   Final Result   Left pleural catheter in place. Persistent bilateral pleural effusions, greater on the right, with bibasilar   airspace disease. Question tiny left pneumothorax. XR CHEST PORTABLE   Final Result   No significant change in bilateral pleural effusions and bilateral lung   consolidation         CT CHEST WO CONTRAST   Final Result   Moderate bilateral pleural effusions, with adjacent consolidation in the   lungs, likely atelectasis in the absence of clinical signs of pneumonia. Small mediastinal nodes are seen, likely reactive      Mild septal thickening, suggesting a component of fluid overload         XR CHEST PORTABLE   Final Result   Increased bilateral pleural effusions and bilateral lung consolidation         CT ABDOMEN PELVIS WO CONTRAST Additional Contrast? None   Final Result   Subtle asymmetric dermal thickening involving the soft tissues overlying the   right hip, along with mild asymmetric subcutaneous edema, compatible with   cellulitis. No soft tissue gas or abscess is identified. Moderate to severe diverticulosis of the sigmoid colon, but without CT   evidence of diverticulitis on the current examination.       At least moderate bilateral pleural effusions with adjacent airspace disease,   similar to increased when compared to the previous exam.         XR CHEST PORTABLE   Final Result   Stable small left pleural effusion, with bibasilar Assessment:  Principal Problem:    Acute renal failure (ARF) ([facility])  Active Problems:    Hyperlipidemia    Former smoker    Pulmonary HTN (HCC)    Sepsis ([facility] [address]    C. difficile colitis    Acquired hypothyroidism    Pulmonary venous congestion    Macrocytic anemia  Resolved Problems:    * No resolved hospital problems.  *          Plan:   · Oxygen supplementation to keep saturation between 90 to 94% only-please titrate down oxygen as per these parameters   · Pulmonary toilet  · CPAP at nighttime and when he is taking a nap in the daytime or on PRN basis  · IM to decide upon continuation of diuretics ,if deemed appropriate   · Will Rpt CXR and assess if the chest tube can be d/jareth   · Monitor input output and BMP  · Correct electrolytes on whenever necessary basis  · Chest tube care to continue  · IM to evaluate and manage macrocytic anemia  · Patient was diagnosed as having C. difficile colitis on 8/11/19-patient is still on p.o. vancomycin -duration of management as per IM   · Patient's synthroid was increased to 150 micrograms daily by IM   · Contact isolation precautions  · Bronchodilators  · Patient is on IV hydrocortisone which was changed to PO cortef and only one dose was given -consider prolonging the duration,if deemed appropriate -IM to decide   · PUD and DVT prophylaxis as per IM          Electronically signed by:  Pradeep Estevez MD    9/5/2019    9:20 PM.

## 2019-09-06 NOTE — PROGRESS NOTES
PROGRESS NOTE  S:74 yrs Patient  admitted on 8/27/2019 with Sepsis ([facility] [address] [A41.9]  [facility]epsis ([facility] [A41.9] . No bleeding overnight, still with diarrhea    Current Hospital Schedued Meds   amLODIPine  5 mg Oral Daily    levothyroxine  150 mcg Oral Daily    metoprolol tartrate  25 mg Oral BID    pantoprazole  40 mg Oral QAM AC    mometasone-formoterol  2 puff Inhalation BID    tamsulosin  0.4 mg Oral Daily    vancomycin  250 mg Oral 4x daily    sodium chloride flush  10 mL Intravenous 2 times per day     Current Hospital IV Meds   sodium chloride 20 mL/hr at 09/04/19 2115     Current Hospital PRN Meds  albuterol sulfate HFA, sodium chloride flush, acetaminophen    Exam:   Vitals:    09/06/19 0915   BP:    Pulse: 94   Resp:    Temp:    SpO2:      I/O last 3 completed shifts: In: 1080 [P.O.:480; I.V.:600]  Out: 950 [Urine:950]   General appearance: alert, appears stated age and cooperative  HEENT: PERRLA  Neck: no adenopathy, no carotid bruit, no JVD, supple, symmetrical, trachea midline and thyroid not enlarged, symmetric, no tenderness/mass/nodules  Lungs: clear to auscultation bilaterally  Heart: regular rate and rhythm, S1, S2 normal, no murmur, click, rub or gallop  Abdomen: soft, non-tender; bowel sounds normal; no masses,  no organomegaly  Extremities: extremities normal, atraumatic, no cyanosis or edema     Labs:  CBC:   Recent Labs     09/04/19  0513   WBC 8.4   HGB 8.8*   HCT 26.6*   .2*        BMP:   Recent Labs     09/04/19  0513 09/05/19  0541 09/06/19  0559    142 143   K 3.4* 4.0 4.1    104 105   CO2 31 31 29   BUN 16 20 19   CREATININE 0.8 0.7* 1.0     LIVER PROFILE: No results for input(s): AST, ALT, LIPASE, PROT, BILIDIR, BILITOT, ALKPHOS in the last 72 hours. Invalid input(s): AMYLASE,  ALB  PT/INR: No results for input(s): INR in the last 72 hours.     Invalid input(s): PT    IMAGING:  Xr Chest Portable    Result Date:

## 2019-09-06 NOTE — PROGRESS NOTES
pneumonia. Small mediastinal nodes are seen, likely reactive      Mild septal thickening, suggesting a component of fluid overload         XR CHEST PORTABLE   Final Result   Increased bilateral pleural effusions and bilateral lung consolidation         CT ABDOMEN PELVIS WO CONTRAST Additional Contrast? None   Final Result   Subtle asymmetric dermal thickening involving the soft tissues overlying the   right hip, along with mild asymmetric subcutaneous edema, compatible with   cellulitis. No soft tissue gas or abscess is identified. Moderate to severe diverticulosis of the sigmoid colon, but without CT   evidence of diverticulitis on the current examination. At least moderate bilateral pleural effusions with adjacent airspace disease,   similar to increased when compared to the previous exam.         XR CHEST PORTABLE   Final Result   Stable small left pleural effusion, with bibasilar atelectasis and/or   consolidation. Assessment/Plan:    Active Hospital Problems    Diagnosis    Pulmonary venous congestion [R09.89]    Macrocytic anemia [D53.9]    C. difficile colitis [A04.72]    Acquired hypothyroidism [E03.9]    Sepsis ([facility] [A41.9]    Acute renal failure (ARF) (HCC) [N17.9]    Pulmonary HTN ([address] [I27.20]    Former smoker [Z87.891]    Hyperlipidemia [E78.5]     PLAN:      C.diff colitis, complicated by sepsis on admission  - oral vancomycin    L pleural effusion  - placement of L chest tube 9/2, with 1.5 L drained immediately. Hypothyroidism  TSH was high, compliance uncertain, increased levothyroxine a bit, f/u with PCP. Decubitus ulcers, present on arrival  Continue wound care    Presumed adrenal insufficiency  Could not be diagnosed with clear certainty. However, responded well to steroids. Steroid dose tapered, then stopped on 9/4. He did well.     Acute renal failure  Resolved with IVFs, Nephrology following    Anemia  GI following  EGD 9/5 was unremarkable, f/u

## 2019-09-07 ENCOUNTER — APPOINTMENT (OUTPATIENT)
Dept: GENERAL RADIOLOGY | Age: 74
DRG: 871 | End: 2019-09-07
Payer: MEDICARE

## 2019-09-07 LAB
ANION GAP SERPL CALCULATED.3IONS-SCNC: 8 MMOL/L (ref 3–16)
BUN BLDV-MCNC: 19 MG/DL (ref 7–20)
CALCIUM SERPL-MCNC: 8.6 MG/DL (ref 8.3–10.6)
CHLORIDE BLD-SCNC: 99 MMOL/L (ref 99–110)
CO2: 31 MMOL/L (ref 21–32)
CREAT SERPL-MCNC: 1.4 MG/DL (ref 0.8–1.3)
GFR AFRICAN AMERICAN: >60
GFR NON-AFRICAN AMERICAN: 50
GLUCOSE BLD-MCNC: 103 MG/DL (ref 70–99)
POTASSIUM REFLEX MAGNESIUM: 4.4 MMOL/L (ref 3.5–5.1)
SODIUM BLD-SCNC: 138 MMOL/L (ref 136–145)

## 2019-09-07 PROCEDURE — 2700000000 HC OXYGEN THERAPY PER DAY

## 2019-09-07 PROCEDURE — 94640 AIRWAY INHALATION TREATMENT: CPT

## 2019-09-07 PROCEDURE — 36415 COLL VENOUS BLD VENIPUNCTURE: CPT

## 2019-09-07 PROCEDURE — 94761 N-INVAS EAR/PLS OXIMETRY MLT: CPT

## 2019-09-07 PROCEDURE — 2060000000 HC ICU INTERMEDIATE R&B

## 2019-09-07 PROCEDURE — 2580000003 HC RX 258: Performed by: INTERNAL MEDICINE

## 2019-09-07 PROCEDURE — 6370000000 HC RX 637 (ALT 250 FOR IP): Performed by: INTERNAL MEDICINE

## 2019-09-07 PROCEDURE — 32562 LYSE CHEST FIBRIN SUBQ DAY: CPT | Performed by: INTERNAL MEDICINE

## 2019-09-07 PROCEDURE — 71045 X-RAY EXAM CHEST 1 VIEW: CPT

## 2019-09-07 PROCEDURE — 3E03317 INTRODUCTION OF OTHER THROMBOLYTIC INTO PERIPHERAL VEIN, PERCUTANEOUS APPROACH: ICD-10-PCS | Performed by: INTERNAL MEDICINE

## 2019-09-07 PROCEDURE — 6360000002 HC RX W HCPCS: Performed by: INTERNAL MEDICINE

## 2019-09-07 PROCEDURE — 99232 SBSQ HOSP IP/OBS MODERATE 35: CPT | Performed by: INTERNAL MEDICINE

## 2019-09-07 PROCEDURE — 80048 BASIC METABOLIC PNL TOTAL CA: CPT

## 2019-09-07 RX ADMIN — TORSEMIDE 20 MG: 20 TABLET ORAL at 09:16

## 2019-09-07 RX ADMIN — Medication 2 PUFF: at 20:14

## 2019-09-07 RX ADMIN — LEVOTHYROXINE SODIUM 150 MCG: 150 TABLET ORAL at 09:15

## 2019-09-07 RX ADMIN — Medication 10 ML: at 09:15

## 2019-09-07 RX ADMIN — Medication 2 PUFF: at 12:20

## 2019-09-07 RX ADMIN — Medication 10 ML: at 19:41

## 2019-09-07 RX ADMIN — AMLODIPINE BESYLATE 5 MG: 5 TABLET ORAL at 09:16

## 2019-09-07 RX ADMIN — METOPROLOL TARTRATE 25 MG: 25 TABLET, FILM COATED ORAL at 09:15

## 2019-09-07 RX ADMIN — Medication 250 MG: at 10:33

## 2019-09-07 RX ADMIN — ALTEPLASE: 2.2 INJECTION, POWDER, LYOPHILIZED, FOR SOLUTION INTRAVENOUS at 13:00

## 2019-09-07 RX ADMIN — METOPROLOL TARTRATE 25 MG: 25 TABLET, FILM COATED ORAL at 19:40

## 2019-09-07 RX ADMIN — PANTOPRAZOLE SODIUM 40 MG: 40 TABLET, DELAYED RELEASE ORAL at 09:15

## 2019-09-07 RX ADMIN — TAMSULOSIN HYDROCHLORIDE 0.4 MG: 0.4 CAPSULE ORAL at 09:16

## 2019-09-07 RX ADMIN — DORNASE ALFA: 1 SOLUTION RESPIRATORY (INHALATION) at 13:00

## 2019-09-07 ASSESSMENT — PAIN SCALES - GENERAL
PAINLEVEL_OUTOF10: 0

## 2019-09-07 NOTE — PROGRESS NOTES
Hospitalist Progress Note      PCP: Aldair Clarke MD    Date of Admission: 8/27/2019    Chief Complaint: weakness, confusion. Complains of shortness of breath and diarrhea    Hospital Course: Readmitted from home with MCKENNA, hypotension, dehydration. Subjective:  Doing well, no complaints. Cr up this AM, stopped his torsemide again. Medications:  Reviewed    Infusion Medications     Scheduled Medications    IV syringe builder   Other Once    IV syringe builder   Other Once    amLODIPine  5 mg Oral Daily    levothyroxine  150 mcg Oral Daily    metoprolol tartrate  25 mg Oral BID    pantoprazole  40 mg Oral QAM AC    mometasone-formoterol  2 puff Inhalation BID    tamsulosin  0.4 mg Oral Daily    sodium chloride flush  10 mL Intravenous 2 times per day     PRN Meds: albuterol sulfate HFA, sodium chloride flush, acetaminophen      Intake/Output Summary (Last 24 hours) at 9/7/2019 1119  Last data filed at 9/7/2019 0914  Gross per 24 hour   Intake 960 ml   Output 3445 ml   Net -2485 ml       Physical Exam Performed:    /72   Pulse 100   Temp 99.2 °F (37.3 °C)   Resp 18   Ht 6' 1\" (1.854 m)   Wt 239 lb 10.2 oz (108.7 kg)   SpO2 95%   BMI 31.62 kg/m²     General appearance: No apparent distress, appears stated age and cooperative. HEENT: Pupils equal, round, and reactive to light. Conjunctivae/corneas clear. Neck: Supple, with full range of motion. No jugular venous distention. Trachea midline. Respiratory:  Normal respiratory effort. Right anterior upper chest with expiratory wheezes and inspiratory crackles  Cardiovascular: Regular rate and rhythm with normal S1/S2 without murmurs, rubs or gallops. Abdomen: Soft, non-tender, non-distended with normal bowel sounds. Musculoskeletal: No clubbing, cyanosis or edema bilaterally. Full range of motion without deformity. Skin: Skin color, texture, turgor normal. Decubitus ulcers noted.  No surrounding cellulitis seen  Neurologic:  Neurovascularly intact without any focal sensory/motor deficits. Cranial nerves: II-XII intact, grossly non-focal.  Psychiatric: Alert and oriented, thought content appropriate, normal insight  Capillary Refill: Brisk,< 3 seconds   Peripheral Pulses: +2 palpable, equal bilaterally       Labs:   No results for input(s): WBC, HGB, HCT, PLT in the last 72 hours. Recent Labs     09/05/19  0541 09/06/19  0559 09/07/19  0445    143 138   K 4.0 4.1 4.4    105 99   CO2 31 29 31   BUN 20 19 19   CREATININE 0.7* 1.0 1.4*   CALCIUM 9.1 8.6 8.6     No results for input(s): AST, ALT, BILIDIR, BILITOT, ALKPHOS in the last 72 hours. No results for input(s): INR in the last 72 hours. No results for input(s): Sugey Art in the last 72 hours. Urinalysis:      Lab Results   Component Value Date    NITRU Negative 08/28/2019    WBCUA 6-10 08/28/2019    BACTERIA Rare 08/28/2019    RBCUA None seen 08/28/2019    BLOODU TRACE-LYSED 08/28/2019    SPECGRAV 1.020 08/28/2019    GLUCOSEU 250 08/28/2019       Radiology:  XR CHEST PORTABLE   Final Result   1. No significant change. XR CHEST PORTABLE   Final Result   No significant change over the past 3 days. XR CHEST PORTABLE   Final Result   Slight worsening of right pleural effusion. Pulmonary vascular congestion   interstitial prominence throughout the lungs is stable to slightly progressed. XR CHEST PORTABLE   Final Result   Left pleural catheter in place. Persistent bilateral pleural effusions, greater on the right, with bibasilar   airspace disease. Question tiny left pneumothorax. XR CHEST PORTABLE   Final Result   No significant change in bilateral pleural effusions and bilateral lung   consolidation         CT CHEST WO CONTRAST   Final Result   Moderate bilateral pleural effusions, with adjacent consolidation in the   lungs, likely atelectasis in the absence of clinical signs of pneumonia.    Small

## 2019-09-07 NOTE — PROGRESS NOTES
Chest tube unclamped as ordered, to suction. Mohs Rapid Report Verbiage: The area of clinically evident tumor was marked with skin marking ink and appropriately hatched.  The initial incision was made following the Mohs approach through the skin.  The specimen was taken to the lab, divided into the necessary number of pieces, chromacoded and processed according to the Mohs protocol.  This was repeated in successive stages until a tumor free defect was achieved.

## 2019-09-07 NOTE — PROGRESS NOTES
PULM/CCM     Patient's chest tube output was 0 but patient's x-ray chest showed patient to have persistent pleural effusions and given the discrepancy between the imaging and the output it was decided to thrombolytics via the chest tube and reassess, for that reason 5 mg of TPA and 50 mL of saline was instilled in the chest wall cavity while the chest tube, the chest tube was clamped and nursing requested to do unclamp after 4 hours to reassess; patient tolerated the procedure well and did not have any apparent complications-will follow     Uzair verde MD

## 2019-09-07 NOTE — PROGRESS NOTES
09/05/19  0541 09/06/19  0559 09/07/19  0445    143 138   K 4.0 4.1 4.4    105 99   CO2 31 29 31   BUN 20 19 19   CREATININE 0.7* 1.0 1.4*       Imaging:  I have reviewed radiology images personally. XR CHEST PORTABLE   Final Result   1. No significant change. XR CHEST PORTABLE   Final Result   No significant change over the past 3 days. XR CHEST PORTABLE   Final Result   Slight worsening of right pleural effusion. Pulmonary vascular congestion   interstitial prominence throughout the lungs is stable to slightly progressed. XR CHEST PORTABLE   Final Result   Left pleural catheter in place. Persistent bilateral pleural effusions, greater on the right, with bibasilar   airspace disease. Question tiny left pneumothorax. XR CHEST PORTABLE   Final Result   No significant change in bilateral pleural effusions and bilateral lung   consolidation         CT CHEST WO CONTRAST   Final Result   Moderate bilateral pleural effusions, with adjacent consolidation in the   lungs, likely atelectasis in the absence of clinical signs of pneumonia. Small mediastinal nodes are seen, likely reactive      Mild septal thickening, suggesting a component of fluid overload         XR CHEST PORTABLE   Final Result   Increased bilateral pleural effusions and bilateral lung consolidation         CT ABDOMEN PELVIS WO CONTRAST Additional Contrast? None   Final Result   Subtle asymmetric dermal thickening involving the soft tissues overlying the   right hip, along with mild asymmetric subcutaneous edema, compatible with   cellulitis. No soft tissue gas or abscess is identified. Moderate to severe diverticulosis of the sigmoid colon, but without CT   evidence of diverticulitis on the current examination.       At least moderate bilateral pleural effusions with adjacent airspace disease,   similar to increased when compared to the previous exam.         XR CHEST PORTABLE   Final Result

## 2019-09-08 LAB
ANION GAP SERPL CALCULATED.3IONS-SCNC: 10 MMOL/L (ref 3–16)
BUN BLDV-MCNC: 21 MG/DL (ref 7–20)
CALCIUM SERPL-MCNC: 8.5 MG/DL (ref 8.3–10.6)
CHLORIDE BLD-SCNC: 96 MMOL/L (ref 99–110)
CO2: 33 MMOL/L (ref 21–32)
CREAT SERPL-MCNC: 1.6 MG/DL (ref 0.8–1.3)
GFR AFRICAN AMERICAN: 51
GFR NON-AFRICAN AMERICAN: 43
GLUCOSE BLD-MCNC: 105 MG/DL (ref 70–99)
HCT VFR BLD CALC: 26.8 % (ref 40.5–52.5)
HEMOGLOBIN: 9 G/DL (ref 13.5–17.5)
MCH RBC QN AUTO: 34 PG (ref 26–34)
MCHC RBC AUTO-ENTMCNC: 33.4 G/DL (ref 31–36)
MCV RBC AUTO: 101.6 FL (ref 80–100)
PDW BLD-RTO: 15.4 % (ref 12.4–15.4)
PLATELET # BLD: 283 K/UL (ref 135–450)
PMV BLD AUTO: 8.7 FL (ref 5–10.5)
POTASSIUM REFLEX MAGNESIUM: 4.2 MMOL/L (ref 3.5–5.1)
RBC # BLD: 2.64 M/UL (ref 4.2–5.9)
SODIUM BLD-SCNC: 139 MMOL/L (ref 136–145)
WBC # BLD: 13.1 K/UL (ref 4–11)

## 2019-09-08 PROCEDURE — 2700000000 HC OXYGEN THERAPY PER DAY

## 2019-09-08 PROCEDURE — 99232 SBSQ HOSP IP/OBS MODERATE 35: CPT | Performed by: INTERNAL MEDICINE

## 2019-09-08 PROCEDURE — 2060000000 HC ICU INTERMEDIATE R&B

## 2019-09-08 PROCEDURE — 85027 COMPLETE CBC AUTOMATED: CPT

## 2019-09-08 PROCEDURE — 51798 US URINE CAPACITY MEASURE: CPT

## 2019-09-08 PROCEDURE — 80048 BASIC METABOLIC PNL TOTAL CA: CPT

## 2019-09-08 PROCEDURE — 94640 AIRWAY INHALATION TREATMENT: CPT

## 2019-09-08 PROCEDURE — 2580000003 HC RX 258: Performed by: INTERNAL MEDICINE

## 2019-09-08 PROCEDURE — 6370000000 HC RX 637 (ALT 250 FOR IP): Performed by: INTERNAL MEDICINE

## 2019-09-08 PROCEDURE — 94660 CPAP INITIATION&MGMT: CPT

## 2019-09-08 PROCEDURE — 36415 COLL VENOUS BLD VENIPUNCTURE: CPT

## 2019-09-08 PROCEDURE — 94761 N-INVAS EAR/PLS OXIMETRY MLT: CPT

## 2019-09-08 RX ADMIN — METOPROLOL TARTRATE 25 MG: 25 TABLET, FILM COATED ORAL at 20:33

## 2019-09-08 RX ADMIN — PANTOPRAZOLE SODIUM 40 MG: 40 TABLET, DELAYED RELEASE ORAL at 04:40

## 2019-09-08 RX ADMIN — Medication 10 ML: at 20:33

## 2019-09-08 RX ADMIN — Medication 2 PUFF: at 07:31

## 2019-09-08 RX ADMIN — Medication 10 ML: at 10:29

## 2019-09-08 RX ADMIN — TAMSULOSIN HYDROCHLORIDE 0.4 MG: 0.4 CAPSULE ORAL at 10:29

## 2019-09-08 RX ADMIN — METOPROLOL TARTRATE 25 MG: 25 TABLET, FILM COATED ORAL at 10:28

## 2019-09-08 RX ADMIN — LEVOTHYROXINE SODIUM 150 MCG: 150 TABLET ORAL at 10:28

## 2019-09-08 RX ADMIN — AMLODIPINE BESYLATE 5 MG: 5 TABLET ORAL at 10:29

## 2019-09-08 ASSESSMENT — PAIN SCALES - GENERAL
PAINLEVEL_OUTOF10: 0

## 2019-09-08 NOTE — PROGRESS NOTES
Hospitalist Progress Note      PCP: Bakari Edwards MD    Date of Admission: 8/27/2019    Chief Complaint: weakness, confusion. Complains of shortness of breath and diarrhea    Hospital Course: Readmitted from home with MCKENNA, hypotension, dehydration. Subjective:  Cr up a little more this AM.  He has required diuresis in days past, we will avoid IVFs today and just give him more time without diuretics. Nursing will bladder scan him as well. The patient has no complaints, denies dyspnea or pain. Chest tube will presumably get the 3rd instillation of tPA today. Medications:  Reviewed    Infusion Medications     Scheduled Medications    amLODIPine  5 mg Oral Daily    levothyroxine  150 mcg Oral Daily    metoprolol tartrate  25 mg Oral BID    pantoprazole  40 mg Oral QAM AC    mometasone-formoterol  2 puff Inhalation BID    tamsulosin  0.4 mg Oral Daily    sodium chloride flush  10 mL Intravenous 2 times per day     PRN Meds: albuterol sulfate HFA, sodium chloride flush, acetaminophen      Intake/Output Summary (Last 24 hours) at 9/8/2019 1202  Last data filed at 9/8/2019 1154  Gross per 24 hour   Intake 1120 ml   Output 2295 ml   Net -1175 ml       Physical Exam Performed:    /72   Pulse 98   Temp 98.5 °F (36.9 °C) (Oral)   Resp 20   Ht 6' 1\" (1.854 m)   Wt 239 lb 10.2 oz (108.7 kg)   SpO2 96%   BMI 31.62 kg/m²     General appearance: No apparent distress, appears stated age and cooperative. HEENT: Pupils equal, round, and reactive to light. Conjunctivae/corneas clear. Neck: Supple, with full range of motion. No jugular venous distention. Trachea midline. Respiratory:  Normal respiratory effort. Right anterior upper chest with expiratory wheezes and inspiratory crackles  Cardiovascular: Regular rate and rhythm with normal S1/S2 without murmurs, rubs or gallops. Abdomen: Soft, non-tender, non-distended with normal bowel sounds.   Musculoskeletal: No clubbing, cyanosis or per pulm. Hypothyroidism  TSH was high, compliance uncertain, increased levothyroxine a bit, f/u with PCP. Decubitus ulcers, present on arrival  Continue wound care    Presumed adrenal insufficiency  Could not be diagnosed with clear certainty. However, responded well to steroids. Steroid dose tapered, then stopped on 9/4. He did well. Acute renal failure  Resolved with IVFs, then restarted his home diuretic, then got MCKENNA again. Held torsemide for now. Nephrology following    Anemia  GI following  EGD 9/5 was unremarkable, f/u pathology on his incidental duodenal polyp      Chronic diastolic heart failure  Initially home Lasix was on hold due to dehydration and MCKENNA on arrival.  Then he required IV furosemide a couple times, did well, and resumed a lower PO dose of torsemide: 20 po qd.     HTN  Metoprolol, added amlodipine     COPD  No evidence of exacerbation  Will continue home regimen  - baseline 4LNC      DVT Prophylaxis: SCDs  Diet: DIET GENERAL;  Dietary Nutrition Supplements: Standard High Calorie Oral Supplement  Code Status: Full Code    PT/OT Eval Status: rec'd home PT/OT    Dispo - perhaps ready 9/10 if chest tube is removed, diarrhea is controlled, and renal function is stable. He says he has family to take care of him at home.        Hayder Cramer MD

## 2019-09-08 NOTE — PROGRESS NOTES
Pt states that he is not ready to wear BIPAP at this time, pt states that he will wear BIPAP later his evening.  Pt resting comfortably on NC.

## 2019-09-08 NOTE — PROGRESS NOTES
Pt states that he does not want to wear CPAP tonight/RN aware. Pt resting comfortably on  3LNC, O2 sat 97%.

## 2019-09-09 ENCOUNTER — APPOINTMENT (OUTPATIENT)
Dept: CT IMAGING | Age: 74
DRG: 871 | End: 2019-09-09
Payer: MEDICARE

## 2019-09-09 ENCOUNTER — APPOINTMENT (OUTPATIENT)
Dept: GENERAL RADIOLOGY | Age: 74
DRG: 871 | End: 2019-09-09
Payer: MEDICARE

## 2019-09-09 LAB
ANION GAP SERPL CALCULATED.3IONS-SCNC: 10 MMOL/L (ref 3–16)
BUN BLDV-MCNC: 21 MG/DL (ref 7–20)
CALCIUM SERPL-MCNC: 8.5 MG/DL (ref 8.3–10.6)
CHLORIDE BLD-SCNC: 96 MMOL/L (ref 99–110)
CO2: 30 MMOL/L (ref 21–32)
CREAT SERPL-MCNC: 1.7 MG/DL (ref 0.8–1.3)
GFR AFRICAN AMERICAN: 48
GFR NON-AFRICAN AMERICAN: 40
GLUCOSE BLD-MCNC: 127 MG/DL (ref 70–99)
HCT VFR BLD CALC: 25.4 % (ref 40.5–52.5)
HEMOGLOBIN: 8.5 G/DL (ref 13.5–17.5)
MCH RBC QN AUTO: 34.3 PG (ref 26–34)
MCHC RBC AUTO-ENTMCNC: 33.6 G/DL (ref 31–36)
MCV RBC AUTO: 102 FL (ref 80–100)
PDW BLD-RTO: 15.3 % (ref 12.4–15.4)
PLATELET # BLD: 303 K/UL (ref 135–450)
PMV BLD AUTO: 8.6 FL (ref 5–10.5)
POTASSIUM REFLEX MAGNESIUM: 4.1 MMOL/L (ref 3.5–5.1)
RBC # BLD: 2.48 M/UL (ref 4.2–5.9)
SODIUM BLD-SCNC: 136 MMOL/L (ref 136–145)
WBC # BLD: 13.7 K/UL (ref 4–11)

## 2019-09-09 PROCEDURE — 6370000000 HC RX 637 (ALT 250 FOR IP): Performed by: INTERNAL MEDICINE

## 2019-09-09 PROCEDURE — 80048 BASIC METABOLIC PNL TOTAL CA: CPT

## 2019-09-09 PROCEDURE — 71045 X-RAY EXAM CHEST 1 VIEW: CPT

## 2019-09-09 PROCEDURE — 2700000000 HC OXYGEN THERAPY PER DAY

## 2019-09-09 PROCEDURE — 2580000003 HC RX 258: Performed by: INTERNAL MEDICINE

## 2019-09-09 PROCEDURE — 71260 CT THORAX DX C+: CPT

## 2019-09-09 PROCEDURE — 94640 AIRWAY INHALATION TREATMENT: CPT

## 2019-09-09 PROCEDURE — 94761 N-INVAS EAR/PLS OXIMETRY MLT: CPT

## 2019-09-09 PROCEDURE — 6360000004 HC RX CONTRAST MEDICATION: Performed by: NURSE PRACTITIONER

## 2019-09-09 PROCEDURE — 85027 COMPLETE CBC AUTOMATED: CPT

## 2019-09-09 PROCEDURE — 36415 COLL VENOUS BLD VENIPUNCTURE: CPT

## 2019-09-09 PROCEDURE — 2060000000 HC ICU INTERMEDIATE R&B

## 2019-09-09 RX ORDER — SODIUM CHLORIDE 9 MG/ML
INJECTION, SOLUTION INTRAVENOUS CONTINUOUS
Status: DISCONTINUED | OUTPATIENT
Start: 2019-09-09 | End: 2019-09-12

## 2019-09-09 RX ORDER — FERROUS SULFATE 325(65) MG
TABLET ORAL
Qty: 90 TABLET | Refills: 0 | Status: SHIPPED | OUTPATIENT
Start: 2019-09-09 | End: 2019-12-28

## 2019-09-09 RX ADMIN — METOPROLOL TARTRATE 25 MG: 25 TABLET, FILM COATED ORAL at 20:59

## 2019-09-09 RX ADMIN — AMLODIPINE BESYLATE 5 MG: 5 TABLET ORAL at 09:17

## 2019-09-09 RX ADMIN — PANTOPRAZOLE SODIUM 40 MG: 40 TABLET, DELAYED RELEASE ORAL at 05:15

## 2019-09-09 RX ADMIN — METOPROLOL TARTRATE 25 MG: 25 TABLET, FILM COATED ORAL at 09:17

## 2019-09-09 RX ADMIN — Medication 2 PUFF: at 08:26

## 2019-09-09 RX ADMIN — SODIUM CHLORIDE: 9 INJECTION, SOLUTION INTRAVENOUS at 13:39

## 2019-09-09 RX ADMIN — Medication 10 ML: at 09:17

## 2019-09-09 RX ADMIN — TAMSULOSIN HYDROCHLORIDE 0.4 MG: 0.4 CAPSULE ORAL at 09:17

## 2019-09-09 RX ADMIN — Medication 2 PUFF: at 19:45

## 2019-09-09 RX ADMIN — IOPAMIDOL 75 ML: 755 INJECTION, SOLUTION INTRAVENOUS at 21:36

## 2019-09-09 RX ADMIN — LEVOTHYROXINE SODIUM 150 MCG: 150 TABLET ORAL at 09:17

## 2019-09-09 NOTE — FLOWSHEET NOTE
09/08/19 2031   Assessment   Charting Type Shift assessment   Neurological   Level of Consciousness 0   Orientation Level Oriented X4   Cognition Appropriate judgement; Appropriate safety awareness; Appropriate attention/concentration; Appropriate for developmental age   Language Clear; Appropriate for developmental age   Size R Pupil (mm) 3   R Pupil Shape Round   R Pupil Reaction Brisk   Size L Pupil (mm) 3   L Pupil Shape Round   L Pupil Reaction Brisk   R Hand  Strong   L Hand  Strong   R Foot Dorsiflexion Moderate   L Foot Dorsiflexion Moderate   R Foot Plantar Flexion Moderate   L Foot Plantar Flexion Moderate   RUE Motor Response Responds to command;Normal extension;Normal flexion   LUE Motor Response Responds to command;Normal extension;Normal flexion   RLE Motor Response Responds to command;Normal extension;Normal flexion   LLE Motor Response Responds to command;Normal extension   Swallow Screening   Is the patient able to remain alert for testing? Yes   Was the Patient Eating a Modified Diet Prior to being Admitted? No   Is there an Existing PEG or Abdominal Feeding Tube? No   Does the patient have a Head of Bed [facility]) restriction <30°? No   Is there a Tracheostomy Tube Present?  No   Sami Coma Scale   Eye Opening 4   Best Verbal Response 5   Best Motor Response 6   Sami Coma Scale Score 15   HEENT   Right Ear Impaired hearing   Left Ear Impaired hearing   Teeth Dentures upper;Dentures lower   Respiratory   Respiratory Pattern Regular   Respiratory Depth Normal   Respiratory Quality/Effort Dyspnea with exertion   Chest Assessment Chest expansion symmetrical   Breath Sounds   Right Upper Lobe Clear   Right Middle Lobe Fine Crackles   Right Lower Lobe Fine Crackles   Left Upper Lobe Fine Crackles   Left Lower Lobe Fine Crackles   Cough/Sputum   Cough Moist;Non-productive   Frequency Occasional   Cardiac   Cardiac Regularity Regular   Heart Sounds S1, S2   Cardiac Rhythm NSR  (on tele monitor)

## 2019-09-09 NOTE — PROGRESS NOTES
Hospitalist Progress Note      PCP: Jennifer Hanson MD    Date of Admission: 8/27/2019    Chief Complaint: weakness, confusion. Complains of shortness of breath and diarrhea    Hospital Course: Readmitted from home with MCKENNA, hypotension, dehydration. Subjective:     Patient reports minimal diarrhea today. He  denies any shortness of breath fever or chills. Creatinine slowly uptrending    Medications:  Reviewed    Infusion Medications     Scheduled Medications    amLODIPine  5 mg Oral Daily    levothyroxine  150 mcg Oral Daily    metoprolol tartrate  25 mg Oral BID    pantoprazole  40 mg Oral QAM AC    mometasone-formoterol  2 puff Inhalation BID    tamsulosin  0.4 mg Oral Daily    sodium chloride flush  10 mL Intravenous 2 times per day     PRN Meds: albuterol sulfate HFA, sodium chloride flush, acetaminophen      Intake/Output Summary (Last 24 hours) at 9/9/2019 1121  Last data filed at 9/9/2019 0955  Gross per 24 hour   Intake 710 ml   Output 940 ml   Net -230 ml       Physical Exam Performed:    /67   Pulse 88   Temp 98.7 °F (37.1 °C) (Oral)   Resp 18   Ht 6' 1\" (1.854 m)   Wt 242 lb 4.6 oz (109.9 kg)   SpO2 95%   BMI 31.97 kg/m²     General appearance: No apparent distress, appears stated age and cooperative. HEENT: Pupils equal, round, and reactive to light. Conjunctivae/corneas clear. Neck: Supple, with full range of motion. No jugular venous distention. Trachea midline. Respiratory:  Normal respiratory effort. Bibasilar Rales,no overt crackles cardiovascular: Regular rate and rhythm with normal S1/S2 without murmurs, rubs or gallops. Abdomen: Soft, non-tender, non-distended with normal bowel sounds. Musculoskeletal: No clubbing, cyanosis or edema bilaterally. Skin: Skin color, texture, turgor normal. Decubitus ulcers noted. No surrounding cellulitis seen  Neurologic: Alert speech clear with no overt facial droop.   Psychiatric: Alert and oriented, thought content insufficiency  Could not be diagnosed with clear certainty. However, responded well to steroids. Steroid dose tapered, then stopped on 9/4. He did well. Acute renal failure  Resolved with IVFs, then restarted his home diuretic, then got MCKENNA again. Held torsemide for now. Creatinine uptrending  restart IV fluids-consider reconsulting nephrology if unimproved in repeat labs     Anemia  GI following  EGD 9/5 was unremarkable, incidental duodenal polyp biopsied was tubular adenoma with no high grade dysplasia.      Chronic diastolic heart failure  Initially home Lasix was on hold due to dehydration and MCKENNA on arrival.  Then he required IV furosemide a couple times, did well, and resumed a lower PO dose of torsemide: 20 po qd-held due to MCKENNA     HTN  Metoprolol, added amlodipine     COPD  No evidence of exacerbation  Will continue home regimen  - baseline 4LNC      DVT Prophylaxis: SCDs  Diet: DIET GENERAL;  Dietary Nutrition Supplements: Standard High Calorie Oral Supplement  Code Status: Full Code    PT/OT Eval Status: rec'd home PT/OT    Dispo - perhaps ready 9/10 if chest tube is removed, diarrhea is controlled, and renal function is stable.       Israel Triplett MD

## 2019-09-10 ENCOUNTER — APPOINTMENT (OUTPATIENT)
Dept: GENERAL RADIOLOGY | Age: 74
DRG: 871 | End: 2019-09-10
Payer: MEDICARE

## 2019-09-10 LAB
ANION GAP SERPL CALCULATED.3IONS-SCNC: 8 MMOL/L (ref 3–16)
ANION GAP SERPL CALCULATED.3IONS-SCNC: 9 MMOL/L (ref 3–16)
BUN BLDV-MCNC: 20 MG/DL (ref 7–20)
BUN BLDV-MCNC: 21 MG/DL (ref 7–20)
CALCIUM SERPL-MCNC: 8.3 MG/DL (ref 8.3–10.6)
CALCIUM SERPL-MCNC: 8.3 MG/DL (ref 8.3–10.6)
CHLORIDE BLD-SCNC: 92 MMOL/L (ref 99–110)
CHLORIDE BLD-SCNC: 98 MMOL/L (ref 99–110)
CHLORIDE URINE RANDOM: <20 MMOL/L
CO2: 29 MMOL/L (ref 21–32)
CO2: 30 MMOL/L (ref 21–32)
CREAT SERPL-MCNC: 1.6 MG/DL (ref 0.8–1.3)
CREAT SERPL-MCNC: 1.7 MG/DL (ref 0.8–1.3)
GFR AFRICAN AMERICAN: 48
GFR AFRICAN AMERICAN: 51
GFR NON-AFRICAN AMERICAN: 40
GFR NON-AFRICAN AMERICAN: 43
GLUCOSE BLD-MCNC: 104 MG/DL (ref 70–99)
GLUCOSE BLD-MCNC: 120 MG/DL (ref 70–99)
POTASSIUM REFLEX MAGNESIUM: 4.3 MMOL/L (ref 3.5–5.1)
POTASSIUM SERPL-SCNC: 4.4 MMOL/L (ref 3.5–5.1)
POTASSIUM, UR: 57.1 MMOL/L
SODIUM BLD-SCNC: 130 MMOL/L (ref 136–145)
SODIUM BLD-SCNC: 136 MMOL/L (ref 136–145)
SODIUM URINE: <20 MMOL/L

## 2019-09-10 PROCEDURE — 84300 ASSAY OF URINE SODIUM: CPT

## 2019-09-10 PROCEDURE — 6370000000 HC RX 637 (ALT 250 FOR IP): Performed by: INTERNAL MEDICINE

## 2019-09-10 PROCEDURE — 36415 COLL VENOUS BLD VENIPUNCTURE: CPT

## 2019-09-10 PROCEDURE — 2580000003 HC RX 258: Performed by: NURSE PRACTITIONER

## 2019-09-10 PROCEDURE — 2060000000 HC ICU INTERMEDIATE R&B

## 2019-09-10 PROCEDURE — 94761 N-INVAS EAR/PLS OXIMETRY MLT: CPT

## 2019-09-10 PROCEDURE — 94660 CPAP INITIATION&MGMT: CPT

## 2019-09-10 PROCEDURE — 99232 SBSQ HOSP IP/OBS MODERATE 35: CPT | Performed by: INTERNAL MEDICINE

## 2019-09-10 PROCEDURE — 82436 ASSAY OF URINE CHLORIDE: CPT

## 2019-09-10 PROCEDURE — 2700000000 HC OXYGEN THERAPY PER DAY

## 2019-09-10 PROCEDURE — 71045 X-RAY EXAM CHEST 1 VIEW: CPT

## 2019-09-10 PROCEDURE — 84133 ASSAY OF URINE POTASSIUM: CPT

## 2019-09-10 PROCEDURE — 94640 AIRWAY INHALATION TREATMENT: CPT

## 2019-09-10 PROCEDURE — 80048 BASIC METABOLIC PNL TOTAL CA: CPT

## 2019-09-10 RX ADMIN — Medication 2 PUFF: at 08:17

## 2019-09-10 RX ADMIN — TAMSULOSIN HYDROCHLORIDE 0.4 MG: 0.4 CAPSULE ORAL at 08:57

## 2019-09-10 RX ADMIN — AMLODIPINE BESYLATE 5 MG: 5 TABLET ORAL at 08:57

## 2019-09-10 RX ADMIN — LEVOTHYROXINE SODIUM 150 MCG: 150 TABLET ORAL at 08:57

## 2019-09-10 RX ADMIN — METOPROLOL TARTRATE 25 MG: 25 TABLET, FILM COATED ORAL at 20:39

## 2019-09-10 RX ADMIN — SODIUM CHLORIDE: 9 INJECTION, SOLUTION INTRAVENOUS at 13:50

## 2019-09-10 RX ADMIN — Medication 2 PUFF: at 19:41

## 2019-09-10 RX ADMIN — METOPROLOL TARTRATE 25 MG: 25 TABLET, FILM COATED ORAL at 08:57

## 2019-09-10 RX ADMIN — PANTOPRAZOLE SODIUM 40 MG: 40 TABLET, DELAYED RELEASE ORAL at 05:06

## 2019-09-10 RX ADMIN — SODIUM CHLORIDE: 9 INJECTION, SOLUTION INTRAVENOUS at 20:59

## 2019-09-10 NOTE — PROGRESS NOTES
INPATIENT PULMONARY CRITICAL CARE PROGRESS NOTE      Reason for visit    pleural effusions, resp failure    SUBJECTIVE: Patient when seen this morning was sleeping in bed with no resp distress or increased work of breathing patient now is refusing to use CPAP at night for last few days as per nursing ;  patient's cumulative fluid balance is - 9.4 L, patient's chest tube output was 30 mL, , patient when seen was on 4 L of nasal cannula oxygen with saturation 96%, patient has a sinus rhythm on the monitor, patient's blood pressure is better controlled , patient was afebrile, patient had acceptable glycemic control, no other pertinent review of system of concern was obtained       Physical Exam:  Blood pressure 124/68, pulse 88, temperature 98.6 °F (37 °C), temperature source Oral, resp. rate 18, height 6' 1\" (1.854 m), weight 246 lb 14.6 oz (112 kg), SpO2 96 %.'   Constitutional:  No acute distress. HENT:  Oropharynx is clear and moist. No thyromegaly. Eyes:  Conjunctivae are normal. Pupils equal, round, and reactive to light. No scleral icterus. Neck: . No tracheal deviation present. No obvious thyroid mass. Short enlarged neck  Cardiovascular: Normal rate, regular rhythm, normal heart sounds. No right ventricular heave. (+) lower extremity edema. Pulmonary/Chest: No wheezes. No rales. Chest wall is not dull to percussion. No accessory muscle usage or stridor. Decreased breath sound intensity, chest tube present  Abdominal: Soft. Bowel sounds present. No distension or hernia. No tenderness. Obese  Musculoskeletal: No cyanosis. No clubbing. No obvious joint deformity. Lymphadenopathy: No cervical or supraclavicular adenopathy. Skin: Skin is warm and dry.  Multiple areas of ecchymosis  Neurologic: sleeping  when seen         Results:  CBC:   Recent Labs     09/08/19  0628 09/09/19  0447   WBC 13.1* 13.7*   HGB 9.0* 8.5*   HCT 26.8* 25.4*   .6* 102.0*    303     BMP:   Recent Labs

## 2019-09-10 NOTE — PROGRESS NOTES
PROGRESS NOTE  S:74 yrs Patient  admitted on 8/27/2019 with Sepsis ([facility] [address] [A41.9]  Sepsis ([facility] [address] [A41.9] . Patient states having 4-5 bms daily. No bleeding    Current Hospital Schedued Meds   amLODIPine  5 mg Oral Daily    levothyroxine  150 mcg Oral Daily    metoprolol tartrate  25 mg Oral BID    pantoprazole  40 mg Oral QAM AC    mometasone-formoterol  2 puff Inhalation BID    tamsulosin  0.4 mg Oral Daily    sodium chloride flush  10 mL Intravenous 2 times per day     Current Hospital IV Meds   sodium chloride 125 mL/hr at 09/09/19 2316     Current Hospital PRN Meds  albuterol sulfate HFA, sodium chloride flush, acetaminophen    Exam:   Vitals:    09/10/19 0506   BP: 116/67   Pulse: 81   Resp: 18   Temp: 98.6 °F (37 °C)   SpO2: 96%     I/O last 3 completed shifts: In: 65 [P.O.:822]  Out: 36 [Urine:700; Chest Tube:30]   General appearance: alert, appears stated age and cooperative  HEENT: PERRLA  Neck: no adenopathy, no carotid bruit, no JVD, supple, symmetrical, trachea midline and thyroid not enlarged, symmetric, no tenderness/mass/nodules  Lungs: clear to auscultation bilaterally  Heart: regular rate and rhythm, S1, S2 normal, no murmur, click, rub or gallop  Abdomen: soft, non-tender; bowel sounds normal; no masses,  no organomegaly  Extremities: extremities normal, atraumatic, no cyanosis or edema     Labs:  CBC:   Recent Labs     09/08/19  0628 09/09/19 0447   WBC 13.1* 13.7*   HGB 9.0* 8.5*   HCT 26.8* 25.4*   .6* 102.0*    303     BMP:   Recent Labs     09/09/19 0447 09/10/19  0032 09/10/19  0445    130* 136   K 4.1 4.4 4.3   CL 96* 92* 98*   CO2 30 29 30   BUN 21* 21* 20   CREATININE 1.7* 1.6* 1.7*     LIVER PROFILE: No results for input(s): AST, ALT, LIPASE, PROT, BILIDIR, BILITOT, ALKPHOS in the last 72 hours. Invalid input(s): AMYLASE,  ALB  PT/INR: No results for input(s): INR in the last 72 hours.     Invalid input(s):

## 2019-09-11 LAB
ANION GAP SERPL CALCULATED.3IONS-SCNC: 8 MMOL/L (ref 3–16)
BUN BLDV-MCNC: 19 MG/DL (ref 7–20)
CALCIUM SERPL-MCNC: 8.1 MG/DL (ref 8.3–10.6)
CHLORIDE BLD-SCNC: 99 MMOL/L (ref 99–110)
CO2: 28 MMOL/L (ref 21–32)
CREAT SERPL-MCNC: 1.6 MG/DL (ref 0.8–1.3)
GFR AFRICAN AMERICAN: 51
GFR NON-AFRICAN AMERICAN: 43
GLUCOSE BLD-MCNC: 104 MG/DL (ref 70–99)
POTASSIUM REFLEX MAGNESIUM: 4.2 MMOL/L (ref 3.5–5.1)
SODIUM BLD-SCNC: 135 MMOL/L (ref 136–145)

## 2019-09-11 PROCEDURE — 36415 COLL VENOUS BLD VENIPUNCTURE: CPT

## 2019-09-11 PROCEDURE — 2580000003 HC RX 258: Performed by: NURSE PRACTITIONER

## 2019-09-11 PROCEDURE — 2060000000 HC ICU INTERMEDIATE R&B

## 2019-09-11 PROCEDURE — 2700000000 HC OXYGEN THERAPY PER DAY

## 2019-09-11 PROCEDURE — 6370000000 HC RX 637 (ALT 250 FOR IP): Performed by: INTERNAL MEDICINE

## 2019-09-11 PROCEDURE — 80048 BASIC METABOLIC PNL TOTAL CA: CPT

## 2019-09-11 PROCEDURE — 94761 N-INVAS EAR/PLS OXIMETRY MLT: CPT

## 2019-09-11 PROCEDURE — 94640 AIRWAY INHALATION TREATMENT: CPT

## 2019-09-11 PROCEDURE — 2580000003 HC RX 258: Performed by: INTERNAL MEDICINE

## 2019-09-11 PROCEDURE — 99232 SBSQ HOSP IP/OBS MODERATE 35: CPT | Performed by: INTERNAL MEDICINE

## 2019-09-11 RX ADMIN — SODIUM CHLORIDE: 9 INJECTION, SOLUTION INTRAVENOUS at 13:46

## 2019-09-11 RX ADMIN — Medication 2 PUFF: at 08:30

## 2019-09-11 RX ADMIN — METOPROLOL TARTRATE 25 MG: 25 TABLET, FILM COATED ORAL at 08:25

## 2019-09-11 RX ADMIN — LEVOTHYROXINE SODIUM 150 MCG: 150 TABLET ORAL at 08:25

## 2019-09-11 RX ADMIN — PANTOPRAZOLE SODIUM 40 MG: 40 TABLET, DELAYED RELEASE ORAL at 05:27

## 2019-09-11 RX ADMIN — TAMSULOSIN HYDROCHLORIDE 0.4 MG: 0.4 CAPSULE ORAL at 08:25

## 2019-09-11 RX ADMIN — Medication 10 ML: at 08:28

## 2019-09-11 RX ADMIN — METOPROLOL TARTRATE 25 MG: 25 TABLET, FILM COATED ORAL at 20:34

## 2019-09-11 RX ADMIN — AMLODIPINE BESYLATE 5 MG: 5 TABLET ORAL at 08:25

## 2019-09-11 RX ADMIN — SODIUM CHLORIDE: 9 INJECTION, SOLUTION INTRAVENOUS at 04:36

## 2019-09-11 RX ADMIN — Medication 2 PUFF: at 20:36

## 2019-09-11 RX ADMIN — PSYLLIUM HUSK 1 PACKET: 3.4 POWDER ORAL at 08:25

## 2019-09-11 NOTE — PROGRESS NOTES
Dr. Mat Nguyen in room discussing pt's options for right pleural effusion. Plan to evaluate tomorrow. Pt wants to remain in bed, agitated at this time. Will continue to monitor.

## 2019-09-11 NOTE — CARE COORDINATION
Case Management/Follow up:    Chart reviewed for length of stay. Hospital day #  15 Unit:  C4  Diagnosis and current status as per MD progress: MCKENNA, chest tube removed yesterday, needs to get up to chair, refusing up to this point. Anticipated d/c date: today or tomorrow  Expected plan for discharge: Spoke with patient at bedside. He is still refusing any services, repeatedly states his family will assist as needed.    Potential barriers: none

## 2019-09-11 NOTE — PROGRESS NOTES
Verbal order to get Pt up to chair by Dr. Ines Solomon. Attempted to get Pt up to chair at this time. Pt refuses. Pt informed will need to be able to ambulate and support self if being discharged home. Pt states he will be able to do so. Will attempt later this shift to get Pt into chair/ambulate.  Pt refuses to work with PT/OT

## 2019-09-11 NOTE — PROGRESS NOTES
seen  Neurologic: Alert speech clear with no overt facial droop. Psychiatric: Alert and oriented, thought content appropriate, normal insight  Capillary Refill: Brisk,< 3 seconds   Peripheral Pulses: +2 palpable, equal bilaterally       Labs:   Recent Labs     09/09/19  0447   WBC 13.7*   HGB 8.5*   HCT 25.4*        Recent Labs     09/10/19  0032 09/10/19  0445 09/11/19  0528   * 136 135*   K 4.4 4.3 4.2   CL 92* 98* 99   CO2 29 30 28   BUN 21* 20 19   CREATININE 1.6* 1.7* 1.6*   CALCIUM 8.3 8.3 8.1*     No results for input(s): AST, ALT, BILIDIR, BILITOT, ALKPHOS in the last 72 hours. No results for input(s): INR in the last 72 hours. No results for input(s): Suge[place] Art in the last 72 hours. Urinalysis:      Lab Results   Component Value Date    NITRU Negative 08/28/2019    WBCUA 6-10 08/28/2019    BACTERIA Rare 08/28/2019    RBCUA None seen 08/28/2019    BLOODU TRACE-LYSED 08/28/2019    SPECGRAV 1.020 08/28/2019    GLUCOSEU 250 08/28/2019       Radiology:  XR CHEST PORTABLE   Final Result   Bilateral airspace disease and bilateral pleural effusions, slightly   decreased on the right         CT CHEST W CONTRAST   Final Result   Left pleural effusion is decreased in size, with left basilar chest tube in   normal position. Moderate-sized right pleural effusion. Bibasilar atelectasis versus pneumonia. There is improved aeration of the   left lower lobe in the interval.         XR CHEST PORTABLE   Final Result   Bilateral pleuroparenchymal disease, slightly increased bilaterally. XR CHEST PORTABLE   Final Result   Improved aeration at the left base and decrease in left pleural effusion with   no pneumothorax. XR CHEST PORTABLE   Final Result   1. No significant change. XR CHEST PORTABLE   Final Result   No significant change over the past 3 days. XR CHEST PORTABLE   Final Result   Slight worsening of right pleural effusion.   Pulmonary vascular congestion   interstitial prominence throughout the lungs is stable to slightly progressed. XR CHEST PORTABLE   Final Result   Left pleural catheter in place. Persistent bilateral pleural effusions, greater on the right, with bibasilar   airspace disease. Question tiny left pneumothorax. XR CHEST PORTABLE   Final Result   No significant change in bilateral pleural effusions and bilateral lung   consolidation         CT CHEST WO CONTRAST   Final Result   Moderate bilateral pleural effusions, with adjacent consolidation in the   lungs, likely atelectasis in the absence of clinical signs of pneumonia. Small mediastinal nodes are seen, likely reactive      Mild septal thickening, suggesting a component of fluid overload         XR CHEST PORTABLE   Final Result   Increased bilateral pleural effusions and bilateral lung consolidation         CT ABDOMEN PELVIS WO CONTRAST Additional Contrast? None   Final Result   Subtle asymmetric dermal thickening involving the soft tissues overlying the   right hip, along with mild asymmetric subcutaneous edema, compatible with   cellulitis. No soft tissue gas or abscess is identified. Moderate to severe diverticulosis of the sigmoid colon, but without CT   evidence of diverticulitis on the current examination. At least moderate bilateral pleural effusions with adjacent airspace disease,   similar to increased when compared to the previous exam.         XR CHEST PORTABLE   Final Result   Stable small left pleural effusion, with bibasilar atelectasis and/or   consolidation.                  Assessment/Plan:    Active Hospital Problems    Diagnosis    Suspected sleep apnea [R29.818]    Pulmonary venous congestion [R09.89]    Macrocytic anemia [D53.9]    C. difficile colitis [A04.72]    Acquired hypothyroidism [E03.9]    Sepsis (HCC) [A41.9]    Acute renal failure (ARF) (HCC) [N17.9]    Pulmonary HTN ([address] [I27.20]    Former smoker [C32.453]

## 2019-09-11 NOTE — PROGRESS NOTES
Reason for exam:->Please confirm with the RN about chest tube removal before doing the CXR FINDINGS: Lung volumes are low accentuating heart size and bronchovascular markings at the lung bases. Heart size is stable. There is persistent right-sided pleural effusion and adjacent right basilar consolidation, slightly decreased There is persistent left-sided pleural effusion and adjacent lung consolidation Degenerative changes are seen in the spine Postfusion changes are seen in the spine     Bilateral airspace disease and bilateral pleural effusions, slightly decreased on the right     Xr Chest Portable    Result Date: 9/9/2019  EXAMINATION: ONE XRAY VIEW OF THE CHEST 9/9/2019 5:45 am COMPARISON: 09/07/2019 HISTORY: ORDERING SYSTEM PROVIDED HISTORY: Persistent pleural effusions TECHNOLOGIST PROVIDED HISTORY: Reason for exam:->Persistent pleural effusions Reason for Exam: Persistent pleural effusions FINDINGS: Lung volumes are low accentuating heart size and bronchovascular markings at the lung bases. Patient's chin obscures the apices Heart size is stable. There is increased right-sided pleural effusion right lung consolidation. There is persistent left-sided pleural effusion and left lung consolidation, slightly increased. Pleural drainage catheter is seen on the left     Bilateral pleuroparenchymal disease, slightly increased bilaterally. Xr Chest Portable    Result Date: 9/7/2019  EXAMINATION: ONE XRAY VIEW OF THE CHEST 9/7/2019 5:53 pm COMPARISON: 09/07/2019. HISTORY: ORDERING SYSTEM PROVIDED HISTORY: pleural effusion s/p tpa TECHNOLOGIST PROVIDED HISTORY: Reason for exam:->pleural effusion s/p tpa FINDINGS: The cardiac silhouette is enlarged and stable. Left-sided pigtail chest tube remains in place with no pneumothorax. Improved aeration at the left base with decrease in left pleural effusion. Stable dependent right basilar opacification and effusion.   Postoperative hardware spanning the lower thoracic effusion. Pulmonary vascular congestion interstitial prominence throughout the lungs is stable to slightly progressed. Xr Chest Portable    Result Date: 9/2/2019  EXAMINATION: ONE XRAY VIEW OF THE CHEST 9/2/2019 2:56 pm COMPARISON: Prior studies including most recent chest x-ray 09/01/2019, CT chest 08/31/2019 HISTORY: ORDERING SYSTEM PROVIDED HISTORY: check chest tube placement TECHNOLOGIST PROVIDED HISTORY: Reason for exam:->check chest tube placement FINDINGS: Moderate-to-large right and moderate left pleural effusions are noted. The size left pleural effusion is probably slightly decreased in size since the prior study. A pleural catheter projects over lower lateral left chest. Bibasilar airspace disease is present. There is a questionable tiny left apical pneumothorax     Left pleural catheter in place. Persistent bilateral pleural effusions, greater on the right, with bibasilar airspace disease. Question tiny left pneumothorax. Xr Chest Portable    Result Date: 9/1/2019  EXAMINATION: ONE XRAY VIEW OF THE CHEST 9/1/2019 11:15 am COMPARISON: 08/31/2019 HISTORY: ORDERING SYSTEM PROVIDED HISTORY: resp failure TECHNOLOGIST PROVIDED HISTORY: Reason for exam:->resp failure Reason for Exam: resp failure Acuity: Unknown Type of Exam: Ongoing FINDINGS: Lung volumes are low accentuating heart size and bronchovascular markings at the lung bases. Patient body habitus limits evaluation of fine pulmonary parenchymal changes. Bilateral pleural effusions and bilateral lung consolidation persist, similar to prior. Heart is enlarged.   There is evidence of prior cervical spine fusion     No significant change in bilateral pleural effusions and bilateral lung consolidation     Xr Chest Portable    Result Date: 8/31/2019  EXAMINATION: ONE XRAY VIEW OF THE CHEST 8/31/2019 10:06 am COMPARISON: 08/27/2019 HISTORY: ORDERING SYSTEM PROVIDED HISTORY: shortness of breath TECHNOLOGIST PROVIDED HISTORY: Reason for exam:->shortness of breath Reason for Exam: sob Acuity: Acute Type of Exam: Initial FINDINGS: Lung volumes are low accentuating heart size and bronchovascular markings at the lung bases. Patient body habitus limits evaluation of fine pulmonary parenchymal changes. Heart size is enlarged, unchanged. There are increased bilateral pleural effusions and bilateral lung consolidation Postfusion changes are seen in the spine. Patient's chin obscures the apices. Increased bilateral pleural effusions and bilateral lung consolidation     Xr Chest Portable    Result Date: 8/27/2019  EXAMINATION: ONE XRAY VIEW OF THE CHEST 8/27/2019 4:19 pm COMPARISON: 08/23/2019 HISTORY: ORDERING SYSTEM PROVIDED HISTORY: sepsis TECHNOLOGIST PROVIDED HISTORY: Reason for exam:->sepsis Reason for Exam: sepsis Acuity: Acute Type of Exam: Initial FINDINGS: Partially visualized thoracolumbar spinal fusion hardware. Bibasilar opacities, left greater than right, with a small left effusion. No pneumothorax. Heart size is stable. Stable small left pleural effusion, with bibasilar atelectasis and/or consolidation. Xr Chest Portable    Result Date: 8/19/2019  EXAMINATION: ONE XRAY VIEW OF THE CHEST 8/19/2019 2:45 pm COMPARISON: 08/18/2019 HISTORY: ORDERING SYSTEM PROVIDED HISTORY: resp failure TECHNOLOGIST PROVIDED HISTORY: Reason for exam:->resp failure Reason for Exam: resp failure Acuity: Acute Type of Exam: Initial FINDINGS: Cardial-pericardial silhouette is enlarged. There is increased central and basilar opacity, similar when compared to the previous exam.  Bilateral pleural effusions are redemonstrated, also similar when compared to the previous exam.  Instrumented fusion within the thoracic spine noted. No acute bony abnormality detected. Continued increased central and basilar opacity, with bilateral pleural effusions, unchanged when compared to the previous exam, most compatible with pulmonary edema.   Nonetheless, disease and pleural effusions. The heart size is unchanged. Spinal fusion hardware is noted. A left IJ catheter has its tip at the junction of the innominate vein and superior vena cava. No pneumothorax. Persistent low lung volumes, pulmonary edema, bilateral pleural effusions and bibasilar airspace disease. Xr Chest Portable    Result Date: 8/14/2019  EXAMINATION: ONE XRAY VIEW OF THE CHEST 8/14/2019 5:08 am COMPARISON: 08/13/2019 HISTORY: ORDERING SYSTEM PROVIDED HISTORY: pulm edema TECHNOLOGIST PROVIDED HISTORY: Reason for exam:->pulm edema Reason for Exam: pulm edema Acuity: Unknown Type of Exam: Unknown FINDINGS: The study is limited by motion artifact. The patient's chin also obscures the lung apices. A left-sided central line is present and terminates in the distal left innominate vein/SVC junction. There is cardiomegaly. There is also pulmonary vascular congestion with bilateral pleural effusions. Limited exam. Persistent CHF with bilateral effusions. Xr Chest Portable    Result Date: 8/13/2019  EXAMINATION: ONE XRAY VIEW OF THE CHEST 8/13/2019 7:13 am COMPARISON: 08/11/2019 HISTORY: ORDERING SYSTEM PROVIDED HISTORY: increased oxygen demands TECHNOLOGIST PROVIDED HISTORY: Reason for exam:->increased oxygen demands Reason for Exam: increased oxygen demands Acuity: Unknown Type of Exam: Unknown FINDINGS: Lung volumes are low accentuating heart size and bronchovascular markings at the lung bases. Patient body habitus limits evaluation of fine pulmonary parenchymal changes. Heart size is enlarged. There are persistent bilateral pleural effusions and bilateral lung consolidation. When compared to prior, there increasing opacity on the right. Spinal fusion hardware is seen     Increased airspace disease on the right. There is persistent pleuroparenchymal disease bilaterally     Assessment and plan:  Acute respiratory failure. On oxygen at 4 LPM with SaO2 96%.   Wean to SaO2 >90%.  Bilateral pleural effusions. Right pleural effusion persists on CT chest, left pleural effusion resolved. Discussed with patient about chest tube placement on the right, he does not wish to go down to IR. Will assess bedside chest tube placement or thoracentesis tomorrow  Suspected JULISSA, pulmonary hypertension. CPAP overnight and during daytime naps. Acute kidney injury. Renal function stable. Anemia. Macrocytic, probably includes an element of chronic disease. Hyperlipidemia, hypothyroidism, HTN. Per IM. DVT prophylaxis. Unclear why he is not on heparin, on SCDs.         Electronically signed by:  Sandra Nassar MD    9/11/2019    5:37 PM.

## 2019-09-11 NOTE — FLOWSHEET NOTE
09/11/19 1949   Assessment   Charting Type Shift assessment   Neurological   Neuro (WDL) WDL   Level of Consciousness 0   Orientation Level Oriented X4   Cognition Appropriate judgement; Appropriate safety awareness; Appropriate attention/concentration; Appropriate for developmental age   Language Clear; Appropriate for developmental age   Size R Pupil (mm) 3   R Pupil Shape Round   R Pupil Reaction Brisk   Size L Pupil (mm) 3   L Pupil Shape Round   L Pupil Reaction Brisk   R Hand  Strong   L Hand  Strong   R Foot Dorsiflexion Strong   L Foot Dorsiflexion Moderate   R Foot Plantar Flexion Moderate   L Foot Plantar Flexion Moderate   RUE Motor Response Responds to command;Normal extension;Normal flexion   LUE Motor Response Responds to command;Normal extension;Normal flexion   RLE Motor Response Responds to command;Normal extension;Normal flexion   LLE Motor Response Responds to command;Normal extension   [place] Coma Scale   Eye Opening 4   Best Verbal Response 5   Best Motor Response 6   [place] Coma Scale Score 15   HEENT   HEENT (WDL) X   Right Ear Impaired hearing   Left Ear Impaired hearing   Teeth Dentures upper;Dentures lower   Respiratory   Respiratory (WDL) X   Respiratory Pattern Regular   Respiratory Depth Normal   Respiratory Quality/Effort Dyspnea with exertion   Chest Assessment Chest expansion symmetrical   Breath Sounds   Right Upper Lobe Clear   Right Middle Lobe Fine Crackles   Right Lower Lobe Fine Crackles;Diminished   Left Upper Lobe Fine Crackles   Left Lower Lobe Fine Crackles;Diminished   Cough/Sputum   Cough Moist;Productive   Cardiac   Cardiac (WDL) X   Cardiac Regularity Regular   Heart Sounds S1, S2   Cardiac Rhythm NSR   Cardiac Monitor   Telemetry Monitor On Yes   Telemetry Audible Yes   Telemetry Alarms Set Yes   Telemetry Box Number 115   Gastrointestinal   Abdominal (WDL) WDL   RUQ Bowel Sounds Active   LUQ Bowel Sounds Active   RLQ Bowel Sounds Active   LLQ Bowel Sounds Primary Wound Type: Pressure Injury  Location: Buttocks  Wound Location Orientation: Left   Dressing/Treatment Open to air   Wound 07/29/19 Buttocks Right   Date First Assessed/Time First Assessed: 07/29/19 1954   Present on Hospital Admission: Yes  Primary Wound Type: Pressure Injury  Location: Buttocks  Wound Location Orientation: Right   Dressing/Treatment Open to air   Wound 07/29/19 Coccyx split at top of buttocks crack   Date First Assessed/Time First Assessed: 07/29/19 1955   Present on Hospital Admission: Yes  Location: Coccyx  Wound Description (Comments): split at top of buttocks crack   Dressing/Treatment Open to air  (moister barrier cream)   Wound 08/30/19 Coccyx   Date First Assessed/Time First Assessed: 08/30/19 0530   Primary Wound Type: (c) Other (comment)  Location: Coccyx   Dressing/Treatment Open to air  (d/t frequent BMs)

## 2019-09-12 ENCOUNTER — APPOINTMENT (OUTPATIENT)
Dept: GENERAL RADIOLOGY | Age: 74
DRG: 871 | End: 2019-09-12
Payer: MEDICARE

## 2019-09-12 LAB
ANION GAP SERPL CALCULATED.3IONS-SCNC: 9 MMOL/L (ref 3–16)
APPEARANCE FLUID: NORMAL
BASOPHILS ABSOLUTE: 0.1 K/UL (ref 0–0.2)
BASOPHILS RELATIVE PERCENT: 0.5 %
BUN BLDV-MCNC: 18 MG/DL (ref 7–20)
CALCIUM SERPL-MCNC: 8.1 MG/DL (ref 8.3–10.6)
CELL COUNT FLUID TYPE: NORMAL
CHLORIDE BLD-SCNC: 103 MMOL/L (ref 99–110)
CLOT EVALUATION: NORMAL
CO2: 26 MMOL/L (ref 21–32)
COLOR FLUID: NORMAL
CREAT SERPL-MCNC: 1.6 MG/DL (ref 0.8–1.3)
EOSINOPHILS ABSOLUTE: 0.2 K/UL (ref 0–0.6)
EOSINOPHILS RELATIVE PERCENT: 1.8 %
FLUID PATH CONSULT: YES
FLUID TYPE: NORMAL
GFR AFRICAN AMERICAN: 51
GFR NON-AFRICAN AMERICAN: 43
GLUCOSE BLD-MCNC: 109 MG/DL (ref 70–99)
GLUCOSE, FLUID: 124 MG/DL
HCT VFR BLD CALC: 25.6 % (ref 40.5–52.5)
HEMOGLOBIN: 8.4 G/DL (ref 13.5–17.5)
LD, FLUID: 53 U/L
LYMPHOCYTES ABSOLUTE: 0.8 K/UL (ref 1–5.1)
LYMPHOCYTES RELATIVE PERCENT: 6.3 %
LYMPHOCYTES, BODY FLUID: 31 %
MACROPHAGE FLUID: 13 %
MCH RBC QN AUTO: 33.8 PG (ref 26–34)
MCHC RBC AUTO-ENTMCNC: 32.6 G/DL (ref 31–36)
MCV RBC AUTO: 103.5 FL (ref 80–100)
MESOTHELIAL FLUID: 1 %
MONOCYTES ABSOLUTE: 1 K/UL (ref 0–1.3)
MONOCYTES RELATIVE PERCENT: 7.7 %
MONONUCLEAR UNIDENTIFIED CELLS FLUID: 2 %
NEUTROPHIL, FLUID: 53 %
NEUTROPHILS ABSOLUTE: 10.4 K/UL (ref 1.7–7.7)
NEUTROPHILS RELATIVE PERCENT: 83.7 %
NUCLEATED CELLS FLUID: 43 /CUMM
NUMBER OF CELLS COUNTED FLUID: 100
PDW BLD-RTO: 15.9 % (ref 12.4–15.4)
PLATELET # BLD: 329 K/UL (ref 135–450)
PMV BLD AUTO: 9.1 FL (ref 5–10.5)
POTASSIUM REFLEX MAGNESIUM: 4.2 MMOL/L (ref 3.5–5.1)
PROTEIN FLUID: 2.2 G/DL
RBC # BLD: 2.48 M/UL (ref 4.2–5.9)
RBC FLUID: 500 /CUMM
SODIUM BLD-SCNC: 138 MMOL/L (ref 136–145)
WBC # BLD: 12.4 K/UL (ref 4–11)

## 2019-09-12 PROCEDURE — 83986 ASSAY PH BODY FLUID NOS: CPT

## 2019-09-12 PROCEDURE — 0W9930Z DRAINAGE OF RIGHT PLEURAL CAVITY WITH DRAINAGE DEVICE, PERCUTANEOUS APPROACH: ICD-10-PCS | Performed by: INTERNAL MEDICINE

## 2019-09-12 PROCEDURE — 2060000000 HC ICU INTERMEDIATE R&B

## 2019-09-12 PROCEDURE — 88305 TISSUE EXAM BY PATHOLOGIST: CPT

## 2019-09-12 PROCEDURE — 94640 AIRWAY INHALATION TREATMENT: CPT

## 2019-09-12 PROCEDURE — 36415 COLL VENOUS BLD VENIPUNCTURE: CPT

## 2019-09-12 PROCEDURE — 80048 BASIC METABOLIC PNL TOTAL CA: CPT

## 2019-09-12 PROCEDURE — 6370000000 HC RX 637 (ALT 250 FOR IP): Performed by: INTERNAL MEDICINE

## 2019-09-12 PROCEDURE — 89051 BODY FLUID CELL COUNT: CPT

## 2019-09-12 PROCEDURE — 82945 GLUCOSE OTHER FLUID: CPT

## 2019-09-12 PROCEDURE — 2500000003 HC RX 250 WO HCPCS: Performed by: INTERNAL MEDICINE

## 2019-09-12 PROCEDURE — 2700000000 HC OXYGEN THERAPY PER DAY

## 2019-09-12 PROCEDURE — 2580000003 HC RX 258: Performed by: INTERNAL MEDICINE

## 2019-09-12 PROCEDURE — 87206 SMEAR FLUORESCENT/ACID STAI: CPT

## 2019-09-12 PROCEDURE — 87015 SPECIMEN INFECT AGNT CONCNTJ: CPT

## 2019-09-12 PROCEDURE — 87116 MYCOBACTERIA CULTURE: CPT

## 2019-09-12 PROCEDURE — 99232 SBSQ HOSP IP/OBS MODERATE 35: CPT | Performed by: INTERNAL MEDICINE

## 2019-09-12 PROCEDURE — 85025 COMPLETE CBC W/AUTO DIFF WBC: CPT

## 2019-09-12 PROCEDURE — 87070 CULTURE OTHR SPECIMN AEROBIC: CPT

## 2019-09-12 PROCEDURE — 6360000002 HC RX W HCPCS: Performed by: INTERNAL MEDICINE

## 2019-09-12 PROCEDURE — 71045 X-RAY EXAM CHEST 1 VIEW: CPT

## 2019-09-12 PROCEDURE — 32551 INSERTION OF CHEST TUBE: CPT | Performed by: INTERNAL MEDICINE

## 2019-09-12 PROCEDURE — 32551 INSERTION OF CHEST TUBE: CPT

## 2019-09-12 PROCEDURE — 88112 CYTOPATH CELL ENHANCE TECH: CPT

## 2019-09-12 PROCEDURE — 87205 SMEAR GRAM STAIN: CPT

## 2019-09-12 PROCEDURE — 84157 ASSAY OF PROTEIN OTHER: CPT

## 2019-09-12 PROCEDURE — 83615 LACTATE (LD) (LDH) ENZYME: CPT

## 2019-09-12 PROCEDURE — 94761 N-INVAS EAR/PLS OXIMETRY MLT: CPT

## 2019-09-12 PROCEDURE — 87102 FUNGUS ISOLATION CULTURE: CPT

## 2019-09-12 RX ORDER — LIDOCAINE HYDROCHLORIDE 10 MG/ML
2 INJECTION, SOLUTION EPIDURAL; INFILTRATION; INTRACAUDAL; PERINEURAL ONCE
Status: COMPLETED | OUTPATIENT
Start: 2019-09-12 | End: 2019-09-12

## 2019-09-12 RX ORDER — MORPHINE SULFATE 2 MG/ML
2 INJECTION, SOLUTION INTRAMUSCULAR; INTRAVENOUS ONCE
Status: COMPLETED | OUTPATIENT
Start: 2019-09-12 | End: 2019-09-12

## 2019-09-12 RX ADMIN — TAMSULOSIN HYDROCHLORIDE 0.4 MG: 0.4 CAPSULE ORAL at 09:45

## 2019-09-12 RX ADMIN — PSYLLIUM HUSK 1 PACKET: 3.4 POWDER ORAL at 09:49

## 2019-09-12 RX ADMIN — PANTOPRAZOLE SODIUM 40 MG: 40 TABLET, DELAYED RELEASE ORAL at 05:07

## 2019-09-12 RX ADMIN — AMLODIPINE BESYLATE 5 MG: 5 TABLET ORAL at 09:45

## 2019-09-12 RX ADMIN — METOPROLOL TARTRATE 25 MG: 25 TABLET, FILM COATED ORAL at 09:45

## 2019-09-12 RX ADMIN — MORPHINE SULFATE 2 MG: 2 INJECTION, SOLUTION INTRAMUSCULAR; INTRAVENOUS at 17:04

## 2019-09-12 RX ADMIN — Medication 2 PUFF: at 20:10

## 2019-09-12 RX ADMIN — Medication 10 ML: at 20:43

## 2019-09-12 RX ADMIN — Medication 10 ML: at 09:48

## 2019-09-12 RX ADMIN — SODIUM CHLORIDE: 9 INJECTION, SOLUTION INTRAVENOUS at 03:41

## 2019-09-12 RX ADMIN — Medication 2 PUFF: at 09:10

## 2019-09-12 RX ADMIN — LEVOTHYROXINE SODIUM 150 MCG: 150 TABLET ORAL at 09:45

## 2019-09-12 RX ADMIN — ACETAMINOPHEN 650 MG: 325 TABLET ORAL at 20:44

## 2019-09-12 RX ADMIN — METOPROLOL TARTRATE 25 MG: 25 TABLET, FILM COATED ORAL at 20:43

## 2019-09-12 RX ADMIN — LIDOCAINE HYDROCHLORIDE 2 ML: 10 INJECTION, SOLUTION EPIDURAL; INFILTRATION; INTRACAUDAL; PERINEURAL at 17:05

## 2019-09-12 ASSESSMENT — PAIN SCALES - GENERAL
PAINLEVEL_OUTOF10: 8
PAINLEVEL_OUTOF10: 0
PAINLEVEL_OUTOF10: 7
PAINLEVEL_OUTOF10: 0
PAINLEVEL_OUTOF10: 0

## 2019-09-12 NOTE — PROGRESS NOTES
Shift assessment complete and documented on flow sheets. VSS. Four eyes skin assessment and MAR handoff completed with previous nurse. Pt refused turn at this time, call light and over bed table in reach. Patient has no requests at this time.  Will continue to monitor.

## 2019-09-12 NOTE — PROGRESS NOTES
1. 6* 1.6*       Imaging:  I have reviewed radiology images personally. XR CHEST PORTABLE   Final Result   Bilateral airspace disease and bilateral pleural effusions, slightly   decreased on the right         CT CHEST W CONTRAST   Final Result   Left pleural effusion is decreased in size, with left basilar chest tube in   normal position. Moderate-sized right pleural effusion. Bibasilar atelectasis versus pneumonia. There is improved aeration of the   left lower lobe in the interval.         XR CHEST PORTABLE   Final Result   Bilateral pleuroparenchymal disease, slightly increased bilaterally. XR CHEST PORTABLE   Final Result   Improved aeration at the left base and decrease in left pleural effusion with   no pneumothorax. XR CHEST PORTABLE   Final Result   1. No significant change. XR CHEST PORTABLE   Final Result   No significant change over the past 3 days. XR CHEST PORTABLE   Final Result   Slight worsening of right pleural effusion. Pulmonary vascular congestion   interstitial prominence throughout the lungs is stable to slightly progressed. XR CHEST PORTABLE   Final Result   Left pleural catheter in place. Persistent bilateral pleural effusions, greater on the right, with bibasilar   airspace disease. Question tiny left pneumothorax. XR CHEST PORTABLE   Final Result   No significant change in bilateral pleural effusions and bilateral lung   consolidation         CT CHEST WO CONTRAST   Final Result   Moderate bilateral pleural effusions, with adjacent consolidation in the   lungs, likely atelectasis in the absence of clinical signs of pneumonia.    Small mediastinal nodes are seen, likely reactive      Mild septal thickening, suggesting a component of fluid overload         XR CHEST PORTABLE   Final Result   Increased bilateral pleural effusions and bilateral lung consolidation         CT ABDOMEN PELVIS WO CONTRAST Additional Contrast? None 9/10/2019  EXAMINATION: CT OF THE CHEST WITH CONTRAST 9/10/2019 9:35 am TECHNIQUE: CT of the chest was performed with the administration of intravenous contrast. Multiplanar reformatted images are provided for review. Dose modulation, iterative reconstruction, and/or weight based adjustment of the mA/kV was utilized to reduce the radiation dose to as low as reasonably achievable. COMPARISON: Chest x-ray, 09/09/2019. CT chest, 08/31/2019 HISTORY: ORDERING SYSTEM PROVIDED HISTORY: ACUTE RESP ILLNESS, >36YEARS OLD TECHNOLOGIST PROVIDED HISTORY: Reason for Exam: chest tube Acuity: Acute Type of Exam: Initial FINDINGS: Mediastinum: Heavy coronary artery calcification is present. Thoracic aorta is tortuous and normal in caliber. Subcentimeter mediastinal nodes are stable. There is no adenopathy. Lungs/pleura: Bilateral, dependently layered pleural effusions are noted, moderate on the right and mild on the left in size. There is a left basilar pleural drain from lateral approach in normal position. The left pleural effusion has decreased in size in the interval.  There is bibasilar patchy opacity in the lung bases. Upper Abdomen: The adrenal glands and upper abdomen are unremarkable. Soft Tissues/Bones: Posterior fusion hardware in the mid and lower thoracic spine is in stable position. Old left rib fractures. Left pleural effusion is decreased in size, with left basilar chest tube in normal position. Moderate-sized right pleural effusion. Bibasilar atelectasis versus pneumonia.   There is improved aeration of the left lower lobe in the interval.     Xr Chest Portable    Result Date: 9/10/2019  EXAMINATION: ONE XRAY VIEW OF THE CHEST 9/10/2019 2:09 pm COMPARISON: 09/09/2019 HISTORY: ORDERING SYSTEM PROVIDED HISTORY: s/p chest tube removal TECHNOLOGIST PROVIDED HISTORY: Reason for exam:->s/p chest tube removal Reason for exam:->Please confirm with the RN about chest tube removal before doing the CXR FINDINGS: Lung pneumothorax. Xr Chest Portable    Result Date: 9/7/2019  EXAMINATION: ONE XRAY VIEW OF THE CHEST 9/7/2019 5:26 am COMPARISON: 09/06/2019 HISTORY: ORDERING SYSTEM PROVIDED HISTORY: Pleural eff TECHNOLOGIST PROVIDED HISTORY: Reason for exam:->Pleural eff Reason for Exam: Pleural eff Type of Exam: Subsequent/Follow-up FINDINGS: No change in the small to moderate bilateral pleural effusions with atelectasis. No change in the mild pulmonary vascular congestion. The cardiac silhouette is stable. There is no pneumothorax. Status post posterior decompression and stabilization of the thoracolumbar spine. 1. No significant change. Xr Chest Portable    Result Date: 9/6/2019  EXAMINATION: ONE XRAY VIEW OF THE CHEST 9/6/2019 7:53 am COMPARISON: September 3, 2019 HISTORY: ORDERING SYSTEM PROVIDED HISTORY: pleural effusion with chest tube TECHNOLOGIST PROVIDED HISTORY: Reason for exam:->pleural effusion with chest tube Reason for Exam: pleural effusion with chest tube FINDINGS: Left-sided chest tube appears unchanged in position. Bilateral pleural effusions remain present, no significant change. Bibasilar airspace disease and vascular congestion with interstitial edema also unchanged     No significant change over the past 3 days. Xr Chest Portable    Result Date: 9/3/2019  EXAMINATION: ONE XRAY VIEW OF THE CHEST 9/3/2019 6:22 am COMPARISON: September 2, 2019 HISTORY: ORDERING SYSTEM PROVIDED HISTORY: COPD TECHNOLOGIST PROVIDED HISTORY: Reason for exam:->COPD Reason for Exam: COPD FINDINGS: Cardiac silhouette is enlarged. Moderate right and small to moderate left pleural effusions, similar to previous exam on the left. Slight worsening of right pleural effusion. Pulmonary vascular congestion interstitial prominence throughout the lungs, stable slightly increased. Slight worsening of right pleural effusion.   Pulmonary vascular congestion interstitial prominence throughout the lungs is stable to 8/18/2019  EXAMINATION: ONE XRAY VIEW OF THE CHEST 8/18/2019 6:52 am COMPARISON: August 17, 2019 HISTORY: ORDERING SYSTEM PROVIDED HISTORY: resp failure TECHNOLOGIST PROVIDED HISTORY: Reason for exam:->resp failure Reason for Exam: poss pna FINDINGS: Cardiac silhouette is enlarged. Moderate to large bilateral pleural effusions, similar to previous exam.  No acute bony abnormality. No significant change in effusions. Xr Chest Portable    Result Date: 8/17/2019  EXAMINATION: ONE XRAY VIEW OF THE CHEST 8/17/2019 5:22 am COMPARISON: August 16, 2019 HISTORY: ORDERING SYSTEM PROVIDED HISTORY: resp failure TECHNOLOGIST PROVIDED HISTORY: Reason for exam:->resp failure Reason for Exam: resp failure FINDINGS: Left-sided central venous catheter with tip projecting in the region of the upper SVC. Cardiac silhouette is enlarged. Increase in pleural effusions which are moderate to large in size. No acute bony abnormality. Worsening of pleural effusions     Xr Chest Portable    Result Date: 8/16/2019  EXAMINATION: ONE XRAY VIEW OF THE CHEST 8/15/2019 5:36 am COMPARISON: 08/14/2019, 08/13/2019 HISTORY: ORDERING SYSTEM PROVIDED HISTORY: resp failure TECHNOLOGIST PROVIDED HISTORY: Reason for exam:->resp failure Reason for Exam: resp failure FINDINGS: A single frontal view of the chest is limited secondary to patient rotation and the chin overlying the bilateral lung apices. There is no acute skeletal abnormality. There are fusion rods stabilizing the thoracic spine. The heart size remains enlarged, though stable. The mediastinal contours are stable. There is a left internal jugular central venous catheter in place with tip overlying the region of the brachiocephalic vein. This is stable and unchanged. There are persistent small bilateral pleural effusions, not significantly changed. There is stable mild-to-moderate interstitial pulmonary edema.   Multifocal hazy opacity throughout both lungs has not left IJ catheter has its tip at the junction of the innominate vein and superior vena cava. No pneumothorax. Persistent low lung volumes, pulmonary edema, bilateral pleural effusions and bibasilar airspace disease. Xr Chest Portable    Result Date: 8/14/2019  EXAMINATION: ONE XRAY VIEW OF THE CHEST 8/14/2019 5:08 am COMPARISON: 08/13/2019 HISTORY: ORDERING SYSTEM PROVIDED HISTORY: pulm edema TECHNOLOGIST PROVIDED HISTORY: Reason for exam:->pulm edema Reason for Exam: pulm edema Acuity: Unknown Type of Exam: Unknown FINDINGS: The study is limited by motion artifact. The patient's chin also obscures the lung apices. A left-sided central line is present and terminates in the distal left innominate vein/SVC junction. There is cardiomegaly. There is also pulmonary vascular congestion with bilateral pleural effusions. Limited exam. Persistent CHF with bilateral effusions. Xr Chest Portable    Result Date: 8/13/2019  EXAMINATION: ONE XRAY VIEW OF THE CHEST 8/13/2019 7:13 am COMPARISON: 08/11/2019 HISTORY: ORDERING SYSTEM PROVIDED HISTORY: increased oxygen demands TECHNOLOGIST PROVIDED HISTORY: Reason for exam:->increased oxygen demands Reason for Exam: increased oxygen demands Acuity: Unknown Type of Exam: Unknown FINDINGS: Lung volumes are low accentuating heart size and bronchovascular markings at the lung bases. Patient body habitus limits evaluation of fine pulmonary parenchymal changes. Heart size is enlarged. There are persistent bilateral pleural effusions and bilateral lung consolidation. When compared to prior, there increasing opacity on the right. Spinal fusion hardware is seen     Increased airspace disease on the right. There is persistent pleuroparenchymal disease bilaterally     Assessment and plan:  Acute respiratory failure. On oxygen at 4 LPM with SaO2 96%. Wean to SaO2 >90%. Bilateral pleural effusions. Right pleural effusion persists on CT chest, left pleural effusion resolved.

## 2019-09-12 NOTE — PROGRESS NOTES
PT    IMAGING:  No results found. Hospital Problems           Last Modified POA    * (Principal) Acute renal failure (ARF) ([facility] [address] 8/30/2019 Yes    Hyperlipidemia 8/30/2019 Yes    Former smoker 9/3/2019 Yes    Pulmonary HTN ([facility] [address] 9/3/2019 Yes    Sepsis ([facility] [address] 8/27/2019 Yes    C. difficile colitis 8/30/2019 Yes    Acquired hypothyroidism 8/30/2019 Yes    Pulmonary venous congestion 9/3/2019 Yes    Macrocytic anemia 9/3/2019 Yes    Suspected sleep apnea 9/6/2019 Yes         Impression:  70 yo male with c diff colitis, pleural effusions, hypothyroidism, diarrhea    Recommendation:  1. Diarrhea improved on colestid and fiber supplementation  2. Pleural effusions being managed by pulmonary  3. Will sign off. Please call if questions or concerns or active gi issues. Patient to follow up in GI clinic as outpatient. May want to consider repeat endoscopy in 3-6 months at the hospital to evaluate EMR site.       Michael Ochoa DO  5:14 PM 9/12/2019            [address] [place] [address] Office  [address] [place] [address] E   [place]  Phone: [phone]   Phone: [phone]  Fax: [phone]       Fax: [phone]

## 2019-09-13 ENCOUNTER — APPOINTMENT (OUTPATIENT)
Dept: GENERAL RADIOLOGY | Age: 74
DRG: 871 | End: 2019-09-13
Payer: MEDICARE

## 2019-09-13 LAB
ANION GAP SERPL CALCULATED.3IONS-SCNC: 7 MMOL/L (ref 3–16)
BUN BLDV-MCNC: 19 MG/DL (ref 7–20)
CALCIUM SERPL-MCNC: 8.5 MG/DL (ref 8.3–10.6)
CHLORIDE BLD-SCNC: 103 MMOL/L (ref 99–110)
CO2: 28 MMOL/L (ref 21–32)
CREAT SERPL-MCNC: 1.6 MG/DL (ref 0.8–1.3)
GFR AFRICAN AMERICAN: 51
GFR NON-AFRICAN AMERICAN: 43
GLUCOSE BLD-MCNC: 102 MG/DL (ref 70–99)
PATH CONSULT FLUID: NORMAL
PH, BODY FLUID: 8.6
POTASSIUM REFLEX MAGNESIUM: 4.7 MMOL/L (ref 3.5–5.1)
SODIUM BLD-SCNC: 138 MMOL/L (ref 136–145)

## 2019-09-13 PROCEDURE — 94761 N-INVAS EAR/PLS OXIMETRY MLT: CPT

## 2019-09-13 PROCEDURE — 6370000000 HC RX 637 (ALT 250 FOR IP): Performed by: INTERNAL MEDICINE

## 2019-09-13 PROCEDURE — 2700000000 HC OXYGEN THERAPY PER DAY

## 2019-09-13 PROCEDURE — 2580000003 HC RX 258: Performed by: INTERNAL MEDICINE

## 2019-09-13 PROCEDURE — 71045 X-RAY EXAM CHEST 1 VIEW: CPT

## 2019-09-13 PROCEDURE — 99232 SBSQ HOSP IP/OBS MODERATE 35: CPT | Performed by: INTERNAL MEDICINE

## 2019-09-13 PROCEDURE — 2060000000 HC ICU INTERMEDIATE R&B

## 2019-09-13 PROCEDURE — 36415 COLL VENOUS BLD VENIPUNCTURE: CPT

## 2019-09-13 PROCEDURE — 94640 AIRWAY INHALATION TREATMENT: CPT

## 2019-09-13 PROCEDURE — 80048 BASIC METABOLIC PNL TOTAL CA: CPT

## 2019-09-13 RX ADMIN — ACETAMINOPHEN 650 MG: 325 TABLET ORAL at 13:37

## 2019-09-13 RX ADMIN — ACETAMINOPHEN 650 MG: 325 TABLET ORAL at 05:15

## 2019-09-13 RX ADMIN — Medication 10 ML: at 09:10

## 2019-09-13 RX ADMIN — Medication 2 PUFF: at 21:39

## 2019-09-13 RX ADMIN — PANTOPRAZOLE SODIUM 40 MG: 40 TABLET, DELAYED RELEASE ORAL at 05:15

## 2019-09-13 RX ADMIN — ACETAMINOPHEN 650 MG: 325 TABLET ORAL at 22:00

## 2019-09-13 RX ADMIN — Medication 10 ML: at 22:01

## 2019-09-13 RX ADMIN — AMLODIPINE BESYLATE 5 MG: 5 TABLET ORAL at 09:08

## 2019-09-13 RX ADMIN — PSYLLIUM HUSK 1 PACKET: 3.4 POWDER ORAL at 09:31

## 2019-09-13 RX ADMIN — ACETAMINOPHEN 650 MG: 325 TABLET ORAL at 00:50

## 2019-09-13 RX ADMIN — ACETAMINOPHEN 650 MG: 325 TABLET ORAL at 09:08

## 2019-09-13 RX ADMIN — TAMSULOSIN HYDROCHLORIDE 0.4 MG: 0.4 CAPSULE ORAL at 09:08

## 2019-09-13 RX ADMIN — METOPROLOL TARTRATE 25 MG: 25 TABLET, FILM COATED ORAL at 09:08

## 2019-09-13 RX ADMIN — METOPROLOL TARTRATE 25 MG: 25 TABLET, FILM COATED ORAL at 22:00

## 2019-09-13 RX ADMIN — LEVOTHYROXINE SODIUM 150 MCG: 150 TABLET ORAL at 09:31

## 2019-09-13 RX ADMIN — ACETAMINOPHEN 650 MG: 325 TABLET ORAL at 17:43

## 2019-09-13 ASSESSMENT — PAIN SCALES - GENERAL
PAINLEVEL_OUTOF10: 6
PAINLEVEL_OUTOF10: 6
PAINLEVEL_OUTOF10: 4
PAINLEVEL_OUTOF10: 9
PAINLEVEL_OUTOF10: 6
PAINLEVEL_OUTOF10: 8
PAINLEVEL_OUTOF10: 6

## 2019-09-13 NOTE — PROGRESS NOTES
This nurse contacted by radiologist in [place] regarding patient's chest x-ray results. See Results for Impression.  Will contact Pulmonologist.

## 2019-09-13 NOTE — PROGRESS NOTES
Prophylaxis: SCDs  Diet: DIET GENERAL;  Dietary Nutrition Supplements: Standard High Calorie Oral Supplement  Code Status: Full Code ,palliative care assisting with goals of care    PT/OT Eval Status: Patient had been refusing      Dispo - possible when chest tube removed,  pt stable and ok wit pulm.        Karina Andre MD

## 2019-09-13 NOTE — PROGRESS NOTES
Acute Type of Exam: Initial FINDINGS: Lower Chest: There are at least moderate-sized bilateral pleural effusions, greater on the left, stable mildly increased when compared to the previous exam.  Adjacent airspace disease is noted. Gynecomastia noted on the right. The left chest wall is unremarkable. Base of the heart unremarkable. Organs: Evaluation of the solid abdominal viscera is limited without intravenous contrast.  Having said that, no acute abnormality detected within the liver and spleen. Gallstones redemonstrated. Normal noncontrast imaging the adrenals and pancreas. Nonobstructing nephrolithiasis is noted bilaterally. No hydronephrosis or hydroureter. No ureteral calculi are found. GI/Bowel: There is mild residual perirectal fat stranding. The fat stranding seen previously has for the most part resolved. There continues to be moderate to severe diverticulosis in the region of the sigmoid colon. Convincing CT evidence diverticulitis is not detected. Large bowel otherwise unremarkable. Appendix is unremarkable. Distal esophagus, stomach, duodenal sweep, and the remainder of the small bowel are unremarkable. Pelvis: Penile prosthesis again noted. No free pelvic fluid. Peritoneum/Retroperitoneum: Moderate vascular calcifications within the abdominal aorta. Abdominal aorta normal in caliber. No retroperitoneal lymphadenopathy. Bones/Soft Tissues: There is slight asymmetric dermal thickening involving the soft tissues overlying the right hip, with mild asymmetric subcutaneous edema. No soft tissue gas or focal fluid collection is identified. There is diffuse muscular atrophy. Chronic left hip fracture is are noted. No acute bony abnormality. Subtle asymmetric dermal thickening involving the soft tissues overlying the right hip, along with mild asymmetric subcutaneous edema, compatible with cellulitis. No soft tissue gas or abscess is identified.  Moderate to severe diverticulosis of the effusion is decreased in size, with left basilar chest tube in normal position. Moderate-sized right pleural effusion. Bibasilar atelectasis versus pneumonia. There is improved aeration of the left lower lobe in the interval.     Xr Chest Portable    Result Date: 9/10/2019  EXAMINATION: ONE XRAY VIEW OF THE CHEST 9/10/2019 2:09 pm COMPARISON: 09/09/2019 HISTORY: ORDERING SYSTEM PROVIDED HISTORY: s/p chest tube removal TECHNOLOGIST PROVIDED HISTORY: Reason for exam:->s/p chest tube removal Reason for exam:->Please confirm with the RN about chest tube removal before doing the CXR FINDINGS: Lung volumes are low accentuating heart size and bronchovascular markings at the lung bases. Heart size is stable. There is persistent right-sided pleural effusion and adjacent right basilar consolidation, slightly decreased There is persistent left-sided pleural effusion and adjacent lung consolidation Degenerative changes are seen in the spine Postfusion changes are seen in the spine     Bilateral airspace disease and bilateral pleural effusions, slightly decreased on the right     Xr Chest Portable    Result Date: 9/9/2019  EXAMINATION: ONE XRAY VIEW OF THE CHEST 9/9/2019 5:45 am COMPARISON: 09/07/2019 HISTORY: ORDERING SYSTEM PROVIDED HISTORY: Persistent pleural effusions TECHNOLOGIST PROVIDED HISTORY: Reason for exam:->Persistent pleural effusions Reason for Exam: Persistent pleural effusions FINDINGS: Lung volumes are low accentuating heart size and bronchovascular markings at the lung bases. Patient's chin obscures the apices Heart size is stable. There is increased right-sided pleural effusion right lung consolidation. There is persistent left-sided pleural effusion and left lung consolidation, slightly increased. Pleural drainage catheter is seen on the left     Bilateral pleuroparenchymal disease, slightly increased bilaterally.      Xr Chest Portable    Result Date: 9/7/2019  EXAMINATION: ONE XRAY VIEW OF THE CHEST 9/7/2019 5:53 pm COMPARISON: 09/07/2019. HISTORY: ORDERING SYSTEM PROVIDED HISTORY: pleural effusion s/p tpa TECHNOLOGIST PROVIDED HISTORY: Reason for exam:->pleural effusion s/p tpa FINDINGS: The cardiac silhouette is enlarged and stable. Left-sided pigtail chest tube remains in place with no pneumothorax. Improved aeration at the left base with decrease in left pleural effusion. Stable dependent right basilar opacification and effusion. Postoperative hardware spanning the lower thoracic spine is again noted. Improved aeration at the left base and decrease in left pleural effusion with no pneumothorax. Xr Chest Portable    Result Date: 9/7/2019  EXAMINATION: ONE XRAY VIEW OF THE CHEST 9/7/2019 5:26 am COMPARISON: 09/06/2019 HISTORY: ORDERING SYSTEM PROVIDED HISTORY: Pleural eff TECHNOLOGIST PROVIDED HISTORY: Reason for exam:->Pleural eff Reason for Exam: Pleural eff Type of Exam: Subsequent/Follow-up FINDINGS: No change in the small to moderate bilateral pleural effusions with atelectasis. No change in the mild pulmonary vascular congestion. The cardiac silhouette is stable. There is no pneumothorax. Status post posterior decompression and stabilization of the thoracolumbar spine. 1. No significant change. Xr Chest Portable    Result Date: 9/6/2019  EXAMINATION: ONE XRAY VIEW OF THE CHEST 9/6/2019 7:53 am COMPARISON: September 3, 2019 HISTORY: ORDERING SYSTEM PROVIDED HISTORY: pleural effusion with chest tube TECHNOLOGIST PROVIDED HISTORY: Reason for exam:->pleural effusion with chest tube Reason for Exam: pleural effusion with chest tube FINDINGS: Left-sided chest tube appears unchanged in position. Bilateral pleural effusions remain present, no significant change. Bibasilar airspace disease and vascular congestion with interstitial edema also unchanged     No significant change over the past 3 days.      Xr Chest Portable    Result Date: 9/3/2019  EXAMINATION: ONE XRAY VIEW OF THE CHEST 9/3/2019 6:22 am COMPARISON: September 2, 2019 HISTORY: ORDERING SYSTEM PROVIDED HISTORY: COPD TECHNOLOGIST PROVIDED HISTORY: Reason for exam:->COPD Reason for Exam: COPD FINDINGS: Cardiac silhouette is enlarged. Moderate right and small to moderate left pleural effusions, similar to previous exam on the left. Slight worsening of right pleural effusion. Pulmonary vascular congestion interstitial prominence throughout the lungs, stable slightly increased. Slight worsening of right pleural effusion. Pulmonary vascular congestion interstitial prominence throughout the lungs is stable to slightly progressed. Xr Chest Portable    Result Date: 9/2/2019  EXAMINATION: ONE XRAY VIEW OF THE CHEST 9/2/2019 2:56 pm COMPARISON: Prior studies including most recent chest x-ray 09/01/2019, CT chest 08/31/2019 HISTORY: ORDERING SYSTEM PROVIDED HISTORY: check chest tube placement TECHNOLOGIST PROVIDED HISTORY: Reason for exam:->check chest tube placement FINDINGS: Moderate-to-large right and moderate left pleural effusions are noted. The size left pleural effusion is probably slightly decreased in size since the prior study. A pleural catheter projects over lower lateral left chest. Bibasilar airspace disease is present. There is a questionable tiny left apical pneumothorax     Left pleural catheter in place. Persistent bilateral pleural effusions, greater on the right, with bibasilar airspace disease. Question tiny left pneumothorax. Xr Chest Portable    Result Date: 9/1/2019  EXAMINATION: ONE XRAY VIEW OF THE CHEST 9/1/2019 11:15 am COMPARISON: 08/31/2019 HISTORY: ORDERING SYSTEM PROVIDED HISTORY: resp failure TECHNOLOGIST PROVIDED HISTORY: Reason for exam:->resp failure Reason for Exam: resp failure Acuity: Unknown Type of Exam: Ongoing FINDINGS: Lung volumes are low accentuating heart size and bronchovascular markings at the lung bases.   Patient body habitus limits evaluation of fine pulmonary parenchymal habitus limits evaluation of fine pulmonary parenchymal changes. Heart size is enlarged. There are persistent bilateral pleural effusions and bilateral lung consolidation. When compared to prior, there increasing opacity on the right. Spinal fusion hardware is seen     Increased airspace disease on the right. There is persistent pleuroparenchymal disease bilaterally     Assessment and plan:  Acute respiratory failure. On oxygen at 4 LPM with SaO2 96%. Wean to SaO2 >90%. Bilateral pleural effusions. Right pleural effusion with chest tube placement 9/12. Unfortunately fluid has been recurrent. Repeat CXR with small effusion, small apical pneumothorax   Suspected JULISSA, pulmonary hypertension. Continue with PAP overnight and during daytime naps, has been refusing. Acute kidney injury. Renal function stable. Anemia. Macrocytic, probably includes an element of chronic disease. Hyperlipidemia, hypothyroidism, HTN. Per IM. DVT prophylaxis. Unclear why he is not on heparin, on SCDs. Discussed with patient, nursing. Long-term prognosis poor due to noncompliance.       Electronically signed by:  Sangeetha Stephen MD    9/13/2019    1:12 PM.

## 2019-09-14 LAB
ANION GAP SERPL CALCULATED.3IONS-SCNC: 9 MMOL/L (ref 3–16)
BASOPHILS ABSOLUTE: 0.1 K/UL (ref 0–0.2)
BASOPHILS RELATIVE PERCENT: 0.7 %
BUN BLDV-MCNC: 21 MG/DL (ref 7–20)
CALCIUM SERPL-MCNC: 8.4 MG/DL (ref 8.3–10.6)
CHLORIDE BLD-SCNC: 102 MMOL/L (ref 99–110)
CO2: 26 MMOL/L (ref 21–32)
CREAT SERPL-MCNC: 1.6 MG/DL (ref 0.8–1.3)
EOSINOPHILS ABSOLUTE: 0.2 K/UL (ref 0–0.6)
EOSINOPHILS RELATIVE PERCENT: 2.2 %
GFR AFRICAN AMERICAN: 51
GFR NON-AFRICAN AMERICAN: 43
GLUCOSE BLD-MCNC: 91 MG/DL (ref 70–99)
HCT VFR BLD CALC: 25.1 % (ref 40.5–52.5)
HEMOGLOBIN: 8.4 G/DL (ref 13.5–17.5)
LYMPHOCYTES ABSOLUTE: 0.7 K/UL (ref 1–5.1)
LYMPHOCYTES RELATIVE PERCENT: 6.2 %
MCH RBC QN AUTO: 34.2 PG (ref 26–34)
MCHC RBC AUTO-ENTMCNC: 33.6 G/DL (ref 31–36)
MCV RBC AUTO: 101.8 FL (ref 80–100)
MONOCYTES ABSOLUTE: 0.6 K/UL (ref 0–1.3)
MONOCYTES RELATIVE PERCENT: 5.9 %
NEUTROPHILS ABSOLUTE: 9.2 K/UL (ref 1.7–7.7)
NEUTROPHILS RELATIVE PERCENT: 85 %
PDW BLD-RTO: 15.7 % (ref 12.4–15.4)
PLATELET # BLD: 325 K/UL (ref 135–450)
PMV BLD AUTO: 9.1 FL (ref 5–10.5)
POTASSIUM REFLEX MAGNESIUM: 4.2 MMOL/L (ref 3.5–5.1)
RBC # BLD: 2.46 M/UL (ref 4.2–5.9)
SODIUM BLD-SCNC: 137 MMOL/L (ref 136–145)
WBC # BLD: 10.8 K/UL (ref 4–11)

## 2019-09-14 PROCEDURE — 80048 BASIC METABOLIC PNL TOTAL CA: CPT

## 2019-09-14 PROCEDURE — 6370000000 HC RX 637 (ALT 250 FOR IP): Performed by: INTERNAL MEDICINE

## 2019-09-14 PROCEDURE — 99232 SBSQ HOSP IP/OBS MODERATE 35: CPT | Performed by: INTERNAL MEDICINE

## 2019-09-14 PROCEDURE — 6360000002 HC RX W HCPCS: Performed by: INTERNAL MEDICINE

## 2019-09-14 PROCEDURE — 2700000000 HC OXYGEN THERAPY PER DAY

## 2019-09-14 PROCEDURE — 2060000000 HC ICU INTERMEDIATE R&B

## 2019-09-14 PROCEDURE — 36415 COLL VENOUS BLD VENIPUNCTURE: CPT

## 2019-09-14 PROCEDURE — 85025 COMPLETE CBC W/AUTO DIFF WBC: CPT

## 2019-09-14 PROCEDURE — 94640 AIRWAY INHALATION TREATMENT: CPT

## 2019-09-14 PROCEDURE — 94761 N-INVAS EAR/PLS OXIMETRY MLT: CPT

## 2019-09-14 PROCEDURE — 2580000003 HC RX 258: Performed by: INTERNAL MEDICINE

## 2019-09-14 RX ORDER — OXYCODONE HYDROCHLORIDE 5 MG/1
5 TABLET ORAL EVERY 4 HOURS PRN
Status: DISCONTINUED | OUTPATIENT
Start: 2019-09-14 | End: 2019-09-14

## 2019-09-14 RX ORDER — HEPARIN SODIUM 5000 [USP'U]/ML
5000 INJECTION, SOLUTION INTRAVENOUS; SUBCUTANEOUS EVERY 8 HOURS SCHEDULED
Status: DISCONTINUED | OUTPATIENT
Start: 2019-09-14 | End: 2019-09-21 | Stop reason: HOSPADM

## 2019-09-14 RX ORDER — OXYCODONE HYDROCHLORIDE 5 MG/1
5 TABLET ORAL EVERY 4 HOURS PRN
Status: DISCONTINUED | OUTPATIENT
Start: 2019-09-14 | End: 2019-09-21 | Stop reason: HOSPADM

## 2019-09-14 RX ADMIN — ACETAMINOPHEN 650 MG: 325 TABLET ORAL at 06:25

## 2019-09-14 RX ADMIN — OXYCODONE HYDROCHLORIDE 5 MG: 5 TABLET ORAL at 17:32

## 2019-09-14 RX ADMIN — ACETAMINOPHEN 650 MG: 325 TABLET ORAL at 02:10

## 2019-09-14 RX ADMIN — METOPROLOL TARTRATE 25 MG: 25 TABLET, FILM COATED ORAL at 09:05

## 2019-09-14 RX ADMIN — ACETAMINOPHEN 650 MG: 325 TABLET ORAL at 20:33

## 2019-09-14 RX ADMIN — PANTOPRAZOLE SODIUM 40 MG: 40 TABLET, DELAYED RELEASE ORAL at 06:25

## 2019-09-14 RX ADMIN — PSYLLIUM HUSK 1 PACKET: 3.4 POWDER ORAL at 09:05

## 2019-09-14 RX ADMIN — Medication 10 ML: at 22:55

## 2019-09-14 RX ADMIN — LEVOTHYROXINE SODIUM 150 MCG: 150 TABLET ORAL at 09:05

## 2019-09-14 RX ADMIN — Medication 2 PUFF: at 09:24

## 2019-09-14 RX ADMIN — AMLODIPINE BESYLATE 5 MG: 5 TABLET ORAL at 09:05

## 2019-09-14 RX ADMIN — METOPROLOL TARTRATE 25 MG: 25 TABLET, FILM COATED ORAL at 20:33

## 2019-09-14 RX ADMIN — HEPARIN SODIUM 5000 UNITS: 5000 INJECTION INTRAVENOUS; SUBCUTANEOUS at 09:14

## 2019-09-14 RX ADMIN — HEPARIN SODIUM 5000 UNITS: 5000 INJECTION INTRAVENOUS; SUBCUTANEOUS at 20:33

## 2019-09-14 RX ADMIN — Medication 10 ML: at 09:05

## 2019-09-14 RX ADMIN — HEPARIN SODIUM 5000 UNITS: 5000 INJECTION INTRAVENOUS; SUBCUTANEOUS at 14:08

## 2019-09-14 RX ADMIN — TAMSULOSIN HYDROCHLORIDE 0.4 MG: 0.4 CAPSULE ORAL at 09:05

## 2019-09-14 ASSESSMENT — PAIN SCALES - GENERAL
PAINLEVEL_OUTOF10: 7
PAINLEVEL_OUTOF10: 4
PAINLEVEL_OUTOF10: 7
PAINLEVEL_OUTOF10: 4
PAINLEVEL_OUTOF10: 6

## 2019-09-14 NOTE — FLOWSHEET NOTE
ethnicity; Ecchymosis   Temperature Warm   L Radial Pulse +2   RLE Neurovascular Assessment   Capillary Refill Less than/equal to 3 seconds   Color Appropriate for ethnicity   Temperature Warm   R Pedal Pulse +2   LLE Neurovascular Assessment   Capillary Refill Less than/equal to 3 seconds   Color Appropriate for ethnicity   Temperature Warm   L Pedal Pulse +2   Skin Color/Condition   Skin Color/Condition (WDL) X   Skin Integrity   Skin Integrity (WDL) X   Musculoskeletal   Musculoskeletal (WDL) X   RUE Full movement   LUE Full movement   RL Extremity Weakness   LL Extremity Weakness   Genitourinary   Genitourinary (WDL) WDL   Anus/Rectum   Anus/Rectum (WDL) X   Evaluation Hemorrhoids   Wound 07/29/19 Buttocks Left   Date First Assessed/Time First Assessed: 07/29/19 1954   Present on Hospital Admission: Yes  Primary Wound Type: Pressure Injury  Location: Buttocks  Wound Location Orientation: Left   Dressing/Treatment Open to air   Wound 07/29/19 Buttocks Right   Date First Assessed/Time First Assessed: 07/29/19 1954   Present on Hospital Admission: Yes  Primary Wound Type: Pressure Injury  Location: Buttocks  Wound Location Orientation: Right   Dressing/Treatment Open to air   Wound 07/29/19 Coccyx split at top of buttocks crack   Date First Assessed/Time First Assessed: 07/29/19 1955   Present on Hospital Admission: Yes  Location: Coccyx  Wound Description (Comments): split at top of buttocks crack   Dressing/Treatment Open to air   Wound 08/30/19 Coccyx   Date First Assessed/Time First Assessed: 08/30/19 0530   Primary Wound Type: (c) Other (comment)  Location: Coccyx   Dressing/Treatment Open to air   Chest Tube 1 Right Pleural   Placement Date/Time: 09/12/19 9507   Timeout: Patient;Procedure;Site/Side;Appropriate Equipment; Consent Confirmed;Sterile Technique using full body drape;Site prepped with chlorhexidine  Inserted by: Dr. Efra Liu Number: [id number]  Chest Tube Orientation: . ..    Chest Tube Airleak No   Drainage

## 2019-09-14 NOTE — PROGRESS NOTES
pleural effusion. Bibasilar atelectasis versus pneumonia. There is improved aeration of the   left lower lobe in the interval.         XR CHEST PORTABLE   Final Result   Bilateral pleuroparenchymal disease, slightly increased bilaterally. XR CHEST PORTABLE   Final Result   Improved aeration at the left base and decrease in left pleural effusion with   no pneumothorax. XR CHEST PORTABLE   Final Result   1. No significant change. XR CHEST PORTABLE   Final Result   No significant change over the past 3 days. XR CHEST PORTABLE   Final Result   Slight worsening of right pleural effusion. Pulmonary vascular congestion   interstitial prominence throughout the lungs is stable to slightly progressed. XR CHEST PORTABLE   Final Result   Left pleural catheter in place. Persistent bilateral pleural effusions, greater on the right, with bibasilar   airspace disease. Question tiny left pneumothorax. XR CHEST PORTABLE   Final Result   No significant change in bilateral pleural effusions and bilateral lung   consolidation         CT CHEST WO CONTRAST   Final Result   Moderate bilateral pleural effusions, with adjacent consolidation in the   lungs, likely atelectasis in the absence of clinical signs of pneumonia. Small mediastinal nodes are seen, likely reactive      Mild septal thickening, suggesting a component of fluid overload         XR CHEST PORTABLE   Final Result   Increased bilateral pleural effusions and bilateral lung consolidation         CT ABDOMEN PELVIS WO CONTRAST Additional Contrast? None   Final Result   Subtle asymmetric dermal thickening involving the soft tissues overlying the   right hip, along with mild asymmetric subcutaneous edema, compatible with   cellulitis. No soft tissue gas or abscess is identified. Moderate to severe diverticulosis of the sigmoid colon, but without CT   evidence of diverticulitis on the current examination. Prophylaxis: SCDs  Diet: DIET GENERAL;  Dietary Nutrition Supplements: Standard High Calorie Oral Supplement  Code Status: Full Code ,palliative care assisting with goals of care    PT/OT Eval Status: Patient  have been refusing      Dispo - possibly when chest tube removed,  pt stable and ok with specialists on board    Mary Anne Horvath MD

## 2019-09-14 NOTE — PROGRESS NOTES
8.4* 8.4*   HCT 25.6* 25.1*   .5* 101.8*    325     BMP:   Recent Labs     09/12/19  0507 09/13/19  0510 09/14/19  0527    138 137   K 4.2 4.7 4.2    103 102   CO2 26 28 26   BUN 18 19 21*   CREATININE 1.6* 1.6* 1.6*       Imaging:  I have reviewed radiology images personally. XR CHEST PORTABLE   Final Result   New small right apical pneumothorax. Right-sided chest tube is poorly   visualized on this study but appears to barely be within the chest.  PA and   lateral views or chest CT may be helpful for further evaluation if indicated. Persistent bilateral pleural effusions and bibasilar airspace disease. The findings were sent to the Radiology Results [address] at 7:55   am on 9/13/2019to be communicated to a licensed caregiver. XR CHEST PORTABLE   Final Result   No visible pneumothorax post thoracentesis. XR CHEST PORTABLE   Final Result   Bilateral airspace disease and bilateral pleural effusions, slightly   decreased on the right         CT CHEST W CONTRAST   Final Result   Left pleural effusion is decreased in size, with left basilar chest tube in   normal position. Moderate-sized right pleural effusion. Bibasilar atelectasis versus pneumonia. There is improved aeration of the   left lower lobe in the interval.         XR CHEST PORTABLE   Final Result   Bilateral pleuroparenchymal disease, slightly increased bilaterally. XR CHEST PORTABLE   Final Result   Improved aeration at the left base and decrease in left pleural effusion with   no pneumothorax. XR CHEST PORTABLE   Final Result   1. No significant change. XR CHEST PORTABLE   Final Result   No significant change over the past 3 days. XR CHEST PORTABLE   Final Result   Slight worsening of right pleural effusion. Pulmonary vascular congestion   interstitial prominence throughout the lungs is stable to slightly progressed.          XR CHEST PORTABLE

## 2019-09-15 ENCOUNTER — APPOINTMENT (OUTPATIENT)
Dept: GENERAL RADIOLOGY | Age: 74
DRG: 871 | End: 2019-09-15
Payer: MEDICARE

## 2019-09-15 LAB
ANION GAP SERPL CALCULATED.3IONS-SCNC: 8 MMOL/L (ref 3–16)
BODY FLUID CULTURE, STERILE: NORMAL
BUN BLDV-MCNC: 21 MG/DL (ref 7–20)
CALCIUM SERPL-MCNC: 8.6 MG/DL (ref 8.3–10.6)
CHLORIDE BLD-SCNC: 99 MMOL/L (ref 99–110)
CO2: 27 MMOL/L (ref 21–32)
CREAT SERPL-MCNC: 1.7 MG/DL (ref 0.8–1.3)
GFR AFRICAN AMERICAN: 48
GFR NON-AFRICAN AMERICAN: 40
GLUCOSE BLD-MCNC: 92 MG/DL (ref 70–99)
GRAM STAIN RESULT: NORMAL
POTASSIUM REFLEX MAGNESIUM: 4.9 MMOL/L (ref 3.5–5.1)
SODIUM BLD-SCNC: 134 MMOL/L (ref 136–145)

## 2019-09-15 PROCEDURE — 6370000000 HC RX 637 (ALT 250 FOR IP): Performed by: INTERNAL MEDICINE

## 2019-09-15 PROCEDURE — 36415 COLL VENOUS BLD VENIPUNCTURE: CPT

## 2019-09-15 PROCEDURE — 94761 N-INVAS EAR/PLS OXIMETRY MLT: CPT

## 2019-09-15 PROCEDURE — 6360000002 HC RX W HCPCS: Performed by: INTERNAL MEDICINE

## 2019-09-15 PROCEDURE — 99232 SBSQ HOSP IP/OBS MODERATE 35: CPT | Performed by: INTERNAL MEDICINE

## 2019-09-15 PROCEDURE — 2580000003 HC RX 258: Performed by: INTERNAL MEDICINE

## 2019-09-15 PROCEDURE — 94640 AIRWAY INHALATION TREATMENT: CPT

## 2019-09-15 PROCEDURE — 2700000000 HC OXYGEN THERAPY PER DAY

## 2019-09-15 PROCEDURE — 2060000000 HC ICU INTERMEDIATE R&B

## 2019-09-15 PROCEDURE — 80048 BASIC METABOLIC PNL TOTAL CA: CPT

## 2019-09-15 PROCEDURE — 71045 X-RAY EXAM CHEST 1 VIEW: CPT

## 2019-09-15 RX ADMIN — Medication 2 PUFF: at 20:35

## 2019-09-15 RX ADMIN — HEPARIN SODIUM 5000 UNITS: 5000 INJECTION INTRAVENOUS; SUBCUTANEOUS at 21:49

## 2019-09-15 RX ADMIN — LEVOTHYROXINE SODIUM 150 MCG: 150 TABLET ORAL at 10:00

## 2019-09-15 RX ADMIN — METOPROLOL TARTRATE 25 MG: 25 TABLET, FILM COATED ORAL at 10:00

## 2019-09-15 RX ADMIN — Medication 10 ML: at 10:01

## 2019-09-15 RX ADMIN — HEPARIN SODIUM 5000 UNITS: 5000 INJECTION INTRAVENOUS; SUBCUTANEOUS at 15:03

## 2019-09-15 RX ADMIN — METOPROLOL TARTRATE 25 MG: 25 TABLET, FILM COATED ORAL at 21:49

## 2019-09-15 RX ADMIN — Medication 10 ML: at 21:56

## 2019-09-15 RX ADMIN — OXYCODONE HYDROCHLORIDE 5 MG: 5 TABLET ORAL at 10:00

## 2019-09-15 RX ADMIN — AMLODIPINE BESYLATE 5 MG: 5 TABLET ORAL at 10:01

## 2019-09-15 RX ADMIN — TAMSULOSIN HYDROCHLORIDE 0.4 MG: 0.4 CAPSULE ORAL at 10:00

## 2019-09-15 RX ADMIN — PANTOPRAZOLE SODIUM 40 MG: 40 TABLET, DELAYED RELEASE ORAL at 10:00

## 2019-09-15 RX ADMIN — OXYCODONE HYDROCHLORIDE 5 MG: 5 TABLET ORAL at 18:27

## 2019-09-15 ASSESSMENT — PAIN SCALES - GENERAL
PAINLEVEL_OUTOF10: 7
PAINLEVEL_OUTOF10: 7
PAINLEVEL_OUTOF10: 4

## 2019-09-15 NOTE — PROGRESS NOTES
GENERAL;  Dietary Nutrition Supplements: Standard High Calorie Oral Supplement  Code Status: Full Code ,palliative care assisting with goals of care    PT/OT Eval Status: Patient  have been refusing      Dispo - possibly when chest tube removed,  pt stable and ok with specialists on board    Karina Andre MD

## 2019-09-15 NOTE — FLOWSHEET NOTE
Temperature Warm   R Pedal Pulse +2   LLE Neurovascular Assessment   Capillary Refill Less than/equal to 3 seconds   Color Appropriate for ethnicity   Temperature Warm   L Pedal Pulse +2   Skin Color/Condition   Skin Color/Condition (WDL) X   Skin Integrity   Skin Integrity (WDL) X   Musculoskeletal   Musculoskeletal (WDL) X   RUE Full movement   LUE Full movement   RL Extremity Weakness   LL Extremity Weakness   Genitourinary   Genitourinary (WDL) WDL   Anus/Rectum   Anus/Rectum (WDL) X   Evaluation Hemorrhoids   Wound 07/29/19 Buttocks Left   Date First Assessed/Time First Assessed: 07/29/19 1954   Present on Hospital Admission: Yes  Primary Wound Type: Pressure Injury  Location: Buttocks  Wound Location Orientation: Left   Dressing/Treatment Open to air   Wound 07/29/19 Buttocks Right   Date First Assessed/Time First Assessed: 07/29/19 1954   Present on Hospital Admission: Yes  Primary Wound Type: Pressure Injury  Location: Buttocks  Wound Location Orientation: Right   Dressing/Treatment Open to air   Wound 07/29/19 Coccyx split at top of buttocks crack   Date First Assessed/Time First Assessed: 07/29/19 1955   Present on Hospital Admission: Yes  Location: Coccyx  Wound Description (Comments): split at top of buttocks crack   Dressing/Treatment Open to air   Wound 08/30/19 Coccyx   Date First Assessed/Time First Assessed: 08/30/19 0530   Primary Wound Type: (c) Other (comment)  Location: Coccyx   Dressing/Treatment Open to air   Chest Tube 1 Right Pleural   Placement Date/Time: 09/12/19 1707   Timeout: Patient;Procedure;Site/Side;Appropriate Equipment; Consent Confirmed;Sterile Technique using full body drape;Site prepped with chlorhexidine  Inserted by: Dr. Dennis Daniel Number: 1  Chest Tube Orientation: . .. Chest Tube Airleak No   Drainage Description Serous   Dressing Status Clean;Dry; Intact   Dressing Type Dry dressing   Dressing Change Due 09/13/19   Site Assessment Clean;Dry; Intact   Surrounding Skin

## 2019-09-15 NOTE — PROGRESS NOTES
Consult placed to Dietician due to poor intake and sacral wounds. Pt had just a couple bites of breakfast, refused lunch and dinner.  Pt did eat 2 chocolate ice creams this evening

## 2019-09-15 NOTE — PROGRESS NOTES
INPATIENT PULMONARY CRITICAL CARE PROGRESS NOTE      SUBJECTIVE: The patient remains afebrile and hemodynamically stable. He is on oxygen at 4 LPM with SaO2 98%. He denies shortness of breath, had right sided chest pain after her chest tube was placed by Dr. Soares Siemens 9/12, chest pain has decreased. He has continued to drain fluid, 810 mL in the last 24 hours. Yesterday he did have a fever to 101 °F.  His left chest tube was discontinued on 9/10 after reviewing chest imaging, and the fact that drainage had decreased. The patient is eating well, says he possibly has diarrhea. He has not been using his BiPAP, is refusing per nursing. The rest of his review of systems was negative. Physical Exam:  Blood pressure 112/64, pulse 74, temperature 98 °F (36.7 °C), temperature source Oral, resp. rate 16, height 6' 1\" (1.854 m), weight 252 lb 3.3 oz (114.4 kg), SpO2 98 %.'   Constitutional: Well-built, obese, appearance of chronic illness. HENT:  Oropharynx is clear and moist.  Class III airway. Eyes:  Conjunctivae are pale. Pupils equal, round, and reactive to light. No scleral icterus. Neck: Short and large neck, no JVD. Cardiovascular: NSR, normal heart sounds. Pulmonary/Chest: No wheezes. No rales. Improved air entry right hemithorax. No accessory muscle usage or stridor. Abdominal: Deferred. Musculoskeletal: No cyanosis. No clubbing. No obvious joint deformity. Poor muscle mass. Lymphadenopathy: Deferred. Skin: Skin is warm and dry. No rash or nodules on the exposed extremities. Multiple ecchymoses. Psychiatric: Normal mood and affect. Behavior is normal.  No anxiety. Neurologic: Alert, awake and oriented. PERRL. Speech fluent. No obvious focal deficit, detailed exam not performed.         Results:  CBC:   Recent Labs     09/14/19 0527   WBC 10.8   HGB 8.4*   HCT 25.1*   .8*        BMP:   Recent Labs     09/13/19  0510 09/14/19  0527 09/15/19  0448    137 134*   K 4.7 4.2 4.9    102 99   CO2 28 26 27   BUN 19 21* 21*   CREATININE 1.6* 1.6* 1.7*       Imaging:  I have reviewed radiology images personally. XR CHEST PORTABLE   Final Result   New small right apical pneumothorax. Right-sided chest tube is poorly   visualized on this study but appears to barely be within the chest.  PA and   lateral views or chest CT may be helpful for further evaluation if indicated. Persistent bilateral pleural effusions and bibasilar airspace disease. The findings were sent to the Radiology Results [address] at 7:55   am on 9/13/2019to be communicated to a licensed caregiver. XR CHEST PORTABLE   Final Result   No visible pneumothorax post thoracentesis. XR CHEST PORTABLE   Final Result   Bilateral airspace disease and bilateral pleural effusions, slightly   decreased on the right         CT CHEST W CONTRAST   Final Result   Left pleural effusion is decreased in size, with left basilar chest tube in   normal position. Moderate-sized right pleural effusion. Bibasilar atelectasis versus pneumonia. There is improved aeration of the   left lower lobe in the interval.         XR CHEST PORTABLE   Final Result   Bilateral pleuroparenchymal disease, slightly increased bilaterally. XR CHEST PORTABLE   Final Result   Improved aeration at the left base and decrease in left pleural effusion with   no pneumothorax. XR CHEST PORTABLE   Final Result   1. No significant change. XR CHEST PORTABLE   Final Result   No significant change over the past 3 days. XR CHEST PORTABLE   Final Result   Slight worsening of right pleural effusion. Pulmonary vascular congestion   interstitial prominence throughout the lungs is stable to slightly progressed. XR CHEST PORTABLE   Final Result   Left pleural catheter in place. Persistent bilateral pleural effusions, greater on the right, with bibasilar   airspace disease.       Question Additional Contrast?->None Reason for Exam: sepsis, groin/butt cellulitis Acuity: Acute Type of Exam: Initial FINDINGS: Lower Chest: There are at least moderate-sized bilateral pleural effusions, greater on the left, stable mildly increased when compared to the previous exam.  Adjacent airspace disease is noted. Gynecomastia noted on the right. The left chest wall is unremarkable. Base of the heart unremarkable. Organs: Evaluation of the solid abdominal viscera is limited without intravenous contrast.  Having said that, no acute abnormality detected within the liver and spleen. Gallstones redemonstrated. Normal noncontrast imaging the adrenals and pancreas. Nonobstructing nephrolithiasis is noted bilaterally. No hydronephrosis or hydroureter. No ureteral calculi are found. GI/Bowel: There is mild residual perirectal fat stranding. The fat stranding seen previously has for the most part resolved. There continues to be moderate to severe diverticulosis in the region of the sigmoid colon. Convincing CT evidence diverticulitis is not detected. Large bowel otherwise unremarkable. Appendix is unremarkable. Distal esophagus, stomach, duodenal sweep, and the remainder of the small bowel are unremarkable. Pelvis: Penile prosthesis again noted. No free pelvic fluid. Peritoneum/Retroperitoneum: Moderate vascular calcifications within the abdominal aorta. Abdominal aorta normal in caliber. No retroperitoneal lymphadenopathy. Bones/Soft Tissues: There is slight asymmetric dermal thickening involving the soft tissues overlying the right hip, with mild asymmetric subcutaneous edema. No soft tissue gas or focal fluid collection is identified. There is diffuse muscular atrophy. Chronic left hip fracture is are noted. No acute bony abnormality. Subtle asymmetric dermal thickening involving the soft tissues overlying the right hip, along with mild asymmetric subcutaneous edema, compatible with cellulitis.   No soft tissue gas or abscess is identified. Moderate to severe diverticulosis of the sigmoid colon, but without CT evidence of diverticulitis on the current examination. At least moderate bilateral pleural effusions with adjacent airspace disease, similar to increased when compared to the previous exam.     Xr Chest Standard (2 Vw)    Result Date: 8/23/2019  EXAMINATION: TWO XRAY VIEWS OF THE CHEST 8/23/2019 1:13 pm COMPARISON: August 19, 2019 HISTORY: ORDERING SYSTEM PROVIDED HISTORY: resp failure TECHNOLOGIST PROVIDED HISTORY: Reason for exam:->resp failure FINDINGS: Cardiac silhouette is enlarged. Moderate bilateral pleural effusions, decreased slightly. Minimally displaced left 6th rib fracture similar to previous exam.     Small bilateral pleural effusions, decreased slightly. Ct Chest Wo Contrast    Result Date: 8/31/2019  EXAMINATION: CT OF THE CHEST WITHOUT CONTRAST 8/31/2019 10:55 am TECHNIQUE: CT of the chest was performed without the administration of intravenous contrast. Multiplanar reformatted images are provided for review. Dose modulation, iterative reconstruction, and/or weight based adjustment of the mA/kV was utilized to reduce the radiation dose to as low as reasonably achievable. COMPARISON: 02/14/2019 HISTORY: ORDERING SYSTEM PROVIDED HISTORY: COUGH, PERSISTENT TECHNOLOGIST PROVIDED HISTORY: Reason for Exam: abnormal chest x-ray Acuity: Acute Type of Exam: Initial FINDINGS: Mediastinum: Thyroid gland appears normal.  Scattered small subcentimeter mediastinal and hilar nodes are noted. Coronary artery calcification is seen. No pericardial effusion. Small hiatal hernia is seen. There is nonspecific thickening at the GE junction. Secretions are seen in the airways. Lungs/pleura: Moderate right-sided pleural effusion is seen. There is adjacent right upper middle and right lower lobe consolidation Moderate left-sided pleural effusion is seen.   There is adjacent left upper and left lower lobe

## 2019-09-16 LAB
ANION GAP SERPL CALCULATED.3IONS-SCNC: 7 MMOL/L (ref 3–16)
BUN BLDV-MCNC: 20 MG/DL (ref 7–20)
CALCIUM SERPL-MCNC: 8.6 MG/DL (ref 8.3–10.6)
CHLORIDE BLD-SCNC: 101 MMOL/L (ref 99–110)
CO2: 26 MMOL/L (ref 21–32)
CREAT SERPL-MCNC: 1.7 MG/DL (ref 0.8–1.3)
FUNGUS (MYCOLOGY) CULTURE: NORMAL
FUNGUS STAIN: NORMAL
GFR AFRICAN AMERICAN: 48
GFR NON-AFRICAN AMERICAN: 40
GLUCOSE BLD-MCNC: 90 MG/DL (ref 70–99)
POTASSIUM REFLEX MAGNESIUM: 4.4 MMOL/L (ref 3.5–5.1)
SODIUM BLD-SCNC: 134 MMOL/L (ref 136–145)

## 2019-09-16 PROCEDURE — 99232 SBSQ HOSP IP/OBS MODERATE 35: CPT | Performed by: INTERNAL MEDICINE

## 2019-09-16 PROCEDURE — 6370000000 HC RX 637 (ALT 250 FOR IP): Performed by: INTERNAL MEDICINE

## 2019-09-16 PROCEDURE — 6360000002 HC RX W HCPCS: Performed by: INTERNAL MEDICINE

## 2019-09-16 PROCEDURE — 2700000000 HC OXYGEN THERAPY PER DAY

## 2019-09-16 PROCEDURE — 94640 AIRWAY INHALATION TREATMENT: CPT

## 2019-09-16 PROCEDURE — 80048 BASIC METABOLIC PNL TOTAL CA: CPT

## 2019-09-16 PROCEDURE — 2580000003 HC RX 258: Performed by: INTERNAL MEDICINE

## 2019-09-16 PROCEDURE — 36415 COLL VENOUS BLD VENIPUNCTURE: CPT

## 2019-09-16 PROCEDURE — 94761 N-INVAS EAR/PLS OXIMETRY MLT: CPT

## 2019-09-16 PROCEDURE — 2060000000 HC ICU INTERMEDIATE R&B

## 2019-09-16 RX ORDER — OMEPRAZOLE 20 MG/1
CAPSULE, DELAYED RELEASE ORAL
Qty: 90 CAPSULE | Refills: 0 | Status: SHIPPED | OUTPATIENT
Start: 2019-09-16

## 2019-09-16 RX ADMIN — AMLODIPINE BESYLATE 5 MG: 5 TABLET ORAL at 09:59

## 2019-09-16 RX ADMIN — Medication 10 ML: at 21:44

## 2019-09-16 RX ADMIN — Medication 10 ML: at 10:00

## 2019-09-16 RX ADMIN — TAMSULOSIN HYDROCHLORIDE 0.4 MG: 0.4 CAPSULE ORAL at 10:00

## 2019-09-16 RX ADMIN — PANTOPRAZOLE SODIUM 40 MG: 40 TABLET, DELAYED RELEASE ORAL at 06:38

## 2019-09-16 RX ADMIN — OXYCODONE HYDROCHLORIDE 5 MG: 5 TABLET ORAL at 15:36

## 2019-09-16 RX ADMIN — HEPARIN SODIUM 5000 UNITS: 5000 INJECTION INTRAVENOUS; SUBCUTANEOUS at 06:38

## 2019-09-16 RX ADMIN — Medication 2 PUFF: at 08:58

## 2019-09-16 RX ADMIN — HEPARIN SODIUM 5000 UNITS: 5000 INJECTION INTRAVENOUS; SUBCUTANEOUS at 14:02

## 2019-09-16 RX ADMIN — HEPARIN SODIUM 5000 UNITS: 5000 INJECTION INTRAVENOUS; SUBCUTANEOUS at 21:38

## 2019-09-16 RX ADMIN — METOPROLOL TARTRATE 25 MG: 25 TABLET, FILM COATED ORAL at 21:38

## 2019-09-16 RX ADMIN — PSYLLIUM HUSK 1 PACKET: 3.4 POWDER ORAL at 10:05

## 2019-09-16 RX ADMIN — Medication 2 PUFF: at 20:34

## 2019-09-16 RX ADMIN — METOPROLOL TARTRATE 25 MG: 25 TABLET, FILM COATED ORAL at 09:59

## 2019-09-16 RX ADMIN — LEVOTHYROXINE SODIUM 150 MCG: 150 TABLET ORAL at 09:59

## 2019-09-16 ASSESSMENT — PAIN SCALES - GENERAL
PAINLEVEL_OUTOF10: 0
PAINLEVEL_OUTOF10: 8
PAINLEVEL_OUTOF10: 8
PAINLEVEL_OUTOF10: 0
PAINLEVEL_OUTOF10: 3
PAINLEVEL_OUTOF10: 0

## 2019-09-16 NOTE — PROGRESS NOTES
identified. There is diffuse muscular atrophy. Chronic left hip fracture is are noted. No acute bony abnormality. Subtle asymmetric dermal thickening involving the soft tissues overlying the right hip, along with mild asymmetric subcutaneous edema, compatible with cellulitis. No soft tissue gas or abscess is identified. Moderate to severe diverticulosis of the sigmoid colon, but without CT evidence of diverticulitis on the current examination. At least moderate bilateral pleural effusions with adjacent airspace disease, similar to increased when compared to the previous exam.     Xr Chest Standard (2 Vw)    Result Date: 8/23/2019  EXAMINATION: TWO XRAY VIEWS OF THE CHEST 8/23/2019 1:13 pm COMPARISON: August 19, 2019 HISTORY: ORDERING SYSTEM PROVIDED HISTORY: resp failure TECHNOLOGIST PROVIDED HISTORY: Reason for exam:->resp failure FINDINGS: Cardiac silhouette is enlarged. Moderate bilateral pleural effusions, decreased slightly. Minimally displaced left 6th rib fracture similar to previous exam.     Small bilateral pleural effusions, decreased slightly. Ct Chest Wo Contrast    Result Date: 8/31/2019  EXAMINATION: CT OF THE CHEST WITHOUT CONTRAST 8/31/2019 10:55 am TECHNIQUE: CT of the chest was performed without the administration of intravenous contrast. Multiplanar reformatted images are provided for review. Dose modulation, iterative reconstruction, and/or weight based adjustment of the mA/kV was utilized to reduce the radiation dose to as low as reasonably achievable. COMPARISON: 02/14/2019 HISTORY: ORDERING SYSTEM PROVIDED HISTORY: COUGH, PERSISTENT TECHNOLOGIST PROVIDED HISTORY: Reason for Exam: abnormal chest x-ray Acuity: Acute Type of Exam: Initial FINDINGS: Mediastinum: Thyroid gland appears normal.  Scattered small subcentimeter mediastinal and hilar nodes are noted. Coronary artery calcification is seen. No pericardial effusion. Small hiatal hernia is seen.   There is nonspecific thickening at the GE junction. Secretions are seen in the airways. Lungs/pleura: Moderate right-sided pleural effusion is seen. There is adjacent right upper middle and right lower lobe consolidation Moderate left-sided pleural effusion is seen. There is adjacent left upper and left lower lobe consolidation. There is underlying emphysema There is subtle septal thickening suggesting a component of fluid overload. Upper Abdomen: Small hiatal hernia seen. There is nonspecific thickening at the GE junction. Soft Tissues/Bones: Postfusion changes are seen in the spine. Multilevel degenerative changes are seen . Degenerative changes are seen in the shoulder joints     Moderate bilateral pleural effusions, with adjacent consolidation in the lungs, likely atelectasis in the absence of clinical signs of pneumonia. Small mediastinal nodes are seen, likely reactive Mild septal thickening, suggesting a component of fluid overload     Ct Chest W Contrast    Result Date: 9/10/2019  EXAMINATION: CT OF THE CHEST WITH CONTRAST 9/10/2019 9:35 am TECHNIQUE: CT of the chest was performed with the administration of intravenous contrast. Multiplanar reformatted images are provided for review. Dose modulation, iterative reconstruction, and/or weight based adjustment of the mA/kV was utilized to reduce the radiation dose to as low as reasonably achievable. COMPARISON: Chest x-ray, 09/09/2019. CT chest, 08/31/2019 HISTORY: ORDERING SYSTEM PROVIDED HISTORY: ACUTE RESP ILLNESS, >36YEARS OLD TECHNOLOGIST PROVIDED HISTORY: Reason for Exam: chest tube Acuity: Acute Type of Exam: Initial FINDINGS: Mediastinum: Heavy coronary artery calcification is present. Thoracic aorta is tortuous and normal in caliber. Subcentimeter mediastinal nodes are stable. There is no adenopathy. Lungs/pleura: Bilateral, dependently layered pleural effusions are noted, moderate on the right and mild on the left in size.   There is a left basilar pleural drain effusion with chest tube FINDINGS: Left-sided chest tube appears unchanged in position. Bilateral pleural effusions remain present, no significant change. Bibasilar airspace disease and vascular congestion with interstitial edema also unchanged     No significant change over the past 3 days. Xr Chest Portable    Result Date: 9/3/2019  EXAMINATION: ONE XRAY VIEW OF THE CHEST 9/3/2019 6:22 am COMPARISON: September 2, 2019 HISTORY: ORDERING SYSTEM PROVIDED HISTORY: COPD TECHNOLOGIST PROVIDED HISTORY: Reason for exam:->COPD Reason for Exam: COPD FINDINGS: Cardiac silhouette is enlarged. Moderate right and small to moderate left pleural effusions, similar to previous exam on the left. Slight worsening of right pleural effusion. Pulmonary vascular congestion interstitial prominence throughout the lungs, stable slightly increased. Slight worsening of right pleural effusion. Pulmonary vascular congestion interstitial prominence throughout the lungs is stable to slightly progressed. Xr Chest Portable    Result Date: 9/2/2019  EXAMINATION: ONE XRAY VIEW OF THE CHEST 9/2/2019 2:56 pm COMPARISON: Prior studies including most recent chest x-ray 09/01/2019, CT chest 08/31/2019 HISTORY: ORDERING SYSTEM PROVIDED HISTORY: check chest tube placement TECHNOLOGIST PROVIDED HISTORY: Reason for exam:->check chest tube placement FINDINGS: Moderate-to-large right and moderate left pleural effusions are noted. The size left pleural effusion is probably slightly decreased in size since the prior study. A pleural catheter projects over lower lateral left chest. Bibasilar airspace disease is present. There is a questionable tiny left apical pneumothorax     Left pleural catheter in place. Persistent bilateral pleural effusions, greater on the right, with bibasilar airspace disease. Question tiny left pneumothorax.      Xr Chest Portable    Result Date: 9/1/2019  EXAMINATION: ONE XRAY VIEW OF THE CHEST 9/1/2019 11:15 am MD    9/16/2019    6:22 AM.

## 2019-09-16 NOTE — PROGRESS NOTES
At 20.15 patient was saturating 98% so this writer weaned down to 2L oxygen. At 4.50am patient awoke while this writer was doing his vitals stating that he could not breath and asked what his oxygen was at, I informed him his oxygen was at 2L the patient cursed a lot and asked to speak to the night manager he demanded his oxygen to be put back to 4L as that is his home dose and he is sick of us messing with it. This writer informed patient the Pulmonologist would like us to wean oxygen when he is saturating so well and educated him reasons why, he was not happy with explanation and said he will speak to pulmnologist in the morning. Oxygen increased back to 4L.

## 2019-09-16 NOTE — CONSULTS
251 lb (113.9 kg)  · % Weight Change:  ,  no significant weight loss per EMR (3.5% wt loss x7 months)  · Ideal Body Wt: 184 lb (83.5 kg), % Ideal Body 136%  · BMI Classification: BMI 30.0 - 34.9 Obese Class I    Nutrition Interventions:   Continue current diet, Modify current ONS  Continued Inpatient Monitoring    Nutrition Evaluation:   · Evaluation: Progressing toward goals   · Goals: Pt will have po intakes 50% or greater and include a protein source at every meal this admission    · Monitoring: Nutrition Progression, Meal Intake, Supplement Intake, Diet Tolerance, Wound Healing, Pertinent Labs, Weight, Monitor Bowel Function      Electronically signed by Dashawn Mohan RD, LD on 9/16/19 at 1:18 PM  Contact Number: Office: [phone]; [address]: [phone]

## 2019-09-16 NOTE — PROGRESS NOTES
Oxygen saturation declined to 85% on 6L.  Hi Flow placed at 8L Additional Notes: Patient states on a scale of 0-10, pain level is 0. Quality 131: Pain Assessment And Follow-Up: Pain assessment using a standardized tool is documented as negative, no follow-up plan required Detail Level: Detailed Quality 130: Documentation Of Current Medications In The Medical Record: Current Medications Documented

## 2019-09-16 NOTE — PROGRESS NOTES
Progress Note    HISTORY     CC:   GI bleeding            We are following for acute kidney injury, recurrent         Subjective/   HPI:    Renal function stable with serum creatinine 1.6-1.7 over last few days, stabilized now, urine output of 800 ml over last 24 hours. Intake adequate, no further hypotension. ROS:  Constitutional:  No fevers, No Chills, + weakness  Cardiovascular:  No palpations, no edema  Respiratory:  No wheezing, no cough, states sob better  Skin:  No rash, no itching  :  No hematuria, no dysuria     Social Hx:  No family at bedside     Past Medical and Surgical History:  - Reviewed, no changes     EXAM       Objective/     Vitals:    09/16/19 0045 09/16/19 0449 09/16/19 0450 09/16/19 0900   BP: 127/75 122/74     Pulse: 78 90     Resp: 16 18  20   Temp: 98.7 °F (37.1 °C) 97.9 °F (36.6 °C)     TempSrc: Oral Oral     SpO2: 94% 93%  98%   Weight:   251 lb 5.2 oz (114 kg)    Height:         24HR INTAKE/OUTPUT:      Intake/Output Summary (Last 24 hours) at 9/16/2019 0911  Last data filed at 9/16/2019 0636  Gross per 24 hour   Intake 840 ml   Output 1270 ml   Net -430 ml     Constitutional:  NAD  Eyes:  Pupils reactive, sclera clear   Neck:  Normal thyroid, no masses   Cardiovascular:  Regular, no rub  Respiratory:  No distress, no wheezing  Psychiatry:  Somnolent, flat affect   Abdomen: +bs, soft, nt, no masses   Musculoskeletal: No LE edema, no clubbing   Lymphatics:  No LAD in neck, no supraclavicular nodes       MEDICAL DECISION MAKING       Data/  Recent Labs     09/14/19 0527   WBC 10.8   HGB 8.4*   HCT 25.1*   .8*        Recent Labs     09/14/19  0527 09/15/19  0448 09/16/19  0457    134* 134*   K 4.2 4.9 4.4    99 101   CO2 26 27 26   GLUCOSE 91 92 90   BUN 21* 21* 20   CREATININE 1.6* 1.7* 1.7*   LABGLOM 43* 40* 40*   GFRAA 51* 48* 48*       Assessment/     Acute Kidney Injury:  KDIGO stage 2  - Etiology:  Pre-renal.  No hematuria.     - Data: Urine

## 2019-09-17 LAB
ANION GAP SERPL CALCULATED.3IONS-SCNC: 7 MMOL/L (ref 3–16)
BUN BLDV-MCNC: 18 MG/DL (ref 7–20)
CALCIUM SERPL-MCNC: 8.6 MG/DL (ref 8.3–10.6)
CHLORIDE BLD-SCNC: 102 MMOL/L (ref 99–110)
CO2: 27 MMOL/L (ref 21–32)
CREAT SERPL-MCNC: 1.4 MG/DL (ref 0.8–1.3)
GFR AFRICAN AMERICAN: >60
GFR NON-AFRICAN AMERICAN: 50
GLUCOSE BLD-MCNC: 93 MG/DL (ref 70–99)
POTASSIUM REFLEX MAGNESIUM: 4.4 MMOL/L (ref 3.5–5.1)
SODIUM BLD-SCNC: 136 MMOL/L (ref 136–145)

## 2019-09-17 PROCEDURE — 80048 BASIC METABOLIC PNL TOTAL CA: CPT

## 2019-09-17 PROCEDURE — 6370000000 HC RX 637 (ALT 250 FOR IP): Performed by: INTERNAL MEDICINE

## 2019-09-17 PROCEDURE — 36415 COLL VENOUS BLD VENIPUNCTURE: CPT

## 2019-09-17 PROCEDURE — 2060000000 HC ICU INTERMEDIATE R&B

## 2019-09-17 PROCEDURE — 99233 SBSQ HOSP IP/OBS HIGH 50: CPT | Performed by: INTERNAL MEDICINE

## 2019-09-17 PROCEDURE — 6360000002 HC RX W HCPCS: Performed by: INTERNAL MEDICINE

## 2019-09-17 PROCEDURE — 2580000003 HC RX 258: Performed by: INTERNAL MEDICINE

## 2019-09-17 PROCEDURE — 94640 AIRWAY INHALATION TREATMENT: CPT

## 2019-09-17 RX ADMIN — AMLODIPINE BESYLATE 5 MG: 5 TABLET ORAL at 09:35

## 2019-09-17 RX ADMIN — METOPROLOL TARTRATE 25 MG: 25 TABLET, FILM COATED ORAL at 20:53

## 2019-09-17 RX ADMIN — PANTOPRAZOLE SODIUM 40 MG: 40 TABLET, DELAYED RELEASE ORAL at 08:10

## 2019-09-17 RX ADMIN — TAMSULOSIN HYDROCHLORIDE 0.4 MG: 0.4 CAPSULE ORAL at 09:34

## 2019-09-17 RX ADMIN — HEPARIN SODIUM 5000 UNITS: 5000 INJECTION INTRAVENOUS; SUBCUTANEOUS at 14:20

## 2019-09-17 RX ADMIN — HEPARIN SODIUM 5000 UNITS: 5000 INJECTION INTRAVENOUS; SUBCUTANEOUS at 22:59

## 2019-09-17 RX ADMIN — LEVOTHYROXINE SODIUM 150 MCG: 150 TABLET ORAL at 08:10

## 2019-09-17 RX ADMIN — METOPROLOL TARTRATE 25 MG: 25 TABLET, FILM COATED ORAL at 09:35

## 2019-09-17 RX ADMIN — Medication 10 ML: at 20:53

## 2019-09-17 RX ADMIN — Medication 10 ML: at 09:35

## 2019-09-17 RX ADMIN — HEPARIN SODIUM 5000 UNITS: 5000 INJECTION INTRAVENOUS; SUBCUTANEOUS at 06:16

## 2019-09-17 RX ADMIN — PSYLLIUM HUSK 1 PACKET: 3.4 POWDER ORAL at 09:36

## 2019-09-17 RX ADMIN — Medication 2 PUFF: at 20:16

## 2019-09-17 ASSESSMENT — PAIN SCALES - GENERAL
PAINLEVEL_OUTOF10: 0
PAINLEVEL_OUTOF10: 0

## 2019-09-17 NOTE — PROGRESS NOTES
Pulmonary & Critical Care Inpatient Progress Note   Jerardo Herrera MD     REASON FOR TODAY'S VISIT:  Chronic resp failure    SUBJECTIVE:   Persistent drainage from chest tube but less than 50cc/hr which meets threshold to remove. On simple nasal cannula oxygen  Not participating with PT and refusing other care as well    Scheduled Meds:   heparin (porcine)  5,000 Units Subcutaneous 3 times per day    psyllium  1 packet Oral Daily    amLODIPine  5 mg Oral Daily    levothyroxine  150 mcg Oral Daily    metoprolol tartrate  25 mg Oral BID    pantoprazole  40 mg Oral QAM AC    mometasone-formoterol  2 puff Inhalation BID    tamsulosin  0.4 mg Oral Daily    sodium chloride flush  10 mL Intravenous 2 times per day       Continuous Infusions:      PRN Meds:  oxyCODONE, albuterol sulfate HFA, sodium chloride flush, acetaminophen    ALLERGIES:  Patient has No Known Allergies. Objective:   PHYSICAL EXAM:  /68   Pulse 91   Temp 97.8 °F (36.6 °C) (Oral)   Resp 16   Ht 6' 1\" (1.854 m)   Wt 249 lb 14.4 oz (113.4 kg)   SpO2 98%   BMI 32.97 kg/m²    Physical Exam   Constitutional: He appears well-developed and well-nourished. No distress. HENT:   Head: Normocephalic and atraumatic. Mouth/Throat: Oropharynx is clear and moist. No oropharyngeal exudate. Eyes: Pupils are equal, round, and reactive to light. EOM are normal.   Neck: Neck supple. No JVD present. Cardiovascular: Normal heart sounds. Exam reveals no gallop and no friction rub. No murmur heard. Pulmonary/Chest: Effort normal. He has no wheezes. He has no rales. Equal chest rise and expansion bilaterally   Abdominal: Soft. Bowel sounds are normal. He exhibits no distension. There is no tenderness. Musculoskeletal: Normal range of motion. He exhibits no edema. Lymphadenopathy:     He has no cervical adenopathy. Neurological: He is alert. No cranial nerve deficit. CN 2-12 grossly intact   Skin: Skin is warm and dry. No rash noted.  He

## 2019-09-17 NOTE — FLOWSHEET NOTE
RUE Full sensation   Sensation LUE Full sensation   Sensation RLE Numbness   Sensation LLE Numbness   RUE Neurovascular Assessment   Capillary Refill Less than/equal to 3 seconds   Color Appropriate for ethnicity; Ecchymosis   Temperature Warm   R Radial Pulse +2   LUE Neurovascular Assessment   Capillary Refill Less than/equal to 3 seconds   Color Appropriate for ethnicity; Ecchymosis   Temperature Warm   L Radial Pulse +2   RLE Neurovascular Assessment   Capillary Refill Less than/equal to 3 seconds   Color Appropriate for ethnicity   Temperature Warm   R Pedal Pulse +2   LLE Neurovascular Assessment   Capillary Refill Less than/equal to 3 seconds   Color Appropriate for ethnicity   Temperature Warm   L Pedal Pulse +2   Skin Color/Condition   Skin Color/Condition (WDL) X   Skin Integrity   Skin Integrity (WDL) X   Musculoskeletal   Musculoskeletal (WDL) X   RUE Full movement   LUE Full movement   RL Extremity Weakness   LL Extremity Weakness   Genitourinary   Genitourinary (WDL) WDL   Flank Tenderness No   Suprapubic Tenderness No   Dysuria No   Urine Assessment   Incontinence No   Urine Color Lorna   Urine Appearance Clear   Urine Odor No odor   Anus/Rectum   Anus/Rectum (WDL) X   Evaluation Hemorrhoids  (per previous assessment)   Chest Tube 1 Right Pleural   Placement Date/Time: 09/12/19 9768   Timeout: Patient;Procedure;Site/Side;Appropriate Equipment; Consent Confirmed;Sterile Technique using full body drape;Site prepped with chlorhexidine  Inserted by: Dr. Dennis Daniel Number: 1  Chest Tube Orientation: . .. Chest Tube Airleak No   Drainage Description Yellow   Dressing Status Clean;Dry; Intact   Dressing Type Dry dressing   Site Assessment Clean;Dry; Intact   Surrounding Skin Dry   Psychosocial   Psychosocial (WDL) WDL   Patient Behaviors Appropriate for age; Cooperative;Calm   Rest/Sleep for Patient [id number]

## 2019-09-17 NOTE — PROGRESS NOTES
months.     Chronic diastolic heart failure: Does not seem in acute decompensation currently. Torsemide held due to MCKENNA  Further management of diuretics as per nephrology. HTN   fairly controlled. Continue meds     COPD: Stable.   Continue inhalers  - baseline 4L NC      DVT Prophylaxis: SCDs  Diet: DIET GENERAL;  Dietary Nutrition Supplements: Low Volume Supplement  Dietary Nutrition Supplements: Wound Healing Oral Supplement  Code Status: Full Code ,palliative care assisting with goals of care    PT/OT Eval Status: Ordered      Dispo - possibly when chest tube removed,  pt stable and ok with specialists on board    Alysa Sherman MD

## 2019-09-17 NOTE — CARE COORDINATION
MD requesting for [facility] to do a screening to see if patient meets criteria and could go to LT[facility] IF patient would even be agreeable with this. Placed call to Kathy with [facility] to ask her to screen patient. After screening patient, Lisa Johnston called this CM back and states he meets criteria but that [employer] NEVER approves to pay for this level of care.

## 2019-09-18 ENCOUNTER — APPOINTMENT (OUTPATIENT)
Dept: GENERAL RADIOLOGY | Age: 74
DRG: 871 | End: 2019-09-18
Payer: MEDICARE

## 2019-09-18 LAB
ANION GAP SERPL CALCULATED.3IONS-SCNC: 8 MMOL/L (ref 3–16)
BUN BLDV-MCNC: 21 MG/DL (ref 7–20)
CALCIUM SERPL-MCNC: 8.5 MG/DL (ref 8.3–10.6)
CHLORIDE BLD-SCNC: 99 MMOL/L (ref 99–110)
CO2: 27 MMOL/L (ref 21–32)
CREAT SERPL-MCNC: 1.3 MG/DL (ref 0.8–1.3)
GFR AFRICAN AMERICAN: >60
GFR NON-AFRICAN AMERICAN: 54
GLUCOSE BLD-MCNC: 85 MG/DL (ref 70–99)
HCT VFR BLD CALC: 26.1 % (ref 40.5–52.5)
HEMOGLOBIN: 8.5 G/DL (ref 13.5–17.5)
MCH RBC QN AUTO: 33.2 PG (ref 26–34)
MCHC RBC AUTO-ENTMCNC: 32.7 G/DL (ref 31–36)
MCV RBC AUTO: 101.4 FL (ref 80–100)
PDW BLD-RTO: 15.8 % (ref 12.4–15.4)
PLATELET # BLD: 413 K/UL (ref 135–450)
PMV BLD AUTO: 8.8 FL (ref 5–10.5)
POTASSIUM REFLEX MAGNESIUM: 4.5 MMOL/L (ref 3.5–5.1)
RBC # BLD: 2.57 M/UL (ref 4.2–5.9)
SODIUM BLD-SCNC: 134 MMOL/L (ref 136–145)
WBC # BLD: 9.8 K/UL (ref 4–11)

## 2019-09-18 PROCEDURE — 94761 N-INVAS EAR/PLS OXIMETRY MLT: CPT

## 2019-09-18 PROCEDURE — 6360000002 HC RX W HCPCS: Performed by: INTERNAL MEDICINE

## 2019-09-18 PROCEDURE — 6370000000 HC RX 637 (ALT 250 FOR IP): Performed by: INTERNAL MEDICINE

## 2019-09-18 PROCEDURE — 85027 COMPLETE CBC AUTOMATED: CPT

## 2019-09-18 PROCEDURE — 94640 AIRWAY INHALATION TREATMENT: CPT

## 2019-09-18 PROCEDURE — 36415 COLL VENOUS BLD VENIPUNCTURE: CPT

## 2019-09-18 PROCEDURE — 80048 BASIC METABOLIC PNL TOTAL CA: CPT

## 2019-09-18 PROCEDURE — 2580000003 HC RX 258: Performed by: INTERNAL MEDICINE

## 2019-09-18 PROCEDURE — 2060000000 HC ICU INTERMEDIATE R&B

## 2019-09-18 PROCEDURE — 2700000000 HC OXYGEN THERAPY PER DAY

## 2019-09-18 PROCEDURE — 71045 X-RAY EXAM CHEST 1 VIEW: CPT

## 2019-09-18 PROCEDURE — 99233 SBSQ HOSP IP/OBS HIGH 50: CPT | Performed by: INTERNAL MEDICINE

## 2019-09-18 RX ADMIN — METOPROLOL TARTRATE 25 MG: 25 TABLET, FILM COATED ORAL at 10:11

## 2019-09-18 RX ADMIN — HEPARIN SODIUM 5000 UNITS: 5000 INJECTION INTRAVENOUS; SUBCUTANEOUS at 05:24

## 2019-09-18 RX ADMIN — PANTOPRAZOLE SODIUM 40 MG: 40 TABLET, DELAYED RELEASE ORAL at 05:24

## 2019-09-18 RX ADMIN — AMLODIPINE BESYLATE 5 MG: 5 TABLET ORAL at 10:11

## 2019-09-18 RX ADMIN — TAMSULOSIN HYDROCHLORIDE 0.4 MG: 0.4 CAPSULE ORAL at 10:11

## 2019-09-18 RX ADMIN — Medication 10 ML: at 21:19

## 2019-09-18 RX ADMIN — Medication 10 ML: at 10:12

## 2019-09-18 RX ADMIN — HEPARIN SODIUM 5000 UNITS: 5000 INJECTION INTRAVENOUS; SUBCUTANEOUS at 15:07

## 2019-09-18 RX ADMIN — LEVOTHYROXINE SODIUM 150 MCG: 150 TABLET ORAL at 08:03

## 2019-09-18 RX ADMIN — HEPARIN SODIUM 5000 UNITS: 5000 INJECTION INTRAVENOUS; SUBCUTANEOUS at 21:18

## 2019-09-18 RX ADMIN — Medication 2 PUFF: at 08:59

## 2019-09-18 RX ADMIN — METOPROLOL TARTRATE 25 MG: 25 TABLET, FILM COATED ORAL at 21:19

## 2019-09-18 ASSESSMENT — PAIN SCALES - GENERAL
PAINLEVEL_OUTOF10: 0
PAINLEVEL_OUTOF10: 0

## 2019-09-18 NOTE — PROGRESS NOTES
Pulmonary & Critical Care Inpatient Progress Note   Paolo Hannah MD     REASON FOR TODAY'S VISIT:  Chronic resp failure    SUBJECTIVE:   No acute events  On 3 LPM of oxygen  Low output overall from chest tube    Scheduled Meds:   heparin (porcine)  5,000 Units Subcutaneous 3 times per day    psyllium  1 packet Oral Daily    amLODIPine  5 mg Oral Daily    levothyroxine  150 mcg Oral Daily    metoprolol tartrate  25 mg Oral BID    pantoprazole  40 mg Oral QAM AC    mometasone-formoterol  2 puff Inhalation BID    tamsulosin  0.4 mg Oral Daily    sodium chloride flush  10 mL Intravenous 2 times per day       Continuous Infusions:      PRN Meds:  oxyCODONE, albuterol sulfate HFA, sodium chloride flush, acetaminophen    ALLERGIES:  Patient has No Known Allergies. Objective:   PHYSICAL EXAM:  /72   Pulse 72   Temp 98.4 °F (36.9 °C) (Oral)   Resp 18   Ht 6' 1\" (1.854 m)   Wt 248 lb 10.9 oz (112.8 kg)   SpO2 97%   BMI 32.81 kg/m²    Physical Exam   Constitutional: He appears well-developed and well-nourished. No distress. HENT:   Head: Normocephalic and atraumatic. Mouth/Throat: Oropharynx is clear and moist. No oropharyngeal exudate. Eyes: Pupils are equal, round, and reactive to light. EOM are normal.   Neck: Neck supple. No JVD present. Cardiovascular: Normal heart sounds. Exam reveals no gallop and no friction rub. No murmur heard. Pulmonary/Chest: Effort normal. He has no wheezes. He has no rales. Equal chest rise and expansion bilaterally   Abdominal: Soft. Bowel sounds are normal. He exhibits no distension. There is no tenderness. Musculoskeletal: Normal range of motion. He exhibits no edema. Lymphadenopathy:     He has no cervical adenopathy. Neurological: He is alert. No cranial nerve deficit. CN 2-12 grossly intact   Skin: Skin is warm and dry. No rash noted. He is not diaphoretic.           Data Reviewed:   LABS:  CBC:  Recent Labs     09/18/19  0528   WBC 9.8   HGB 8.5*

## 2019-09-18 NOTE — PROGRESS NOTES
Max[place] Distance (cm) 0 9/18/2019 12:24 PM   Wound Assessment Intact; Red 9/18/2019 12:24 PM   Drainage Amount None 9/18/2019 12:24 PM   Drainage Description Prudencio Hope 8/22/2019  6:37 PM   Odor None 9/18/2019 12:24 PM   Margins Attached edges; Defined edges 9/8/2019  2:44 PM   Kat-wound Assessment Blanchable erythema;Denuded;Red 9/18/2019 12:24 PM   Non-staged Wound Description Partial thickness 8/28/2019 11:26 AM   [place]%Wound Bed 90 8/22/2019  6:37 PM   Red%Wound Bed 100 9/18/2019 12:24 PM   Culture Taken No 9/18/2019 12:24 PM   Number of days: 50    R Buttock:         Wound 07/29/19 Coccyx split at top of buttocks crack (Active)   Wound Image   8/28/2019 11:26 AM   Wound Other 9/6/2019  7:38 PM   Dressing Status Other (Comment) 9/8/2019  2:44 PM   Dressing Changed Changed/New 8/22/2019  6:37 PM   Dressing/Treatment Open to air 9/16/2019  1:58 PM   Wound Cleansed Wound cleanser 8/28/2019 11:26 AM   Wound Length (cm) 0.4 cm 8/28/2019 11:26 AM   Wound Width (cm) 0.4 cm 8/28/2019 11:26 AM   Wound Depth (cm) 0.1 cm 8/28/2019 11:26 AM   Wound Surface Area (cm^2) 0.16 cm^2 8/28/2019 11:26 AM   Change in Wound Size % (l*w) 84 8/28/2019 11:26 AM   Wound Volume (cm^3) 0.02 cm^3 8/28/2019 11:26 AM   Distance Tunneling (cm) 0 cm 8/28/2019 11:26 AM   Tunneling Position ___ O'Clock 0 8/28/2019 11:26 AM   Undermining Starts ___ O'Clock 0 8/28/2019 11:26 AM   Undermining Ends___ O'Clock 0 8/28/2019 11:26 AM   Undermining [place] Distance (cm) 0 8/28/2019 11:26 AM   Wound Assessment Clean;Dry; Intact 9/8/2019  4:38 AM   Drainage Amount None 9/8/2019  2:44 PM   Odor None 9/4/2019  4:33 PM   Margins Attached edges; Defined edges 9/8/2019  2:44 PM   Kat-wound Assessment Blanchable erythema; Excoriated 9/9/2019  5:29 PM   [place]%Wound Bed 100 9/4/2019  4:33 PM   Red%Wound Bed 100 8/20/2019  5:24 PM   Black%Wound Bed 100 8/28/2019 11:26 AM   Culture Taken No 9/4/2019  4:33 PM   Number of days: 50       Wound 08/30/19 Coccyx (Active)   Wound Image

## 2019-09-19 ENCOUNTER — APPOINTMENT (OUTPATIENT)
Dept: GENERAL RADIOLOGY | Age: 74
DRG: 871 | End: 2019-09-19
Payer: MEDICARE

## 2019-09-19 LAB
ANION GAP SERPL CALCULATED.3IONS-SCNC: 8 MMOL/L (ref 3–16)
BUN BLDV-MCNC: 17 MG/DL (ref 7–20)
CALCIUM SERPL-MCNC: 8.4 MG/DL (ref 8.3–10.6)
CHLORIDE BLD-SCNC: 101 MMOL/L (ref 99–110)
CO2: 28 MMOL/L (ref 21–32)
CREAT SERPL-MCNC: 1.2 MG/DL (ref 0.8–1.3)
GFR AFRICAN AMERICAN: >60
GFR NON-AFRICAN AMERICAN: 59
GLUCOSE BLD-MCNC: 87 MG/DL (ref 70–99)
POTASSIUM REFLEX MAGNESIUM: 4.3 MMOL/L (ref 3.5–5.1)
SODIUM BLD-SCNC: 137 MMOL/L (ref 136–145)

## 2019-09-19 PROCEDURE — 6370000000 HC RX 637 (ALT 250 FOR IP): Performed by: INTERNAL MEDICINE

## 2019-09-19 PROCEDURE — 2700000000 HC OXYGEN THERAPY PER DAY

## 2019-09-19 PROCEDURE — 80048 BASIC METABOLIC PNL TOTAL CA: CPT

## 2019-09-19 PROCEDURE — 99233 SBSQ HOSP IP/OBS HIGH 50: CPT | Performed by: INTERNAL MEDICINE

## 2019-09-19 PROCEDURE — 94761 N-INVAS EAR/PLS OXIMETRY MLT: CPT

## 2019-09-19 PROCEDURE — 2060000000 HC ICU INTERMEDIATE R&B

## 2019-09-19 PROCEDURE — 36415 COLL VENOUS BLD VENIPUNCTURE: CPT

## 2019-09-19 PROCEDURE — 94640 AIRWAY INHALATION TREATMENT: CPT

## 2019-09-19 PROCEDURE — 2580000003 HC RX 258: Performed by: INTERNAL MEDICINE

## 2019-09-19 PROCEDURE — 71045 X-RAY EXAM CHEST 1 VIEW: CPT

## 2019-09-19 PROCEDURE — 6360000002 HC RX W HCPCS: Performed by: INTERNAL MEDICINE

## 2019-09-19 RX ORDER — TORSEMIDE 20 MG/1
20 TABLET ORAL ONCE
Status: COMPLETED | OUTPATIENT
Start: 2019-09-19 | End: 2019-09-19

## 2019-09-19 RX ADMIN — Medication 10 ML: at 09:00

## 2019-09-19 RX ADMIN — Medication 2 PUFF: at 19:19

## 2019-09-19 RX ADMIN — LEVOTHYROXINE SODIUM 150 MCG: 150 TABLET ORAL at 08:58

## 2019-09-19 RX ADMIN — TAMSULOSIN HYDROCHLORIDE 0.4 MG: 0.4 CAPSULE ORAL at 10:22

## 2019-09-19 RX ADMIN — HEPARIN SODIUM 5000 UNITS: 5000 INJECTION INTRAVENOUS; SUBCUTANEOUS at 21:17

## 2019-09-19 RX ADMIN — METOPROLOL TARTRATE 25 MG: 25 TABLET, FILM COATED ORAL at 20:25

## 2019-09-19 RX ADMIN — PSYLLIUM HUSK 1 PACKET: 3.4 POWDER ORAL at 10:23

## 2019-09-19 RX ADMIN — HEPARIN SODIUM 5000 UNITS: 5000 INJECTION INTRAVENOUS; SUBCUTANEOUS at 14:10

## 2019-09-19 RX ADMIN — METOPROLOL TARTRATE 25 MG: 25 TABLET, FILM COATED ORAL at 10:21

## 2019-09-19 RX ADMIN — Medication 10 ML: at 21:38

## 2019-09-19 RX ADMIN — AMLODIPINE BESYLATE 5 MG: 5 TABLET ORAL at 10:21

## 2019-09-19 RX ADMIN — TORSEMIDE 20 MG: 20 TABLET ORAL at 14:09

## 2019-09-19 RX ADMIN — PANTOPRAZOLE SODIUM 40 MG: 40 TABLET, DELAYED RELEASE ORAL at 06:44

## 2019-09-19 RX ADMIN — HEPARIN SODIUM 5000 UNITS: 5000 INJECTION INTRAVENOUS; SUBCUTANEOUS at 06:44

## 2019-09-19 ASSESSMENT — PAIN SCALES - GENERAL
PAINLEVEL_OUTOF10: 0

## 2019-09-19 NOTE — PROGRESS NOTES
thickening involving the soft tissues overlying the   right hip, along with mild asymmetric subcutaneous edema, compatible with   cellulitis. No soft tissue gas or abscess is identified. Moderate to severe diverticulosis of the sigmoid colon, but without CT   evidence of diverticulitis on the current examination. At least moderate bilateral pleural effusions with adjacent airspace disease,   similar to increased when compared to the previous exam.         XR CHEST PORTABLE   Final Result   Stable small left pleural effusion, with bibasilar atelectasis and/or   consolidation. Assessment/Plan:    Active Hospital Problems    Diagnosis    Suspected sleep apnea [R29.818]    Pulmonary venous congestion [R09.89]    Macrocytic anemia [D53.9]    C. difficile colitis [A04.72]    Acquired hypothyroidism [E03.9]    Sepsis ([facility] [address] [A41.9]    Acute renal failure (ARF) (HCC) [N17.9]    Pulmonary HTN ([facility] [address] [I27.20]    Former smoker [Z87.891]    Pleural effusion [J90]    Hyperlipidemia [E78.5]           C.diff colitis, complicated by sepsis on admission  - oral vancomycin completed 9/7. No more diarrhea. Continue on Colestid and fiber supplementation. GI signed off       Bilateral pleural effusion: s/p L chest tube placement on 9/2 later removed on 9/11. CT chest showed rt loculated effusion, s/p rt  thoracentesis on 9/12. Chest tube management per Pulm. Oxycodone started   Right side chest tube in situ, further management as per pulmonary. Plan to clamp chest tube and monitor, patient is still off diuretics. Clinically remained stable, decision about restarting diuretics as per nephrology service. Hypothyroidism:   TSH was high, ? Compliance. Synthroid dose was increased. Will need repeat TFTs 4 to 6 weeks    Decubitus ulcers, present on arrival  Continue wound care    Presumed adrenal insufficiency  Could not be diagnosed with clear certainty.  However, responded well to

## 2019-09-20 ENCOUNTER — APPOINTMENT (OUTPATIENT)
Dept: GENERAL RADIOLOGY | Age: 74
DRG: 871 | End: 2019-09-20
Payer: MEDICARE

## 2019-09-20 LAB
ANION GAP SERPL CALCULATED.3IONS-SCNC: 8 MMOL/L (ref 3–16)
BUN BLDV-MCNC: 16 MG/DL (ref 7–20)
CALCIUM SERPL-MCNC: 8.4 MG/DL (ref 8.3–10.6)
CHLORIDE BLD-SCNC: 99 MMOL/L (ref 99–110)
CO2: 29 MMOL/L (ref 21–32)
CREAT SERPL-MCNC: 1.2 MG/DL (ref 0.8–1.3)
GFR AFRICAN AMERICAN: >60
GFR NON-AFRICAN AMERICAN: 59
GLUCOSE BLD-MCNC: 94 MG/DL (ref 70–99)
POTASSIUM REFLEX MAGNESIUM: 4.1 MMOL/L (ref 3.5–5.1)
SODIUM BLD-SCNC: 136 MMOL/L (ref 136–145)

## 2019-09-20 PROCEDURE — 6370000000 HC RX 637 (ALT 250 FOR IP): Performed by: INTERNAL MEDICINE

## 2019-09-20 PROCEDURE — 99233 SBSQ HOSP IP/OBS HIGH 50: CPT | Performed by: INTERNAL MEDICINE

## 2019-09-20 PROCEDURE — 80048 BASIC METABOLIC PNL TOTAL CA: CPT

## 2019-09-20 PROCEDURE — 94669 MECHANICAL CHEST WALL OSCILL: CPT

## 2019-09-20 PROCEDURE — 32551 INSERTION OF CHEST TUBE: CPT

## 2019-09-20 PROCEDURE — 2700000000 HC OXYGEN THERAPY PER DAY

## 2019-09-20 PROCEDURE — 6360000002 HC RX W HCPCS: Performed by: INTERNAL MEDICINE

## 2019-09-20 PROCEDURE — 2060000000 HC ICU INTERMEDIATE R&B

## 2019-09-20 PROCEDURE — 94640 AIRWAY INHALATION TREATMENT: CPT

## 2019-09-20 PROCEDURE — 71045 X-RAY EXAM CHEST 1 VIEW: CPT

## 2019-09-20 PROCEDURE — 94761 N-INVAS EAR/PLS OXIMETRY MLT: CPT

## 2019-09-20 PROCEDURE — 2580000003 HC RX 258: Performed by: INTERNAL MEDICINE

## 2019-09-20 PROCEDURE — 36415 COLL VENOUS BLD VENIPUNCTURE: CPT

## 2019-09-20 RX ORDER — TORSEMIDE 20 MG/1
20 TABLET ORAL ONCE
Status: COMPLETED | OUTPATIENT
Start: 2019-09-20 | End: 2019-09-20

## 2019-09-20 RX ADMIN — HEPARIN SODIUM 5000 UNITS: 5000 INJECTION INTRAVENOUS; SUBCUTANEOUS at 15:58

## 2019-09-20 RX ADMIN — AMLODIPINE BESYLATE 5 MG: 5 TABLET ORAL at 10:15

## 2019-09-20 RX ADMIN — TORSEMIDE 20 MG: 20 TABLET ORAL at 10:15

## 2019-09-20 RX ADMIN — OXYCODONE HYDROCHLORIDE 5 MG: 5 TABLET ORAL at 14:14

## 2019-09-20 RX ADMIN — PSYLLIUM HUSK 1 PACKET: 3.4 POWDER ORAL at 10:16

## 2019-09-20 RX ADMIN — Medication 2 PUFF: at 09:02

## 2019-09-20 RX ADMIN — HEPARIN SODIUM 5000 UNITS: 5000 INJECTION INTRAVENOUS; SUBCUTANEOUS at 06:01

## 2019-09-20 RX ADMIN — METOPROLOL TARTRATE 25 MG: 25 TABLET, FILM COATED ORAL at 21:27

## 2019-09-20 RX ADMIN — Medication 10 ML: at 10:16

## 2019-09-20 RX ADMIN — PANTOPRAZOLE SODIUM 40 MG: 40 TABLET, DELAYED RELEASE ORAL at 06:03

## 2019-09-20 RX ADMIN — LEVOTHYROXINE SODIUM 150 MCG: 150 TABLET ORAL at 07:48

## 2019-09-20 RX ADMIN — Medication 2 PUFF: at 20:02

## 2019-09-20 RX ADMIN — TAMSULOSIN HYDROCHLORIDE 0.4 MG: 0.4 CAPSULE ORAL at 10:16

## 2019-09-20 RX ADMIN — METOPROLOL TARTRATE 25 MG: 25 TABLET, FILM COATED ORAL at 10:15

## 2019-09-20 RX ADMIN — Medication 10 ML: at 21:29

## 2019-09-20 RX ADMIN — HEPARIN SODIUM 5000 UNITS: 5000 INJECTION INTRAVENOUS; SUBCUTANEOUS at 21:26

## 2019-09-20 ASSESSMENT — PAIN SCALES - GENERAL
PAINLEVEL_OUTOF10: 0

## 2019-09-20 NOTE — PROGRESS NOTES
[facility]:    I called the daughter and she did not feel she needed to be part of the conversation around hospice services. Stated Elba Duval is just a [de-identified] old man. \"     I met with the patient at bedside to discuss hospice philosophy and services. He is \"considering hospice\" when he goes home. He does not want to come back to the hospital.  He also stated he does not wish to be resuscitated. [facility] nurse will continue to follow until a decision is made. Thank you for the opportunity to be involved in the care of this patient and family. If you have any questions or concerns please contact [facility] at [phone].     America Hodgkin RN BSN

## 2019-09-20 NOTE — PROGRESS NOTES
Nutrition Assessment    Type and Reason for Visit: Reassess    Nutrition Recommendations:   1. Continue general diet   2. Continue Ensure Compact and Bill ONS  3. RD encouraged adequate protein/calorie intake  4. Will continue to monitor pt nutritional status, intake, weights, bowel habits, and need for further nutritional intervention    Nutrition Assessment: Follow-up: Patient remains nutritionally compromised AEB variable PO intake this admission, from 0-100% and  ongoing multiple wounds. Patient reports liking Ensure compact. Per RN today, nursing staff has not been seeing Benedicto curz on trays but she reports she will call for it if it does not come up. Will make communication via tray ticket as well. RD notes that patient is considering hospice post d/c. Encouraged good PO intake of meals and supplements for remainder of admission. Malnutrition Assessment:  · Malnutrition Status: No malnutrition  · Context: Acute illness or injury  · Findings of the 6 clinical characteristics of malnutrition (Minimum of 2 out of 6 clinical characteristics is required to make the diagnosis of moderate or severe Protein Calorie Malnutrition based on AND/ASPEN Guidelines):  1. Energy Intake-Greater than 75% of estimated energy requirement, Unable to assess    2. Weight Loss-No significant weight loss,    3. Fat Loss-No significant subcutaneous fat loss,    4. Muscle Loss-No significant muscle mass loss,    5. Fluid Accumulation-No significant fluid accumulation,    6.  Strength-Not measured    Nutrition Risk Level:  Moderate    Nutrient Needs:  · Estimated Daily Total Kcal: 8128-6220  · Estimated Daily Protein (g): 100-117  · Estimated Daily Total Fluid (ml/day): 1 ml/kcal    Nutrition Diagnosis:   · Problem: Increased protein loss  · Etiology: related to Increased demand for energy/nutrients     Signs and symptoms:  as evidenced by Presence of wounds    Objective Information:  · Nutrition-Focused Physical Findings: BM: x2 9/19  · Wound Type: Pressure Ulcer, Stage II, Stage III  · Current Nutrition Therapies:  · Oral Diet Orders: General   · Oral Diet intake: 1-25%, %, 26-50%  · Oral Nutrition Supplement (ONS) Orders: Low Volume Supplement, Wound Healing Oral Supplement  · ONS intake: 26-50%  · Anthropometric Measures:  · Ht: 6' 1\" (185.4 cm)   · Current Body Wt: 251 lb (113.9 kg)  · % Weight Change:  ,  no significant weight loss per EMR (3.5% wt loss x7 months)  · Ideal Body Wt: 184 lb (83.5 kg), % Ideal Body 136%  · BMI Classification: BMI 30.0 - 34.9 Obese Class I    Nutrition Interventions:   Continue current diet, Continue current ONS  Continued Inpatient Monitoring    Nutrition Evaluation:   · Evaluation: Progressing toward goals   · Goals: Pt will have po intakes 50% or greater and include a protein source at every meal this admission    · Monitoring: Nutrition Progression, Meal Intake, Supplement Intake, Wound Healing, Pertinent Labs, Monitor Bowel Function      Electronically signed by Enrique Wilson RD, LD on 9/20/19 at 3:33 PM  Contact Number: Office: [phone]; [address]: [phone]

## 2019-09-20 NOTE — PROGRESS NOTES
noted. No surrounding cellulitis seen  Neurologic: Alert speech clear with no overt facial droop. Psychiatric: Alert and oriented, thought content appropriate, normal insight  Capillary Refill: Brisk,< 3 seconds   Peripheral Pulses: +2 palpable, equal bilaterally       Labs:   Recent Labs     09/18/19  0528   WBC 9.8   HGB 8.5*   HCT 26.1*        Recent Labs     09/18/19  0528 09/19/19  0531 09/20/19  0541   * 137 136   K 4.5 4.3 4.1   CL 99 101 99   CO2 27 28 29   BUN 21* 17 16   CREATININE 1.3 1.2 1.2   CALCIUM 8.5 8.4 8.4       Urinalysis:      Lab Results   Component Value Date    NITRU Negative 08/28/2019    WBCUA 6-10 08/28/2019    BACTERIA Rare 08/28/2019    RBCUA None seen 08/28/2019    BLOODU TRACE-LYSED 08/28/2019    SPECGRAV 1.020 08/28/2019    GLUCOSEU 250 08/28/2019       Radiology:  XR CHEST PORTABLE   Final Result   Decrease in right pleural effusion. No significant change otherwise. No   pneumothorax. XR CHEST PORTABLE   Final Result   1. Worsening right basilar airspace disease. XR CHEST PORTABLE   Final Result   Right basilar chest tube is stable. No pneumothorax. Mild right basilar opacities, consistent with atelectasis and small effusion. Stable left basilar consolidation/pleural effusion. XR CHEST PORTABLE   Final Result   Interval placement of right-sided chest tube. Decrease in right pleural   effusion with right basilar atelectasis. Decrease in left pleural effusion. No significant change otherwise. XR CHEST PORTABLE   Final Result   New small right apical pneumothorax. Right-sided chest tube is poorly   visualized on this study but appears to barely be within the chest.  PA and   lateral views or chest CT may be helpful for further evaluation if indicated. Persistent bilateral pleural effusions and bibasilar airspace disease.       The findings were sent to the Radiology Results [address] at 7:55   am on 9/13/2019to be communicated to a licensed caregiver. XR CHEST PORTABLE   Final Result   No visible pneumothorax post thoracentesis. XR CHEST PORTABLE   Final Result   Bilateral airspace disease and bilateral pleural effusions, slightly   decreased on the right         CT CHEST W CONTRAST   Final Result   Left pleural effusion is decreased in size, with left basilar chest tube in   normal position. Moderate-sized right pleural effusion. Bibasilar atelectasis versus pneumonia. There is improved aeration of the   left lower lobe in the interval.         XR CHEST PORTABLE   Final Result   Bilateral pleuroparenchymal disease, slightly increased bilaterally. XR CHEST PORTABLE   Final Result   Improved aeration at the left base and decrease in left pleural effusion with   no pneumothorax. XR CHEST PORTABLE   Final Result   1. No significant change. XR CHEST PORTABLE   Final Result   No significant change over the past 3 days. XR CHEST PORTABLE   Final Result   Slight worsening of right pleural effusion. Pulmonary vascular congestion   interstitial prominence throughout the lungs is stable to slightly progressed. XR CHEST PORTABLE   Final Result   Left pleural catheter in place. Persistent bilateral pleural effusions, greater on the right, with bibasilar   airspace disease. Question tiny left pneumothorax. XR CHEST PORTABLE   Final Result   No significant change in bilateral pleural effusions and bilateral lung   consolidation         CT CHEST WO CONTRAST   Final Result   Moderate bilateral pleural effusions, with adjacent consolidation in the   lungs, likely atelectasis in the absence of clinical signs of pneumonia.    Small mediastinal nodes are seen, likely reactive      Mild septal thickening, suggesting a component of fluid overload         XR CHEST PORTABLE   Final Result   Increased bilateral pleural effusions and bilateral lung consolidation         CT ABDOMEN PELVIS WO CONTRAST Additional Contrast? None   Final Result   Subtle asymmetric dermal thickening involving the soft tissues overlying the   right hip, along with mild asymmetric subcutaneous edema, compatible with   cellulitis. No soft tissue gas or abscess is identified. Moderate to severe diverticulosis of the sigmoid colon, but without CT   evidence of diverticulitis on the current examination. At least moderate bilateral pleural effusions with adjacent airspace disease,   similar to increased when compared to the previous exam.         XR CHEST PORTABLE   Final Result   Stable small left pleural effusion, with bibasilar atelectasis and/or   consolidation. Assessment/Plan:    Active Hospital Problems    Diagnosis    Suspected sleep apnea [R29.818]    Pulmonary venous congestion [R09.89]    Macrocytic anemia [D53.9]    C. difficile colitis [A04.72]    Acquired hypothyroidism [E03.9]    Sepsis ([facility] [address] [A41.9]    Acute renal failure (ARF) (HCC) [N17.9]    Pulmonary HTN ([address] [I27.20]    Former smoker [Z87.891]    Pleural effusion [J90]    Hyperlipidemia [E78.5]           C.diff colitis, complicated by sepsis on admission  - oral vancomycin completed 9/7. No more diarrhea. Continue on Colestid and fiber supplementation. GI signed off       Bilateral pleural effusion: s/p L chest tube placement on 9/2 later removed on 9/11. CT chest showed rt loculated effusion, s/p rt  thoracentesis on 9/12. Chest tube management per Pulm. Oxycodone started   Right side chest tube in situ, further management as per pulmonary. Plan to clamp chest tube and monitor, patient is still off diuretics. Clinically remained stable, decision about restarting diuretics as per nephrology service. Patient was started on torsemide 20 mg daily, continue to monitor renal function. Hypothyroidism:   TSH was high, ? Compliance. Synthroid dose was increased.   Will need repeat

## 2019-09-20 NOTE — PROGRESS NOTES
sodium and chloride less than 20  - Clinical:  Recurrent w diuresis     Diarrhea:  History of c.diff colitis. Completed course of PO vanc.     Chronic Diastolic heart failure:  Compensated. He tells me he is always on oxygen. No LE edema    Bilateral pleural effusions: s/p chest tube on left on 9/2, removed 9/10, now with chest tube on right 9/12, plans per Pulmonary     COPD:  Oxygen dependent.       Anemia:  Normal platelets. Stable.      Plan/     - torsemide 20 mg again on 9/20, first dose on 9/19 after  9/7  - Close monitoring of labs, bp's

## 2019-09-21 VITALS
RESPIRATION RATE: 18 BRPM | BODY MASS INDEX: 30.24 KG/M2 | TEMPERATURE: 97.5 F | DIASTOLIC BLOOD PRESSURE: 69 MMHG | WEIGHT: 228.18 LBS | OXYGEN SATURATION: 95 % | SYSTOLIC BLOOD PRESSURE: 107 MMHG | HEART RATE: 91 BPM | HEIGHT: 73 IN

## 2019-09-21 LAB
ANION GAP SERPL CALCULATED.3IONS-SCNC: 10 MMOL/L (ref 3–16)
BUN BLDV-MCNC: 15 MG/DL (ref 7–20)
CALCIUM SERPL-MCNC: 8.3 MG/DL (ref 8.3–10.6)
CHLORIDE BLD-SCNC: 98 MMOL/L (ref 99–110)
CO2: 29 MMOL/L (ref 21–32)
CREAT SERPL-MCNC: 1.1 MG/DL (ref 0.8–1.3)
GFR AFRICAN AMERICAN: >60
GFR NON-AFRICAN AMERICAN: >60
GLUCOSE BLD-MCNC: 90 MG/DL (ref 70–99)
POTASSIUM REFLEX MAGNESIUM: 4 MMOL/L (ref 3.5–5.1)
SODIUM BLD-SCNC: 137 MMOL/L (ref 136–145)

## 2019-09-21 PROCEDURE — 6370000000 HC RX 637 (ALT 250 FOR IP): Performed by: INTERNAL MEDICINE

## 2019-09-21 PROCEDURE — 94640 AIRWAY INHALATION TREATMENT: CPT

## 2019-09-21 PROCEDURE — 80048 BASIC METABOLIC PNL TOTAL CA: CPT

## 2019-09-21 PROCEDURE — 36415 COLL VENOUS BLD VENIPUNCTURE: CPT

## 2019-09-21 PROCEDURE — 2580000003 HC RX 258: Performed by: INTERNAL MEDICINE

## 2019-09-21 PROCEDURE — 2700000000 HC OXYGEN THERAPY PER DAY

## 2019-09-21 PROCEDURE — 6360000002 HC RX W HCPCS: Performed by: INTERNAL MEDICINE

## 2019-09-21 PROCEDURE — 94761 N-INVAS EAR/PLS OXIMETRY MLT: CPT

## 2019-09-21 PROCEDURE — 94669 MECHANICAL CHEST WALL OSCILL: CPT

## 2019-09-21 PROCEDURE — 99232 SBSQ HOSP IP/OBS MODERATE 35: CPT | Performed by: INTERNAL MEDICINE

## 2019-09-21 RX ORDER — TORSEMIDE 20 MG/1
20 TABLET ORAL ONCE
Status: COMPLETED | OUTPATIENT
Start: 2019-09-21 | End: 2019-09-21

## 2019-09-21 RX ORDER — TORSEMIDE 20 MG/1
20 TABLET ORAL EVERY OTHER DAY
Qty: 30 TABLET | Refills: 3
Start: 2019-09-21

## 2019-09-21 RX ADMIN — Medication 2 PUFF: at 11:46

## 2019-09-21 RX ADMIN — PSYLLIUM HUSK 1 PACKET: 3.4 POWDER ORAL at 10:49

## 2019-09-21 RX ADMIN — TAMSULOSIN HYDROCHLORIDE 0.4 MG: 0.4 CAPSULE ORAL at 10:44

## 2019-09-21 RX ADMIN — Medication 10 ML: at 10:44

## 2019-09-21 RX ADMIN — HEPARIN SODIUM 5000 UNITS: 5000 INJECTION INTRAVENOUS; SUBCUTANEOUS at 06:04

## 2019-09-21 RX ADMIN — PANTOPRAZOLE SODIUM 40 MG: 40 TABLET, DELAYED RELEASE ORAL at 06:04

## 2019-09-21 RX ADMIN — METOPROLOL TARTRATE 25 MG: 25 TABLET, FILM COATED ORAL at 10:44

## 2019-09-21 RX ADMIN — HEPARIN SODIUM 5000 UNITS: 5000 INJECTION INTRAVENOUS; SUBCUTANEOUS at 14:17

## 2019-09-21 RX ADMIN — LEVOTHYROXINE SODIUM 150 MCG: 150 TABLET ORAL at 10:48

## 2019-09-21 RX ADMIN — TORSEMIDE 20 MG: 20 TABLET ORAL at 14:17

## 2019-09-21 RX ADMIN — AMLODIPINE BESYLATE 5 MG: 5 TABLET ORAL at 10:44

## 2019-09-21 NOTE — DISCHARGE SUMMARY
steroids. Steroid dose tapered, then stopped on 9/4. He did well.     Acute renal failure: Recurrent. Creatinine improving, off IVF. Clinically resolved, creatinine is normalized, patient is still off diuretics.       Anemia  GI following  EGD 9/5 was unremarkable, incidental duodenal polyp biopsied was tubular adenoma with no high grade dysplasia. Repeat EGD in 3-6 months.     Chronic diastolic heart failure: Does not seem in acute decompensation currently. Torsemide held due to MCKENNA  Further management of diuretics as per nephrology.        HTN   fairly controlled. Continue meds     COPD: Stable. Continue inhalers  - baseline 4L NC     Patient is highly noncompliant and noncooperative, does not want to participate in therapy, according to nursing staff patient does not get out of bed. Patient wants to go home only. Physical Exam Performed:     /67   Pulse 95   Temp 97.5 °F (36.4 °C)   Resp 18   Ht 6' 1\" (1.854 m)   Wt 228 lb 2.8 oz (103.5 kg)   SpO2 98%   BMI 30.10 kg/m²       General appearance:  No apparent distress, appears stated age and cooperative. HEENT:  Normal cephalic, atraumatic without obvious deformity. Pupils equal, round, and reactive to light. Extra ocular muscles intact. Conjunctivae/corneas clear. Neck: Supple, with full range of motion. No jugular venous distention. Trachea midline. Respiratory:  Normal respiratory effort. Clear to auscultation, bilaterally without Rales/Wheezes/Rhonchi. Cardiovascular:  Regular rate and rhythm with normal S1/S2 without murmurs, rubs or gallops. Abdomen: Soft, non-tender, non-distended with normal bowel sounds. Musculoskeletal:  No clubbing, cyanosis or edema bilaterally. Full range of motion without deformity. Skin: Skin color, texture, turgor normal.  No rashes or lesions. Neurologic:  Neurovascularly intact without any focal sensory/motor deficits.  Cranial nerves: II-XII intact, grossly non-focal.  Psychiatric:  Alert

## 2019-09-21 NOTE — PROGRESS NOTES
Reviewed avs with pt, he verbalized understanding and was able to teach back. Pt being dressed. dtr to bring in home oxygen tank and to take pt home. Pt resting. No complaints.

## 2019-09-21 NOTE — PLAN OF CARE
Increase function to baseline
Palliative Care Progress Note  Palliative Care Admit date:   9/12/19    Plan of care/goals:   D/w Pulm yesterday & met again today w/ Mr. Neno Sharp to discuss goals and add in discussion re: the option for hospice. We discussed pts recurring pl effusions and likelihood of that continuing. During our discussion, Nephrologist saw pt and reinforced the difficulty to pts kidneys w/ the diuretics and, to be clear, pts unwillingness to be compliant or work to help himself. Pts only question was if he'd have to go into \"one of their places or if they'd come to my home? \"    We discussed the tristen of hospice involvement and how comfort might well look in his situation, though I suspect this will bear repeating. Have offered/suggested that we convene a family mtg. Pts only response was \"I don't know, this is a lot to think about. \"     Plan:  Pt is agreeable to ongoing writer visits/discussion.   Will ask  to see for additional support          Reason for consult:    _X_ Advance Care Planning  ___ Transition of Care Planning  ___ Psychosocial/Spiritual Support  ___ Symptom Management                                                                                                                                        Jaron Nowak RN
Problem: Falls - Risk of:  Goal: Will remain free from falls  Description  Bed in lowest locked positions, call light within reach, side rails up x 2, bed check in place. Instructed patient to call before getting out of bed. Patient verbalized understanding. 9/9/2019 1036 by Jaya Bertrand RN  Outcome: Ongoing  9/9/2019 0456 by Arsenio Luna RN  Outcome: Ongoing  Note:   Safety/fall prevention maintained. Bed locked on lowest position. Side rails up 2 x 4, Personal belongings and call light within reach. Hourly rounding done. Attended to needs promptly. Pt free from falls. MKRN  Goal: Absence of physical injury  Description  Absence of physical injury  Outcome: Ongoing     Problem: Risk for Impaired Skin Integrity  Goal: Tissue integrity - skin and mucous membranes  Description  Structural intactness and normal physiological function of skin and  mucous membranes.   9/9/2019 1036 by Jaya Bertrand RN  Outcome: Ongoing  9/9/2019 0456 by Arsenio Luna RN  Outcome: Ongoing     Problem: Nutrition  Goal: Optimal nutrition therapy  Outcome: Ongoing
Problem: Falls - Risk of:  Goal: Will remain free from falls  Description  Bed in lowest locked positions, call light within reach, side rails up x 2, bed check in place. Instructed patient to call before getting out of bed. Patient verbalized understanding. Outcome: Ongoing     Problem: Risk for Impaired Skin Integrity  Goal: Tissue integrity - skin and mucous membranes  Description  Structural intactness and normal physiological function of skin and  mucous membranes.   Outcome: Ongoing     Problem: HEMODYNAMIC STATUS  Goal: Patient has stable vital signs and fluid balance  Outcome: Ongoing     Problem: Pain:  Goal: Pain level will decrease  Description  Pain level will decrease  Outcome: Ongoing
Problem: Falls - Risk of:  Goal: Will remain free from falls  Description  Bed in lowest locked positions, call light within reach, side rails up x 2, bed check in place. Instructed patient to call before getting out of bed. Patient verbalized understanding. Outcome: Ongoing  Goal: Absence of physical injury  Description  Absence of physical injury  Outcome: Ongoing     Problem: Risk for Impaired Skin Integrity  Goal: Tissue integrity - skin and mucous membranes  Description  Structural intactness and normal physiological function of skin and  mucous membranes. Outcome: Ongoing   Pt refuses turns, instructed to self turn. Pt incontinent. barrier cream applied to groin, coccyx and buttocks. Will continue to monitor.      Problem: Nutrition  Goal: Optimal nutrition therapy  Outcome: Ongoing
Problem: Falls - Risk of:  Goal: Will remain free from falls  Description  Bed in lowest locked positions, call light within reach, side rails up x 2, bed check in place. Instructed patient to call before getting out of bed. Patient verbalized understanding. Outcome: Ongoing  Note:   Safety/fall prevention maintained. Bed locked on lowest position. Side rails up 2 x 4, Personal belongings and call light within reach. Hourly rounding done. Attended to needs promptly. Pt free from falls. MKRN     Problem: Risk for Impaired Skin Integrity  Goal: Tissue integrity - skin and mucous membranes  Description  Structural intactness and normal physiological function of skin and  mucous membranes.   Outcome: Ongoing
Problem: Falls - Risk of:  Goal: Will remain free from falls  Description  Will remain free from falls  9/2/2019 0956 by Eric Walker RN  Outcome: Ongoing  9/1/2019 2351 by Giovani Fraire RN  Note:   Pt will remain free of falls this shift. Bedside table and call light within reach, bed alarm in place. Pt instructed to call out when in need of assistance, verbalized understanding. Will continue to monitor. Problem: Risk for Impaired Skin Integrity  Goal: Tissue integrity - skin and mucous membranes  Description  Structural intactness and normal physiological function of skin and  mucous membranes.   Outcome: Ongoing     Problem: Nutrition  Goal: Optimal nutrition therapy  Outcome: Ongoing
Problem: Falls - Risk of:  Goal: Will remain free from falls  Description  Will remain free from falls  Note:   Pt will remain free of falls this shift. Bedside table and call light within reach, bed alarm in place. Pt instructed to call out when in need of assistance, verbalized understanding. Will continue to monitor.
Problem: Falls - Risk of:  Goal: Will remain free from falls  Outcome: Ongoing  Patient instructed to use call light if assistance is needed. Call light within reach. Bed alarm on.
Problem: Nutrition  Goal: Optimal nutrition therapy  Outcome: Ongoing  Note:   Nutrition Problem: Increased protein loss  Intervention: Food and/or Nutrient Delivery: Continue current diet, Start ONS  Nutritional Goals: Pt will have po intakes 50% or greater and include a protein source at every meal this admission
L O2. Pt denies shortness of breath. Pt with pitting lower extremity edema. Patient and/or Family's stated Goal of Care this Admission: reduce shortness of breath, increase activity tolerance, better understand heart failure and disease management, be more comfortable and reduce lower extremity edema prior to discharge      Comorbidities Reviewed Yes  Patient has a past medical history of Acquired hypothyroidism, Alcohol abuse, Alcohol abuse, ARF (acute renal failure) ([facility] [address] CHF (congestive heart failure) ([facility] [address] CKD (chronic kidney disease), Clostridium difficile infection, Hypercholesteremia, Hypertension, Mediastinal adenopathy, Pneumonia due to organism, and Pulmonary emphysema ([facility] [address] >>For CHF and Comorbidity documentation on Education Time and Topics, please see Education Tab  Pt to be going home with hospice. Pt is only concerned with comfort. Pt states only comfort to staff.           Problem: HEMODYNAMIC STATUS  Goal: Patient has stable vital signs and fluid balance  Outcome: Ongoing     Problem: FLUID AND ELECTROLYTE IMBALANCE  Goal: Fluid and electrolyte balance are achieved/maintained  Outcome: Ongoing     Problem: ACTIVITY INTOLERANCE/IMPAIRED MOBILITY  Goal: Mobility/activity is maintained at optimum level for patient  Outcome: Ongoing     Problem: Pain:  Goal: Pain level will decrease  Description  Pain level will decrease  Outcome: Ongoing  Goal: Control of acute pain  Description  Control of acute pain  Outcome: Ongoing  Goal: Control of chronic pain  Description  Control of chronic pain  Outcome: Ongoing

## 2019-09-21 NOTE — PROGRESS NOTES
Pt bathed, assessed. Pt had bilateral excoriated areas on his scrotum with spot bleeding. Pt has bilateral buttock wounds on excoriated and bright red bloody. Wounds cleansed with foam cleanser and buttock would dressed with mepatel mesh and mepatel with border. Excoriated areas sprayed with no touch protectant. All wound care products including dressings were packed for pt to take home. Still waiting on dtr to return. Pt clean dry and ready for his home clothes. Will assist pt dressed and to care when the time comes. Pt resting without complaints. Watching tv. Anticipating going home and stating that he will probably never return. He plans to sign on with hospice Monday. Will cont to assist through to departure.

## 2019-09-21 NOTE — CARE COORDINATION
CASE MANAGEMENT DISCHARGE SUMMARY      Discharge to: Home with [facility]    Transportation:    Family/car: yes     Pt agreed to hospice only if he could remain full code and return to the hospital for intubation if needed. As this is not consistent with hospice philosophy he discharges to home with hospice information only. Re-established previous [facility] with [facility] and added Palliative Care RN and SW (liason states these are available with [facility]). Dtr states she will transport pt home and assist him with ADLs in the home. Note: Discharging nurse to complete REJI, reconcile AVS, and place final copy with patient's discharge packet. RN to ensure that written prescriptions for  Level II medications are sent with patient to the facility as per protocol.

## 2019-09-21 NOTE — PROGRESS NOTES
diuresis     Diarrhea:  History of c.diff colitis. Completed course of PO vanc.     Chronic Diastolic heart failure:  Compensated. He tells me he is always on oxygen. No LE edema    Bilateral pleural effusions: s/p chest tube on left on 9/2, removed 9/10, chest tube on right 9/12--> dced 9/20, plans per Pulmonary     COPD:  Oxygen dependent.       Anemia:  Normal platelets. Stable.      Plan/     - torsemide 20 mg again on 9/19-21, first dose on 9/19 after  9/7  -at dc would use torsemide 20 mg MWF  - Close monitoring of labs, bp's

## 2019-09-21 NOTE — PROGRESS NOTES
[facility]    Met with pt with the understood plan to facilitate discharge to home if pt has decided he would like to start hospice benefit. At this time he clearly stated he does not want comfort care, but wants aggressive treatment to include return visits to the hospital as needed. Pt does want to remain a full code, which does not preclude him from signing up for hospice benefit. However, he is stating he does want aggressive treatment over comfort care. Pt has contact info for [facility]. Please call with any questions or concerns.     Thank you for this referral.  Fatmata Lepe RN  [phone]

## 2019-09-21 NOTE — PROGRESS NOTES
28 29 29   BUN 17 16 15   CREATININE 1.2 1.2 1.1       Imaging:  I have reviewed radiology images personally. XR CHEST PORTABLE   Final Result   1. Interval removal of the right pleural pigtail catheter. No pneumothorax   identified. 2. Loculated fluid identified in the right minor fissure. 3. Persistent bibasilar opacities with bilateral effusions. 4. Cardiomegaly. XR CHEST PORTABLE   Final Result   Decrease in right pleural effusion. No significant change otherwise. No   pneumothorax. XR CHEST PORTABLE   Final Result   1. Worsening right basilar airspace disease. XR CHEST PORTABLE   Final Result   Right basilar chest tube is stable. No pneumothorax. Mild right basilar opacities, consistent with atelectasis and small effusion. Stable left basilar consolidation/pleural effusion. XR CHEST PORTABLE   Final Result   Interval placement of right-sided chest tube. Decrease in right pleural   effusion with right basilar atelectasis. Decrease in left pleural effusion. No significant change otherwise. XR CHEST PORTABLE   Final Result   New small right apical pneumothorax. Right-sided chest tube is poorly   visualized on this study but appears to barely be within the chest.  PA and   lateral views or chest CT may be helpful for further evaluation if indicated. Persistent bilateral pleural effusions and bibasilar airspace disease. The findings were sent to the Radiology Results [address] at 7:55   am on 9/13/2019to be communicated to a licensed caregiver. XR CHEST PORTABLE   Final Result   No visible pneumothorax post thoracentesis. XR CHEST PORTABLE   Final Result   Bilateral airspace disease and bilateral pleural effusions, slightly   decreased on the right         CT CHEST W CONTRAST   Final Result   Left pleural effusion is decreased in size, with left basilar chest tube in   normal position.       Moderate-sized right At least moderate bilateral pleural effusions with adjacent airspace disease,   similar to increased when compared to the previous exam.         XR CHEST PORTABLE   Final Result   Stable small left pleural effusion, with bibasilar atelectasis and/or   consolidation. Results for Anibal Sy (MRN [id number]) as of 9/21/2019 10:32   Ref. Range 9/20/2019 05:41 9/20/2019 06:24 9/20/2019 16:11 9/21/2019 05:45   Sodium Latest Ref Range: 136 - 145 mmol/L 136   137   Potassium Latest Ref Range: 3.5 - 5.1 mmol/L 4.1   4.0   Chloride Latest Ref Range: 99 - 110 mmol/L 99   98 (L)   CO2 Latest Ref Range: 21 - 32 mmol/L 29   29   BUN Latest Ref Range: 7 - 20 mg/dL 16   15   Creatinine Latest Ref Range: 0.8 - 1.3 mg/dL 1.2   1.1   Anion Gap Latest Ref Range: 3 - 16  8   10   GFR Non- Latest Ref Range: >60  59 (A)   >60   GFR  Latest Ref Range: >60  >60   >60   Glucose Latest Ref Range: 70 - 99 mg/dL 94   90   Calcium Latest Ref Range: 8.3 - 10.6 mg/dL 8.4   8.3   XR CHEST PORTABLE Unknown  Rpt Rpt      ONE X-RAY VIEW OF THE CHEST, 9/20/2019 3:56 pm       COMPARISON:   September 20, 2019       HISTORY:   ORDERING SYSTEM PROVIDED HISTORY: S/P chest tube removal   TECHNOLOGIST PROVIDED HISTORY:   Please confirm with RN that the chest tube has been d/jareth before doing the CXR   Reason for exam:->S/P chest tube removal   Reason for Exam: S/P chest tube removal   Acuity: Acute   Type of Exam: Initial       FINDINGS:   Interval removal of the right pleural pigtail catheter.  Accumulation of   fluid is identified in the right minor fissure.  The lungs are low in volume   with bibasilar opacities and bilateral effusions.  Findings are superimposed   on emphysematous changes.  No pneumothorax is identified.       No acute osseous abnormality.           Impression   1. Interval removal of the right pleural pigtail catheter.  No pneumothorax   identified.    2. Loculated fluid identified in the

## 2019-09-23 ENCOUNTER — TELEPHONE (OUTPATIENT)
Dept: INTERNAL MEDICINE CLINIC | Age: 74
End: 2019-09-23

## 2019-09-23 NOTE — TELEPHONE ENCOUNTER
----- Message from Ofe Winkler MD sent at 9/23/2019 12:48 PM EDT -----  Contact: [address] [phone]  Sure    ----- Message -----  From: Bradley Boyle MA  Sent: 9/23/2019  11:45 AM EDT  To: MD Nadia Saldana, home care nurse, requesting verbal orders for social work and to refer patient to palliative care. Please advise.

## 2019-09-24 ENCOUNTER — CARE COORDINATION (OUTPATIENT)
Dept: CARE COORDINATION | Age: 74
End: 2019-09-24

## 2019-09-25 ENCOUNTER — APPOINTMENT (OUTPATIENT)
Dept: GENERAL RADIOLOGY | Age: 74
DRG: 871 | End: 2019-09-25
Payer: MEDICARE

## 2019-09-25 ENCOUNTER — APPOINTMENT (OUTPATIENT)
Dept: CT IMAGING | Age: 74
DRG: 871 | End: 2019-09-25
Payer: MEDICARE

## 2019-09-25 ENCOUNTER — APPOINTMENT (OUTPATIENT)
Dept: ULTRASOUND IMAGING | Age: 74
DRG: 871 | End: 2019-09-25
Payer: MEDICARE

## 2019-09-25 ENCOUNTER — HOSPITAL ENCOUNTER (INPATIENT)
Age: 74
LOS: 13 days | Discharge: HOME HEALTH | DRG: 871 | End: 2019-10-08
Attending: EMERGENCY MEDICINE | Admitting: INTERNAL MEDICINE
Payer: MEDICARE

## 2019-09-25 DIAGNOSIS — J90 PLEURAL EFFUSION: ICD-10-CM

## 2019-09-25 DIAGNOSIS — A41.9 SEPTICEMIA (HCC): Primary | ICD-10-CM

## 2019-09-25 DIAGNOSIS — I95.9 HYPOTENSION, UNSPECIFIED HYPOTENSION TYPE: ICD-10-CM

## 2019-09-25 DIAGNOSIS — E87.1 HYPONATREMIA: ICD-10-CM

## 2019-09-25 DIAGNOSIS — K80.20 GALLSTONES: ICD-10-CM

## 2019-09-25 DIAGNOSIS — E87.20 METABOLIC ACIDOSIS: ICD-10-CM

## 2019-09-25 DIAGNOSIS — M25.552 BILATERAL HIP PAIN: ICD-10-CM

## 2019-09-25 DIAGNOSIS — N17.9 ACUTE RENAL FAILURE SUPERIMPOSED ON CHRONIC KIDNEY DISEASE, UNSPECIFIED CKD STAGE, UNSPECIFIED ACUTE RENAL FAILURE TYPE (HCC): ICD-10-CM

## 2019-09-25 DIAGNOSIS — N39.0 ACUTE UTI: ICD-10-CM

## 2019-09-25 DIAGNOSIS — N18.9 ACUTE RENAL FAILURE SUPERIMPOSED ON CHRONIC KIDNEY DISEASE, UNSPECIFIED CKD STAGE, UNSPECIFIED ACUTE RENAL FAILURE TYPE (HCC): ICD-10-CM

## 2019-09-25 DIAGNOSIS — E87.20 LACTIC ACIDOSIS: ICD-10-CM

## 2019-09-25 DIAGNOSIS — R77.8 ELEVATED TROPONIN: ICD-10-CM

## 2019-09-25 DIAGNOSIS — R06.02 SHORTNESS OF BREATH: ICD-10-CM

## 2019-09-25 DIAGNOSIS — M25.551 BILATERAL HIP PAIN: ICD-10-CM

## 2019-09-25 PROBLEM — R06.89 INEFFECTIVE AIRWAY CLEARANCE: Status: ACTIVE | Noted: 2019-09-25

## 2019-09-25 PROBLEM — D72.829 LEUKOCYTOSIS: Status: ACTIVE | Noted: 2019-09-25

## 2019-09-25 PROBLEM — R09.89 CHEST CONGESTION: Status: ACTIVE | Noted: 2019-09-25

## 2019-09-25 PROBLEM — R82.81 PYURIA: Status: ACTIVE | Noted: 2019-09-25

## 2019-09-25 LAB
A/G RATIO: 0.6 (ref 1.1–2.2)
ALBUMIN SERPL-MCNC: 2.3 G/DL (ref 3.4–5)
ALP BLD-CCNC: 238 U/L (ref 40–129)
ALT SERPL-CCNC: 12 U/L (ref 10–40)
ANION GAP SERPL CALCULATED.3IONS-SCNC: 20 MMOL/L (ref 3–16)
ANISOCYTOSIS: ABNORMAL
AST SERPL-CCNC: 22 U/L (ref 15–37)
BACTERIA: ABNORMAL /HPF
BANDED NEUTROPHILS RELATIVE PERCENT: 7 % (ref 0–7)
BASE EXCESS ARTERIAL: -2 MMOL/L (ref -3–3)
BASE EXCESS ARTERIAL: -2.5 MMOL/L (ref -3–3)
BASE EXCESS VENOUS: -6.8 MMOL/L (ref -3–3)
BASOPHILS ABSOLUTE: 0 K/UL (ref 0–0.2)
BASOPHILS RELATIVE PERCENT: 0 %
BILIRUB SERPL-MCNC: <0.2 MG/DL (ref 0–1)
BILIRUBIN URINE: NEGATIVE
BLOOD, URINE: ABNORMAL
BUN BLDV-MCNC: 38 MG/DL (ref 7–20)
CALCIUM SERPL-MCNC: 8.5 MG/DL (ref 8.3–10.6)
CARBOXYHEMOGLOBIN ARTERIAL: 0.3 % (ref 0–1.5)
CARBOXYHEMOGLOBIN ARTERIAL: 0.3 % (ref 0–1.5)
CARBOXYHEMOGLOBIN: 0.9 % (ref 0–1.5)
CHLORIDE BLD-SCNC: 90 MMOL/L (ref 99–110)
CLARITY: ABNORMAL
CO2: 20 MMOL/L (ref 21–32)
COLOR: YELLOW
COMMENT UA: ABNORMAL
CREAT SERPL-MCNC: 3.5 MG/DL (ref 0.8–1.3)
EOSINOPHILS ABSOLUTE: 0.2 K/UL (ref 0–0.6)
EOSINOPHILS RELATIVE PERCENT: 1 %
GFR AFRICAN AMERICAN: 21
GFR NON-AFRICAN AMERICAN: 17
GLOBULIN: 4.1 G/DL
GLUCOSE BLD-MCNC: 123 MG/DL (ref 70–99)
GLUCOSE URINE: NEGATIVE MG/DL
HCO3 ARTERIAL: 22.7 MMOL/L (ref 21–29)
HCO3 ARTERIAL: 23.2 MMOL/L (ref 21–29)
HCO3 VENOUS: 19.4 MMOL/L (ref 23–29)
HCT VFR BLD CALC: 25.5 % (ref 40.5–52.5)
HEMOGLOBIN, ART, EXTENDED: 8 G/DL (ref 13.5–17.5)
HEMOGLOBIN, ART, EXTENDED: 8.9 G/DL (ref 13.5–17.5)
HEMOGLOBIN: 8.3 G/DL (ref 13.5–17.5)
KETONES, URINE: NEGATIVE MG/DL
LACTIC ACID, SEPSIS: 3.4 MMOL/L (ref 0.4–1.9)
LACTIC ACID, SEPSIS: 4.2 MMOL/L (ref 0.4–1.9)
LACTIC ACID, SEPSIS: 5.3 MMOL/L (ref 0.4–1.9)
LACTIC ACID: 6 MMOL/L (ref 0.4–2)
LEUKOCYTE ESTERASE, URINE: ABNORMAL
LYMPHOCYTES ABSOLUTE: 1.9 K/UL (ref 1–5.1)
LYMPHOCYTES RELATIVE PERCENT: 9 %
MACROCYTES: ABNORMAL
MCH RBC QN AUTO: 32.6 PG (ref 26–34)
MCHC RBC AUTO-ENTMCNC: 32.6 G/DL (ref 31–36)
MCV RBC AUTO: 100 FL (ref 80–100)
METHEMOGLOBIN ARTERIAL: 0.8 %
METHEMOGLOBIN ARTERIAL: 0.8 %
METHEMOGLOBIN VENOUS: 0.8 %
MICROCYTES: ABNORMAL
MICROSCOPIC EXAMINATION: YES
MONOCYTES ABSOLUTE: 1.2 K/UL (ref 0–1.3)
MONOCYTES RELATIVE PERCENT: 6 %
NEUTROPHILS ABSOLUTE: 17.5 K/UL (ref 1.7–7.7)
NEUTROPHILS RELATIVE PERCENT: 77 %
NITRITE, URINE: NEGATIVE
O2 CONTENT ARTERIAL: 11 ML/DL
O2 CONTENT ARTERIAL: 12 ML/DL
O2 CONTENT, VEN: 8 VOL %
O2 SAT, ARTERIAL: 94.1 %
O2 SAT, ARTERIAL: 96.2 %
O2 SAT, VEN: 68 %
O2 THERAPY: ABNORMAL
PCO2 ARTERIAL: 40.5 MMHG (ref 35–45)
PCO2 ARTERIAL: 41.1 MMHG (ref 35–45)
PCO2, VEN: 41.8 MMHG (ref 40–50)
PDW BLD-RTO: 16.2 % (ref 12.4–15.4)
PH ARTERIAL: 7.37 (ref 7.35–7.45)
PH ARTERIAL: 7.37 (ref 7.35–7.45)
PH UA: 5.5 (ref 5–8)
PH VENOUS: 7.28 (ref 7.35–7.45)
PLATELET # BLD: 691 K/UL (ref 135–450)
PMV BLD AUTO: 8.1 FL (ref 5–10.5)
PO2 ARTERIAL: 77 MMHG (ref 75–108)
PO2 ARTERIAL: 93.9 MMHG (ref 75–108)
PO2, VEN: 40.8 MMHG (ref 25–40)
POTASSIUM REFLEX MAGNESIUM: 3.9 MMOL/L (ref 3.5–5.1)
PRO-BNP: 906 PG/ML (ref 0–124)
PROTEIN UA: 30 MG/DL
RBC # BLD: 2.55 M/UL (ref 4.2–5.9)
RBC UA: ABNORMAL /HPF (ref 0–2)
SODIUM BLD-SCNC: 130 MMOL/L (ref 136–145)
SPECIFIC GRAVITY UA: 1.02 (ref 1–1.03)
T4 FREE: 0.8 NG/DL (ref 0.9–1.8)
TCO2 ARTERIAL: 23.9 MMOL/L
TCO2 ARTERIAL: 24.4 MMOL/L
TCO2 CALC VENOUS: 21 MMOL/L
TOTAL PROTEIN: 6.4 G/DL (ref 6.4–8.2)
TROPONIN: 0.04 NG/ML
TSH REFLEX: 24.5 UIU/ML (ref 0.27–4.2)
URINE REFLEX TO CULTURE: YES
URINE TYPE: ABNORMAL
UROBILINOGEN, URINE: 0.2 E.U./DL
WBC # BLD: 20.8 K/UL (ref 4–11)
WBC UA: >100 /HPF (ref 0–5)

## 2019-09-25 PROCEDURE — 2500000003 HC RX 250 WO HCPCS: Performed by: INTERNAL MEDICINE

## 2019-09-25 PROCEDURE — 0B938ZZ DRAINAGE OF RIGHT MAIN BRONCHUS, VIA NATURAL OR ARTIFICIAL OPENING ENDOSCOPIC: ICD-10-PCS | Performed by: INTERNAL MEDICINE

## 2019-09-25 PROCEDURE — 87086 URINE CULTURE/COLONY COUNT: CPT

## 2019-09-25 PROCEDURE — 87116 MYCOBACTERIA CULTURE: CPT

## 2019-09-25 PROCEDURE — 96365 THER/PROPH/DIAG IV INF INIT: CPT

## 2019-09-25 PROCEDURE — 82803 BLOOD GASES ANY COMBINATION: CPT

## 2019-09-25 PROCEDURE — 51701 INSERT BLADDER CATHETER: CPT

## 2019-09-25 PROCEDURE — 96367 TX/PROPH/DG ADDL SEQ IV INF: CPT

## 2019-09-25 PROCEDURE — 76705 ECHO EXAM OF ABDOMEN: CPT

## 2019-09-25 PROCEDURE — 87206 SMEAR FLUORESCENT/ACID STAI: CPT

## 2019-09-25 PROCEDURE — 88305 TISSUE EXAM BY PATHOLOGIST: CPT

## 2019-09-25 PROCEDURE — 3609010800 HC BRONCHOSCOPY ALVEOLAR LAVAGE: Performed by: INTERNAL MEDICINE

## 2019-09-25 PROCEDURE — 31624 DX BRONCHOSCOPE/LAVAGE: CPT | Performed by: INTERNAL MEDICINE

## 2019-09-25 PROCEDURE — 83605 ASSAY OF LACTIC ACID: CPT

## 2019-09-25 PROCEDURE — 99291 CRITICAL CARE FIRST HOUR: CPT | Performed by: INTERNAL MEDICINE

## 2019-09-25 PROCEDURE — 99152 MOD SED SAME PHYS/QHP 5/>YRS: CPT | Performed by: INTERNAL MEDICINE

## 2019-09-25 PROCEDURE — 87106 FUNGI IDENTIFICATION YEAST: CPT

## 2019-09-25 PROCEDURE — 80053 COMPREHEN METABOLIC PANEL: CPT

## 2019-09-25 PROCEDURE — 99291 CRITICAL CARE FIRST HOUR: CPT

## 2019-09-25 PROCEDURE — 0B968ZZ DRAINAGE OF RIGHT LOWER LOBE BRONCHUS, VIA NATURAL OR ARTIFICIAL OPENING ENDOSCOPIC: ICD-10-PCS | Performed by: INTERNAL MEDICINE

## 2019-09-25 PROCEDURE — 87040 BLOOD CULTURE FOR BACTERIA: CPT

## 2019-09-25 PROCEDURE — 36600 WITHDRAWAL OF ARTERIAL BLOOD: CPT

## 2019-09-25 PROCEDURE — 85025 COMPLETE CBC W/AUTO DIFF WBC: CPT

## 2019-09-25 PROCEDURE — 0B978ZZ DRAINAGE OF LEFT MAIN BRONCHUS, VIA NATURAL OR ARTIFICIAL OPENING ENDOSCOPIC: ICD-10-PCS | Performed by: INTERNAL MEDICINE

## 2019-09-25 PROCEDURE — 84484 ASSAY OF TROPONIN QUANT: CPT

## 2019-09-25 PROCEDURE — 2580000003 HC RX 258: Performed by: EMERGENCY MEDICINE

## 2019-09-25 PROCEDURE — 2580000003 HC RX 258: Performed by: INTERNAL MEDICINE

## 2019-09-25 PROCEDURE — 6360000002 HC RX W HCPCS: Performed by: INTERNAL MEDICINE

## 2019-09-25 PROCEDURE — 84443 ASSAY THYROID STIM HORMONE: CPT

## 2019-09-25 PROCEDURE — 71045 X-RAY EXAM CHEST 1 VIEW: CPT

## 2019-09-25 PROCEDURE — 94761 N-INVAS EAR/PLS OXIMETRY MLT: CPT

## 2019-09-25 PROCEDURE — 81001 URINALYSIS AUTO W/SCOPE: CPT

## 2019-09-25 PROCEDURE — 36415 COLL VENOUS BLD VENIPUNCTURE: CPT

## 2019-09-25 PROCEDURE — 87186 SC STD MICRODIL/AGAR DIL: CPT

## 2019-09-25 PROCEDURE — 93005 ELECTROCARDIOGRAM TRACING: CPT | Performed by: EMERGENCY MEDICINE

## 2019-09-25 PROCEDURE — 87102 FUNGUS ISOLATION CULTURE: CPT

## 2019-09-25 PROCEDURE — 2060000000 HC ICU INTERMEDIATE R&B

## 2019-09-25 PROCEDURE — 7100000011 HC PHASE II RECOVERY - ADDTL 15 MIN: Performed by: INTERNAL MEDICINE

## 2019-09-25 PROCEDURE — 87077 CULTURE AEROBIC IDENTIFY: CPT

## 2019-09-25 PROCEDURE — 87015 SPECIMEN INFECT AGNT CONCNTJ: CPT

## 2019-09-25 PROCEDURE — 96375 TX/PRO/DX INJ NEW DRUG ADDON: CPT

## 2019-09-25 PROCEDURE — 74176 CT ABD & PELVIS W/O CONTRAST: CPT

## 2019-09-25 PROCEDURE — 87205 SMEAR GRAM STAIN: CPT

## 2019-09-25 PROCEDURE — 7100000010 HC PHASE II RECOVERY - FIRST 15 MIN: Performed by: INTERNAL MEDICINE

## 2019-09-25 PROCEDURE — 88112 CYTOPATH CELL ENHANCE TECH: CPT

## 2019-09-25 PROCEDURE — 83880 ASSAY OF NATRIURETIC PEPTIDE: CPT

## 2019-09-25 PROCEDURE — 6370000000 HC RX 637 (ALT 250 FOR IP): Performed by: INTERNAL MEDICINE

## 2019-09-25 PROCEDURE — 87070 CULTURE OTHR SPECIMN AEROBIC: CPT

## 2019-09-25 PROCEDURE — 2700000000 HC OXYGEN THERAPY PER DAY

## 2019-09-25 PROCEDURE — 2709999900 HC NON-CHARGEABLE SUPPLY: Performed by: INTERNAL MEDICINE

## 2019-09-25 PROCEDURE — 84439 ASSAY OF FREE THYROXINE: CPT

## 2019-09-25 PROCEDURE — 31645 BRNCHSC W/THER ASPIR 1ST: CPT | Performed by: INTERNAL MEDICINE

## 2019-09-25 PROCEDURE — 3609010900 HC BRONCHOSCOPY THERAPUTIC ASPIRATION INITIAL: Performed by: INTERNAL MEDICINE

## 2019-09-25 PROCEDURE — 6360000002 HC RX W HCPCS: Performed by: EMERGENCY MEDICINE

## 2019-09-25 RX ORDER — 0.9 % SODIUM CHLORIDE 0.9 %
1000 INTRAVENOUS SOLUTION INTRAVENOUS ONCE
Status: DISCONTINUED | OUTPATIENT
Start: 2019-09-25 | End: 2019-09-25

## 2019-09-25 RX ORDER — MAGNESIUM HYDROXIDE 1200 MG/15ML
LIQUID ORAL CONTINUOUS PRN
Status: COMPLETED | OUTPATIENT
Start: 2019-09-25 | End: 2019-09-25

## 2019-09-25 RX ORDER — ACETYLCYSTEINE 200 MG/ML
SOLUTION ORAL; RESPIRATORY (INHALATION) PRN
Status: DISCONTINUED | OUTPATIENT
Start: 2019-09-25 | End: 2019-09-25 | Stop reason: ALTCHOICE

## 2019-09-25 RX ORDER — MIDODRINE HYDROCHLORIDE 5 MG/1
10 TABLET ORAL
Status: DISCONTINUED | OUTPATIENT
Start: 2019-09-25 | End: 2019-09-28

## 2019-09-25 RX ORDER — LEVOTHYROXINE SODIUM 0.12 MG/1
125 TABLET ORAL DAILY
Status: DISCONTINUED | OUTPATIENT
Start: 2019-09-25 | End: 2019-09-26

## 2019-09-25 RX ORDER — SODIUM CHLORIDE 0.9 % (FLUSH) 0.9 %
10 SYRINGE (ML) INJECTION EVERY 12 HOURS SCHEDULED
Status: DISCONTINUED | OUTPATIENT
Start: 2019-09-25 | End: 2019-10-08 | Stop reason: HOSPADM

## 2019-09-25 RX ORDER — HEPARIN SODIUM 5000 [USP'U]/ML
5000 INJECTION, SOLUTION INTRAVENOUS; SUBCUTANEOUS EVERY 8 HOURS SCHEDULED
Status: DISCONTINUED | OUTPATIENT
Start: 2019-09-25 | End: 2019-10-08 | Stop reason: HOSPADM

## 2019-09-25 RX ORDER — SODIUM CHLORIDE 0.9 % (FLUSH) 0.9 %
10 SYRINGE (ML) INJECTION PRN
Status: DISCONTINUED | OUTPATIENT
Start: 2019-09-25 | End: 2019-10-08 | Stop reason: HOSPADM

## 2019-09-25 RX ORDER — SODIUM CHLORIDE 9 MG/ML
INJECTION, SOLUTION INTRAVENOUS CONTINUOUS
Status: DISCONTINUED | OUTPATIENT
Start: 2019-09-25 | End: 2019-09-25

## 2019-09-25 RX ORDER — SODIUM CHLORIDE 9 MG/ML
INJECTION, SOLUTION INTRAVENOUS CONTINUOUS
Status: DISCONTINUED | OUTPATIENT
Start: 2019-09-25 | End: 2019-09-28

## 2019-09-25 RX ORDER — LIDOCAINE HYDROCHLORIDE 20 MG/ML
INJECTION, SOLUTION INFILTRATION; PERINEURAL PRN
Status: DISCONTINUED | OUTPATIENT
Start: 2019-09-25 | End: 2019-09-25 | Stop reason: ALTCHOICE

## 2019-09-25 RX ORDER — MIDAZOLAM HYDROCHLORIDE 5 MG/ML
INJECTION INTRAMUSCULAR; INTRAVENOUS PRN
Status: DISCONTINUED | OUTPATIENT
Start: 2019-09-25 | End: 2019-09-25 | Stop reason: ALTCHOICE

## 2019-09-25 RX ORDER — KETOROLAC TROMETHAMINE 30 MG/ML
30 INJECTION, SOLUTION INTRAMUSCULAR; INTRAVENOUS ONCE
Status: DISCONTINUED | OUTPATIENT
Start: 2019-09-25 | End: 2019-09-25

## 2019-09-25 RX ORDER — 0.9 % SODIUM CHLORIDE 0.9 %
250 INTRAVENOUS SOLUTION INTRAVENOUS ONCE
Status: COMPLETED | OUTPATIENT
Start: 2019-09-25 | End: 2019-09-25

## 2019-09-25 RX ORDER — FENTANYL CITRATE 50 UG/ML
INJECTION, SOLUTION INTRAMUSCULAR; INTRAVENOUS PRN
Status: DISCONTINUED | OUTPATIENT
Start: 2019-09-25 | End: 2019-09-25 | Stop reason: ALTCHOICE

## 2019-09-25 RX ORDER — 0.9 % SODIUM CHLORIDE 0.9 %
30 INTRAVENOUS SOLUTION INTRAVENOUS ONCE
Status: COMPLETED | OUTPATIENT
Start: 2019-09-25 | End: 2019-09-25

## 2019-09-25 RX ORDER — FENTANYL CITRATE 50 UG/ML
25 INJECTION, SOLUTION INTRAMUSCULAR; INTRAVENOUS ONCE
Status: COMPLETED | OUTPATIENT
Start: 2019-09-25 | End: 2019-09-25

## 2019-09-25 RX ORDER — LINEZOLID 2 MG/ML
600 INJECTION, SOLUTION INTRAVENOUS EVERY 12 HOURS
Status: DISCONTINUED | OUTPATIENT
Start: 2019-09-25 | End: 2019-09-30

## 2019-09-25 RX ADMIN — VANCOMYCIN HYDROCHLORIDE 1000 MG: 1 INJECTION, POWDER, LYOPHILIZED, FOR SOLUTION INTRAVENOUS at 02:54

## 2019-09-25 RX ADMIN — FENTANYL CITRATE 25 MCG: 50 INJECTION, SOLUTION INTRAMUSCULAR; INTRAVENOUS at 02:46

## 2019-09-25 RX ADMIN — PIPERACILLIN AND TAZOBACTAM 3.38 G: 3; .375 INJECTION, POWDER, FOR SOLUTION INTRAVENOUS at 22:38

## 2019-09-25 RX ADMIN — PIPERACILLIN SODIUM,TAZOBACTAM SODIUM 3.38 G: 3; .375 INJECTION, POWDER, FOR SOLUTION INTRAVENOUS at 02:46

## 2019-09-25 RX ADMIN — LINEZOLID 600 MG: 2 INJECTION, SOLUTION INTRAVENOUS at 20:30

## 2019-09-25 RX ADMIN — LINEZOLID 600 MG: 2 INJECTION, SOLUTION INTRAVENOUS at 09:31

## 2019-09-25 RX ADMIN — HEPARIN SODIUM 5000 UNITS: 5000 INJECTION INTRAVENOUS; SUBCUTANEOUS at 16:21

## 2019-09-25 RX ADMIN — SODIUM CHLORIDE 250 ML: 9 INJECTION, SOLUTION INTRAVENOUS at 02:03

## 2019-09-25 RX ADMIN — HEPARIN SODIUM 5000 UNITS: 5000 INJECTION INTRAVENOUS; SUBCUTANEOUS at 09:31

## 2019-09-25 RX ADMIN — LEVOTHYROXINE SODIUM 125 MCG: 125 TABLET ORAL at 09:31

## 2019-09-25 RX ADMIN — SODIUM CHLORIDE: 9 INJECTION, SOLUTION INTRAVENOUS at 14:03

## 2019-09-25 RX ADMIN — PIPERACILLIN AND TAZOBACTAM 3.38 G: 3; .375 INJECTION, POWDER, FOR SOLUTION INTRAVENOUS at 14:02

## 2019-09-25 RX ADMIN — HEPARIN SODIUM 5000 UNITS: 5000 INJECTION INTRAVENOUS; SUBCUTANEOUS at 20:29

## 2019-09-25 RX ADMIN — SODIUM CHLORIDE: 9 INJECTION, SOLUTION INTRAVENOUS at 09:44

## 2019-09-25 RX ADMIN — Medication 10 ML: at 20:30

## 2019-09-25 RX ADMIN — SODIUM CHLORIDE 2397 ML: 9 INJECTION, SOLUTION INTRAVENOUS at 02:46

## 2019-09-25 ASSESSMENT — PAIN - FUNCTIONAL ASSESSMENT: PAIN_FUNCTIONAL_ASSESSMENT: 0-10

## 2019-09-25 ASSESSMENT — PAIN DESCRIPTION - LOCATION: LOCATION: BACK;KNEE

## 2019-09-25 ASSESSMENT — PAIN SCALES - GENERAL
PAINLEVEL_OUTOF10: 8
PAINLEVEL_OUTOF10: 8

## 2019-09-25 ASSESSMENT — PAIN DESCRIPTION - PAIN TYPE: TYPE: CHRONIC PAIN

## 2019-09-26 LAB
ANION GAP SERPL CALCULATED.3IONS-SCNC: 10 MMOL/L (ref 3–16)
BASOPHILS ABSOLUTE: 0.1 K/UL (ref 0–0.2)
BASOPHILS RELATIVE PERCENT: 0.6 %
BUN BLDV-MCNC: 36 MG/DL (ref 7–20)
CALCIUM SERPL-MCNC: 8.2 MG/DL (ref 8.3–10.6)
CHLORIDE BLD-SCNC: 101 MMOL/L (ref 99–110)
CO2: 25 MMOL/L (ref 21–32)
CREAT SERPL-MCNC: 2.3 MG/DL (ref 0.8–1.3)
EKG ATRIAL RATE: 90 BPM
EKG DIAGNOSIS: NORMAL
EKG P AXIS: 64 DEGREES
EKG P-R INTERVAL: 184 MS
EKG Q-T INTERVAL: 394 MS
EKG QRS DURATION: 92 MS
EKG QTC CALCULATION (BAZETT): 481 MS
EKG R AXIS: 15 DEGREES
EKG T AXIS: 40 DEGREES
EKG VENTRICULAR RATE: 90 BPM
EOSINOPHILS ABSOLUTE: 0.1 K/UL (ref 0–0.6)
EOSINOPHILS RELATIVE PERCENT: 0.9 %
GFR AFRICAN AMERICAN: 34
GFR NON-AFRICAN AMERICAN: 28
GLUCOSE BLD-MCNC: 136 MG/DL (ref 70–99)
HCT VFR BLD CALC: 22.1 % (ref 40.5–52.5)
HEMOGLOBIN: 7.3 G/DL (ref 13.5–17.5)
LYMPHOCYTES ABSOLUTE: 1.1 K/UL (ref 1–5.1)
LYMPHOCYTES RELATIVE PERCENT: 7.6 %
MCH RBC QN AUTO: 32.6 PG (ref 26–34)
MCHC RBC AUTO-ENTMCNC: 33 G/DL (ref 31–36)
MCV RBC AUTO: 98.9 FL (ref 80–100)
MONOCYTES ABSOLUTE: 0.7 K/UL (ref 0–1.3)
MONOCYTES RELATIVE PERCENT: 5.2 %
NEUTROPHILS ABSOLUTE: 11.9 K/UL (ref 1.7–7.7)
NEUTROPHILS RELATIVE PERCENT: 85.7 %
PDW BLD-RTO: 15.7 % (ref 12.4–15.4)
PLATELET # BLD: 540 K/UL (ref 135–450)
PMV BLD AUTO: 7.9 FL (ref 5–10.5)
POTASSIUM REFLEX MAGNESIUM: 4.2 MMOL/L (ref 3.5–5.1)
PROCALCITONIN: 0.67 NG/ML (ref 0–0.15)
RBC # BLD: 2.24 M/UL (ref 4.2–5.9)
SODIUM BLD-SCNC: 136 MMOL/L (ref 136–145)
WBC # BLD: 13.9 K/UL (ref 4–11)

## 2019-09-26 PROCEDURE — 2500000003 HC RX 250 WO HCPCS: Performed by: INTERNAL MEDICINE

## 2019-09-26 PROCEDURE — 2700000000 HC OXYGEN THERAPY PER DAY

## 2019-09-26 PROCEDURE — 99233 SBSQ HOSP IP/OBS HIGH 50: CPT | Performed by: INTERNAL MEDICINE

## 2019-09-26 PROCEDURE — 80048 BASIC METABOLIC PNL TOTAL CA: CPT

## 2019-09-26 PROCEDURE — 85025 COMPLETE CBC W/AUTO DIFF WBC: CPT

## 2019-09-26 PROCEDURE — 2580000003 HC RX 258: Performed by: INTERNAL MEDICINE

## 2019-09-26 PROCEDURE — 6370000000 HC RX 637 (ALT 250 FOR IP): Performed by: INTERNAL MEDICINE

## 2019-09-26 PROCEDURE — 93010 ELECTROCARDIOGRAM REPORT: CPT | Performed by: INTERNAL MEDICINE

## 2019-09-26 PROCEDURE — 94761 N-INVAS EAR/PLS OXIMETRY MLT: CPT

## 2019-09-26 PROCEDURE — 36415 COLL VENOUS BLD VENIPUNCTURE: CPT

## 2019-09-26 PROCEDURE — 6360000002 HC RX W HCPCS: Performed by: INTERNAL MEDICINE

## 2019-09-26 PROCEDURE — 2060000000 HC ICU INTERMEDIATE R&B

## 2019-09-26 PROCEDURE — 84145 PROCALCITONIN (PCT): CPT

## 2019-09-26 RX ORDER — HYDROCODONE BITARTRATE AND ACETAMINOPHEN 5; 325 MG/1; MG/1
1 TABLET ORAL EVERY 6 HOURS PRN
Status: DISCONTINUED | OUTPATIENT
Start: 2019-09-26 | End: 2019-10-08 | Stop reason: HOSPADM

## 2019-09-26 RX ORDER — 0.9 % SODIUM CHLORIDE 0.9 %
500 INTRAVENOUS SOLUTION INTRAVENOUS ONCE
Status: COMPLETED | OUTPATIENT
Start: 2019-09-26 | End: 2019-09-26

## 2019-09-26 RX ORDER — ACETAMINOPHEN 325 MG/1
650 TABLET ORAL EVERY 4 HOURS PRN
Status: DISCONTINUED | OUTPATIENT
Start: 2019-09-26 | End: 2019-10-08 | Stop reason: HOSPADM

## 2019-09-26 RX ORDER — LEVOTHYROXINE SODIUM 0.15 MG/1
150 TABLET ORAL DAILY
Status: DISCONTINUED | OUTPATIENT
Start: 2019-09-27 | End: 2019-09-28

## 2019-09-26 RX ADMIN — PIPERACILLIN AND TAZOBACTAM 3.38 G: 3; .375 INJECTION, POWDER, FOR SOLUTION INTRAVENOUS at 12:34

## 2019-09-26 RX ADMIN — MICONAZOLE NITRATE: 20 POWDER TOPICAL at 21:18

## 2019-09-26 RX ADMIN — LINEZOLID 600 MG: 2 INJECTION, SOLUTION INTRAVENOUS at 23:53

## 2019-09-26 RX ADMIN — LEVOTHYROXINE SODIUM 125 MCG: 125 TABLET ORAL at 05:07

## 2019-09-26 RX ADMIN — HEPARIN SODIUM 5000 UNITS: 5000 INJECTION INTRAVENOUS; SUBCUTANEOUS at 05:07

## 2019-09-26 RX ADMIN — ACETAMINOPHEN 650 MG: 325 TABLET ORAL at 12:34

## 2019-09-26 RX ADMIN — LINEZOLID 600 MG: 2 INJECTION, SOLUTION INTRAVENOUS at 08:36

## 2019-09-26 RX ADMIN — PIPERACILLIN AND TAZOBACTAM 3.38 G: 3; .375 INJECTION, POWDER, FOR SOLUTION INTRAVENOUS at 18:43

## 2019-09-26 RX ADMIN — HYDROCODONE BITARTRATE AND ACETAMINOPHEN 1 TABLET: 5; 325 TABLET ORAL at 18:39

## 2019-09-26 RX ADMIN — SODIUM CHLORIDE: 9 INJECTION, SOLUTION INTRAVENOUS at 21:18

## 2019-09-26 RX ADMIN — MIDODRINE HYDROCHLORIDE 10 MG: 5 TABLET ORAL at 18:39

## 2019-09-26 RX ADMIN — HEPARIN SODIUM 5000 UNITS: 5000 INJECTION INTRAVENOUS; SUBCUTANEOUS at 15:36

## 2019-09-26 RX ADMIN — SODIUM CHLORIDE 500 ML: 9 INJECTION, SOLUTION INTRAVENOUS at 08:32

## 2019-09-26 RX ADMIN — PIPERACILLIN AND TAZOBACTAM 3.38 G: 3; .375 INJECTION, POWDER, FOR SOLUTION INTRAVENOUS at 05:07

## 2019-09-26 RX ADMIN — MIDODRINE HYDROCHLORIDE 10 MG: 5 TABLET ORAL at 08:35

## 2019-09-26 RX ADMIN — HEPARIN SODIUM 5000 UNITS: 5000 INJECTION INTRAVENOUS; SUBCUTANEOUS at 21:53

## 2019-09-26 ASSESSMENT — PAIN SCALES - GENERAL
PAINLEVEL_OUTOF10: 5
PAINLEVEL_OUTOF10: 5
PAINLEVEL_OUTOF10: 0
PAINLEVEL_OUTOF10: 3
PAINLEVEL_OUTOF10: 5

## 2019-09-27 LAB
ANION GAP SERPL CALCULATED.3IONS-SCNC: 11 MMOL/L (ref 3–16)
BASOPHILS ABSOLUTE: 0.1 K/UL (ref 0–0.2)
BASOPHILS RELATIVE PERCENT: 0.5 %
BUN BLDV-MCNC: 26 MG/DL (ref 7–20)
CALCIUM SERPL-MCNC: 8.2 MG/DL (ref 8.3–10.6)
CHLORIDE BLD-SCNC: 103 MMOL/L (ref 99–110)
CO2: 24 MMOL/L (ref 21–32)
CREAT SERPL-MCNC: 1.6 MG/DL (ref 0.8–1.3)
CULTURE, RESPIRATORY: NORMAL
EOSINOPHILS ABSOLUTE: 0.2 K/UL (ref 0–0.6)
EOSINOPHILS RELATIVE PERCENT: 1.2 %
GFR AFRICAN AMERICAN: 51
GFR NON-AFRICAN AMERICAN: 43
GLUCOSE BLD-MCNC: 108 MG/DL (ref 70–99)
GRAM STAIN RESULT: NORMAL
HCT VFR BLD CALC: 22.2 % (ref 40.5–52.5)
HEMOGLOBIN: 7.6 G/DL (ref 13.5–17.5)
LYMPHOCYTES ABSOLUTE: 1.1 K/UL (ref 1–5.1)
LYMPHOCYTES RELATIVE PERCENT: 7.4 %
MCH RBC QN AUTO: 33.8 PG (ref 26–34)
MCHC RBC AUTO-ENTMCNC: 34.2 G/DL (ref 31–36)
MCV RBC AUTO: 98.8 FL (ref 80–100)
MONOCYTES ABSOLUTE: 0.7 K/UL (ref 0–1.3)
MONOCYTES RELATIVE PERCENT: 5 %
NEUTROPHILS ABSOLUTE: 12.4 K/UL (ref 1.7–7.7)
NEUTROPHILS RELATIVE PERCENT: 85.9 %
ORGANISM: ABNORMAL
PDW BLD-RTO: 15.9 % (ref 12.4–15.4)
PLATELET # BLD: 574 K/UL (ref 135–450)
PMV BLD AUTO: 7.9 FL (ref 5–10.5)
POTASSIUM REFLEX MAGNESIUM: 3.9 MMOL/L (ref 3.5–5.1)
PROCALCITONIN: 0.49 NG/ML (ref 0–0.15)
RBC # BLD: 2.25 M/UL (ref 4.2–5.9)
SODIUM BLD-SCNC: 138 MMOL/L (ref 136–145)
URINE CULTURE, ROUTINE: ABNORMAL
URINE CULTURE, ROUTINE: ABNORMAL
WBC # BLD: 14.4 K/UL (ref 4–11)

## 2019-09-27 PROCEDURE — 2700000000 HC OXYGEN THERAPY PER DAY

## 2019-09-27 PROCEDURE — 2060000000 HC ICU INTERMEDIATE R&B

## 2019-09-27 PROCEDURE — 84145 PROCALCITONIN (PCT): CPT

## 2019-09-27 PROCEDURE — 85025 COMPLETE CBC W/AUTO DIFF WBC: CPT

## 2019-09-27 PROCEDURE — 6360000002 HC RX W HCPCS: Performed by: INTERNAL MEDICINE

## 2019-09-27 PROCEDURE — 99232 SBSQ HOSP IP/OBS MODERATE 35: CPT | Performed by: INTERNAL MEDICINE

## 2019-09-27 PROCEDURE — 6370000000 HC RX 637 (ALT 250 FOR IP): Performed by: INTERNAL MEDICINE

## 2019-09-27 PROCEDURE — 2580000003 HC RX 258: Performed by: INTERNAL MEDICINE

## 2019-09-27 PROCEDURE — 36415 COLL VENOUS BLD VENIPUNCTURE: CPT

## 2019-09-27 PROCEDURE — 94761 N-INVAS EAR/PLS OXIMETRY MLT: CPT

## 2019-09-27 PROCEDURE — 80048 BASIC METABOLIC PNL TOTAL CA: CPT

## 2019-09-27 RX ADMIN — PIPERACILLIN AND TAZOBACTAM 3.38 G: 3; .375 INJECTION, POWDER, FOR SOLUTION INTRAVENOUS at 04:31

## 2019-09-27 RX ADMIN — Medication 10 ML: at 21:41

## 2019-09-27 RX ADMIN — MIDODRINE HYDROCHLORIDE 10 MG: 5 TABLET ORAL at 07:58

## 2019-09-27 RX ADMIN — HYDROCODONE BITARTRATE AND ACETAMINOPHEN 1 TABLET: 5; 325 TABLET ORAL at 00:43

## 2019-09-27 RX ADMIN — MIDODRINE HYDROCHLORIDE 10 MG: 5 TABLET ORAL at 16:42

## 2019-09-27 RX ADMIN — MIDODRINE HYDROCHLORIDE 10 MG: 5 TABLET ORAL at 18:07

## 2019-09-27 RX ADMIN — MICONAZOLE NITRATE: 20 POWDER TOPICAL at 21:38

## 2019-09-27 RX ADMIN — Medication 10 ML: at 07:59

## 2019-09-27 RX ADMIN — LEVOTHYROXINE SODIUM 150 MCG: 150 TABLET ORAL at 07:15

## 2019-09-27 RX ADMIN — MICONAZOLE NITRATE: 20 POWDER TOPICAL at 07:58

## 2019-09-27 RX ADMIN — HYDROCODONE BITARTRATE AND ACETAMINOPHEN 1 TABLET: 5; 325 TABLET ORAL at 07:15

## 2019-09-27 RX ADMIN — HEPARIN SODIUM 5000 UNITS: 5000 INJECTION INTRAVENOUS; SUBCUTANEOUS at 21:42

## 2019-09-27 RX ADMIN — LINEZOLID 600 MG: 2 INJECTION, SOLUTION INTRAVENOUS at 21:37

## 2019-09-27 RX ADMIN — PIPERACILLIN AND TAZOBACTAM 3.38 G: 3; .375 INJECTION, POWDER, FOR SOLUTION INTRAVENOUS at 21:38

## 2019-09-27 RX ADMIN — HEPARIN SODIUM 5000 UNITS: 5000 INJECTION INTRAVENOUS; SUBCUTANEOUS at 07:13

## 2019-09-27 RX ADMIN — PIPERACILLIN AND TAZOBACTAM 3.38 G: 3; .375 INJECTION, POWDER, FOR SOLUTION INTRAVENOUS at 16:42

## 2019-09-27 RX ADMIN — LINEZOLID 600 MG: 2 INJECTION, SOLUTION INTRAVENOUS at 07:58

## 2019-09-27 ASSESSMENT — PAIN SCALES - GENERAL
PAINLEVEL_OUTOF10: 0
PAINLEVEL_OUTOF10: 0
PAINLEVEL_OUTOF10: 9
PAINLEVEL_OUTOF10: 8
PAINLEVEL_OUTOF10: 0
PAINLEVEL_OUTOF10: 0
PAINLEVEL_OUTOF10: 7
PAINLEVEL_OUTOF10: 0

## 2019-09-28 ENCOUNTER — APPOINTMENT (OUTPATIENT)
Dept: GENERAL RADIOLOGY | Age: 74
DRG: 871 | End: 2019-09-28
Payer: MEDICARE

## 2019-09-28 LAB
ANION GAP SERPL CALCULATED.3IONS-SCNC: 10 MMOL/L (ref 3–16)
BASOPHILS ABSOLUTE: 0.1 K/UL (ref 0–0.2)
BASOPHILS RELATIVE PERCENT: 0.5 %
BUN BLDV-MCNC: 16 MG/DL (ref 7–20)
CALCIUM SERPL-MCNC: 8.3 MG/DL (ref 8.3–10.6)
CHLORIDE BLD-SCNC: 106 MMOL/L (ref 99–110)
CO2: 26 MMOL/L (ref 21–32)
CREAT SERPL-MCNC: 1.2 MG/DL (ref 0.8–1.3)
EOSINOPHILS ABSOLUTE: 0.2 K/UL (ref 0–0.6)
EOSINOPHILS RELATIVE PERCENT: 1.6 %
GFR AFRICAN AMERICAN: >60
GFR NON-AFRICAN AMERICAN: 59
GLUCOSE BLD-MCNC: 183 MG/DL (ref 70–99)
HCT VFR BLD CALC: 23 % (ref 40.5–52.5)
HEMOGLOBIN: 7.5 G/DL (ref 13.5–17.5)
LYMPHOCYTES ABSOLUTE: 1.1 K/UL (ref 1–5.1)
LYMPHOCYTES RELATIVE PERCENT: 8.7 %
MCH RBC QN AUTO: 32.5 PG (ref 26–34)
MCHC RBC AUTO-ENTMCNC: 32.8 G/DL (ref 31–36)
MCV RBC AUTO: 99 FL (ref 80–100)
MONOCYTES ABSOLUTE: 0.8 K/UL (ref 0–1.3)
MONOCYTES RELATIVE PERCENT: 6.3 %
NEUTROPHILS ABSOLUTE: 10.5 K/UL (ref 1.7–7.7)
NEUTROPHILS RELATIVE PERCENT: 82.9 %
PDW BLD-RTO: 15.8 % (ref 12.4–15.4)
PLATELET # BLD: 583 K/UL (ref 135–450)
PMV BLD AUTO: 7.7 FL (ref 5–10.5)
POTASSIUM REFLEX MAGNESIUM: 3.7 MMOL/L (ref 3.5–5.1)
PROCALCITONIN: 0.4 NG/ML (ref 0–0.15)
RBC # BLD: 2.32 M/UL (ref 4.2–5.9)
SODIUM BLD-SCNC: 142 MMOL/L (ref 136–145)
WBC # BLD: 12.6 K/UL (ref 4–11)

## 2019-09-28 PROCEDURE — 6360000002 HC RX W HCPCS: Performed by: INTERNAL MEDICINE

## 2019-09-28 PROCEDURE — 80048 BASIC METABOLIC PNL TOTAL CA: CPT

## 2019-09-28 PROCEDURE — 71045 X-RAY EXAM CHEST 1 VIEW: CPT

## 2019-09-28 PROCEDURE — 97535 SELF CARE MNGMENT TRAINING: CPT

## 2019-09-28 PROCEDURE — 97530 THERAPEUTIC ACTIVITIES: CPT

## 2019-09-28 PROCEDURE — 36415 COLL VENOUS BLD VENIPUNCTURE: CPT

## 2019-09-28 PROCEDURE — 94150 VITAL CAPACITY TEST: CPT

## 2019-09-28 PROCEDURE — 2700000000 HC OXYGEN THERAPY PER DAY

## 2019-09-28 PROCEDURE — 97166 OT EVAL MOD COMPLEX 45 MIN: CPT

## 2019-09-28 PROCEDURE — 6370000000 HC RX 637 (ALT 250 FOR IP): Performed by: INTERNAL MEDICINE

## 2019-09-28 PROCEDURE — 94669 MECHANICAL CHEST WALL OSCILL: CPT

## 2019-09-28 PROCEDURE — 84145 PROCALCITONIN (PCT): CPT

## 2019-09-28 PROCEDURE — 2060000000 HC ICU INTERMEDIATE R&B

## 2019-09-28 PROCEDURE — 85025 COMPLETE CBC W/AUTO DIFF WBC: CPT

## 2019-09-28 PROCEDURE — 97162 PT EVAL MOD COMPLEX 30 MIN: CPT

## 2019-09-28 PROCEDURE — 99233 SBSQ HOSP IP/OBS HIGH 50: CPT | Performed by: INTERNAL MEDICINE

## 2019-09-28 PROCEDURE — 94761 N-INVAS EAR/PLS OXIMETRY MLT: CPT

## 2019-09-28 PROCEDURE — 2580000003 HC RX 258: Performed by: INTERNAL MEDICINE

## 2019-09-28 PROCEDURE — 94640 AIRWAY INHALATION TREATMENT: CPT

## 2019-09-28 RX ORDER — MIDODRINE HYDROCHLORIDE 5 MG/1
5 TABLET ORAL
Status: DISCONTINUED | OUTPATIENT
Start: 2019-09-28 | End: 2019-09-29

## 2019-09-28 RX ORDER — FLUCONAZOLE 100 MG/1
200 TABLET ORAL DAILY
Status: DISCONTINUED | OUTPATIENT
Start: 2019-09-28 | End: 2019-10-04

## 2019-09-28 RX ORDER — IPRATROPIUM BROMIDE AND ALBUTEROL SULFATE 2.5; .5 MG/3ML; MG/3ML
1 SOLUTION RESPIRATORY (INHALATION)
Status: DISCONTINUED | OUTPATIENT
Start: 2019-09-28 | End: 2019-10-01

## 2019-09-28 RX ORDER — SACCHAROMYCES BOULARDII 250 MG
250 CAPSULE ORAL 2 TIMES DAILY
Status: DISCONTINUED | OUTPATIENT
Start: 2019-09-28 | End: 2019-10-08 | Stop reason: HOSPADM

## 2019-09-28 RX ORDER — IPRATROPIUM BROMIDE AND ALBUTEROL SULFATE 2.5; .5 MG/3ML; MG/3ML
1 SOLUTION RESPIRATORY (INHALATION)
Status: DISCONTINUED | OUTPATIENT
Start: 2019-09-28 | End: 2019-09-28

## 2019-09-28 RX ORDER — LEVOTHYROXINE SODIUM 0.12 MG/1
250 TABLET ORAL DAILY
Status: DISCONTINUED | OUTPATIENT
Start: 2019-09-29 | End: 2019-09-30

## 2019-09-28 RX ADMIN — LINEZOLID 600 MG: 2 INJECTION, SOLUTION INTRAVENOUS at 09:20

## 2019-09-28 RX ADMIN — LINEZOLID 600 MG: 2 INJECTION, SOLUTION INTRAVENOUS at 20:50

## 2019-09-28 RX ADMIN — HEPARIN SODIUM 5000 UNITS: 5000 INJECTION INTRAVENOUS; SUBCUTANEOUS at 06:11

## 2019-09-28 RX ADMIN — Medication 10 ML: at 21:00

## 2019-09-28 RX ADMIN — IPRATROPIUM BROMIDE AND ALBUTEROL SULFATE 1 AMPULE: .5; 3 SOLUTION RESPIRATORY (INHALATION) at 20:44

## 2019-09-28 RX ADMIN — HEPARIN SODIUM 5000 UNITS: 5000 INJECTION INTRAVENOUS; SUBCUTANEOUS at 14:12

## 2019-09-28 RX ADMIN — MICONAZOLE NITRATE: 20 POWDER TOPICAL at 09:20

## 2019-09-28 RX ADMIN — PIPERACILLIN AND TAZOBACTAM 3.38 G: 3; .375 INJECTION, POWDER, FOR SOLUTION INTRAVENOUS at 04:15

## 2019-09-28 RX ADMIN — LEVOTHYROXINE SODIUM 150 MCG: 150 TABLET ORAL at 06:11

## 2019-09-28 RX ADMIN — PIPERACILLIN AND TAZOBACTAM 3.38 G: 3; .375 INJECTION, POWDER, FOR SOLUTION INTRAVENOUS at 12:56

## 2019-09-28 RX ADMIN — Medication 250 MG: at 12:55

## 2019-09-28 RX ADMIN — MIDODRINE HYDROCHLORIDE 5 MG: 5 TABLET ORAL at 17:25

## 2019-09-28 RX ADMIN — MIDODRINE HYDROCHLORIDE 5 MG: 5 TABLET ORAL at 12:55

## 2019-09-28 RX ADMIN — IPRATROPIUM BROMIDE AND ALBUTEROL SULFATE 1 AMPULE: .5; 3 SOLUTION RESPIRATORY (INHALATION) at 12:29

## 2019-09-28 RX ADMIN — MICONAZOLE NITRATE: 20 POWDER TOPICAL at 20:59

## 2019-09-28 RX ADMIN — HEPARIN SODIUM 5000 UNITS: 5000 INJECTION INTRAVENOUS; SUBCUTANEOUS at 21:20

## 2019-09-28 RX ADMIN — MIDODRINE HYDROCHLORIDE 10 MG: 5 TABLET ORAL at 09:20

## 2019-09-28 RX ADMIN — PIPERACILLIN AND TAZOBACTAM 3.38 G: 3; .375 INJECTION, POWDER, FOR SOLUTION INTRAVENOUS at 20:51

## 2019-09-28 RX ADMIN — FLUCONAZOLE 200 MG: 100 TABLET ORAL at 12:55

## 2019-09-28 RX ADMIN — Medication 250 MG: at 20:59

## 2019-09-28 ASSESSMENT — PAIN SCALES - GENERAL
PAINLEVEL_OUTOF10: 0

## 2019-09-28 ASSESSMENT — PAIN DESCRIPTION - PAIN TYPE: TYPE: ACUTE PAIN

## 2019-09-28 ASSESSMENT — PAIN DESCRIPTION - LOCATION: LOCATION: BUTTOCKS

## 2019-09-28 ASSESSMENT — PAIN SCALES - WONG BAKER: WONGBAKER_NUMERICALRESPONSE: 8

## 2019-09-29 ENCOUNTER — APPOINTMENT (OUTPATIENT)
Dept: CT IMAGING | Age: 74
DRG: 871 | End: 2019-09-29
Payer: MEDICARE

## 2019-09-29 LAB
ANION GAP SERPL CALCULATED.3IONS-SCNC: 12 MMOL/L (ref 3–16)
BASOPHILS ABSOLUTE: 0.1 K/UL (ref 0–0.2)
BASOPHILS RELATIVE PERCENT: 0.6 %
BUN BLDV-MCNC: 10 MG/DL (ref 7–20)
CALCIUM SERPL-MCNC: 8.3 MG/DL (ref 8.3–10.6)
CHLORIDE BLD-SCNC: 103 MMOL/L (ref 99–110)
CO2: 25 MMOL/L (ref 21–32)
CREAT SERPL-MCNC: 0.9 MG/DL (ref 0.8–1.3)
EOSINOPHILS ABSOLUTE: 0.1 K/UL (ref 0–0.6)
EOSINOPHILS RELATIVE PERCENT: 1.2 %
GFR AFRICAN AMERICAN: >60
GFR NON-AFRICAN AMERICAN: >60
GLUCOSE BLD-MCNC: 125 MG/DL (ref 70–99)
HCT VFR BLD CALC: 22.1 % (ref 40.5–52.5)
HEMOGLOBIN: 7.3 G/DL (ref 13.5–17.5)
LYMPHOCYTES ABSOLUTE: 1.5 K/UL (ref 1–5.1)
LYMPHOCYTES RELATIVE PERCENT: 11.4 %
MAGNESIUM: 1.5 MG/DL (ref 1.8–2.4)
MCH RBC QN AUTO: 33 PG (ref 26–34)
MCHC RBC AUTO-ENTMCNC: 33.1 G/DL (ref 31–36)
MCV RBC AUTO: 99.7 FL (ref 80–100)
MONOCYTES ABSOLUTE: 0.9 K/UL (ref 0–1.3)
MONOCYTES RELATIVE PERCENT: 6.7 %
NEUTROPHILS ABSOLUTE: 10.3 K/UL (ref 1.7–7.7)
NEUTROPHILS RELATIVE PERCENT: 80.1 %
PDW BLD-RTO: 15.7 % (ref 12.4–15.4)
PLATELET # BLD: 532 K/UL (ref 135–450)
PMV BLD AUTO: 7.5 FL (ref 5–10.5)
POTASSIUM REFLEX MAGNESIUM: 3.5 MMOL/L (ref 3.5–5.1)
RBC # BLD: 2.21 M/UL (ref 4.2–5.9)
SODIUM BLD-SCNC: 140 MMOL/L (ref 136–145)
WBC # BLD: 12.8 K/UL (ref 4–11)

## 2019-09-29 PROCEDURE — 36415 COLL VENOUS BLD VENIPUNCTURE: CPT

## 2019-09-29 PROCEDURE — 94669 MECHANICAL CHEST WALL OSCILL: CPT

## 2019-09-29 PROCEDURE — 6370000000 HC RX 637 (ALT 250 FOR IP): Performed by: NURSE PRACTITIONER

## 2019-09-29 PROCEDURE — 83735 ASSAY OF MAGNESIUM: CPT

## 2019-09-29 PROCEDURE — 94640 AIRWAY INHALATION TREATMENT: CPT

## 2019-09-29 PROCEDURE — 2700000000 HC OXYGEN THERAPY PER DAY

## 2019-09-29 PROCEDURE — 6370000000 HC RX 637 (ALT 250 FOR IP): Performed by: INTERNAL MEDICINE

## 2019-09-29 PROCEDURE — 71250 CT THORAX DX C-: CPT

## 2019-09-29 PROCEDURE — 2580000003 HC RX 258: Performed by: INTERNAL MEDICINE

## 2019-09-29 PROCEDURE — 6360000002 HC RX W HCPCS: Performed by: INTERNAL MEDICINE

## 2019-09-29 PROCEDURE — 80048 BASIC METABOLIC PNL TOTAL CA: CPT

## 2019-09-29 PROCEDURE — 94761 N-INVAS EAR/PLS OXIMETRY MLT: CPT

## 2019-09-29 PROCEDURE — 2060000000 HC ICU INTERMEDIATE R&B

## 2019-09-29 PROCEDURE — 85025 COMPLETE CBC W/AUTO DIFF WBC: CPT

## 2019-09-29 PROCEDURE — 99232 SBSQ HOSP IP/OBS MODERATE 35: CPT | Performed by: INTERNAL MEDICINE

## 2019-09-29 RX ORDER — MAGNESIUM SULFATE IN WATER 40 MG/ML
2 INJECTION, SOLUTION INTRAVENOUS ONCE
Status: COMPLETED | OUTPATIENT
Start: 2019-09-29 | End: 2019-09-29

## 2019-09-29 RX ADMIN — PIPERACILLIN AND TAZOBACTAM 3.38 G: 3; .375 INJECTION, POWDER, FOR SOLUTION INTRAVENOUS at 04:09

## 2019-09-29 RX ADMIN — Medication 250 MG: at 09:10

## 2019-09-29 RX ADMIN — HEPARIN SODIUM 5000 UNITS: 5000 INJECTION INTRAVENOUS; SUBCUTANEOUS at 06:25

## 2019-09-29 RX ADMIN — FLUCONAZOLE 200 MG: 100 TABLET ORAL at 09:10

## 2019-09-29 RX ADMIN — LINEZOLID 600 MG: 2 INJECTION, SOLUTION INTRAVENOUS at 20:44

## 2019-09-29 RX ADMIN — MIDODRINE HYDROCHLORIDE 5 MG: 5 TABLET ORAL at 09:10

## 2019-09-29 RX ADMIN — MIDODRINE HYDROCHLORIDE 5 MG: 5 TABLET ORAL at 12:29

## 2019-09-29 RX ADMIN — MIDODRINE HYDROCHLORIDE 5 MG: 5 TABLET ORAL at 16:55

## 2019-09-29 RX ADMIN — HEPARIN SODIUM 5000 UNITS: 5000 INJECTION INTRAVENOUS; SUBCUTANEOUS at 16:47

## 2019-09-29 RX ADMIN — MICONAZOLE NITRATE: 20 POWDER TOPICAL at 20:49

## 2019-09-29 RX ADMIN — Medication 250 MG: at 20:43

## 2019-09-29 RX ADMIN — Medication 10 ML: at 09:11

## 2019-09-29 RX ADMIN — PIPERACILLIN AND TAZOBACTAM 3.38 G: 3; .375 INJECTION, POWDER, FOR SOLUTION INTRAVENOUS at 22:20

## 2019-09-29 RX ADMIN — MICONAZOLE NITRATE: 20 POWDER TOPICAL at 09:11

## 2019-09-29 RX ADMIN — PIPERACILLIN AND TAZOBACTAM 3.38 G: 3; .375 INJECTION, POWDER, FOR SOLUTION INTRAVENOUS at 12:29

## 2019-09-29 RX ADMIN — Medication 10 ML: at 20:45

## 2019-09-29 RX ADMIN — LEVOTHYROXINE SODIUM 250 MCG: 125 TABLET ORAL at 06:24

## 2019-09-29 RX ADMIN — LINEZOLID 600 MG: 2 INJECTION, SOLUTION INTRAVENOUS at 09:11

## 2019-09-29 RX ADMIN — IPRATROPIUM BROMIDE AND ALBUTEROL SULFATE 1 AMPULE: .5; 3 SOLUTION RESPIRATORY (INHALATION) at 20:05

## 2019-09-29 RX ADMIN — IPRATROPIUM BROMIDE AND ALBUTEROL SULFATE 1 AMPULE: .5; 3 SOLUTION RESPIRATORY (INHALATION) at 08:58

## 2019-09-29 RX ADMIN — HEPARIN SODIUM 5000 UNITS: 5000 INJECTION INTRAVENOUS; SUBCUTANEOUS at 20:44

## 2019-09-29 RX ADMIN — MAGNESIUM SULFATE HEPTAHYDRATE 2 G: 40 INJECTION, SOLUTION INTRAVENOUS at 06:26

## 2019-09-29 ASSESSMENT — PAIN SCALES - GENERAL
PAINLEVEL_OUTOF10: 0
PAINLEVEL_OUTOF10: 0

## 2019-09-30 LAB
ANION GAP SERPL CALCULATED.3IONS-SCNC: 9 MMOL/L (ref 3–16)
BASOPHILS ABSOLUTE: 0.1 K/UL (ref 0–0.2)
BASOPHILS RELATIVE PERCENT: 0.6 %
BLOOD CULTURE, ROUTINE: NORMAL
BUN BLDV-MCNC: 6 MG/DL (ref 7–20)
CALCIUM SERPL-MCNC: 8.4 MG/DL (ref 8.3–10.6)
CHLORIDE BLD-SCNC: 102 MMOL/L (ref 99–110)
CO2: 28 MMOL/L (ref 21–32)
CREAT SERPL-MCNC: 0.8 MG/DL (ref 0.8–1.3)
CULTURE, BLOOD 2: NORMAL
EOSINOPHILS ABSOLUTE: 0.1 K/UL (ref 0–0.6)
EOSINOPHILS RELATIVE PERCENT: 1.3 %
GFR AFRICAN AMERICAN: >60
GFR NON-AFRICAN AMERICAN: >60
GLUCOSE BLD-MCNC: 103 MG/DL (ref 70–99)
HCT VFR BLD CALC: 22.3 % (ref 40.5–52.5)
HEMOGLOBIN: 7.5 G/DL (ref 13.5–17.5)
LYMPHOCYTES ABSOLUTE: 1.3 K/UL (ref 1–5.1)
LYMPHOCYTES RELATIVE PERCENT: 11.9 %
MCH RBC QN AUTO: 33.2 PG (ref 26–34)
MCHC RBC AUTO-ENTMCNC: 33.9 G/DL (ref 31–36)
MCV RBC AUTO: 97.9 FL (ref 80–100)
MONOCYTES ABSOLUTE: 0.7 K/UL (ref 0–1.3)
MONOCYTES RELATIVE PERCENT: 6.6 %
NEUTROPHILS ABSOLUTE: 8.7 K/UL (ref 1.7–7.7)
NEUTROPHILS RELATIVE PERCENT: 79.6 %
PDW BLD-RTO: 15.9 % (ref 12.4–15.4)
PLATELET # BLD: 487 K/UL (ref 135–450)
PMV BLD AUTO: 7.5 FL (ref 5–10.5)
POTASSIUM REFLEX MAGNESIUM: 3.6 MMOL/L (ref 3.5–5.1)
PROCALCITONIN: 0.33 NG/ML (ref 0–0.15)
RBC # BLD: 2.27 M/UL (ref 4.2–5.9)
SODIUM BLD-SCNC: 139 MMOL/L (ref 136–145)
WBC # BLD: 11 K/UL (ref 4–11)

## 2019-09-30 PROCEDURE — 97530 THERAPEUTIC ACTIVITIES: CPT

## 2019-09-30 PROCEDURE — 6360000002 HC RX W HCPCS: Performed by: INTERNAL MEDICINE

## 2019-09-30 PROCEDURE — 99233 SBSQ HOSP IP/OBS HIGH 50: CPT | Performed by: INTERNAL MEDICINE

## 2019-09-30 PROCEDURE — 2060000000 HC ICU INTERMEDIATE R&B

## 2019-09-30 PROCEDURE — 2700000000 HC OXYGEN THERAPY PER DAY

## 2019-09-30 PROCEDURE — 6370000000 HC RX 637 (ALT 250 FOR IP): Performed by: NURSE PRACTITIONER

## 2019-09-30 PROCEDURE — 6370000000 HC RX 637 (ALT 250 FOR IP): Performed by: INTERNAL MEDICINE

## 2019-09-30 PROCEDURE — 36415 COLL VENOUS BLD VENIPUNCTURE: CPT

## 2019-09-30 PROCEDURE — 94669 MECHANICAL CHEST WALL OSCILL: CPT

## 2019-09-30 PROCEDURE — 85025 COMPLETE CBC W/AUTO DIFF WBC: CPT

## 2019-09-30 PROCEDURE — 2500000003 HC RX 250 WO HCPCS: Performed by: INTERNAL MEDICINE

## 2019-09-30 PROCEDURE — 94640 AIRWAY INHALATION TREATMENT: CPT

## 2019-09-30 PROCEDURE — 80048 BASIC METABOLIC PNL TOTAL CA: CPT

## 2019-09-30 PROCEDURE — 97110 THERAPEUTIC EXERCISES: CPT

## 2019-09-30 PROCEDURE — 94761 N-INVAS EAR/PLS OXIMETRY MLT: CPT

## 2019-09-30 PROCEDURE — 84145 PROCALCITONIN (PCT): CPT

## 2019-09-30 PROCEDURE — 2580000003 HC RX 258: Performed by: INTERNAL MEDICINE

## 2019-09-30 RX ORDER — ESCITALOPRAM OXALATE 10 MG/1
10 TABLET ORAL DAILY
Status: DISCONTINUED | OUTPATIENT
Start: 2019-09-30 | End: 2019-10-08 | Stop reason: HOSPADM

## 2019-09-30 RX ORDER — MIRTAZAPINE 15 MG/1
15 TABLET, FILM COATED ORAL NIGHTLY
Status: DISCONTINUED | OUTPATIENT
Start: 2019-09-30 | End: 2019-10-01

## 2019-09-30 RX ORDER — FUROSEMIDE 40 MG/1
40 TABLET ORAL DAILY
Status: DISCONTINUED | OUTPATIENT
Start: 2019-09-30 | End: 2019-10-02

## 2019-09-30 RX ORDER — HYDROPHILIC CREAM
1 PASTE (GRAM) TOPICAL 2 TIMES DAILY
Status: DISCONTINUED | OUTPATIENT
Start: 2019-09-30 | End: 2019-10-08 | Stop reason: HOSPADM

## 2019-09-30 RX ORDER — LEVOTHYROXINE SODIUM 0.15 MG/1
150 TABLET ORAL DAILY
Status: DISCONTINUED | OUTPATIENT
Start: 2019-10-01 | End: 2019-10-08 | Stop reason: HOSPADM

## 2019-09-30 RX ADMIN — LINEZOLID 600 MG: 2 INJECTION, SOLUTION INTRAVENOUS at 09:53

## 2019-09-30 RX ADMIN — MICONAZOLE NITRATE: 20 POWDER TOPICAL at 20:56

## 2019-09-30 RX ADMIN — HEPARIN SODIUM 5000 UNITS: 5000 INJECTION INTRAVENOUS; SUBCUTANEOUS at 13:21

## 2019-09-30 RX ADMIN — Medication 250 MG: at 20:55

## 2019-09-30 RX ADMIN — MIRTAZAPINE 15 MG: 15 TABLET, FILM COATED ORAL at 20:55

## 2019-09-30 RX ADMIN — LEVOTHYROXINE SODIUM 250 MCG: 125 TABLET ORAL at 06:22

## 2019-09-30 RX ADMIN — Medication 10 ML: at 20:56

## 2019-09-30 RX ADMIN — ESCITALOPRAM OXALATE 10 MG: 10 TABLET ORAL at 17:07

## 2019-09-30 RX ADMIN — Medication 10 ML: at 09:55

## 2019-09-30 RX ADMIN — HEPARIN SODIUM 5000 UNITS: 5000 INJECTION INTRAVENOUS; SUBCUTANEOUS at 06:22

## 2019-09-30 RX ADMIN — Medication 1 DOSE: at 20:55

## 2019-09-30 RX ADMIN — PIPERACILLIN AND TAZOBACTAM 3.38 G: 3; .375 INJECTION, POWDER, FOR SOLUTION INTRAVENOUS at 04:40

## 2019-09-30 RX ADMIN — MICONAZOLE NITRATE: 20 POWDER TOPICAL at 09:55

## 2019-09-30 RX ADMIN — FUROSEMIDE 40 MG: 40 TABLET ORAL at 17:07

## 2019-09-30 RX ADMIN — FLUCONAZOLE 200 MG: 100 TABLET ORAL at 09:53

## 2019-09-30 RX ADMIN — IPRATROPIUM BROMIDE AND ALBUTEROL SULFATE 1 AMPULE: .5; 3 SOLUTION RESPIRATORY (INHALATION) at 16:36

## 2019-09-30 RX ADMIN — Medication 250 MG: at 09:54

## 2019-09-30 RX ADMIN — PIPERACILLIN AND TAZOBACTAM 3.38 G: 3; .375 INJECTION, POWDER, FOR SOLUTION INTRAVENOUS at 11:52

## 2019-09-30 RX ADMIN — HEPARIN SODIUM 5000 UNITS: 5000 INJECTION INTRAVENOUS; SUBCUTANEOUS at 20:57

## 2019-09-30 ASSESSMENT — PAIN SCALES - GENERAL
PAINLEVEL_OUTOF10: 2
PAINLEVEL_OUTOF10: 8

## 2019-09-30 ASSESSMENT — PAIN DESCRIPTION - PAIN TYPE
TYPE: CHRONIC PAIN
TYPE: ACUTE PAIN;CHRONIC PAIN

## 2019-09-30 ASSESSMENT — PAIN - FUNCTIONAL ASSESSMENT
PAIN_FUNCTIONAL_ASSESSMENT: PREVENTS OR INTERFERES SOME ACTIVE ACTIVITIES AND ADLS
PAIN_FUNCTIONAL_ASSESSMENT: PREVENTS OR INTERFERES SOME ACTIVE ACTIVITIES AND ADLS

## 2019-09-30 ASSESSMENT — PAIN DESCRIPTION - LOCATION: LOCATION: GENERALIZED

## 2019-10-01 LAB
AFB CULTURE (MYCOBACTERIA): NORMAL
AFB CULTURE (MYCOBACTERIA): NORMAL
AFB SMEAR: NORMAL
AFB SMEAR: NORMAL
ANION GAP SERPL CALCULATED.3IONS-SCNC: 7 MMOL/L (ref 3–16)
BASOPHILS ABSOLUTE: 0.1 K/UL (ref 0–0.2)
BASOPHILS RELATIVE PERCENT: 0.5 %
BUN BLDV-MCNC: 4 MG/DL (ref 7–20)
CALCIUM SERPL-MCNC: 8.6 MG/DL (ref 8.3–10.6)
CHLORIDE BLD-SCNC: 104 MMOL/L (ref 99–110)
CO2: 31 MMOL/L (ref 21–32)
CREAT SERPL-MCNC: 0.8 MG/DL (ref 0.8–1.3)
EOSINOPHILS ABSOLUTE: 0.2 K/UL (ref 0–0.6)
EOSINOPHILS RELATIVE PERCENT: 1.6 %
GFR AFRICAN AMERICAN: >60
GFR NON-AFRICAN AMERICAN: >60
GLUCOSE BLD-MCNC: 107 MG/DL (ref 70–99)
HCT VFR BLD CALC: 23.9 % (ref 40.5–52.5)
HEMOGLOBIN: 8 G/DL (ref 13.5–17.5)
LYMPHOCYTES ABSOLUTE: 1.5 K/UL (ref 1–5.1)
LYMPHOCYTES RELATIVE PERCENT: 12.5 %
MAGNESIUM: 1.6 MG/DL (ref 1.8–2.4)
MCH RBC QN AUTO: 33 PG (ref 26–34)
MCHC RBC AUTO-ENTMCNC: 33.4 G/DL (ref 31–36)
MCV RBC AUTO: 98.7 FL (ref 80–100)
MONOCYTES ABSOLUTE: 1 K/UL (ref 0–1.3)
MONOCYTES RELATIVE PERCENT: 7.8 %
NEUTROPHILS ABSOLUTE: 9.5 K/UL (ref 1.7–7.7)
NEUTROPHILS RELATIVE PERCENT: 77.6 %
PDW BLD-RTO: 15.6 % (ref 12.4–15.4)
PLATELET # BLD: 454 K/UL (ref 135–450)
PMV BLD AUTO: 7.4 FL (ref 5–10.5)
POTASSIUM REFLEX MAGNESIUM: 3.5 MMOL/L (ref 3.5–5.1)
RBC # BLD: 2.42 M/UL (ref 4.2–5.9)
SODIUM BLD-SCNC: 142 MMOL/L (ref 136–145)
WBC # BLD: 12.2 K/UL (ref 4–11)

## 2019-10-01 PROCEDURE — 2580000003 HC RX 258: Performed by: INTERNAL MEDICINE

## 2019-10-01 PROCEDURE — 6370000000 HC RX 637 (ALT 250 FOR IP): Performed by: INTERNAL MEDICINE

## 2019-10-01 PROCEDURE — 85025 COMPLETE CBC W/AUTO DIFF WBC: CPT

## 2019-10-01 PROCEDURE — 83735 ASSAY OF MAGNESIUM: CPT

## 2019-10-01 PROCEDURE — 6360000002 HC RX W HCPCS: Performed by: INTERNAL MEDICINE

## 2019-10-01 PROCEDURE — 36415 COLL VENOUS BLD VENIPUNCTURE: CPT

## 2019-10-01 PROCEDURE — 2060000000 HC ICU INTERMEDIATE R&B

## 2019-10-01 PROCEDURE — 80048 BASIC METABOLIC PNL TOTAL CA: CPT

## 2019-10-01 PROCEDURE — 99232 SBSQ HOSP IP/OBS MODERATE 35: CPT | Performed by: INTERNAL MEDICINE

## 2019-10-01 RX ORDER — POTASSIUM CHLORIDE 20 MEQ/1
40 TABLET, EXTENDED RELEASE ORAL 2 TIMES DAILY
Status: DISCONTINUED | OUTPATIENT
Start: 2019-10-01 | End: 2019-10-01

## 2019-10-01 RX ORDER — MAGNESIUM SULFATE IN WATER 40 MG/ML
2 INJECTION, SOLUTION INTRAVENOUS ONCE
Status: COMPLETED | OUTPATIENT
Start: 2019-10-01 | End: 2019-10-01

## 2019-10-01 RX ORDER — IPRATROPIUM BROMIDE AND ALBUTEROL SULFATE 2.5; .5 MG/3ML; MG/3ML
1 SOLUTION RESPIRATORY (INHALATION) EVERY 4 HOURS PRN
Status: DISCONTINUED | OUTPATIENT
Start: 2019-10-01 | End: 2019-10-08 | Stop reason: HOSPADM

## 2019-10-01 RX ORDER — CALCIUM CARBONATE 200(500)MG
500 TABLET,CHEWABLE ORAL 3 TIMES DAILY PRN
Status: DISCONTINUED | OUTPATIENT
Start: 2019-10-01 | End: 2019-10-08 | Stop reason: HOSPADM

## 2019-10-01 RX ORDER — POTASSIUM CHLORIDE 20 MEQ/1
40 TABLET, EXTENDED RELEASE ORAL ONCE
Status: COMPLETED | OUTPATIENT
Start: 2019-10-01 | End: 2019-10-01

## 2019-10-01 RX ORDER — METOPROLOL SUCCINATE 50 MG/1
50 TABLET, EXTENDED RELEASE ORAL DAILY
Status: DISCONTINUED | OUTPATIENT
Start: 2019-10-01 | End: 2019-10-08 | Stop reason: HOSPADM

## 2019-10-01 RX ORDER — MIRTAZAPINE 15 MG/1
45 TABLET, FILM COATED ORAL NIGHTLY
Status: DISCONTINUED | OUTPATIENT
Start: 2019-10-01 | End: 2019-10-08 | Stop reason: HOSPADM

## 2019-10-01 RX ADMIN — MIRTAZAPINE 45 MG: 15 TABLET, FILM COATED ORAL at 21:13

## 2019-10-01 RX ADMIN — ANTACID TABLETS 500 MG: 500 TABLET, CHEWABLE ORAL at 18:01

## 2019-10-01 RX ADMIN — MAGNESIUM SULFATE HEPTAHYDRATE 2 G: 40 INJECTION, SOLUTION INTRAVENOUS at 13:10

## 2019-10-01 RX ADMIN — METOPROLOL SUCCINATE 50 MG: 50 TABLET, EXTENDED RELEASE ORAL at 13:11

## 2019-10-01 RX ADMIN — Medication 250 MG: at 10:07

## 2019-10-01 RX ADMIN — Medication 10 ML: at 21:14

## 2019-10-01 RX ADMIN — MICONAZOLE NITRATE: 20 POWDER TOPICAL at 10:11

## 2019-10-01 RX ADMIN — MICONAZOLE NITRATE: 20 POWDER TOPICAL at 21:13

## 2019-10-01 RX ADMIN — ESCITALOPRAM OXALATE 10 MG: 10 TABLET ORAL at 10:07

## 2019-10-01 RX ADMIN — Medication 10 ML: at 10:07

## 2019-10-01 RX ADMIN — LEVOTHYROXINE SODIUM 150 MCG: 150 TABLET ORAL at 10:07

## 2019-10-01 RX ADMIN — HEPARIN SODIUM 5000 UNITS: 5000 INJECTION INTRAVENOUS; SUBCUTANEOUS at 21:13

## 2019-10-01 RX ADMIN — HYDROCODONE BITARTRATE AND ACETAMINOPHEN 1 TABLET: 5; 325 TABLET ORAL at 22:31

## 2019-10-01 RX ADMIN — HEPARIN SODIUM 5000 UNITS: 5000 INJECTION INTRAVENOUS; SUBCUTANEOUS at 13:12

## 2019-10-01 RX ADMIN — POTASSIUM CHLORIDE 40 MEQ: 20 TABLET, EXTENDED RELEASE ORAL at 13:10

## 2019-10-01 RX ADMIN — FLUCONAZOLE 200 MG: 100 TABLET ORAL at 10:07

## 2019-10-01 RX ADMIN — Medication 10 ML: at 13:11

## 2019-10-01 RX ADMIN — Medication 1 DOSE: at 13:12

## 2019-10-01 RX ADMIN — Medication 250 MG: at 21:13

## 2019-10-01 RX ADMIN — Medication 1 DOSE: at 21:35

## 2019-10-01 RX ADMIN — FUROSEMIDE 40 MG: 40 TABLET ORAL at 10:07

## 2019-10-01 ASSESSMENT — PAIN SCALES - GENERAL
PAINLEVEL_OUTOF10: 7
PAINLEVEL_OUTOF10: 0

## 2019-10-02 ENCOUNTER — APPOINTMENT (OUTPATIENT)
Dept: GENERAL RADIOLOGY | Age: 74
DRG: 871 | End: 2019-10-02
Payer: MEDICARE

## 2019-10-02 LAB
ANION GAP SERPL CALCULATED.3IONS-SCNC: 9 MMOL/L (ref 3–16)
BUN BLDV-MCNC: 3 MG/DL (ref 7–20)
CALCIUM SERPL-MCNC: 8.9 MG/DL (ref 8.3–10.6)
CHLORIDE BLD-SCNC: 101 MMOL/L (ref 99–110)
CO2: 33 MMOL/L (ref 21–32)
CREAT SERPL-MCNC: 0.7 MG/DL (ref 0.8–1.3)
GFR AFRICAN AMERICAN: >60
GFR NON-AFRICAN AMERICAN: >60
GLUCOSE BLD-MCNC: 91 MG/DL (ref 70–99)
MAGNESIUM: 1.6 MG/DL (ref 1.8–2.4)
POTASSIUM REFLEX MAGNESIUM: 3.5 MMOL/L (ref 3.5–5.1)
PROCALCITONIN: 0.29 NG/ML (ref 0–0.15)
SODIUM BLD-SCNC: 143 MMOL/L (ref 136–145)

## 2019-10-02 PROCEDURE — 2060000000 HC ICU INTERMEDIATE R&B

## 2019-10-02 PROCEDURE — 6360000002 HC RX W HCPCS: Performed by: INTERNAL MEDICINE

## 2019-10-02 PROCEDURE — 6370000000 HC RX 637 (ALT 250 FOR IP): Performed by: INTERNAL MEDICINE

## 2019-10-02 PROCEDURE — 71046 X-RAY EXAM CHEST 2 VIEWS: CPT

## 2019-10-02 PROCEDURE — 99232 SBSQ HOSP IP/OBS MODERATE 35: CPT | Performed by: INTERNAL MEDICINE

## 2019-10-02 PROCEDURE — 80048 BASIC METABOLIC PNL TOTAL CA: CPT

## 2019-10-02 PROCEDURE — 94761 N-INVAS EAR/PLS OXIMETRY MLT: CPT

## 2019-10-02 PROCEDURE — 2700000000 HC OXYGEN THERAPY PER DAY

## 2019-10-02 PROCEDURE — 97530 THERAPEUTIC ACTIVITIES: CPT

## 2019-10-02 PROCEDURE — 2580000003 HC RX 258: Performed by: INTERNAL MEDICINE

## 2019-10-02 PROCEDURE — 84145 PROCALCITONIN (PCT): CPT

## 2019-10-02 PROCEDURE — 83735 ASSAY OF MAGNESIUM: CPT

## 2019-10-02 PROCEDURE — 36415 COLL VENOUS BLD VENIPUNCTURE: CPT

## 2019-10-02 RX ORDER — MAGNESIUM SULFATE IN WATER 40 MG/ML
2 INJECTION, SOLUTION INTRAVENOUS ONCE
Status: COMPLETED | OUTPATIENT
Start: 2019-10-02 | End: 2019-10-02

## 2019-10-02 RX ORDER — POTASSIUM CHLORIDE 20 MEQ/1
20 TABLET, EXTENDED RELEASE ORAL 2 TIMES DAILY
Status: DISCONTINUED | OUTPATIENT
Start: 2019-10-02 | End: 2019-10-03

## 2019-10-02 RX ORDER — FUROSEMIDE 10 MG/ML
40 INJECTION INTRAMUSCULAR; INTRAVENOUS 2 TIMES DAILY
Status: DISCONTINUED | OUTPATIENT
Start: 2019-10-02 | End: 2019-10-04

## 2019-10-02 RX ADMIN — FLUCONAZOLE 200 MG: 100 TABLET ORAL at 09:40

## 2019-10-02 RX ADMIN — MICONAZOLE NITRATE: 20 POWDER TOPICAL at 09:41

## 2019-10-02 RX ADMIN — Medication 1 DOSE: at 16:00

## 2019-10-02 RX ADMIN — FUROSEMIDE 40 MG: 10 INJECTION, SOLUTION INTRAMUSCULAR; INTRAVENOUS at 14:41

## 2019-10-02 RX ADMIN — HEPARIN SODIUM 5000 UNITS: 5000 INJECTION INTRAVENOUS; SUBCUTANEOUS at 21:09

## 2019-10-02 RX ADMIN — HEPARIN SODIUM 5000 UNITS: 5000 INJECTION INTRAVENOUS; SUBCUTANEOUS at 05:30

## 2019-10-02 RX ADMIN — Medication 10 ML: at 09:41

## 2019-10-02 RX ADMIN — MICONAZOLE NITRATE: 20 POWDER TOPICAL at 21:10

## 2019-10-02 RX ADMIN — METOPROLOL SUCCINATE 50 MG: 50 TABLET, EXTENDED RELEASE ORAL at 09:40

## 2019-10-02 RX ADMIN — LEVOTHYROXINE SODIUM 150 MCG: 150 TABLET ORAL at 09:40

## 2019-10-02 RX ADMIN — HEPARIN SODIUM 5000 UNITS: 5000 INJECTION INTRAVENOUS; SUBCUTANEOUS at 14:42

## 2019-10-02 RX ADMIN — Medication 10 ML: at 21:10

## 2019-10-02 RX ADMIN — Medication 250 MG: at 21:09

## 2019-10-02 RX ADMIN — Medication 250 MG: at 09:40

## 2019-10-02 RX ADMIN — ESCITALOPRAM OXALATE 10 MG: 10 TABLET ORAL at 09:40

## 2019-10-02 RX ADMIN — MAGNESIUM SULFATE HEPTAHYDRATE 2 G: 40 INJECTION, SOLUTION INTRAVENOUS at 14:42

## 2019-10-02 RX ADMIN — MIRTAZAPINE 45 MG: 15 TABLET, FILM COATED ORAL at 21:09

## 2019-10-02 RX ADMIN — POTASSIUM CHLORIDE 20 MEQ: 20 TABLET, EXTENDED RELEASE ORAL at 21:09

## 2019-10-02 RX ADMIN — POTASSIUM CHLORIDE 20 MEQ: 20 TABLET, EXTENDED RELEASE ORAL at 14:41

## 2019-10-02 RX ADMIN — FUROSEMIDE 40 MG: 10 INJECTION, SOLUTION INTRAMUSCULAR; INTRAVENOUS at 19:27

## 2019-10-02 RX ADMIN — FUROSEMIDE 40 MG: 40 TABLET ORAL at 09:40

## 2019-10-02 RX ADMIN — Medication 1 DOSE: at 09:45

## 2019-10-02 ASSESSMENT — PAIN DESCRIPTION - PAIN TYPE
TYPE: CHRONIC PAIN

## 2019-10-02 ASSESSMENT — PAIN SCALES - GENERAL
PAINLEVEL_OUTOF10: 5

## 2019-10-02 ASSESSMENT — PAIN DESCRIPTION - LOCATION
LOCATION: BACK;BUTTOCKS
LOCATION: BACK

## 2019-10-03 ENCOUNTER — TELEPHONE (OUTPATIENT)
Dept: INTERNAL MEDICINE CLINIC | Age: 74
End: 2019-10-03

## 2019-10-03 LAB
ANION GAP SERPL CALCULATED.3IONS-SCNC: 9 MMOL/L (ref 3–16)
BUN BLDV-MCNC: <2 MG/DL (ref 7–20)
CALCIUM SERPL-MCNC: 8.4 MG/DL (ref 8.3–10.6)
CHLORIDE BLD-SCNC: 97 MMOL/L (ref 99–110)
CO2: 38 MMOL/L (ref 21–32)
CREAT SERPL-MCNC: 0.7 MG/DL (ref 0.8–1.3)
GFR AFRICAN AMERICAN: >60
GFR NON-AFRICAN AMERICAN: >60
GLUCOSE BLD-MCNC: 109 MG/DL (ref 70–99)
MAGNESIUM: 1.5 MG/DL (ref 1.8–2.4)
POTASSIUM REFLEX MAGNESIUM: 3.3 MMOL/L (ref 3.5–5.1)
SODIUM BLD-SCNC: 144 MMOL/L (ref 136–145)

## 2019-10-03 PROCEDURE — 6370000000 HC RX 637 (ALT 250 FOR IP): Performed by: INTERNAL MEDICINE

## 2019-10-03 PROCEDURE — 2700000000 HC OXYGEN THERAPY PER DAY

## 2019-10-03 PROCEDURE — 6360000002 HC RX W HCPCS: Performed by: INTERNAL MEDICINE

## 2019-10-03 PROCEDURE — 2060000000 HC ICU INTERMEDIATE R&B

## 2019-10-03 PROCEDURE — 2580000003 HC RX 258: Performed by: INTERNAL MEDICINE

## 2019-10-03 PROCEDURE — 94761 N-INVAS EAR/PLS OXIMETRY MLT: CPT

## 2019-10-03 PROCEDURE — 83735 ASSAY OF MAGNESIUM: CPT

## 2019-10-03 PROCEDURE — 80048 BASIC METABOLIC PNL TOTAL CA: CPT

## 2019-10-03 PROCEDURE — 99232 SBSQ HOSP IP/OBS MODERATE 35: CPT | Performed by: INTERNAL MEDICINE

## 2019-10-03 PROCEDURE — 36415 COLL VENOUS BLD VENIPUNCTURE: CPT

## 2019-10-03 RX ORDER — MAGNESIUM SULFATE IN WATER 40 MG/ML
2 INJECTION, SOLUTION INTRAVENOUS ONCE
Status: COMPLETED | OUTPATIENT
Start: 2019-10-03 | End: 2019-10-03

## 2019-10-03 RX ORDER — POTASSIUM CHLORIDE 20 MEQ/1
20 TABLET, EXTENDED RELEASE ORAL 3 TIMES DAILY
Status: DISCONTINUED | OUTPATIENT
Start: 2019-10-03 | End: 2019-10-05

## 2019-10-03 RX ORDER — SPIRONOLACTONE 25 MG/1
25 TABLET ORAL DAILY
Status: DISCONTINUED | OUTPATIENT
Start: 2019-10-03 | End: 2019-10-08 | Stop reason: HOSPADM

## 2019-10-03 RX ADMIN — FLUCONAZOLE 200 MG: 100 TABLET ORAL at 08:59

## 2019-10-03 RX ADMIN — FUROSEMIDE 40 MG: 10 INJECTION, SOLUTION INTRAMUSCULAR; INTRAVENOUS at 18:34

## 2019-10-03 RX ADMIN — HEPARIN SODIUM 5000 UNITS: 5000 INJECTION INTRAVENOUS; SUBCUTANEOUS at 16:10

## 2019-10-03 RX ADMIN — MIRTAZAPINE 45 MG: 15 TABLET, FILM COATED ORAL at 20:43

## 2019-10-03 RX ADMIN — HEPARIN SODIUM 5000 UNITS: 5000 INJECTION INTRAVENOUS; SUBCUTANEOUS at 20:44

## 2019-10-03 RX ADMIN — LEVOTHYROXINE SODIUM 150 MCG: 150 TABLET ORAL at 08:59

## 2019-10-03 RX ADMIN — MAGNESIUM SULFATE HEPTAHYDRATE 2 G: 40 INJECTION, SOLUTION INTRAVENOUS at 09:08

## 2019-10-03 RX ADMIN — FUROSEMIDE 40 MG: 10 INJECTION, SOLUTION INTRAMUSCULAR; INTRAVENOUS at 08:59

## 2019-10-03 RX ADMIN — Medication 250 MG: at 20:44

## 2019-10-03 RX ADMIN — SPIRONOLACTONE 25 MG: 25 TABLET ORAL at 12:29

## 2019-10-03 RX ADMIN — METOPROLOL SUCCINATE 50 MG: 50 TABLET, EXTENDED RELEASE ORAL at 08:59

## 2019-10-03 RX ADMIN — MICONAZOLE NITRATE: 20 POWDER TOPICAL at 20:45

## 2019-10-03 RX ADMIN — Medication 10 ML: at 20:45

## 2019-10-03 RX ADMIN — Medication 10 ML: at 09:00

## 2019-10-03 RX ADMIN — HEPARIN SODIUM 5000 UNITS: 5000 INJECTION INTRAVENOUS; SUBCUTANEOUS at 05:11

## 2019-10-03 RX ADMIN — POTASSIUM CHLORIDE 20 MEQ: 20 TABLET, EXTENDED RELEASE ORAL at 20:44

## 2019-10-03 RX ADMIN — Medication 1 DOSE: at 16:10

## 2019-10-03 RX ADMIN — MICONAZOLE NITRATE: 20 POWDER TOPICAL at 09:03

## 2019-10-03 RX ADMIN — Medication 1 DOSE: at 09:03

## 2019-10-03 RX ADMIN — POTASSIUM CHLORIDE 20 MEQ: 20 TABLET, EXTENDED RELEASE ORAL at 08:59

## 2019-10-03 RX ADMIN — POTASSIUM CHLORIDE 20 MEQ: 20 TABLET, EXTENDED RELEASE ORAL at 16:10

## 2019-10-03 RX ADMIN — ESCITALOPRAM OXALATE 10 MG: 10 TABLET ORAL at 08:59

## 2019-10-03 RX ADMIN — Medication 250 MG: at 08:59

## 2019-10-03 ASSESSMENT — PAIN DESCRIPTION - LOCATION
LOCATION: BACK;BUTTOCKS

## 2019-10-03 ASSESSMENT — PAIN SCALES - GENERAL
PAINLEVEL_OUTOF10: 5
PAINLEVEL_OUTOF10: 5
PAINLEVEL_OUTOF10: 4
PAINLEVEL_OUTOF10: 4
PAINLEVEL_OUTOF10: 5
PAINLEVEL_OUTOF10: 4
PAINLEVEL_OUTOF10: 5

## 2019-10-03 ASSESSMENT — PAIN DESCRIPTION - PAIN TYPE
TYPE: CHRONIC PAIN

## 2019-10-03 ASSESSMENT — PAIN - FUNCTIONAL ASSESSMENT: PAIN_FUNCTIONAL_ASSESSMENT: PREVENTS OR INTERFERES WITH MANY ACTIVE NOT PASSIVE ACTIVITIES

## 2019-10-04 ENCOUNTER — APPOINTMENT (OUTPATIENT)
Dept: GENERAL RADIOLOGY | Age: 74
DRG: 871 | End: 2019-10-04
Payer: MEDICARE

## 2019-10-04 LAB
ANION GAP SERPL CALCULATED.3IONS-SCNC: 8 MMOL/L (ref 3–16)
BUN BLDV-MCNC: <2 MG/DL (ref 7–20)
CALCIUM SERPL-MCNC: 8.6 MG/DL (ref 8.3–10.6)
CHLORIDE BLD-SCNC: 92 MMOL/L (ref 99–110)
CO2: 40 MMOL/L (ref 21–32)
CREAT SERPL-MCNC: 0.9 MG/DL (ref 0.8–1.3)
GFR AFRICAN AMERICAN: >60
GFR NON-AFRICAN AMERICAN: >60
GLUCOSE BLD-MCNC: 102 MG/DL (ref 70–99)
POTASSIUM REFLEX MAGNESIUM: 3.7 MMOL/L (ref 3.5–5.1)
PROCALCITONIN: 0.29 NG/ML (ref 0–0.15)
SODIUM BLD-SCNC: 140 MMOL/L (ref 136–145)

## 2019-10-04 PROCEDURE — 84145 PROCALCITONIN (PCT): CPT

## 2019-10-04 PROCEDURE — 6370000000 HC RX 637 (ALT 250 FOR IP): Performed by: INTERNAL MEDICINE

## 2019-10-04 PROCEDURE — 80048 BASIC METABOLIC PNL TOTAL CA: CPT

## 2019-10-04 PROCEDURE — 2060000000 HC ICU INTERMEDIATE R&B

## 2019-10-04 PROCEDURE — 6360000002 HC RX W HCPCS: Performed by: INTERNAL MEDICINE

## 2019-10-04 PROCEDURE — 2700000000 HC OXYGEN THERAPY PER DAY

## 2019-10-04 PROCEDURE — 94761 N-INVAS EAR/PLS OXIMETRY MLT: CPT

## 2019-10-04 PROCEDURE — 2580000003 HC RX 258: Performed by: INTERNAL MEDICINE

## 2019-10-04 PROCEDURE — 99232 SBSQ HOSP IP/OBS MODERATE 35: CPT | Performed by: INTERNAL MEDICINE

## 2019-10-04 PROCEDURE — 36415 COLL VENOUS BLD VENIPUNCTURE: CPT

## 2019-10-04 PROCEDURE — 71045 X-RAY EXAM CHEST 1 VIEW: CPT

## 2019-10-04 RX ORDER — PROCHLORPERAZINE EDISYLATE 5 MG/ML
10 INJECTION INTRAMUSCULAR; INTRAVENOUS EVERY 6 HOURS PRN
Status: DISCONTINUED | OUTPATIENT
Start: 2019-10-04 | End: 2019-10-08 | Stop reason: HOSPADM

## 2019-10-04 RX ORDER — FUROSEMIDE 10 MG/ML
40 INJECTION INTRAMUSCULAR; INTRAVENOUS 3 TIMES DAILY
Status: DISCONTINUED | OUTPATIENT
Start: 2019-10-04 | End: 2019-10-05

## 2019-10-04 RX ADMIN — Medication 10 ML: at 09:46

## 2019-10-04 RX ADMIN — POTASSIUM CHLORIDE 20 MEQ: 20 TABLET, EXTENDED RELEASE ORAL at 09:46

## 2019-10-04 RX ADMIN — FLUCONAZOLE 200 MG: 100 TABLET ORAL at 09:45

## 2019-10-04 RX ADMIN — HEPARIN SODIUM 5000 UNITS: 5000 INJECTION INTRAVENOUS; SUBCUTANEOUS at 05:31

## 2019-10-04 RX ADMIN — HEPARIN SODIUM 5000 UNITS: 5000 INJECTION INTRAVENOUS; SUBCUTANEOUS at 14:24

## 2019-10-04 RX ADMIN — PROCHLORPERAZINE EDISYLATE 10 MG: 5 INJECTION INTRAMUSCULAR; INTRAVENOUS at 17:18

## 2019-10-04 RX ADMIN — MICONAZOLE NITRATE: 20 POWDER TOPICAL at 09:46

## 2019-10-04 RX ADMIN — POTASSIUM CHLORIDE 20 MEQ: 20 TABLET, EXTENDED RELEASE ORAL at 14:24

## 2019-10-04 RX ADMIN — ACETAMINOPHEN 650 MG: 325 TABLET ORAL at 09:45

## 2019-10-04 RX ADMIN — SPIRONOLACTONE 25 MG: 25 TABLET ORAL at 09:45

## 2019-10-04 RX ADMIN — ESCITALOPRAM OXALATE 10 MG: 10 TABLET ORAL at 09:45

## 2019-10-04 RX ADMIN — FUROSEMIDE 40 MG: 10 INJECTION, SOLUTION INTRAMUSCULAR; INTRAVENOUS at 14:24

## 2019-10-04 RX ADMIN — Medication 1 DOSE: at 09:47

## 2019-10-04 RX ADMIN — Medication 250 MG: at 09:45

## 2019-10-04 RX ADMIN — METOPROLOL SUCCINATE 50 MG: 50 TABLET, EXTENDED RELEASE ORAL at 09:45

## 2019-10-04 RX ADMIN — FUROSEMIDE 40 MG: 10 INJECTION, SOLUTION INTRAMUSCULAR; INTRAVENOUS at 09:46

## 2019-10-04 RX ADMIN — LEVOTHYROXINE SODIUM 150 MCG: 150 TABLET ORAL at 08:36

## 2019-10-04 RX ADMIN — Medication 1 DOSE: at 16:45

## 2019-10-04 ASSESSMENT — PAIN SCALES - GENERAL
PAINLEVEL_OUTOF10: 2
PAINLEVEL_OUTOF10: 6
PAINLEVEL_OUTOF10: 4

## 2019-10-04 ASSESSMENT — PAIN DESCRIPTION - PAIN TYPE: TYPE: ACUTE PAIN

## 2019-10-04 ASSESSMENT — PAIN DESCRIPTION - LOCATION: LOCATION: HEAD

## 2019-10-05 LAB
ANION GAP SERPL CALCULATED.3IONS-SCNC: 9 MMOL/L (ref 3–16)
ANISOCYTOSIS: ABNORMAL
BANDED NEUTROPHILS RELATIVE PERCENT: 2 % (ref 0–7)
BASOPHILS ABSOLUTE: 0 K/UL (ref 0–0.2)
BASOPHILS RELATIVE PERCENT: 0 %
BUN BLDV-MCNC: 5 MG/DL (ref 7–20)
CALCIUM SERPL-MCNC: 9.2 MG/DL (ref 8.3–10.6)
CHLORIDE BLD-SCNC: 93 MMOL/L (ref 99–110)
CO2: 39 MMOL/L (ref 21–32)
CREAT SERPL-MCNC: 1.2 MG/DL (ref 0.8–1.3)
EOSINOPHILS ABSOLUTE: 0 K/UL (ref 0–0.6)
EOSINOPHILS RELATIVE PERCENT: 0 %
GFR AFRICAN AMERICAN: >60
GFR NON-AFRICAN AMERICAN: 59
GLUCOSE BLD-MCNC: 123 MG/DL (ref 70–99)
HCT VFR BLD CALC: 27.9 % (ref 40.5–52.5)
HEMOGLOBIN: 9.3 G/DL (ref 13.5–17.5)
LYMPHOCYTES ABSOLUTE: 3 K/UL (ref 1–5.1)
LYMPHOCYTES RELATIVE PERCENT: 16 %
MACROCYTES: ABNORMAL
MCH RBC QN AUTO: 32.5 PG (ref 26–34)
MCHC RBC AUTO-ENTMCNC: 33.4 G/DL (ref 31–36)
MCV RBC AUTO: 97.5 FL (ref 80–100)
MONOCYTES ABSOLUTE: 1.5 K/UL (ref 0–1.3)
MONOCYTES RELATIVE PERCENT: 8 %
NEUTROPHILS ABSOLUTE: 14.1 K/UL (ref 1.7–7.7)
NEUTROPHILS RELATIVE PERCENT: 74 %
PDW BLD-RTO: 16 % (ref 12.4–15.4)
PLATELET # BLD: 376 K/UL (ref 135–450)
PLATELET SLIDE REVIEW: ADEQUATE
PMV BLD AUTO: 7.6 FL (ref 5–10.5)
POLYCHROMASIA: ABNORMAL
POTASSIUM REFLEX MAGNESIUM: 4.3 MMOL/L (ref 3.5–5.1)
RBC # BLD: 2.86 M/UL (ref 4.2–5.9)
SODIUM BLD-SCNC: 141 MMOL/L (ref 136–145)
STOMATOCYTES: ABNORMAL
WBC # BLD: 18.5 K/UL (ref 4–11)

## 2019-10-05 PROCEDURE — 6360000002 HC RX W HCPCS: Performed by: INTERNAL MEDICINE

## 2019-10-05 PROCEDURE — 2700000000 HC OXYGEN THERAPY PER DAY

## 2019-10-05 PROCEDURE — 80048 BASIC METABOLIC PNL TOTAL CA: CPT

## 2019-10-05 PROCEDURE — 6370000000 HC RX 637 (ALT 250 FOR IP): Performed by: INTERNAL MEDICINE

## 2019-10-05 PROCEDURE — 36415 COLL VENOUS BLD VENIPUNCTURE: CPT

## 2019-10-05 PROCEDURE — 85025 COMPLETE CBC W/AUTO DIFF WBC: CPT

## 2019-10-05 PROCEDURE — 99232 SBSQ HOSP IP/OBS MODERATE 35: CPT | Performed by: INTERNAL MEDICINE

## 2019-10-05 PROCEDURE — 2060000000 HC ICU INTERMEDIATE R&B

## 2019-10-05 PROCEDURE — 2580000003 HC RX 258: Performed by: INTERNAL MEDICINE

## 2019-10-05 PROCEDURE — 2500000003 HC RX 250 WO HCPCS: Performed by: INTERNAL MEDICINE

## 2019-10-05 PROCEDURE — 94761 N-INVAS EAR/PLS OXIMETRY MLT: CPT

## 2019-10-05 RX ORDER — FUROSEMIDE 10 MG/ML
40 INJECTION INTRAMUSCULAR; INTRAVENOUS DAILY
Status: DISCONTINUED | OUTPATIENT
Start: 2019-10-06 | End: 2019-10-05

## 2019-10-05 RX ADMIN — ACETAMINOPHEN 650 MG: 325 TABLET ORAL at 08:38

## 2019-10-05 RX ADMIN — POTASSIUM CHLORIDE 20 MEQ: 20 TABLET, EXTENDED RELEASE ORAL at 08:39

## 2019-10-05 RX ADMIN — Medication 10 ML: at 08:39

## 2019-10-05 RX ADMIN — ESCITALOPRAM OXALATE 10 MG: 10 TABLET ORAL at 08:39

## 2019-10-05 RX ADMIN — Medication 250 MG: at 20:53

## 2019-10-05 RX ADMIN — METOPROLOL SUCCINATE 50 MG: 50 TABLET, EXTENDED RELEASE ORAL at 08:38

## 2019-10-05 RX ADMIN — LEVOTHYROXINE SODIUM 150 MCG: 150 TABLET ORAL at 15:08

## 2019-10-05 RX ADMIN — FUROSEMIDE 40 MG: 10 INJECTION, SOLUTION INTRAMUSCULAR; INTRAVENOUS at 08:39

## 2019-10-05 RX ADMIN — Medication 250 MG: at 08:38

## 2019-10-05 RX ADMIN — HEPARIN SODIUM 5000 UNITS: 5000 INJECTION INTRAVENOUS; SUBCUTANEOUS at 00:55

## 2019-10-05 RX ADMIN — MIRTAZAPINE 45 MG: 15 TABLET, FILM COATED ORAL at 00:54

## 2019-10-05 RX ADMIN — HEPARIN SODIUM 5000 UNITS: 5000 INJECTION INTRAVENOUS; SUBCUTANEOUS at 20:54

## 2019-10-05 RX ADMIN — HEPARIN SODIUM 5000 UNITS: 5000 INJECTION INTRAVENOUS; SUBCUTANEOUS at 07:34

## 2019-10-05 RX ADMIN — POTASSIUM CHLORIDE 20 MEQ: 20 TABLET, EXTENDED RELEASE ORAL at 14:58

## 2019-10-05 RX ADMIN — POTASSIUM CHLORIDE 20 MEQ: 20 TABLET, EXTENDED RELEASE ORAL at 00:53

## 2019-10-05 RX ADMIN — Medication 250 MG: at 00:53

## 2019-10-05 RX ADMIN — PROCHLORPERAZINE EDISYLATE 10 MG: 5 INJECTION INTRAMUSCULAR; INTRAVENOUS at 01:02

## 2019-10-05 RX ADMIN — Medication 1 DOSE: at 15:06

## 2019-10-05 RX ADMIN — MICONAZOLE NITRATE: 20 POWDER TOPICAL at 08:38

## 2019-10-05 RX ADMIN — FUROSEMIDE 40 MG: 10 INJECTION, SOLUTION INTRAMUSCULAR; INTRAVENOUS at 00:53

## 2019-10-05 RX ADMIN — Medication 10 ML: at 00:55

## 2019-10-05 RX ADMIN — MIRTAZAPINE 45 MG: 15 TABLET, FILM COATED ORAL at 20:53

## 2019-10-05 RX ADMIN — Medication 10 ML: at 20:53

## 2019-10-05 RX ADMIN — MICONAZOLE NITRATE: 20 POWDER TOPICAL at 20:54

## 2019-10-05 RX ADMIN — SPIRONOLACTONE 25 MG: 25 TABLET ORAL at 08:39

## 2019-10-05 RX ADMIN — MICONAZOLE NITRATE: 20 POWDER TOPICAL at 00:54

## 2019-10-05 RX ADMIN — HEPARIN SODIUM 5000 UNITS: 5000 INJECTION INTRAVENOUS; SUBCUTANEOUS at 14:58

## 2019-10-05 ASSESSMENT — PAIN SCALES - GENERAL
PAINLEVEL_OUTOF10: 1
PAINLEVEL_OUTOF10: 1
PAINLEVEL_OUTOF10: 0
PAINLEVEL_OUTOF10: 1
PAINLEVEL_OUTOF10: 0

## 2019-10-06 LAB
ANION GAP SERPL CALCULATED.3IONS-SCNC: 8 MMOL/L (ref 3–16)
ANISOCYTOSIS: ABNORMAL
BANDED NEUTROPHILS RELATIVE PERCENT: 1 % (ref 0–7)
BASE EXCESS VENOUS: 14.1 MMOL/L (ref -3–3)
BASOPHILIC STIPPLING: ABNORMAL
BASOPHILS ABSOLUTE: 0 K/UL (ref 0–0.2)
BASOPHILS RELATIVE PERCENT: 0 %
BUN BLDV-MCNC: 11 MG/DL (ref 7–20)
CALCIUM SERPL-MCNC: 9.2 MG/DL (ref 8.3–10.6)
CARBOXYHEMOGLOBIN: 1.3 % (ref 0–1.5)
CHLORIDE BLD-SCNC: 92 MMOL/L (ref 99–110)
CHLORIDE URINE RANDOM: 35 MMOL/L
CO2: 39 MMOL/L (ref 21–32)
CREAT SERPL-MCNC: 1.7 MG/DL (ref 0.8–1.3)
EOSINOPHILS ABSOLUTE: 0.2 K/UL (ref 0–0.6)
EOSINOPHILS RELATIVE PERCENT: 1 %
GFR AFRICAN AMERICAN: 48
GFR NON-AFRICAN AMERICAN: 40
GLUCOSE BLD-MCNC: 101 MG/DL (ref 70–99)
HCO3 VENOUS: 40.2 MMOL/L (ref 23–29)
HCT VFR BLD CALC: 27.2 % (ref 40.5–52.5)
HEMOGLOBIN: 9.2 G/DL (ref 13.5–17.5)
LYMPHOCYTES ABSOLUTE: 2.3 K/UL (ref 1–5.1)
LYMPHOCYTES RELATIVE PERCENT: 14 %
MCH RBC QN AUTO: 32.8 PG (ref 26–34)
MCHC RBC AUTO-ENTMCNC: 33.6 G/DL (ref 31–36)
MCV RBC AUTO: 97.7 FL (ref 80–100)
METHEMOGLOBIN VENOUS: 0.7 %
MONOCYTES ABSOLUTE: 1.5 K/UL (ref 0–1.3)
MONOCYTES RELATIVE PERCENT: 9 %
NEUTROPHILS ABSOLUTE: 12.4 K/UL (ref 1.7–7.7)
NEUTROPHILS RELATIVE PERCENT: 75 %
O2 CONTENT, VEN: 11 VOL %
O2 SAT, VEN: 74 %
O2 THERAPY: ABNORMAL
PCO2, VEN: 59.1 MMHG (ref 40–50)
PDW BLD-RTO: 16 % (ref 12.4–15.4)
PH VENOUS: 7.45 (ref 7.35–7.45)
PLATELET # BLD: 406 K/UL (ref 135–450)
PMV BLD AUTO: 7.7 FL (ref 5–10.5)
PO2, VEN: 40.5 MMHG (ref 25–40)
POLYCHROMASIA: ABNORMAL
POTASSIUM REFLEX MAGNESIUM: 4.8 MMOL/L (ref 3.5–5.1)
RBC # BLD: 2.79 M/UL (ref 4.2–5.9)
SODIUM BLD-SCNC: 139 MMOL/L (ref 136–145)
SODIUM URINE: 106 MMOL/L
STOMATOCYTES: ABNORMAL
TCO2 CALC VENOUS: 42 MMOL/L
WBC # BLD: 16.3 K/UL (ref 4–11)

## 2019-10-06 PROCEDURE — 85025 COMPLETE CBC W/AUTO DIFF WBC: CPT

## 2019-10-06 PROCEDURE — 2060000000 HC ICU INTERMEDIATE R&B

## 2019-10-06 PROCEDURE — 82803 BLOOD GASES ANY COMBINATION: CPT

## 2019-10-06 PROCEDURE — 2700000000 HC OXYGEN THERAPY PER DAY

## 2019-10-06 PROCEDURE — 36415 COLL VENOUS BLD VENIPUNCTURE: CPT

## 2019-10-06 PROCEDURE — 82436 ASSAY OF URINE CHLORIDE: CPT

## 2019-10-06 PROCEDURE — 6370000000 HC RX 637 (ALT 250 FOR IP): Performed by: INTERNAL MEDICINE

## 2019-10-06 PROCEDURE — 84300 ASSAY OF URINE SODIUM: CPT

## 2019-10-06 PROCEDURE — 2580000003 HC RX 258: Performed by: INTERNAL MEDICINE

## 2019-10-06 PROCEDURE — 6360000002 HC RX W HCPCS: Performed by: INTERNAL MEDICINE

## 2019-10-06 PROCEDURE — 80048 BASIC METABOLIC PNL TOTAL CA: CPT

## 2019-10-06 PROCEDURE — 94761 N-INVAS EAR/PLS OXIMETRY MLT: CPT

## 2019-10-06 RX ORDER — SODIUM CHLORIDE 9 MG/ML
INJECTION, SOLUTION INTRAVENOUS CONTINUOUS
Status: DISCONTINUED | OUTPATIENT
Start: 2019-10-06 | End: 2019-10-07

## 2019-10-06 RX ADMIN — MIRTAZAPINE 45 MG: 15 TABLET, FILM COATED ORAL at 21:57

## 2019-10-06 RX ADMIN — HEPARIN SODIUM 5000 UNITS: 5000 INJECTION INTRAVENOUS; SUBCUTANEOUS at 21:57

## 2019-10-06 RX ADMIN — HEPARIN SODIUM 5000 UNITS: 5000 INJECTION INTRAVENOUS; SUBCUTANEOUS at 06:44

## 2019-10-06 RX ADMIN — Medication 10 ML: at 21:57

## 2019-10-06 RX ADMIN — Medication 250 MG: at 09:32

## 2019-10-06 RX ADMIN — SPIRONOLACTONE 25 MG: 25 TABLET ORAL at 09:32

## 2019-10-06 RX ADMIN — HEPARIN SODIUM 5000 UNITS: 5000 INJECTION INTRAVENOUS; SUBCUTANEOUS at 15:46

## 2019-10-06 RX ADMIN — LEVOTHYROXINE SODIUM 150 MCG: 150 TABLET ORAL at 08:12

## 2019-10-06 RX ADMIN — MICONAZOLE NITRATE: 20 POWDER TOPICAL at 22:01

## 2019-10-06 RX ADMIN — Medication 250 MG: at 02:50

## 2019-10-06 RX ADMIN — MICONAZOLE NITRATE: 20 POWDER TOPICAL at 09:32

## 2019-10-06 RX ADMIN — HYDROCODONE BITARTRATE AND ACETAMINOPHEN 1 TABLET: 5; 325 TABLET ORAL at 21:57

## 2019-10-06 RX ADMIN — Medication 1 DOSE: at 09:32

## 2019-10-06 RX ADMIN — Medication 10 ML: at 09:32

## 2019-10-06 RX ADMIN — SODIUM CHLORIDE: 9 INJECTION, SOLUTION INTRAVENOUS at 22:10

## 2019-10-06 RX ADMIN — METOPROLOL SUCCINATE 50 MG: 50 TABLET, EXTENDED RELEASE ORAL at 09:31

## 2019-10-06 RX ADMIN — Medication 1 DOSE: at 16:43

## 2019-10-06 RX ADMIN — ESCITALOPRAM OXALATE 10 MG: 10 TABLET ORAL at 09:32

## 2019-10-06 ASSESSMENT — PAIN SCALES - GENERAL
PAINLEVEL_OUTOF10: 0
PAINLEVEL_OUTOF10: 7
PAINLEVEL_OUTOF10: 0
PAINLEVEL_OUTOF10: 3
PAINLEVEL_OUTOF10: 0

## 2019-10-07 ENCOUNTER — TELEPHONE (OUTPATIENT)
Dept: INTERNAL MEDICINE CLINIC | Age: 74
End: 2019-10-07

## 2019-10-07 LAB
ANION GAP SERPL CALCULATED.3IONS-SCNC: 8 MMOL/L (ref 3–16)
BASOPHILS ABSOLUTE: 0.1 K/UL (ref 0–0.2)
BASOPHILS RELATIVE PERCENT: 0.4 %
BUN BLDV-MCNC: 15 MG/DL (ref 7–20)
CALCIUM SERPL-MCNC: 8.6 MG/DL (ref 8.3–10.6)
CHLORIDE BLD-SCNC: 96 MMOL/L (ref 99–110)
CO2: 36 MMOL/L (ref 21–32)
CREAT SERPL-MCNC: 1.7 MG/DL (ref 0.8–1.3)
EOSINOPHILS ABSOLUTE: 0.1 K/UL (ref 0–0.6)
EOSINOPHILS RELATIVE PERCENT: 0.9 %
GFR AFRICAN AMERICAN: 48
GFR NON-AFRICAN AMERICAN: 40
GLUCOSE BLD-MCNC: 90 MG/DL (ref 70–99)
HCT VFR BLD CALC: 23.9 % (ref 40.5–52.5)
HEMOGLOBIN: 8 G/DL (ref 13.5–17.5)
LYMPHOCYTES ABSOLUTE: 1.8 K/UL (ref 1–5.1)
LYMPHOCYTES RELATIVE PERCENT: 11.5 %
MCH RBC QN AUTO: 32.7 PG (ref 26–34)
MCHC RBC AUTO-ENTMCNC: 33.6 G/DL (ref 31–36)
MCV RBC AUTO: 97.2 FL (ref 80–100)
MONOCYTES ABSOLUTE: 1.5 K/UL (ref 0–1.3)
MONOCYTES RELATIVE PERCENT: 9.5 %
NEUTROPHILS ABSOLUTE: 12.2 K/UL (ref 1.7–7.7)
NEUTROPHILS RELATIVE PERCENT: 77.7 %
PDW BLD-RTO: 16.1 % (ref 12.4–15.4)
PLATELET # BLD: 438 K/UL (ref 135–450)
PMV BLD AUTO: 8.1 FL (ref 5–10.5)
POTASSIUM REFLEX MAGNESIUM: 4.2 MMOL/L (ref 3.5–5.1)
RBC # BLD: 2.46 M/UL (ref 4.2–5.9)
SODIUM BLD-SCNC: 140 MMOL/L (ref 136–145)
WBC # BLD: 15.8 K/UL (ref 4–11)

## 2019-10-07 PROCEDURE — 6360000002 HC RX W HCPCS: Performed by: INTERNAL MEDICINE

## 2019-10-07 PROCEDURE — 2700000000 HC OXYGEN THERAPY PER DAY

## 2019-10-07 PROCEDURE — 1200000000 HC SEMI PRIVATE

## 2019-10-07 PROCEDURE — 80048 BASIC METABOLIC PNL TOTAL CA: CPT

## 2019-10-07 PROCEDURE — 94761 N-INVAS EAR/PLS OXIMETRY MLT: CPT

## 2019-10-07 PROCEDURE — 6370000000 HC RX 637 (ALT 250 FOR IP): Performed by: INTERNAL MEDICINE

## 2019-10-07 PROCEDURE — 85025 COMPLETE CBC W/AUTO DIFF WBC: CPT

## 2019-10-07 PROCEDURE — 2580000003 HC RX 258: Performed by: INTERNAL MEDICINE

## 2019-10-07 RX ADMIN — Medication 1 DOSE: at 16:46

## 2019-10-07 RX ADMIN — SODIUM CHLORIDE: 9 INJECTION, SOLUTION INTRAVENOUS at 09:13

## 2019-10-07 RX ADMIN — Medication 250 MG: at 20:49

## 2019-10-07 RX ADMIN — METOPROLOL SUCCINATE 50 MG: 50 TABLET, EXTENDED RELEASE ORAL at 10:37

## 2019-10-07 RX ADMIN — Medication 250 MG: at 10:37

## 2019-10-07 RX ADMIN — Medication 10 ML: at 20:49

## 2019-10-07 RX ADMIN — Medication 1 DOSE: at 10:39

## 2019-10-07 RX ADMIN — ESCITALOPRAM OXALATE 10 MG: 10 TABLET ORAL at 10:37

## 2019-10-07 RX ADMIN — HEPARIN SODIUM 5000 UNITS: 5000 INJECTION INTRAVENOUS; SUBCUTANEOUS at 05:59

## 2019-10-07 RX ADMIN — Medication 10 ML: at 10:39

## 2019-10-07 RX ADMIN — MIRTAZAPINE 45 MG: 15 TABLET, FILM COATED ORAL at 20:49

## 2019-10-07 RX ADMIN — LEVOTHYROXINE SODIUM 150 MCG: 150 TABLET ORAL at 10:37

## 2019-10-07 RX ADMIN — HEPARIN SODIUM 5000 UNITS: 5000 INJECTION INTRAVENOUS; SUBCUTANEOUS at 20:49

## 2019-10-07 RX ADMIN — MICONAZOLE NITRATE: 20 POWDER TOPICAL at 10:39

## 2019-10-07 RX ADMIN — SPIRONOLACTONE 25 MG: 25 TABLET ORAL at 10:37

## 2019-10-07 RX ADMIN — ACETAMINOPHEN 650 MG: 325 TABLET ORAL at 00:24

## 2019-10-07 ASSESSMENT — PAIN SCALES - GENERAL
PAINLEVEL_OUTOF10: 0
PAINLEVEL_OUTOF10: 0
PAINLEVEL_OUTOF10: 5
PAINLEVEL_OUTOF10: 0

## 2019-10-08 ENCOUNTER — TELEPHONE (OUTPATIENT)
Dept: INTERNAL MEDICINE CLINIC | Age: 74
End: 2019-10-08

## 2019-10-08 VITALS
HEART RATE: 83 BPM | WEIGHT: 222 LBS | TEMPERATURE: 98.2 F | RESPIRATION RATE: 18 BRPM | OXYGEN SATURATION: 97 % | HEIGHT: 73 IN | SYSTOLIC BLOOD PRESSURE: 127 MMHG | BODY MASS INDEX: 29.42 KG/M2 | DIASTOLIC BLOOD PRESSURE: 75 MMHG

## 2019-10-08 LAB
ANION GAP SERPL CALCULATED.3IONS-SCNC: 13 MMOL/L (ref 3–16)
BASOPHILS ABSOLUTE: 0.1 K/UL (ref 0–0.2)
BASOPHILS RELATIVE PERCENT: 0.6 %
BUN BLDV-MCNC: 16 MG/DL (ref 7–20)
CALCIUM SERPL-MCNC: 8.9 MG/DL (ref 8.3–10.6)
CHLORIDE BLD-SCNC: 96 MMOL/L (ref 99–110)
CO2: 30 MMOL/L (ref 21–32)
CREAT SERPL-MCNC: 1.6 MG/DL (ref 0.8–1.3)
EOSINOPHILS ABSOLUTE: 0.2 K/UL (ref 0–0.6)
EOSINOPHILS RELATIVE PERCENT: 1.1 %
GFR AFRICAN AMERICAN: 51
GFR NON-AFRICAN AMERICAN: 43
GLUCOSE BLD-MCNC: 75 MG/DL (ref 70–99)
HCT VFR BLD CALC: 26 % (ref 40.5–52.5)
HEMOGLOBIN: 8.7 G/DL (ref 13.5–17.5)
LYMPHOCYTES ABSOLUTE: 1.3 K/UL (ref 1–5.1)
LYMPHOCYTES RELATIVE PERCENT: 7.7 %
MCH RBC QN AUTO: 32.9 PG (ref 26–34)
MCHC RBC AUTO-ENTMCNC: 33.3 G/DL (ref 31–36)
MCV RBC AUTO: 98.6 FL (ref 80–100)
MONOCYTES ABSOLUTE: 1.3 K/UL (ref 0–1.3)
MONOCYTES RELATIVE PERCENT: 8.2 %
NEUTROPHILS ABSOLUTE: 13.3 K/UL (ref 1.7–7.7)
NEUTROPHILS RELATIVE PERCENT: 82.4 %
PDW BLD-RTO: 15.8 % (ref 12.4–15.4)
PLATELET # BLD: 507 K/UL (ref 135–450)
PMV BLD AUTO: 8.6 FL (ref 5–10.5)
POTASSIUM REFLEX MAGNESIUM: 4.3 MMOL/L (ref 3.5–5.1)
RBC # BLD: 2.64 M/UL (ref 4.2–5.9)
SODIUM BLD-SCNC: 139 MMOL/L (ref 136–145)
WBC # BLD: 16.2 K/UL (ref 4–11)

## 2019-10-08 PROCEDURE — 36415 COLL VENOUS BLD VENIPUNCTURE: CPT

## 2019-10-08 PROCEDURE — 2700000000 HC OXYGEN THERAPY PER DAY

## 2019-10-08 PROCEDURE — 80048 BASIC METABOLIC PNL TOTAL CA: CPT

## 2019-10-08 PROCEDURE — 94761 N-INVAS EAR/PLS OXIMETRY MLT: CPT

## 2019-10-08 PROCEDURE — 6370000000 HC RX 637 (ALT 250 FOR IP): Performed by: INTERNAL MEDICINE

## 2019-10-08 PROCEDURE — 85025 COMPLETE CBC W/AUTO DIFF WBC: CPT

## 2019-10-08 PROCEDURE — 6360000002 HC RX W HCPCS: Performed by: INTERNAL MEDICINE

## 2019-10-08 PROCEDURE — 2580000003 HC RX 258: Performed by: INTERNAL MEDICINE

## 2019-10-08 RX ORDER — LEVOTHYROXINE SODIUM 0.15 MG/1
150 TABLET ORAL DAILY
Qty: 30 TABLET | Refills: 3 | Status: SHIPPED | OUTPATIENT
Start: 2019-10-09

## 2019-10-08 RX ORDER — SPIRONOLACTONE 25 MG/1
25 TABLET ORAL DAILY
Qty: 30 TABLET | Refills: 3 | Status: SHIPPED | OUTPATIENT
Start: 2019-10-09

## 2019-10-08 RX ADMIN — Medication 1 DOSE: at 10:23

## 2019-10-08 RX ADMIN — Medication 10 ML: at 10:21

## 2019-10-08 RX ADMIN — HEPARIN SODIUM 5000 UNITS: 5000 INJECTION INTRAVENOUS; SUBCUTANEOUS at 06:31

## 2019-10-08 RX ADMIN — HEPARIN SODIUM 5000 UNITS: 5000 INJECTION INTRAVENOUS; SUBCUTANEOUS at 14:47

## 2019-10-08 RX ADMIN — SPIRONOLACTONE 25 MG: 25 TABLET ORAL at 10:18

## 2019-10-08 RX ADMIN — Medication 1 DOSE: at 17:15

## 2019-10-08 RX ADMIN — METOPROLOL SUCCINATE 50 MG: 50 TABLET, EXTENDED RELEASE ORAL at 10:19

## 2019-10-08 RX ADMIN — MICONAZOLE NITRATE: 20 POWDER TOPICAL at 10:21

## 2019-10-08 RX ADMIN — LEVOTHYROXINE SODIUM 150 MCG: 150 TABLET ORAL at 10:18

## 2019-10-08 RX ADMIN — ESCITALOPRAM OXALATE 10 MG: 10 TABLET ORAL at 10:18

## 2019-10-08 RX ADMIN — Medication 250 MG: at 10:18

## 2019-10-08 ASSESSMENT — PAIN SCALES - GENERAL
PAINLEVEL_OUTOF10: 0

## 2019-10-09 ENCOUNTER — TELEPHONE (OUTPATIENT)
Dept: PHARMACY | Facility: CLINIC | Age: 74
End: 2019-10-09

## 2019-10-11 ENCOUNTER — TELEPHONE (OUTPATIENT)
Dept: INTERNAL MEDICINE CLINIC | Age: 74
End: 2019-10-11

## 2019-10-14 ENCOUNTER — TELEPHONE (OUTPATIENT)
Dept: INTERNAL MEDICINE CLINIC | Age: 74
End: 2019-10-14

## 2019-10-14 LAB
FUNGUS (MYCOLOGY) CULTURE: NORMAL
FUNGUS STAIN: NORMAL

## 2019-10-15 ENCOUNTER — TELEPHONE (OUTPATIENT)
Dept: INTERNAL MEDICINE CLINIC | Age: 74
End: 2019-10-15

## 2019-10-17 ENCOUNTER — TELEPHONE (OUTPATIENT)
Dept: INTERNAL MEDICINE CLINIC | Age: 74
End: 2019-10-17

## 2019-10-18 ENCOUNTER — TELEPHONE (OUTPATIENT)
Dept: INTERNAL MEDICINE CLINIC | Age: 74
End: 2019-10-18

## 2019-10-28 LAB
FUNGUS (MYCOLOGY) CULTURE: ABNORMAL
FUNGUS STAIN: ABNORMAL
ORGANISM: ABNORMAL

## 2019-10-29 LAB
AFB CULTURE (MYCOBACTERIA): NORMAL
AFB SMEAR: NORMAL

## 2019-10-30 ENCOUNTER — CARE COORDINATION (OUTPATIENT)
Dept: CARE COORDINATION | Age: 74
End: 2019-10-30

## 2019-11-12 LAB
AFB CULTURE (MYCOBACTERIA): NORMAL
AFB SMEAR: NORMAL

## 2019-12-28 ENCOUNTER — HOSPITAL ENCOUNTER (EMERGENCY)
Age: 74
Discharge: HOME | End: 2019-12-28
Attending: EMERGENCY MEDICINE
Payer: COMMERCIAL

## 2019-12-28 VITALS
SYSTOLIC BLOOD PRESSURE: 94 MMHG | HEIGHT: 73 IN | TEMPERATURE: 97.8 F | BODY MASS INDEX: 29.42 KG/M2 | DIASTOLIC BLOOD PRESSURE: 62 MMHG | RESPIRATION RATE: 18 BRPM | OXYGEN SATURATION: 94 % | HEART RATE: 76 BPM | WEIGHT: 222 LBS

## 2019-12-28 DIAGNOSIS — R33.9 URINARY RETENTION: Primary | ICD-10-CM

## 2019-12-28 DIAGNOSIS — T83.420A: ICD-10-CM

## 2019-12-28 DIAGNOSIS — N39.0 ACUTE UTI: ICD-10-CM

## 2019-12-28 PROCEDURE — 6370000000 HC RX 637 (ALT 250 FOR IP): Performed by: EMERGENCY MEDICINE

## 2019-12-28 PROCEDURE — 51798 US URINE CAPACITY MEASURE: CPT

## 2019-12-28 PROCEDURE — 51702 INSERT TEMP BLADDER CATH: CPT

## 2019-12-28 PROCEDURE — 99283 EMERGENCY DEPT VISIT LOW MDM: CPT

## 2019-12-28 RX ORDER — CEFDINIR 300 MG/1
300 CAPSULE ORAL 2 TIMES DAILY
Qty: 20 CAPSULE | Refills: 0 | Status: SHIPPED | OUTPATIENT
Start: 2019-12-28 | End: 2020-01-07

## 2019-12-28 RX ORDER — OXYCODONE HYDROCHLORIDE 5 MG/1
5 TABLET ORAL EVERY 4 HOURS PRN
COMMUNITY

## 2019-12-28 RX ORDER — QUETIAPINE FUMARATE 25 MG/1
25 TABLET, FILM COATED ORAL NIGHTLY
COMMUNITY

## 2019-12-28 RX ORDER — GABAPENTIN 300 MG/1
300 CAPSULE ORAL 3 TIMES DAILY
COMMUNITY

## 2019-12-28 RX ORDER — CEFDINIR 300 MG/1
300 CAPSULE ORAL ONCE
Status: COMPLETED | OUTPATIENT
Start: 2019-12-28 | End: 2019-12-28

## 2019-12-28 RX ORDER — LOPERAMIDE HYDROCHLORIDE 2 MG/1
2 CAPSULE ORAL 2 TIMES DAILY PRN
COMMUNITY

## 2019-12-28 RX ORDER — ONDANSETRON 4 MG/1
4 TABLET, ORALLY DISINTEGRATING ORAL EVERY 6 HOURS PRN
COMMUNITY

## 2019-12-28 RX ADMIN — CEFDINIR 300 MG: 300 CAPSULE ORAL at 05:35

## 2020-03-25 PROBLEM — N17.9 ACUTE KIDNEY FAILURE, UNSPECIFIED (HCC): Status: RESOLVED | Noted: 2019-08-11 | Resolved: 2020-03-24

## 2020-03-25 PROBLEM — N17.9 ACUTE RENAL FAILURE (ARF) (HCC): Status: RESOLVED | Noted: 2019-08-11 | Resolved: 2020-03-24

## 2021-07-13 NOTE — PROGRESS NOTES
Hospitalist Progress Note      PCP: Ermelinda Healy MD    Date of Admission: 8/27/2019    Chief Complaint: weakness, confusion. Complains of shortness of breath and diarrhea    Hospital Course: Readmitted from home with MCKENNA, hypotension, dehydration. Subjective:     Diarrhea persistent but improved- having small smears per nurse. Patient denies any fever, chills or abdominal pain      Medications:  Reviewed    Infusion Medications    sodium chloride 125 mL/hr at 09/09/19 2316     Scheduled Medications    amLODIPine  5 mg Oral Daily    levothyroxine  150 mcg Oral Daily    metoprolol tartrate  25 mg Oral BID    pantoprazole  40 mg Oral QAM AC    mometasone-formoterol  2 puff Inhalation BID    tamsulosin  0.4 mg Oral Daily    sodium chloride flush  10 mL Intravenous 2 times per day     PRN Meds: albuterol sulfate HFA, sodium chloride flush, acetaminophen      Intake/Output Summary (Last 24 hours) at 9/10/2019 1026  Last data filed at 9/10/2019 0849  Gross per 24 hour   Intake 942 ml   Output 830 ml   Net 112 ml       Physical Exam Performed:    /68   Pulse 88   Temp 98.6 °F (37 °C) (Oral)   Resp 18   Ht 6' 1\" (1.854 m)   Wt 246 lb 14.6 oz (112 kg)   SpO2 96%   BMI 32.58 kg/m²     General appearance: No apparent distress, appears stated age and cooperative. HEENT: Pupils equal, round, and reactive to light. Conjunctivae/corneas clear. Neck: Supple, with full range of motion. No jugular venous distention. Trachea midline. Respiratory:  Normal respiratory effort. Bibasilar Rales,no overt crackles cardiovascular: Regular rate and rhythm with normal S1/S2 without murmurs, rubs or gallops. Abdomen: Soft, non-tender, non-distended with normal bowel sounds. Musculoskeletal: No clubbing, cyanosis or edema bilaterally. Skin: Skin color, texture, turgor normal. Decubitus ulcers noted.  No surrounding cellulitis seen  Neurologic: Alert speech clear with no overt facial English

## 2024-03-12 NOTE — CARE COORDINATION
Spoke with patient at bedside to confirm the discharge plan. He is still refusing SNF and not participating with therapy. He states his plan is to go home but that he will go home with [facility]. He also states he has family available to assist him as needed around the clock. Placed call to Meghann Maya with [facility] and updated her on the plan. She states they will follow at discharge. accepted accepted accepted

## (undated) DEVICE — ELECTRODE PT RET AD L9FT HI MOIST COND ADH HYDRGEL CORDED

## (undated) DEVICE — ENDO CARRY-ON PROCEDURE KIT INCLUDES SUCTION TUBING, LUBRICANT, GAUZE, BIOHAZARD STICKER, TRANSPORT PAD AND INTERCEPT BEDSIDE KIT.: Brand: ENDO CARRY-ON PROCEDURE KIT

## (undated) DEVICE — SINGLE USE BIOPSY VALVE MAJ-210: Brand: SINGLE USE BIOPSY VALVE (STERILE)

## (undated) DEVICE — ELECTRODE,RADIOTRANSLUCENT,FOAM,3PK: Brand: MEDLINE

## (undated) DEVICE — YANKAUER,BULB TIP,W/O VENT,RIGID,STERILE: Brand: MEDLINE

## (undated) DEVICE — Device: Brand: DEFENDO VALVE AND CONNECTOR KIT

## (undated) DEVICE — TRAP,MUCUS SPECIMEN, 80CC: Brand: MEDLINE

## (undated) DEVICE — CONTRAST AGENT KIT SYR 23 GA 10 CC TWIN PK KT W/ INTERJECT BX/10

## (undated) DEVICE — SINGLE USE SUCTION VALVE MAJ-209: Brand: SINGLE USE SUCTION VALVE (STERILE)

## (undated) DEVICE — TUBING, SUCTION, 3/16" X 6', STRAIGHT: Brand: MEDLINE

## (undated) DEVICE — Z DISCONTINUED USE 2276105 GOWN PROTCT UNIV CHST W28IN L49IN SL 24IN BLU SPUNBOND FLM

## (undated) DEVICE — TUBING, SUCTION, 3/16" X 12', STRAIGHT: Brand: MEDLINE

## (undated) DEVICE — CANNULA,OXY,ADULT,SUPERSOFT,W/7'TUB,SC: Brand: MEDLINE INDUSTRIES, INC.

## (undated) DEVICE — Device: Brand: DISPOSABLE ELECTROSURGICAL SNARE

## (undated) DEVICE — SYRINGE MED 50ML LUERSLIP TIP

## (undated) DEVICE — CLIP LIG L235CM RESOL 360 BX/20

## (undated) DEVICE — CONMED SCOPE SAVER BITE BLOCK, 20X27 MM: Brand: SCOPE SAVER

## (undated) DEVICE — LINER,SOFT,SUCTION CANISTER,1500CC: Brand: MEDLINE

## (undated) DEVICE — BOWL MED M 16OZ PLAS CAP GRAD

## (undated) DEVICE — ELECTRODE ECG MONITR FOAM TEAR DROP ADLT RED

## (undated) DEVICE — FORCEPS BX L240CM DIA2.4MM L NDL RAD JAW 4 133340

## (undated) DEVICE — RETRIEVER ENDOSCP L230CM DIA2.5MM NET W3XL5.5CM MIN WRK CHN

## (undated) DEVICE — SYRINGE MED 10ML SLIP TIP BLNT FILL AND LUERLOCK DISP

## (undated) DEVICE — FRAME EYEWR PROTCT ASST CLR DISP SAFEVIEW